# Patient Record
Sex: FEMALE | Race: WHITE | NOT HISPANIC OR LATINO | Employment: OTHER | URBAN - METROPOLITAN AREA
[De-identification: names, ages, dates, MRNs, and addresses within clinical notes are randomized per-mention and may not be internally consistent; named-entity substitution may affect disease eponyms.]

---

## 2017-01-02 ENCOUNTER — APPOINTMENT (OUTPATIENT)
Dept: RADIOLOGY | Facility: MEDICAL CENTER | Age: 67
End: 2017-01-02
Attending: EMERGENCY MEDICINE
Payer: MEDICARE

## 2017-01-02 ENCOUNTER — HOSPITAL ENCOUNTER (EMERGENCY)
Facility: MEDICAL CENTER | Age: 67
End: 2017-01-02
Attending: EMERGENCY MEDICINE
Payer: MEDICARE

## 2017-01-02 VITALS
HEIGHT: 64 IN | HEART RATE: 76 BPM | BODY MASS INDEX: 28.98 KG/M2 | OXYGEN SATURATION: 95 % | WEIGHT: 169.75 LBS | DIASTOLIC BLOOD PRESSURE: 74 MMHG | SYSTOLIC BLOOD PRESSURE: 120 MMHG | TEMPERATURE: 97 F | RESPIRATION RATE: 18 BRPM

## 2017-01-02 DIAGNOSIS — M54.50 ACUTE BILATERAL LOW BACK PAIN WITHOUT SCIATICA: ICD-10-CM

## 2017-01-02 DIAGNOSIS — J06.9 UPPER RESPIRATORY TRACT INFECTION, UNSPECIFIED TYPE: ICD-10-CM

## 2017-01-02 DIAGNOSIS — M62.838 MUSCLE SPASM: ICD-10-CM

## 2017-01-02 LAB
APPEARANCE UR: ABNORMAL
APPEARANCE UR: CLEAR
BACTERIA #/AREA URNS HPF: ABNORMAL /HPF
BILIRUB UR QL STRIP.AUTO: NEGATIVE
COLOR UR: YELLOW
COLOR UR: YELLOW
CULTURE IF INDICATED INDCX: YES UA CULTURE
EPI CELLS #/AREA URNS HPF: ABNORMAL /HPF
GLUCOSE UR STRIP.AUTO-MCNC: NEGATIVE MG/DL
GLUCOSE UR STRIP.AUTO-MCNC: NEGATIVE MG/DL
KETONES UR STRIP.AUTO-MCNC: NEGATIVE MG/DL
KETONES UR STRIP.AUTO-MCNC: NEGATIVE MG/DL
LEUKOCYTE ESTERASE UR QL STRIP.AUTO: NEGATIVE
LEUKOCYTE ESTERASE UR QL STRIP.AUTO: NEGATIVE
MICRO URNS: ABNORMAL
NITRITE UR QL STRIP.AUTO: POSITIVE
NITRITE UR QL STRIP.AUTO: POSITIVE
PH UR STRIP.AUTO: 6 [PH]
PH UR STRIP.AUTO: 6 [PH]
PROT UR QL STRIP: NEGATIVE MG/DL
PROT UR QL STRIP: NEGATIVE MG/DL
RBC # URNS HPF: ABNORMAL /HPF
RBC UR QL AUTO: NEGATIVE
RBC UR QL AUTO: NEGATIVE
SP GR UR STRIP.AUTO: <=1.005
SP GR UR STRIP.AUTO: <=1.005
WBC #/AREA URNS HPF: ABNORMAL /HPF

## 2017-01-02 PROCEDURE — 71010 DX-CHEST-LIMITED (1 VIEW): CPT

## 2017-01-02 PROCEDURE — 87086 URINE CULTURE/COLONY COUNT: CPT

## 2017-01-02 PROCEDURE — 99284 EMERGENCY DEPT VISIT MOD MDM: CPT

## 2017-01-02 PROCEDURE — 87077 CULTURE AEROBIC IDENTIFY: CPT

## 2017-01-02 PROCEDURE — 87186 SC STD MICRODIL/AGAR DIL: CPT

## 2017-01-02 PROCEDURE — 81002 URINALYSIS NONAUTO W/O SCOPE: CPT

## 2017-01-02 PROCEDURE — 81001 URINALYSIS AUTO W/SCOPE: CPT

## 2017-01-02 RX ORDER — CIPROFLOXACIN 500 MG/1
500 TABLET, FILM COATED ORAL 2 TIMES DAILY
Qty: 14 TAB | Refills: 0 | Status: SHIPPED | OUTPATIENT
Start: 2017-01-02 | End: 2017-01-06

## 2017-01-02 RX ORDER — METHOCARBAMOL 750 MG/1
1500 TABLET, FILM COATED ORAL 4 TIMES DAILY PRN
Qty: 20 TAB | Refills: 0 | Status: SHIPPED | OUTPATIENT
Start: 2017-01-02 | End: 2017-02-27

## 2017-01-02 RX ORDER — METHYLPREDNISOLONE 4 MG/1
TABLET ORAL
Qty: 1 KIT | Refills: 0 | Status: SHIPPED | OUTPATIENT
Start: 2017-01-02 | End: 2017-01-18 | Stop reason: SDUPTHER

## 2017-01-02 ASSESSMENT — PAIN SCALES - GENERAL: PAINLEVEL_OUTOF10: 4

## 2017-01-02 NOTE — ED AVS SNAPSHOT
Foound Access Code: Activation code not generated  Current Foound Status: Active    Personalishart  A secure, online tool to manage your health information     [employer]’s Foound® is a secure, online tool that connects you to your personalized health information from the privacy of your home -- day or night - making it very easy for you to manage your healthcare. Once the activation process is completed, you can even access your medical information using the Foound koko, which is available for free in the Apple Koko store or Google Play store.     Foound provides the following levels of access (as shown below):   My Chart Features   [facility] Primary Care Doctor [facility]  Specialists [facility]  Urgent  Care Non-[facility]  Primary Care  Doctor   Email your healthcare team securely and privately 24/7 X X X X   Manage appointments: schedule your next appointment; view details of past/upcoming appointments X      Request prescription refills. X      View recent personal medical records, including lab and immunizations X X X X   View health record, including health history, allergies, medications X X X X   Read reports about your outpatient visits, procedures, consult and ER notes X X X X   See your discharge summary, which is a recap of your hospital and/or ER visit that includes your diagnosis, lab results, and care plan. X X       How to register for Foound:  1. Go to  [email or web address].  2. Click on the Sign Up Now box, which takes you to the New Member Sign Up page. You will need to provide the following information:  a. Enter your Foound Access Code exactly as it appears at the top of this page. (You will not need to use this code after you’ve completed the sign-up process. If you do not sign up before the expiration date, you must request a new code.)   b. Enter your date of birth.   c. Enter your home email address.   d. Click Submit, and follow the next screen’s instructions.  3. Create a Foound ID. This will  be your [employer] login ID and cannot be changed, so think of one that is secure and easy to remember.  4. Create a [employer] password. You can change your password at any time.  5. Enter your Password Reset Question and Answer. This can be used at a later time if you forget your password.   6. Enter your e-mail address. This allows you to receive e-mail notifications when new information is available in [employer].  7. Click Sign Up. You can now view your health information.    For assistance activating your [employer] account, call [phone]

## 2017-01-02 NOTE — ED AVS SNAPSHOT
1/2/2017          Summer Hatch  [address] [place] [address]    Dear Summer:    [facility] wants to ensure your discharge home is safe and you or your loved ones have had all your questions answered regarding your care after you leave the hospital.    You may receive a telephone call within two days of your discharge.  This call is to make certain you understand your discharge instructions as well as ensure we provided you with the best care possible during your stay with us.     The call will only last approximately 3-5 minutes and will be done by a nurse.    Once again, we want to ensure your discharge home is safe and that you have a clear understanding of any next steps in your care.  If you have any questions or concerns, please do not hesitate to contact us, we are here for you.  Thank you for choosing [facility] for your healthcare needs.    Sincerely,    Jarred Gonzalez    [facility]

## 2017-01-02 NOTE — ED NOTES
Presents accompanied by family; pt complains of exacerbation of chronic back pin.  She has a residual cough from a recent URI treated with antibiotics.

## 2017-01-02 NOTE — ED AVS SNAPSHOT
After Visit Summary                                                                                                                Summer Hatch   MRN: [id number]    Department:  [facility], Emergency Dept   Date of Visit:  1/2/2017            [facility], Emergency Dept    [address]    [place] [address]    Phone:  [phone]      You were seen by     Victor Hugo Franco M.D.      Your Diagnosis Was     Acute bilateral low back pain without sciatica     M54.5       Follow-up Information     1. Follow up with Jl Palomino M.D.. Schedule an appointment as soon as possible for a visit in 1 week.    Specialty:  Family Medicine    Why:  As needed    Contact information    [address] [facility] [address]  [place] [address] [phone]        Medication Information     Review all of your home medications and newly ordered medications with your primary doctor and/or pharmacist as soon as possible. Follow medication instructions as directed by your doctor and/or pharmacist.     Please keep your complete medication list with you and share with your physician. Update the information when medications are discontinued, doses are changed, or new medications (including over-the-counter products) are added; and carry medication information at all times in the event of emergency situations.               Medication List      START taking these medications        Instructions    ciprofloxacin 500 MG Tabs   Commonly known as:  CIPRO    Take 1 Tab by mouth 2 times a day.   Dose:  500 mg       methocarbamol 750 MG Tabs   Commonly known as:  ROBAXIN    Take 2 Tabs by mouth 4 times a day as needed (muscle spasm).   Dose:  1500 mg       MethylPREDNISolone 4 MG Tbpk   Commonly known as:  MEDROL DOSEPAK    Sig: Per instructions on pack         ASK your doctor about these medications        Instructions    * albuterol 108 (90 BASE) MCG/ACT Aers inhalation aerosol    Inhale 2 Puffs by  mouth every 6 hours as needed for Shortness of Breath.   Dose:  2 Puff       * albuterol 2.5mg/3ml Nebu solution for nebulization   Commonly known as:  PROVENTIL    Doctor's comments:  DX COPD J44.9   3 mL by Nebulization route every four hours as needed for Shortness of Breath.   Dose:  2.5 mg       allopurinol 100 MG Tabs   Commonly known as:  ZYLOPRIM    TAKE 1 TABLET DAILY       * aripiprazole 5 MG tablet   Commonly known as:  ABILIFY    TAKE 1 TABLET AT BEDTIME       * aripiprazole 5 MG tablet   Commonly known as:  ABILIFY    TAKE 1 TABLET AT BEDTIME       aspirin 325 MG Tabs   Commonly known as:  ASA    Take 325 mg by mouth every day.   Dose:  325 mg       atorvastatin 40 MG Tabs   Commonly known as:  LIPITOR    Take 1 Tab by mouth every day.   Dose:  40 mg       * azithromycin 250 MG Tabs   Commonly known as:  ZITHROMAX    Take 2 tablets on day 1, then take 1 tablet a day for 4 days.       * azithromycin 250 MG Tabs   Commonly known as:  ZITHROMAX    Take 2 tablets on day 1, then take 1 tablet a day for 4 days.       Calcium Carbonate Antacid 1000 MG Chew    Take 1 Tab by mouth 2 Times a Day.   Dose:  1000 mg       cyclobenzaprine 10 MG Tabs   Commonly known as:  FLEXERIL    Take 1 Tab by mouth 3 times a day as needed.   Dose:  10 mg        MG Caps    Take 100 mg by mouth every morning.   Dose:  100 mg       * duloxetine 60 MG Cpep delayed-release capsule   Commonly known as:  CYMBALTA    TAKE 1 CAPSULE EVERY       MORNING       * duloxetine 60 MG Cpep delayed-release capsule   Commonly known as:  CYMBALTA    TAKE 1 CAPSULE EVERY       MORNING       fenofibrate 145 MG Tabs   Commonly known as:  TRICOR    TAKE 1 TABLET DAILY       fluticasone 50 MCG/ACT nasal spray   Commonly known as:  FLONASE    Spray 1-2 Sprays in nose every day.   Dose:  1-2 Spray       fluticasone-salmeterol 250-50 MCG/DOSE Aepb   Commonly known as:  ADVAIR    Inhale 1 Puff by mouth every 12 hours.   Dose:  1 Puff        hydrocodone-acetaminophen 5-325 MG Tabs per tablet   Commonly known as:  NORCO    Take 1-2 Tabs by mouth every four hours as needed.   Dose:  1-2 Tab       latanoprost 0.005 % Soln   Commonly known as:  XALATAN    Place 1 Drop in both eyes every evening.   Dose:  1 Drop       lisinopril 10 MG Tabs   Commonly known as:  PRINIVIL    Take 1 Tab by mouth every day.   Dose:  10 mg       Magnesium Oxide 500 MG Tabs    Take 1 Tab by mouth PRN.   Dose:  1 Tab       niacin 1000 MG CR tablet   Commonly known as:  NIASPAN    TAKE 1 TABLET DAILY       omeprazole 20 MG delayed-release capsule   Commonly known as:  PRILOSEC    Take 1 Cap by mouth every morning. Indications: GERD-Related Laryngitis   Dose:  20 mg       * predniSONE 10 MG Tabs   Commonly known as:  DELTASONE    Take 30mg x 5 days, then take 20mg x 5 days, then take 10mg x 5 days, with food, then discontinue.       * predniSONE 10 MG Tabs   Commonly known as:  DELTASONE    Take 30mg x 3 days, then take 20mg x 3 days, then take 10mg x 3 days, with food, then discontinue.       SPIRIVA HANDIHALER 18 MCG Caps   Generic drug:  tiotropium    INHALE THE CONTENTS OF ONE CAPSULE DAILY       SYNTHROID 100 MCG Tabs   Generic drug:  levothyroxine    TAKE 1 TABLET DAILY       tramadol 50 MG Tabs   Commonly known as:  ULTRAM    TAKE 1 TO 2 TABLETS BY MOUTH 2 TIMES DAILY AS NEEDED FOR SEVERE PAIN       triamterene-hctz 37.5-25 MG Tabs   Commonly known as:  MAXZIDE-25/DYAZIDE    TAKE 1 TABLET EVERY MORNING       * Notice:  This list has 10 medication(s) that are the same as other medications prescribed for you. Read the directions carefully, and ask your doctor or other care provider to review them with you.            Procedures and tests performed during your visit     DX-CHEST-LIMITED (1 VIEW)    POC UA    URINALYSIS,CULTURE IF INDICATED    URINE MICROSCOPIC (W/UA)        Discharge Instructions         Back Pain & Injury    Take Medrol Dosepak and Robaxin for spasm. Followup if  not better 7-10 days.  Return for weakness or fever.  Avoid lifting more than 10 pounds and avoid bending.  Avoid strict bedrest. Continue your inhalers using albuterol 4 times a day. See your physician if respiratory symptoms not improving next week. Return for worsening shortness of breath, dizziness, vomiting, ill appearance.    Your back pain is most likely caused by a strain of the muscles or ligaments that support the spine.  This is a  common injury. Back strains cause pain and trouble moving because of muscle spasms. They may take several weeks to heal, although they are usually much better after 2-3 days.     Your spinal column (backbone) is made up of 24 main vertebral bodies in addition to the sacrum and coccyx. These are held together by tough fibrous tissue called ligaments, and also by the support of your muscles. Nerve roots pass through the openings between the vertebrae. A sudden wrenching move or injury to the back may cause injury to, or pressure upon these nerves. This may result in localized back pain or radiation (movement) of pain into the buttocks and down the leg into the foot. The condition known as sciatica is frequently associated with a ruptured (herniated) disc. Pain is also created by muscle spasm alone.    Treatment for back pain includes:  l Rest - Get bed rest as needed over the next day or two.  Use a firm mattress and lie on your side with your knees slightly bent. If you lie on your back, put a pillow under your knees.  Use bed rest for only the most extreme, acute (sudden) episode. Prolonged bed rest over 48 hours will aggravate your condition.  l Early movement - Back pain improves most rapidly if you remain active. It is much more stressful on the back to sit or  one place than it is to move around.  Do not sit, drive or  one place for more than 30 minutes at a time.  Take short walks on level surfaces as soon as pain will allow.  l Limit bending and lifting -  Don't bend over or lift anything over 20 pounds until you are completely better. Learn to lift by bending your knees and using your leg muscles to help. Keep the load close to your body and avoid twisting, reaching and overhead work.     l Medicines - Medicine to reduce pain and inflammation are helpful and muscle-relaxing drugs may also be prescribed.  l Therapy - Ice used for acute conditions is very effective. Use a large plastic bag filled with ice and wrapped in a towel. This also provides excellent pain relief. This may be continuous or for thirty minutes every two hours during acute phase, then as needed. Heat for thirty minutes prior to activities is helpful.  Back exercises and gentle massage may be of some benefit.You should be examined again if your back pain is not better in one week.     TO PREVENT:  Avoid an underactive life style. Active exercise, as directed by your caregiver, is your greatest weapon against back pain. Hard physical activities such as tennis, racquetball, water skiing etc., without proper physical conditioning may aggravate and/or create problems, especially if you are not in condition for that activity. If you have a back problem it is especially important to avoid sports requiring sudden body movements. Swimming and walking are generally safer activities. Maintain good posture. Avoid obesity.    See your caregiver for continued problems. Your caregiver can help or refer you for appropriate exercises and work hardening. Work hardening means that the back is put through the proper exercises and rehabilitation to treat the present problems and prevent future problems. With conditioning, most back problems can be avoided. Sometimes a more serious issue may be the cause of back pain and you should be seen immediately again if new problems seem to be developing.    seek immediate medical attention if:  Ø Numbness, tingling, weakness, or problem with the use of your arms or  legs.  Ø Severe back pain not relieved with medications.  Ø Change in bowel or bladder control.  Ø Increasing pain in any areas of the body.  Ø Shortness of breath, dizziness or fainting.  Ø Nausea (feeling sick to your stomach), vomiting or sweats.  Ø Fever above 101º    ExitCare® Patient Information ©2007 [employer].              Patient Information     Patient Information    Following emergency treatment: all patient requiring follow-up care must return either to a private physician or a clinic if your condition worsens before you are able to obtain further medical attention, please return to the emergency room.     Billing Information    At [facility], we work to make the billing process streamlined for our patients.  Our Representatives are here to answer any questions you may have regarding your hospital bill.  If you have insurance coverage and have supplied your insurance information to us, we will submit a claim to your insurer on your behalf.  Should you have any questions regarding your bill, we can be reached online or by phone as follows:  Online: You are able pay your bills online or live chat with our representatives about any billing questions you may have. We are here to help Monday - Friday from 8:00am to 7:30pm and 9:00am - 12:00pm on Saturdays.  Please visit [email or web address]  for more information.   Phone:  [phone] or [phone]    Please note that your emergency physician, surgeon, pathologist, radiologist, anesthesiologist, and other specialists are not employed by [facility] and will therefore bill separately for their services.  Please contact them directly for any questions concerning their bills at the numbers below:     Emergency Physician Services:  [phone]  [facility]:  [phone]  [facility]:  [phone]  [facility]:  [phone]    1. Your final bill may vary from the amount  quoted upon discharge if all procedures are not complete at that time, or if your doctor has additional procedures of which we are not aware. You will receive an additional bill if you return to the Emergency Department at [facility] for suture removal regardless of the facility of which the sutures were placed.     2. Please arrange for settlement of this account at the emergency registration.    3. All self-pay accounts are due in full at the time of treatment.  If you are unable to meet this obligation then payment is expected within 4-5 days.     4. If you have had radiology studies (CT, X-ray, Ultrasound, MRI), you have received a preliminary result during your emergency department visit. Please contact the radiology department [phone] to receive a copy of your final result. Please discuss the Final result with your primary physician or with the follow up physician provided.     Crisis Hotline:  [place] Crisis Hotline:  8-446-DSFUFPY or 1-576.472.4105  [place] Crisis Hotline:    1-716.632.6761 or 181-605-9182         ED Discharge Follow Up Questions    1. In order to provide you with very good care, we would like to follow up with a phone call in the next few days.  May we have your permission to contact you?     YES /  NO    2. What is the best phone number to call you? (       )_____-__________    3. What is the best time to call you?      Morning  /  Afternoon  /  Evening                   Patient Signature:  ____________________________________________________________    Date:  ____________________________________________________________      Your appointments     Jan 18, 2017  1:00 PM   Established Patient Pul with JOSE Marrufo   [facility] (--)    [address]  [address]  [place] [address]   [phone]            Mar 30, 2017  8:40 AM   Established Patient with Jl Palomino M.D.   [facility] ([facility])    [address] [address]   [phone]           You will be receiving a confirmation call a few days before your appointment from our automated call confirmation system.

## 2017-01-03 NOTE — DISCHARGE INSTRUCTIONS
Back Pain & Injury    Take Medrol Dosepak and Robaxin for spasm. Followup if not better 7-10 days.  Return for weakness or fever.  Avoid lifting more than 10 pounds and avoid bending.  Avoid strict bedrest. Continue your inhalers using albuterol 4 times a day. See your physician if respiratory symptoms not improving next week. Return for worsening shortness of breath, dizziness, vomiting, ill appearance.    Your back pain is most likely caused by a strain of the muscles or ligaments that support the spine.  This is a  common injury. Back strains cause pain and trouble moving because of muscle spasms. They may take several weeks to heal, although they are usually much better after 2-3 days.     Your spinal column (backbone) is made up of 24 main vertebral bodies in addition to the sacrum and coccyx. These are held together by tough fibrous tissue called ligaments, and also by the support of your muscles. Nerve roots pass through the openings between the vertebrae. A sudden wrenching move or injury to the back may cause injury to, or pressure upon these nerves. This may result in localized back pain or radiation (movement) of pain into the buttocks and down the leg into the foot. The condition known as sciatica is frequently associated with a ruptured (herniated) disc. Pain is also created by muscle spasm alone.    Treatment for back pain includes:  l Rest - Get bed rest as needed over the next day or two.  Use a firm mattress and lie on your side with your knees slightly bent. If you lie on your back, put a pillow under your knees.  Use bed rest for only the most extreme, acute (sudden) episode. Prolonged bed rest over 48 hours will aggravate your condition.  l Early movement - Back pain improves most rapidly if you remain active. It is much more stressful on the back to sit or  one place than it is to move around.  Do not sit, drive or  one place for more than 30 minutes at a time.  Take short  walks on level surfaces as soon as pain will allow.  l Limit bending and lifting - Don't bend over or lift anything over 20 pounds until you are completely better. Learn to lift by bending your knees and using your leg muscles to help. Keep the load close to your body and avoid twisting, reaching and overhead work.     l Medicines - Medicine to reduce pain and inflammation are helpful and muscle-relaxing drugs may also be prescribed.  l Therapy - Ice used for acute conditions is very effective. Use a large plastic bag filled with ice and wrapped in a towel. This also provides excellent pain relief. This may be continuous or for thirty minutes every two hours during acute phase, then as needed. Heat for thirty minutes prior to activities is helpful.  Back exercises and gentle massage may be of some benefit.You should be examined again if your back pain is not better in one week.     TO PREVENT:  Avoid an underactive life style. Active exercise, as directed by your caregiver, is your greatest weapon against back pain. Hard physical activities such as tennis, racquetball, water skiing etc., without proper physical conditioning may aggravate and/or create problems, especially if you are not in condition for that activity. If you have a back problem it is especially important to avoid sports requiring sudden body movements. Swimming and walking are generally safer activities. Maintain good posture. Avoid obesity.    See your caregiver for continued problems. Your caregiver can help or refer you for appropriate exercises and work hardening. Work hardening means that the back is put through the proper exercises and rehabilitation to treat the present problems and prevent future problems. With conditioning, most back problems can be avoided. Sometimes a more serious issue may be the cause of back pain and you should be seen immediately again if new problems seem to be developing.    seek immediate medical attention  if:  Ø Numbness, tingling, weakness, or problem with the use of your arms or legs.  Ø Severe back pain not relieved with medications.  Ø Change in bowel or bladder control.  Ø Increasing pain in any areas of the body.  Ø Shortness of breath, dizziness or fainting.  Ø Nausea (feeling sick to your stomach), vomiting or sweats.  Ø Fever above 101º    ExitCare® Patient Information ©2007 [employer].

## 2017-01-03 NOTE — ED NOTES
Discharge and f/u instructions discussed and understanding verbalized.  Ambulatory out of ED.  RX x 2 given.

## 2017-01-03 NOTE — ED PROVIDER NOTES
ED Provider Note    CHIEF COMPLAINT  Chief Complaint   Patient presents with   • Back Pain   • Cough       HPI  Summer Hatch is a 66 y.o. female who presents for severe back pain since this morning that is positional and locks the patient up preventing her from moving. She is pain-free now in her current posture. Onset today. Denies history of chronic back pain prior surgeries or injury. Denies Radiation of the pain weakness numbness bowel or bladder issue or fever. Patient has also had a respiratory illness she called flu for 7 days with cough productive of yellow sputum. She has a history of asthma and COPD and quit smoking 20 years ago. Her pulmonologist Dr. Shaikh treated her with a Z-Gerard and prednisone in addition to her Spiriva Advair and albuterol. She believes this infection is improving.    REVIEW OF SYSTEMS  Pertinent positives include: Baseline shortness of breath, resolved vomiting and resolved diarrhea.  Pertinent negatives include: Chest pain, fever, influenza vaccine, diabetes, immunocompromise.    PAST MEDICAL HISTORY  Past Medical History   Diagnosis Date   • Hypertension    • COPD    • Renal disorder    • Seizure disorder (HCC)    • Carpal tunnel syndrome    • Hypothyroidism 10/22/2013   • GERD (gastroesophageal reflux disease) 10/22/2013   • History of tobacco abuse 10/22/2013     Quit 1998. Smoked one pack per day for 30 years.   • Asthma    • Bronchitis    • Osteoporosis    • Pneumonia        SOCIAL HISTORY  No tobacco use.    CURRENT MEDICATIONS  •  predniSONE (DELTASONE) 10 MG Tab, Take 30mg x 3 days, then take 20mg x 3 days, then take 10mg x 3 days, with food, then discontinue., Disp: 18 Tab, Rfl: 2  •  azithromycin (ZITHROMAX) 250 MG Tab, Take 2 tablets on day 1, then take 1 tablet a day for 4 days., Disp: 6 Tab, Rfl: 0  •  SYNTHROID 100 MCG Tab, TAKE 1 TABLET DAILY, Disp: 90 Tab, Rfl: 3  •  cyclobenzaprine (FLEXERIL) 10 MG Tab, Take 1 Tab by mouth 3 times a day as needed., Disp: 30 Tab,  Rfl: 0  •  duloxetine (CYMBALTA) 60 MG Cap DR Particles delayed-release capsule, TAKE 1 CAPSULE EVERY       MORNING, Disp: 90 Cap, Rfl: 3  •  aripiprazole (ABILIFY) 5 MG tablet, TAKE 1 TABLET AT BEDTIME, Disp: 90 Tab, Rfl: 3  •  atorvastatin (LIPITOR) 40 MG Tab, Take 1 Tab by mouth every day., Disp: 90 Tab, Rfl: 3  •  fenofibrate (TRICOR) 145 MG Tab, TAKE 1 TABLET DAILY, Disp: 90 Tab, Rfl: 3  •  aripiprazole (ABILIFY) 5 MG tablet, TAKE 1 TABLET AT BEDTIME, Disp: 90 Tab, Rfl: 3  •  albuterol 108 (90 BASE) MCG/ACT Aero Soln inhalation aerosol, Inhale 2 Puffs by mouth every 6 hours as needed for Shortness of Breath., Disp: 3 Inhaler, Rfl: 3  •  predniSONE (DELTASONE) 10 MG Tab, Take 30mg x 5 days, then take 20mg x 5 days, then take 10mg x 5 days, with food, then discontinue., Disp: 30 Tab, Rfl: 0  •  albuterol (PROVENTIL) 2.5mg/3ml Nebu Soln solution for nebulization, 3 mL by Nebulization route every four hours as needed for Shortness of Breath., Disp: 360 mL, Rfl: 5  •  azithromycin (ZITHROMAX) 250 MG Tab, Take 2 tablets on day 1, then take 1 tablet a day for 4 days., Disp: 6 Tab, Rfl: 0  •  tramadol (ULTRAM) 50 MG Tab, TAKE 1 TO 2 TABLETS BY MOUTH 2 TIMES DAILY AS NEEDED FOR SEVERE PAIN, Disp: 90 Tab, Rfl: 5  •  allopurinol (ZYLOPRIM) 100 MG Tab, TAKE 1 TABLET DAILY, Disp: 90 Tab, Rfl: 3  •  SPIRIVA HANDIHALER 18 MCG Cap, INHALE THE CONTENTS OF ONE CAPSULE DAILY, Disp: 90 Cap, Rfl: 3  •  lisinopril (PRINIVIL) 10 MG Tab, Take 1 Tab by mouth every day., Disp: 90 Tab, Rfl: 3  •  omeprazole (PRILOSEC) 20 MG delayed-release capsule, Take 1 Cap by mouth every morning. Indications: GERD-Related Laryngitis, Disp: 90 Cap, Rfl: 3  •  duloxetine (CYMBALTA) 60 MG Cap DR Particles delayed-release capsule, TAKE 1 CAPSULE EVERY       MORNING, Disp: 90 Cap, Rfl: 3  •  triamterene-hctz (MAXZIDE-25/DYAZIDE) 37.5-25 MG Tab, TAKE 1 TABLET EVERY MORNING, Disp: 90 Tab, Rfl: 3  •  niacin (NIASPAN) 1000 MG CR tablet, TAKE 1 TABLET DAILY,  "Disp: 90 Tab, Rfl: 3  •  fluticasone (FLONASE) 50 MCG/ACT nasal spray, Spray 1-2 Sprays in nose every day., Disp: 3 Bottle, Rfl: 3  •  fluticasone-salmeterol (ADVAIR) 250-50 MCG/DOSE AEROSOL POWDER, BREATH ACTIVATED, Inhale 1 Puff by mouth every 12 hours., Disp: 3 Inhaler, Rfl: 3  •  Magnesium Oxide 500 MG Tab, Take 1 Tab by mouth PRN., Disp: , Rfl:   •  calcium carbonate 1000 MG Chew Tab, Take 1 Tab by mouth 2 Times a Day., Disp: 30 Tab, Rfl: 3  •  docusate sodium 100 MG Cap, Take 100 mg by mouth every morning., Disp: 60 Cap, Rfl: 3  •  latanoprost (XALATAN) 0.005 % Solution, Place 1 Drop in both eyes every evening., Disp: 1 Bottle, Rfl: 3  •  hydrocodone-acetaminophen (NORCO) 5-325 MG Tab per tablet, Take 1-2 Tabs by mouth every four hours as needed., Disp: , Rfl:   •  aspirin (ASA) 325 MG TABS, Take 325 mg by mouth every day., Disp: , Rfl:       ALLERGIES  Allergies   Allergen Reactions   • Tape    • Tylenol Swelling     Lip, throat swelling       PHYSICAL EXAM  VITAL SIGNS: /74 mmHg  Pulse 77  Temp(Src) 36.1 °C (97 °F)  Resp 18  Ht 1.626 m (5' 4\")  Wt 77 kg (169 lb 12.1 oz)  BMI 29.12 kg/m2  SpO2 94%  Constitutional :  Well developed, Well nourished, no hypoxia on room air.   HNT: Oropharynx moist without erythema or exudate.   Ears: External ears normal.  Eyes: pupils reactive without eye discharge nor conjunctival hyperemia.  Neck: Normal range of motion, No tenderness, Supple, No stridor.   Lymphatic: No adenopathy.   Cardiovascular: Regular rhythm, No murmurs, No rubs, No gallops.  No cyanosis.   Respiratory: Rales, rhonchi, wheeze  Abdomen:  Soft, nontender  Skin: Warm, dry, no erythema, no rash.   Musculoskeletal: no limb deformities.    RADIOLOGY:  DX-CHEST-LIMITED (1 VIEW)   Final Result         No acute cardiac or pulmonary abnormality is identified.          LABORATORY:  Results for orders placed or performed during the hospital encounter of 01/02/17   URINALYSIS,CULTURE IF INDICATED "   Result Value Ref Range    Micro Urine Req Microscopic     Color Yellow     Character Clear     Specific Gravity <=1.005 <1.035    Ph 6.0 5.0-8.0    Glucose Negative Negative mg/dL    Ketones Negative Negative mg/dL    Protein Negative Negative mg/dL    Bilirubin Negative Negative    Nitrite Positive (A) Negative    Leukocyte Esterase Negative Negative    Occult Blood Negative Negative    Culture Indicated Yes UA Culture   URINE MICROSCOPIC (W/UA)   Result Value Ref Range    WBC 0-2 /hpf    RBC 0-2 /hpf    Bacteria Moderate (A) None /hpf    Epithelial Cells Few Few /hpf   POC UA   Result Value Ref Range    POC Color Yellow     POC Appearance Slightly Cloudy (A)     POC Glucose Negative Negative mg/dL    POC Ketones Negative Negative mg/dL    POC Specific Gravity <=1.005 (A) 1.005-1.030    POC Blood Negative Negative    POC Urine PH 6.0 5.0-8.0    POC Protein Negative Negative mg/dL    POC Nitrites Positive (A) Negative    POC Leukocyte Esterase Negative Negative         COURSE & MEDICAL DECISION MAKING  This patient presents with back pain that is likely muscle spasm given the positional nature and history. There is no evidence of neurologic deficit or epidural abscess. There is no trauma so at this point there is no indication for imaging. She also has a recent respiratory infection and asthma and COPD that appears to be improving after a course of antibiotics. There is no pneumonia or hypoxia.    PLAN:  Discharge Medication List as of 1/2/2017  5:28 PM      START taking these medications    Details   MethylPREDNISolone (MEDROL DOSEPAK) 4 MG Tablet Therapy Pack Sig: Per instructions on pack, Disp-1 Kit, R-0, Print Rx Paper      methocarbamol (ROBAXIN) 750 MG Tab Take 2 Tabs by mouth 4 times a day as needed (muscle spasm)., Disp-20 Tab, R-0, Print Rx Paper             Back pain handout given    Jl Palomino M.D.  [address]  [address]  [facility] [id number] [phone]    Schedule an appointment as soon as  possible for a visit in 1 week  As needed        CONDITION:  Good.    FINAL IMPRESSION:  1. Acute bilateral low back pain without sciatica    2. Muscle spasm    3. Upper respiratory tract infection, unspecified type    4.      History of asthma and COPD      Electronically signed by: Victor Hugo Franco, 1/2/2017

## 2017-01-04 LAB
BACTERIA UR CULT: ABNORMAL
BACTERIA UR CULT: ABNORMAL
SIGNIFICANT IND 70042: ABNORMAL
SITE SITE: ABNORMAL
SOURCE SOURCE: ABNORMAL

## 2017-01-05 NOTE — PROGRESS NOTES
ED Positive Culture Follow-up/Notification Note:    Date: 1/5/17     Patient seen in the ED on 1/2/2017 for back pain and cough.  Per the ED note, the back pain is positional and she was pain free in the ER.  There was also documentation of improvement of her respiratory symptoms on azithromycin prescribed by her pulmonologist.      1. Acute bilateral low back pain without sciatica    2. Muscle spasm    3. Upper respiratory tract infection, unspecified type       Discharge Medication List as of 1/2/2017  5:28 PM      START taking these medications    Details   MethylPREDNISolone (MEDROL DOSEPAK) 4 MG Tablet Therapy Pack Sig: Per instructions on pack, Disp-1 Kit, R-0, Print Rx Paper      methocarbamol (ROBAXIN) 750 MG Tab Take 2 Tabs by mouth 4 times a day as needed (muscle spasm)., Disp-20 Tab, R-0, Print Rx Paper      ciprofloxacin (CIPRO) 500 MG Tab Take 1 Tab by mouth 2 times a day., Disp-14 Tab, R-0, Print Rx Paper             Allergies: Tape and Tylenol     Final cultures:   Results     Procedure Component Value Units Date/Time    URINE CULTURE(NEW) [[id number]]  (Abnormal)  (Susceptibility) Collected:  01/02/17 1546    Order Status:  Completed Specimen Information:  Urine Updated:  01/04/17 5205     Significant Indicator POS (POS)      Source UR      Site --      Urine Culture -- (A)      Urine Culture -- (A)      Result:        Escherichia coli  >100,000 cfu/mL      Narrative:      TEST Urine Culture WAS CANCELLED, 01/02/17 19:34 HIS# [id number]    Culture & Susceptibility     ESCHERICHIA COLI     Antibiotic Sensitivity Microscan Unit Status    Ampicillin Sensitive <=8 mcg/mL Final    Cefepime Sensitive <=8 mcg/mL Final    Cefotaxime Sensitive <=2 mcg/mL Final    Cefotetan Sensitive <=16 mcg/mL Final    Ceftazidime Sensitive <=1 mcg/mL Final    Ceftriaxone Sensitive <=8 mcg/mL Final    Cefuroxime Sensitive <=4 mcg/mL Final    Cephalothin Sensitive <=8 mcg/mL Final    Ciprofloxacin Sensitive <=1 mcg/mL Final     Gentamicin Sensitive <=4 mcg/mL Final    Levofloxacin Sensitive <=2 mcg/mL Final    Nitrofurantoin Sensitive <=32 mcg/mL Final    Pip/Tazobactam Sensitive <=16 mcg/mL Final    Piperacillin Sensitive <=16 mcg/mL Final    Tigecycline Sensitive <=2 mcg/mL Final    Tobramycin Sensitive <=4 mcg/mL Final    Trimeth/Sulfa Sensitive <=2/38 mcg/mL Final                       URINALYSIS,CULTURE IF INDICATED [[id number]]  (Abnormal) Collected:  01/02/17 1546    Order Status:  Completed Specimen Information:  Other Updated:  01/02/17 1705     Micro Urine Req Microscopic      Color Yellow      Character Clear      Specific Gravity <=1.005      Ph 6.0      Glucose Negative mg/dL      Ketones Negative mg/dL      Protein Negative mg/dL      Bilirubin Negative      Nitrite Positive (A)      Leukocyte Esterase Negative      Occult Blood Negative      Culture Indicated Yes UA Culture     URINE CULTURE(NEW) [[id number]]     Order Status:  Canceled           Plan:   - No documented symptoms of UTI and UA with negative leukocyte esterase.  - This is asymptomatic bacteriuria.  No changes to treatment are needed.     Catalina Finley

## 2017-01-06 PROCEDURE — 99284 EMERGENCY DEPT VISIT MOD MDM: CPT

## 2017-01-07 ENCOUNTER — HOSPITAL ENCOUNTER (EMERGENCY)
Facility: MEDICAL CENTER | Age: 67
End: 2017-01-07
Attending: EMERGENCY MEDICINE
Payer: MEDICARE

## 2017-01-07 VITALS
HEIGHT: 64 IN | WEIGHT: 165 LBS | SYSTOLIC BLOOD PRESSURE: 125 MMHG | DIASTOLIC BLOOD PRESSURE: 83 MMHG | OXYGEN SATURATION: 94 % | RESPIRATION RATE: 18 BRPM | TEMPERATURE: 97.4 F | HEART RATE: 90 BPM | BODY MASS INDEX: 28.17 KG/M2

## 2017-01-07 DIAGNOSIS — M62.838 MUSCLE SPASM: ICD-10-CM

## 2017-01-07 DIAGNOSIS — M54.50 ACUTE BILATERAL LOW BACK PAIN WITHOUT SCIATICA: ICD-10-CM

## 2017-01-07 PROCEDURE — A9270 NON-COVERED ITEM OR SERVICE: HCPCS | Performed by: EMERGENCY MEDICINE

## 2017-01-07 PROCEDURE — 700102 HCHG RX REV CODE 250 W/ 637 OVERRIDE(OP): Performed by: EMERGENCY MEDICINE

## 2017-01-07 PROCEDURE — 36415 COLL VENOUS BLD VENIPUNCTURE: CPT

## 2017-01-07 PROCEDURE — 96374 THER/PROPH/DIAG INJ IV PUSH: CPT

## 2017-01-07 PROCEDURE — 700111 HCHG RX REV CODE 636 W/ 250 OVERRIDE (IP): Performed by: EMERGENCY MEDICINE

## 2017-01-07 RX ORDER — OXYCODONE HYDROCHLORIDE 5 MG/1
5 TABLET ORAL EVERY 6 HOURS PRN
Qty: 12 TAB | Refills: 0 | Status: SHIPPED | OUTPATIENT
Start: 2017-01-07 | End: 2017-01-10

## 2017-01-07 RX ORDER — OXYCODONE HYDROCHLORIDE 5 MG/1
5 TABLET ORAL ONCE
Status: COMPLETED | OUTPATIENT
Start: 2017-01-07 | End: 2017-01-07

## 2017-01-07 RX ORDER — DIAZEPAM 5 MG/1
5 TABLET ORAL ONCE
Status: COMPLETED | OUTPATIENT
Start: 2017-01-07 | End: 2017-01-07

## 2017-01-07 RX ORDER — OXYCODONE HYDROCHLORIDE 5 MG/1
10 TABLET ORAL ONCE
Status: COMPLETED | OUTPATIENT
Start: 2017-01-07 | End: 2017-01-07

## 2017-01-07 RX ORDER — DIAZEPAM 5 MG/1
5 TABLET ORAL EVERY 12 HOURS PRN
Qty: 6 TAB | Refills: 0 | Status: SHIPPED | OUTPATIENT
Start: 2017-01-07 | End: 2017-02-27

## 2017-01-07 RX ADMIN — OXYCODONE HYDROCHLORIDE 5 MG: 5 TABLET ORAL at 03:16

## 2017-01-07 RX ADMIN — OXYCODONE HYDROCHLORIDE 10 MG: 5 TABLET ORAL at 01:20

## 2017-01-07 RX ADMIN — HYDROMORPHONE HYDROCHLORIDE 1 MG: 1 INJECTION, SOLUTION INTRAMUSCULAR; INTRAVENOUS; SUBCUTANEOUS at 04:23

## 2017-01-07 RX ADMIN — DIAZEPAM 5 MG: 5 TABLET ORAL at 01:20

## 2017-01-07 ASSESSMENT — ENCOUNTER SYMPTOMS
COUGH: 0
WEAKNESS: 0
ABDOMINAL PAIN: 0
BACK PAIN: 1
FEVER: 0
CONFUSION: 0

## 2017-01-07 NOTE — ED NOTES
"Pt to ED c/o of muscle spasms, particularly in back that started around 1900 this evening.  Pt also c/o of back pain.  Was seen at this facility on 1/1/17 for the same thing, but tonight it is \"much worse, I can't move\".   "

## 2017-01-07 NOTE — ED AVS SNAPSHOT
[employer] Access Code: Activation code not generated  Current [employer] Status: Active    [employer]  A secure, online tool to manage your health information     [employer]’s [employer]® is a secure, online tool that connects you to your personalized health information from the privacy of your home -- day or night - making it very easy for you to manage your healthcare. Once the activation process is completed, you can even access your medical information using the [employer] koko, which is available for free in the Apple Koko store or Google Play store.     [employer] provides the following levels of access (as shown below):   My Chart Features   [facility] Primary Care Doctor [facility]  Specialists [facility]  Urgent  Care Non-[facility]  Primary Care  Doctor   Email your healthcare team securely and privately 24/7 X X X X   Manage appointments: schedule your next appointment; view details of past/upcoming appointments X      Request prescription refills. X      View recent personal medical records, including lab and immunizations X X X X   View health record, including health history, allergies, medications X X X X   Read reports about your outpatient visits, procedures, consult and ER notes X X X X   See your discharge summary, which is a recap of your hospital and/or ER visit that includes your diagnosis, lab results, and care plan. X X       How to register for [employer]:  1. Go to  [email or web address].  2. Click on the Sign Up Now box, which takes you to the New Member Sign Up page. You will need to provide the following information:  a. Enter your [employer] Access Code exactly as it appears at the top of this page. (You will not need to use this code after you’ve completed the sign-up process. If you do not sign up before the expiration date, you must request a new code.)   b. Enter your date of birth.   c. Enter your home email address.   d. Click Submit, and follow the next screen’s instructions.  3. Create a [employer] ID. This will  be your [employer] login ID and cannot be changed, so think of one that is secure and easy to remember.  4. Create a [employer] password. You can change your password at any time.  5. Enter your Password Reset Question and Answer. This can be used at a later time if you forget your password.   6. Enter your e-mail address. This allows you to receive e-mail notifications when new information is available in [employer].  7. Click Sign Up. You can now view your health information.    For assistance activating your [employer] account, call [phone]

## 2017-01-07 NOTE — ED PROVIDER NOTES
ED Provider Note    ED Provider Note      Primary care provider: Jl Palomino M.D.  Means of arrival: POV  History obtained from: Patient  History limited by: none    CHIEF COMPLAINT  Chief Complaint   Patient presents with   • Back Pain   • Muscle Spasms       HPI  Summer Hatch is a 66 y.o. female who presents to the Emergency Department for worsening back pain. She as seen on 1/1/17 for the same thing and was told it was muscle spasm. She had a pain contract for tramadol which does not help. Movement makes it worse. Laying down makes it worse. She took robaxin at 7pm as well without relief and was sent home with steroid without relief. No saddle anesthesia, no urinary incontinence, no localized weakness or numbness or tingling. No recent trauma to her back.    REVIEW OF SYSTEMS  Review of Systems   Constitutional: Negative for fever.   HENT: Negative for congestion.    Respiratory: Negative for cough.    Cardiovascular: Negative for chest pain.   Gastrointestinal: Negative for abdominal pain.   Genitourinary: Negative for difficulty urinating.   Musculoskeletal: Positive for back pain.   Skin: Negative for rash.   Allergic/Immunologic: Negative for immunocompromised state.   Neurological: Negative for weakness.   Psychiatric/Behavioral: Negative for confusion.       PAST MEDICAL HISTORY   has a past medical history of Hypertension; COPD; Renal disorder; Seizure disorder (HCC); Carpal tunnel syndrome; Hypothyroidism (10/22/2013); GERD (gastroesophageal reflux disease) (10/22/2013); History of tobacco abuse (10/22/2013); Asthma; Bronchitis; Osteoporosis; and Pneumonia.    SURGICAL HISTORY   has past surgical history that includes nerve ulnar repair or explore; other orthopedic surgery; hip revision total (10/10/2013); incision and drainage orthopedic (10/10/2013); carpal tunnel release; hip revision total (Right, 11/5/2015); tonsillectomy; and appendectomy.    SOCIAL HISTORY  Social History   Substance Use  "Topics   • Smoking status: Former Smoker -- 1.00 packs/day for 30 years     Types: Cigarettes   • Smokeless tobacco: Never Used   • Alcohol Use: No      History   Drug Use No       FAMILY HISTORY  Family History   Problem Relation Age of Onset   • Psychiatry Mother 47     suicide   • Alcohol/Drug Mother    • Cancer Father 72     Lung   • Alcohol/Drug Brother      Recovering       CURRENT MEDICATIONS  Reviewed.  See Encounter Summary.     ALLERGIES  Allergies   Allergen Reactions   • Tape    • Tylenol Swelling     Lip, throat swelling       PHYSICAL EXAM  VITAL SIGNS: /83 mmHg  Pulse 90  Temp(Src) 36.3 °C (97.4 °F)  Resp 18  Ht 1.626 m (5' 4.02\")  Wt 74.844 kg (165 lb)  BMI 28.31 kg/m2  SpO2 94%  Physical Exam   Constitutional: She is oriented to person, place, and time. No distress.   HENT:   Head: Normocephalic and atraumatic.   Eyes: Pupils are equal, round, and reactive to light.   Neck: Normal range of motion.   Cardiovascular: Normal rate.    Pulmonary/Chest: Effort normal and breath sounds normal.   Abdominal: Soft. There is no tenderness.   Musculoskeletal: She exhibits no edema or tenderness.   Diffuse left sided lower back pain tenderness    Neurological: She is alert and oriented to person, place, and time. No cranial nerve deficit. Coordination normal.   5/5 strength in LE bilaterally and intact sensation   Skin: Skin is warm. She is not diaphoretic.   Psychiatric: She has a normal mood and affect. Her behavior is normal.             COURSE & MEDICAL DECISION MAKING  Pertinent Labs & Imaging studies reviewed. (See chart for details)    Differential: muscle spasm, UTI, cauda equina, herniated disc, pyelo     1:13 AM - Patient seen and examined at bedside.     Decision Making:  This is a 66 y.o. year old female who presents with lower back pain that has been ongoing for some time. She was seen here in emergency department about a week ago and was given steroids and Robaxin for the same thing. " She has allergy both to Tylenol and cannot take NSAIDs because of her CK D therefore she has only been taking tramadol and the Robaxin. She has had a pain specialist and has a pain contract with them. On presentation here she has no focal neurological deficits and actually has very good isolated strength in her lower extremities however because of the spasm that is mostly localized in the lower left side makes it difficult to walk or move about without causing more discomfort. She has no significant red flags such as urinary retention or incontinence or saddle anesthesia to suggest any need for emergent imaging. She has no history of trauma. Without any focal neural findings this is most likely just related to low back pain and muscle spasm.  She was given oxycodone and Valium in the ED. On reevaluation after the initial dosing she was able to move a little bit more such as laid back on the bed and sit up as opposed to before she didn't want even lay down on the bed. However she still is having difficulty ambulating on her own therefore we'll put an IV in her and give her stronger IV pain medicines to see if we can achieve pain control.    She was given a dose of IV Dilaudid here. Upon reevaluation she was feeling significantly better. Her range of motion had significantly improved. She reevaluation was able to sit up without any difficulty and stand up and ambulate. She had again still no focal weakness or numbness or tingling. She is on a pain contract and will see her pain specialist on Tuesday however this being her 2nd visit to the emergency room for back pain I recommended treatment at home with a couple more days of stronger pain medication. Due to her allergies she can only have some oxycodone as well as a few tablets of Valium. She was advised not to take other medications with it such as her Flexeril, Robaxin or tramadol. She is aware of this plan. She is going to speak with her pain specialist on Tuesday.  She also understands any strict return precautions for worsening symptoms. She is discharged home in stable condition.    FINAL IMPRESSION  1. Acute bilateral low back pain without sciatica    2. Muscle spasm       Dispo: home

## 2017-01-07 NOTE — ED AVS SNAPSHOT
1/7/2017          Summer Hatch  [address] [place] [address]    Dear Summer:    [facility] wants to ensure your discharge home is safe and you or your loved ones have had all your questions answered regarding your care after you leave the hospital.    You may receive a telephone call within two days of your discharge.  This call is to make certain you understand your discharge instructions as well as ensure we provided you with the best care possible during your stay with us.     The call will only last approximately 3-5 minutes and will be done by a nurse.    Once again, we want to ensure your discharge home is safe and that you have a clear understanding of any next steps in your care.  If you have any questions or concerns, please do not hesitate to contact us, we are here for you.  Thank you for choosing [facility] for your healthcare needs.    Sincerely,    Jarred Gonzalez    [facility]

## 2017-01-07 NOTE — DISCHARGE INSTRUCTIONS
Back Pain, Adult  Back pain is very common in adults. The cause of back pain is rarely dangerous and the pain often gets better over time. The cause of your back pain may not be known. Some common causes of back pain include:  · Strain of the muscles or ligaments supporting the spine.  · Wear and tear (degeneration) of the spinal disks.  · Arthritis.  · Direct injury to the back.  For many people, back pain may return. Since back pain is rarely dangerous, most people can learn to manage this condition on their own.  HOME CARE INSTRUCTIONS  Watch your back pain for any changes. The following actions may help to lessen any discomfort you are feeling:  · Remain active. It is stressful on your back to sit or  one place for long periods of time. Do not sit, drive, or  one place for more than 30 minutes at a time. Take short walks on even surfaces as soon as you are able. Try to increase the length of time you walk each day.  · Exercise regularly as directed by your health care provider. Exercise helps your back heal faster. It also helps avoid future injury by keeping your muscles strong and flexible.  · Do not stay in bed. Resting more than 1-2 days can delay your recovery.  · Pay attention to your body when you bend and lift. The most comfortable positions are those that put less stress on your recovering back. Always use proper lifting techniques, including:  · Bending your knees.  · Keeping the load close to your body.  · Avoiding twisting.  · Find a comfortable position to sleep. Use a firm mattress and lie on your side with your knees slightly bent. If you lie on your back, put a pillow under your knees.  · Avoid feeling anxious or stressed. Stress increases muscle tension and can worsen back pain. It is important to recognize when you are anxious or stressed and learn ways to manage it, such as with exercise.  · Take medicines only as directed by your health care provider. Over-the-counter  medicines to reduce pain and inflammation are often the most helpful. Your health care provider may prescribe muscle relaxant drugs. These medicines help dull your pain so you can more quickly return to your normal activities and healthy exercise.  · Apply ice to the injured area:  · Put ice in a plastic bag.  · Place a towel between your skin and the bag.  · Leave the ice on for 20 minutes, 2-3 times a day for the first 2-3 days. After that, ice and heat may be alternated to reduce pain and spasms.  · Maintain a healthy weight. Excess weight puts extra stress on your back and makes it difficult to maintain good posture.  SEEK MEDICAL CARE IF:  · You have pain that is not relieved with rest or medicine.  · You have increasing pain going down into the legs or buttocks.  · You have pain that does not improve in one week.  · You have night pain.  · You lose weight.  · You have a fever or chills.  SEEK IMMEDIATE MEDICAL CARE IF:   · You develop new bowel or bladder control problems.  · You have unusual weakness or numbness in your arms or legs.  · You develop nausea or vomiting.  · You develop abdominal pain.  · You feel faint.     This information is not intended to replace advice given to you by your health care provider. Make sure you discuss any questions you have with your health care provider.     Document Released: 12/18/2006 Document Revised: 01/08/2016 Document Reviewed: 04/21/2015  [employer] Interactive Patient Education ©2016 [employer]      Muscle Cramps and Spasms  Muscle cramps and spasms occur when a muscle or muscles tighten and you have no control over this tightening (involuntary muscle contraction). They are a common problem and can develop in any muscle. The most common place is in the calf muscles of the leg. Both muscle cramps and muscle spasms are involuntary muscle contractions, but they also have differences:   · Muscle cramps are sporadic and painful. They may last a few seconds to a quarter  of an hour. Muscle cramps are often more forceful and last longer than muscle spasms.  · Muscle spasms may or may not be painful. They may also last just a few seconds or much longer.  CAUSES   It is uncommon for cramps or spasms to be due to a serious underlying problem. In many cases, the cause of cramps or spasms is unknown. Some common causes are:   · Overexertion.    · Overuse from repetitive motions (doing the same thing over and over).    · Remaining in a certain position for a long period of time.    · Improper preparation, form, or technique while performing a sport or activity.    · Dehydration.    · Injury.    · Side effects of some medicines.    · Abnormally low levels of the salts and ions in your blood (electrolytes), especially potassium and calcium. This could happen if you are taking water pills (diuretics) or you are pregnant.    Some underlying medical problems can make it more likely to develop cramps or spasms. These include, but are not limited to:   · Diabetes.    · Parkinson disease.    · Hormone disorders, such as thyroid problems.    · Alcohol abuse.    · Diseases specific to muscles, joints, and bones.    · Blood vessel disease where not enough blood is getting to the muscles.    HOME CARE INSTRUCTIONS   · Stay well hydrated. Drink enough water and fluids to keep your urine clear or pale yellow.  · It may be helpful to massage, stretch, and relax the affected muscle.  · For tight or tense muscles, use a warm towel, heating pad, or hot shower water directed to the affected area.  · If you are sore or have pain after a cramp or spasm, applying ice to the affected area may relieve discomfort.  ¨ Put ice in a plastic bag.  ¨ Place a towel between your skin and the bag.  ¨ Leave the ice on for 15-20 minutes, 03-04 times a day.  · Medicines used to treat a known cause of cramps or spasms may help reduce their frequency or severity. Only take over-the-counter or prescription medicines as directed  by your caregiver.  SEEK MEDICAL CARE IF:   Your cramps or spasms get more severe, more frequent, or do not improve over time.   MAKE SURE YOU:   · Understand these instructions.  · Will watch your condition.  · Will get help right away if you are not doing well or get worse.     This information is not intended to replace advice given to you by your health care provider. Make sure you discuss any questions you have with your health care provider.     Document Released: 06/09/2003 Document Revised: 04/14/2014 Document Reviewed: 12/04/2013  [employer] Interactive Patient Education ©2016 [employer]

## 2017-01-07 NOTE — ED AVS SNAPSHOT
After Visit Summary                                                                                                                Summer Hatch   MRN: [id number]    Department:  [facility], Emergency Dept   Date of Visit:  1/6/2017            [facility], Emergency Dept    [address] [facility]    [place] [address]    Phone:  [phone]      You were seen by     Catalina Sheffield M.D.      Your Diagnosis Was     Acute bilateral low back pain without sciatica     M54.5       These are the medications you received during your hospitalization from 01/06/2017 2314 to 01/07/2017 0515     Date/Time Order Dose Route Action    01/07/2017 0120 diazepam (VALIUM) tablet 5 mg 5 mg Oral Given    01/07/2017 0120 oxycodone immediate-release (ROXICODONE) tablet 10 mg 10 mg Oral Given    01/07/2017 0316 oxycodone immediate-release (ROXICODONE) tablet 5 mg 5 mg Oral Given    01/07/2017 0423 HYDROmorphone (DILAUDID) injection 1 mg 1 mg Intravenous Given      Follow-up Information     1. Schedule an appointment as soon as possible for a visit with Jl Palomino M.D..    Specialty:  Family Medicine    Contact information    [address] [facility] [address]  [address]  [place] [address] [phone]        Medication Information     Review all of your home medications and newly ordered medications with your primary doctor and/or pharmacist as soon as possible. Follow medication instructions as directed by your doctor and/or pharmacist.     Please keep your complete medication list with you and share with your physician. Update the information when medications are discontinued, doses are changed, or new medications (including over-the-counter products) are added; and carry medication information at all times in the event of emergency situations.               Medication List      START taking these medications        Instructions    diazepam 5 MG Tabs   Commonly known as:  VALIUM    Take 1 Tab by  mouth every 12 hours as needed (muscle spasm).   Dose:  5 mg       oxycodone immediate-release 5 MG Tabs   Commonly known as:  ROXICODONE    Take 1 Tab by mouth every 6 hours as needed for Severe Pain for up to 3 days.   Dose:  5 mg         ASK your doctor about these medications        Instructions    * albuterol 108 (90 BASE) MCG/ACT Aers inhalation aerosol    Inhale 2 Puffs by mouth every 6 hours as needed for Shortness of Breath.   Dose:  2 Puff       * albuterol 2.5mg/3ml Nebu solution for nebulization   Commonly known as:  PROVENTIL    Doctor's comments:  DX COPD J44.9   3 mL by Nebulization route every four hours as needed for Shortness of Breath.   Dose:  2.5 mg       allopurinol 100 MG Tabs   Commonly known as:  ZYLOPRIM    TAKE 1 TABLET DAILY       * aripiprazole 5 MG tablet   Commonly known as:  ABILIFY    TAKE 1 TABLET AT BEDTIME       * aripiprazole 5 MG tablet   Commonly known as:  ABILIFY    TAKE 1 TABLET AT BEDTIME       aspirin 325 MG Tabs   Commonly known as:  ASA    Take 325 mg by mouth every day.   Dose:  325 mg       atorvastatin 40 MG Tabs   Commonly known as:  LIPITOR    Take 1 Tab by mouth every day.   Dose:  40 mg       Calcium Carbonate Antacid 1000 MG Chew    Take 1 Tab by mouth 2 Times a Day.   Dose:  1000 mg       cyclobenzaprine 10 MG Tabs   Commonly known as:  FLEXERIL    Take 1 Tab by mouth 3 times a day as needed.   Dose:  10 mg        MG Caps    Take 100 mg by mouth every morning.   Dose:  100 mg       * duloxetine 60 MG Cpep delayed-release capsule   Commonly known as:  CYMBALTA    TAKE 1 CAPSULE EVERY       MORNING       * duloxetine 60 MG Cpep delayed-release capsule   Commonly known as:  CYMBALTA    TAKE 1 CAPSULE EVERY       MORNING       fenofibrate 145 MG Tabs   Commonly known as:  TRICOR    TAKE 1 TABLET DAILY       fluticasone 50 MCG/ACT nasal spray   Commonly known as:  FLONASE    Spray 1-2 Sprays in nose every day.   Dose:  1-2 Spray       fluticasone-salmeterol  250-50 MCG/DOSE Aepb   Commonly known as:  ADVAIR    Inhale 1 Puff by mouth every 12 hours.   Dose:  1 Puff       hydrocodone-acetaminophen 5-325 MG Tabs per tablet   Commonly known as:  NORCO    Take 1-2 Tabs by mouth every four hours as needed.   Dose:  1-2 Tab       latanoprost 0.005 % Soln   Commonly known as:  XALATAN    Place 1 Drop in both eyes every evening.   Dose:  1 Drop       lisinopril 10 MG Tabs   Commonly known as:  PRINIVIL    Take 1 Tab by mouth every day.   Dose:  10 mg       Magnesium Oxide 500 MG Tabs    Take 1 Tab by mouth PRN.   Dose:  1 Tab       methocarbamol 750 MG Tabs   Commonly known as:  ROBAXIN    Take 2 Tabs by mouth 4 times a day as needed (muscle spasm).   Dose:  1500 mg       MethylPREDNISolone 4 MG Tbpk   Commonly known as:  MEDROL DOSEPAK    Sig: Per instructions on pack       niacin 1000 MG CR tablet   Commonly known as:  NIASPAN    TAKE 1 TABLET DAILY       omeprazole 20 MG delayed-release capsule   Commonly known as:  PRILOSEC    Take 1 Cap by mouth every morning. Indications: GERD-Related Laryngitis   Dose:  20 mg       predniSONE 10 MG Tabs   Commonly known as:  DELTASONE    Take 30mg x 3 days, then take 20mg x 3 days, then take 10mg x 3 days, with food, then discontinue.       SPIRIVA HANDIHALER 18 MCG Caps   Generic drug:  tiotropium    INHALE THE CONTENTS OF ONE CAPSULE DAILY       SYNTHROID 100 MCG Tabs   Generic drug:  levothyroxine    TAKE 1 TABLET DAILY       tramadol 50 MG Tabs   Commonly known as:  ULTRAM    TAKE 1 TO 2 TABLETS BY MOUTH 2 TIMES DAILY AS NEEDED FOR SEVERE PAIN       triamterene-hctz 37.5-25 MG Tabs   Commonly known as:  MAXZIDE-25/DYAZIDE    TAKE 1 TABLET EVERY MORNING       * Notice:  This list has 6 medication(s) that are the same as other medications prescribed for you. Read the directions carefully, and ask your doctor or other care provider to review them with you.              Discharge Instructions       Back Pain, Adult  Back pain is very  common in adults. The cause of back pain is rarely dangerous and the pain often gets better over time. The cause of your back pain may not be known. Some common causes of back pain include:  · Strain of the muscles or ligaments supporting the spine.  · Wear and tear (degeneration) of the spinal disks.  · Arthritis.  · Direct injury to the back.  For many people, back pain may return. Since back pain is rarely dangerous, most people can learn to manage this condition on their own.  HOME CARE INSTRUCTIONS  Watch your back pain for any changes. The following actions may help to lessen any discomfort you are feeling:  · Remain active. It is stressful on your back to sit or  one place for long periods of time. Do not sit, drive, or  one place for more than 30 minutes at a time. Take short walks on even surfaces as soon as you are able. Try to increase the length of time you walk each day.  · Exercise regularly as directed by your health care provider. Exercise helps your back heal faster. It also helps avoid future injury by keeping your muscles strong and flexible.  · Do not stay in bed. Resting more than 1-2 days can delay your recovery.  · Pay attention to your body when you bend and lift. The most comfortable positions are those that put less stress on your recovering back. Always use proper lifting techniques, including:  · Bending your knees.  · Keeping the load close to your body.  · Avoiding twisting.  · Find a comfortable position to sleep. Use a firm mattress and lie on your side with your knees slightly bent. If you lie on your back, put a pillow under your knees.  · Avoid feeling anxious or stressed. Stress increases muscle tension and can worsen back pain. It is important to recognize when you are anxious or stressed and learn ways to manage it, such as with exercise.  · Take medicines only as directed by your health care provider. Over-the-counter medicines to reduce pain and inflammation  are often the most helpful. Your health care provider may prescribe muscle relaxant drugs. These medicines help dull your pain so you can more quickly return to your normal activities and healthy exercise.  · Apply ice to the injured area:  · Put ice in a plastic bag.  · Place a towel between your skin and the bag.  · Leave the ice on for 20 minutes, 2-3 times a day for the first 2-3 days. After that, ice and heat may be alternated to reduce pain and spasms.  · Maintain a healthy weight. Excess weight puts extra stress on your back and makes it difficult to maintain good posture.  SEEK MEDICAL CARE IF:  · You have pain that is not relieved with rest or medicine.  · You have increasing pain going down into the legs or buttocks.  · You have pain that does not improve in one week.  · You have night pain.  · You lose weight.  · You have a fever or chills.  SEEK IMMEDIATE MEDICAL CARE IF:   · You develop new bowel or bladder control problems.  · You have unusual weakness or numbness in your arms or legs.  · You develop nausea or vomiting.  · You develop abdominal pain.  · You feel faint.     This information is not intended to replace advice given to you by your health care provider. Make sure you discuss any questions you have with your health care provider.     Document Released: 12/18/2006 Document Revised: 01/08/2016 Document Reviewed: 04/21/2015  [employer] Interactive Patient Education ©2016 [employer]      Muscle Cramps and Spasms  Muscle cramps and spasms occur when a muscle or muscles tighten and you have no control over this tightening (involuntary muscle contraction). They are a common problem and can develop in any muscle. The most common place is in the calf muscles of the leg. Both muscle cramps and muscle spasms are involuntary muscle contractions, but they also have differences:   · Muscle cramps are sporadic and painful. They may last a few seconds to a quarter of an hour. Muscle cramps are often more  forceful and last longer than muscle spasms.  · Muscle spasms may or may not be painful. They may also last just a few seconds or much longer.  CAUSES   It is uncommon for cramps or spasms to be due to a serious underlying problem. In many cases, the cause of cramps or spasms is unknown. Some common causes are:   · Overexertion.    · Overuse from repetitive motions (doing the same thing over and over).    · Remaining in a certain position for a long period of time.    · Improper preparation, form, or technique while performing a sport or activity.    · Dehydration.    · Injury.    · Side effects of some medicines.    · Abnormally low levels of the salts and ions in your blood (electrolytes), especially potassium and calcium. This could happen if you are taking water pills (diuretics) or you are pregnant.    Some underlying medical problems can make it more likely to develop cramps or spasms. These include, but are not limited to:   · Diabetes.    · Parkinson disease.    · Hormone disorders, such as thyroid problems.    · Alcohol abuse.    · Diseases specific to muscles, joints, and bones.    · Blood vessel disease where not enough blood is getting to the muscles.    HOME CARE INSTRUCTIONS   · Stay well hydrated. Drink enough water and fluids to keep your urine clear or pale yellow.  · It may be helpful to massage, stretch, and relax the affected muscle.  · For tight or tense muscles, use a warm towel, heating pad, or hot shower water directed to the affected area.  · If you are sore or have pain after a cramp or spasm, applying ice to the affected area may relieve discomfort.  ¨ Put ice in a plastic bag.  ¨ Place a towel between your skin and the bag.  ¨ Leave the ice on for 15-20 minutes, 03-04 times a day.  · Medicines used to treat a known cause of cramps or spasms may help reduce their frequency or severity. Only take over-the-counter or prescription medicines as directed by your caregiver.  SEEK MEDICAL CARE  IF:   Your cramps or spasms get more severe, more frequent, or do not improve over time.   MAKE SURE YOU:   · Understand these instructions.  · Will watch your condition.  · Will get help right away if you are not doing well or get worse.     This information is not intended to replace advice given to you by your health care provider. Make sure you discuss any questions you have with your health care provider.     Document Released: 06/09/2003 Document Revised: 04/14/2014 Document Reviewed: 12/04/2013  [employer] Interactive Patient Education ©2016 [employer]              Patient Information     Patient Information    Following emergency treatment: all patient requiring follow-up care must return either to a private physician or a clinic if your condition worsens before you are able to obtain further medical attention, please return to the emergency room.     Billing Information    At [facility], we work to make the billing process streamlined for our patients.  Our Representatives are here to answer any questions you may have regarding your hospital bill.  If you have insurance coverage and have supplied your insurance information to us, we will submit a claim to your insurer on your behalf.  Should you have any questions regarding your bill, we can be reached online or by phone as follows:  Online: You are able pay your bills online or live chat with our representatives about any billing questions you may have. We are here to help Monday - Friday from 8:00am to 7:30pm and 9:00am - 12:00pm on Saturdays.  Please visit [email or web address]  for more information.   Phone:  [phone] or [phone]    Please note that your emergency physician, surgeon, pathologist, radiologist, anesthesiologist, and other specialists are not employed by [facility] and will therefore bill separately for their services.  Please contact them directly for any questions concerning their bills at the  numbers below:     Emergency Physician Services:  [phone]  [facility]:  [phone]  [facility]:  [phone]  [facility]:  [phone]    1. Your final bill may vary from the amount quoted upon discharge if all procedures are not complete at that time, or if your doctor has additional procedures of which we are not aware. You will receive an additional bill if you return to the Emergency Department at [facility] for suture removal regardless of the facility of which the sutures were placed.     2. Please arrange for settlement of this account at the emergency registration.    3. All self-pay accounts are due in full at the time of treatment.  If you are unable to meet this obligation then payment is expected within 4-5 days.     4. If you have had radiology studies (CT, X-ray, Ultrasound, MRI), you have received a preliminary result during your emergency department visit. Please contact the radiology department [phone] to receive a copy of your final result. Please discuss the Final result with your primary physician or with the follow up physician provided.     Crisis Hotline:  [place] Crisis Hotline:  4-808-UWMLDWN or 1-177.422.2083  [place] Crisis Hotline:    1-601.656.8652 or 111-598-5548         ED Discharge Follow Up Questions    1. In order to provide you with very good care, we would like to follow up with a phone call in the next few days.  May we have your permission to contact you?     YES /  NO    2. What is the best phone number to call you? (       )_____-__________    3. What is the best time to call you?      Morning  /  Afternoon  /  Evening                   Patient Signature:  ____________________________________________________________    Date:  ____________________________________________________________      Your appointments     Jan 18, 2017  1:00 PM   Established Patient Pul with JOSE Marrufo   [facility] (--)    [address]  [address]  [facility] [address]   [phone]            Mar 30, 2017  8:40 AM   Established Patient with Jl Palomino M.D.   [facility]    [address]  [facility] [id number] [phone]           You will be receiving a confirmation call a few days before your appointment from our automated call confirmation system.

## 2017-01-07 NOTE — ED NOTES
Pt to ER with c/o lower back pain/spasms starting at 1900 this evening. Pt took 2 tramadol this evening with no relief.

## 2017-01-07 NOTE — ED NOTES
Pt states pain improved but still present, now lying down in bed. Call light in reach, family at bedside.

## 2017-01-07 NOTE — ED NOTES
Discharged in good condition after discharge instructions reviewed with pt in detail, pt verbalizes understanding of all. Ambulated to w/c and then wheeled out in w/c out with steady gait accompanied by family with follow up instructions in hand. Instructed pt to not drive while taking pain medications, pt states understanding.

## 2017-01-16 ENCOUNTER — HOSPITAL ENCOUNTER (OUTPATIENT)
Dept: RADIOLOGY | Facility: MEDICAL CENTER | Age: 67
End: 2017-01-16
Attending: PHYSICIAN ASSISTANT
Payer: MEDICARE

## 2017-01-16 DIAGNOSIS — M47.816 SPONDYLOSIS OF LUMBAR REGION WITHOUT MYELOPATHY OR RADICULOPATHY: ICD-10-CM

## 2017-01-16 PROCEDURE — 72100 X-RAY EXAM L-S SPINE 2/3 VWS: CPT

## 2017-01-17 ENCOUNTER — OFFICE VISIT (OUTPATIENT)
Dept: MEDICAL GROUP | Facility: MEDICAL CENTER | Age: 67
End: 2017-01-17
Payer: MEDICARE

## 2017-01-17 VITALS
OXYGEN SATURATION: 95 % | WEIGHT: 167 LBS | BODY MASS INDEX: 27.82 KG/M2 | DIASTOLIC BLOOD PRESSURE: 84 MMHG | SYSTOLIC BLOOD PRESSURE: 138 MMHG | HEART RATE: 90 BPM | TEMPERATURE: 97.5 F | HEIGHT: 65 IN

## 2017-01-17 DIAGNOSIS — J44.1 CHRONIC OBSTRUCTIVE PULMONARY DISEASE WITH ACUTE EXACERBATION (HCC): ICD-10-CM

## 2017-01-17 DIAGNOSIS — J40 BRONCHITIS: ICD-10-CM

## 2017-01-17 DIAGNOSIS — Z92.241 HISTORY OF RECENT STEROID USE: ICD-10-CM

## 2017-01-17 DIAGNOSIS — S22.080G COMPRESSION FRACTURE OF T12 VERTEBRA WITH DELAYED HEALING: ICD-10-CM

## 2017-01-17 PROCEDURE — 99214 OFFICE O/P EST MOD 30 MIN: CPT | Performed by: FAMILY MEDICINE

## 2017-01-17 RX ORDER — AMOXICILLIN AND CLAVULANATE POTASSIUM 875; 125 MG/1; MG/1
1 TABLET, FILM COATED ORAL 2 TIMES DAILY
Qty: 20 TAB | Refills: 0 | Status: SHIPPED | OUTPATIENT
Start: 2017-01-17 | End: 2017-01-17 | Stop reason: SDUPTHER

## 2017-01-17 RX ORDER — AMOXICILLIN AND CLAVULANATE POTASSIUM 875; 125 MG/1; MG/1
1 TABLET, FILM COATED ORAL 2 TIMES DAILY
Qty: 20 TAB | Refills: 0 | Status: SHIPPED | OUTPATIENT
Start: 2017-01-17 | End: 2017-02-27

## 2017-01-17 NOTE — MR AVS SNAPSHOT
"        Summer Hatch   2017 1:00 PM   Office Visit   MRN: [id number]    Department:  [facility]   Dept Phone:  [phone]    Description:  Female : 1950   Provider:  Darlin Shaikh M.D.           Reason for Visit     Cough     URI runny nose    Back Pain           Allergies as of 2017     Allergen Noted Reactions    Tape 10/09/2013       Tylenol 09/15/2016   Swelling    Lip, throat swelling      You were diagnosed with     Bronchitis   [930168]       Chronic obstructive pulmonary disease with acute exacerbation (CMS-[facility])   [914163]       Compression fracture of T12 vertebra with delayed healing   [0008230]       History of recent steroid use   [6125164]         Vital Signs     Blood Pressure Pulse Temperature Height Weight Body Mass Index    138/84 mmHg 90 36.4 °C (97.5 °F) 1.651 m (5' 5\") 75.751 kg (167 lb) 27.79 kg/m2    Oxygen Saturation Breastfeeding? Smoking Status             95% No Former Smoker         Basic Information     Date Of Birth Sex Race Ethnicity Preferred Language    1950 Female White Non- English      Your appointments     2017  1:00 PM   Established Patient Pul with JOSE Marrufo   [facility] (--)    [address]  [address]  [facility] [id number] [phone]            Mar 30, 2017  8:40 AM   Established Patient with Jl Palomino M.D.   [facility] ([facility])    [address]  [facility] [address] [phone]           You will be receiving a confirmation call a few days before your appointment from our automated call confirmation system.              Problem List              ICD-10-CM Priority Class Noted - Resolved    History of septic arthritis Z87.39   10/9/2013 - Present    HTN (hypertension) I10 Medium  10/10/2013 - Present    Seizure disorder (CMS-HCC) G40.909   10/10/2013 - Present    Hyperlipidemia E78.5 Low  10/10/2013 - Present    CTS (carpal tunnel " syndrome) G56.00   10/10/2013 - Present    Chronic hip pain M25.559, G89.29   10/22/2013 - Present    Hypothyroidism E03.9 Low  10/22/2013 - Present    GERD (gastroesophageal reflux disease) K21.9 Low  10/22/2013 - Present    Screening for breast cancer Z12.39   10/22/2013 - Present    History of tobacco abuse Z87.891   10/22/2013 - Present    Right arm pain M79.601   12/13/2013 - Present    Neuropathy (CMS-HCC) G62.9   1/10/2014 - Present    Chronic gout M1A.9XX0   3/13/2014 - Present    Seasonal allergies J30.2   6/13/2014 - Present    Vitamin D insufficiency E55.9   9/26/2014 - Present    Chronic kidney disease, stage III (moderate) N18.3 Medium  9/26/2014 - Present    Atopic dermatitis L20.9   1/8/2015 - Present    Prediabetes R73.03 Low  5/15/2015 - Present    MDD (major depressive disorder), recurrent episode, moderate (CMS-HCC) F33.1 Low  6/18/2015 - Present    Cyst in hand [id number]   6/26/2015 - Present    AK (actinic keratosis) L57.0   6/26/2015 - Present    Status post right hip replacement Z96.641 High  10/31/2015 - Present    Bilateral hip pain M25.551, M25.552   3/21/2016 - Present    Chest pain R07.9   9/30/2016 - Present    Dizzy R42   9/30/2016 - Present    Non morbid obesity due to excess calories E66.09   9/30/2016 - Present    Atherosclerosis of both carotid arteries I65.23   10/19/2016 - Present    Bronchitis J40   1/17/2017 - Present    Chronic obstructive pulmonary disease with acute exacerbation (CMS-HCC) J44.1   1/17/2017 - Present    Compression fracture of T12 vertebra with delayed healing M48.54XG   1/17/2017 - Present    History of recent steroid use Z92.241   1/17/2017 - Present      Health Maintenance        Date Due Completion Dates    IMM DTaP/Tdap/Td Vaccine (1 - Tdap) 6/6/1970 ---    IMM ZOSTER VACCINE 6/6/2011 ---    BONE DENSITY 6/6/2016 ---    IMM INFLUENZA (1) 9/1/2016 9/28/2015, 9/26/2014, 10/13/2013    MAMMOGRAM 4/13/2017 4/13/2016, 1/2/2014, 12/20/2013, 12/20/2013, 12/20/2013,  12/20/2013    PAP SMEAR 6/26/2018 6/26/2015, 6/26/2015    IMM PNEUMOCOCCAL 65+ (ADULT) LOW/MEDIUM RISK SERIES (2 of 2 - PPSV23) 10/14/2018 9/28/2015, 10/14/2013    COLONOSCOPY 1/1/2020 1/1/2010 (Done)    Override on 1/1/2010: Done            Current Immunizations     13-VALENT PCV PREVNAR 9/28/2015    Influenza TIV (IM) 10/13/2013 11:02 AM    Influenza Vaccine Quad Inj (Pf) 9/26/2014  8:32 AM    Influenza Vaccine Quad Inj (Preserved) 9/28/2015    Pneumococcal polysaccharide vaccine (PPSV-23) 10/14/2013  8:03 AM      Below and/or attached are the medications your provider expects you to take. Review all of your home medications and newly ordered medications with your provider and/or pharmacist. Follow medication instructions as directed by your provider and/or pharmacist. Please keep your medication list with you and share with your provider. Update the information when medications are discontinued, doses are changed, or new medications (including over-the-counter products) are added; and carry medication information at all times in the event of emergency situations     Allergies:  TAPE - (reactions not documented)     TYLENOL - Swelling               Medications  Valid as of: January 17, 2017 -  2:47 PM    Generic Name Brand Name Tablet Size Instructions for use    Albuterol Sulfate (Aero Soln) albuterol 108 (90 BASE) MCG/ACT Inhale 2 Puffs by mouth every 6 hours as needed for Shortness of Breath.        Albuterol Sulfate (Nebu Soln) PROVENTIL 2.5mg/3ml 3 mL by Nebulization route every four hours as needed for Shortness of Breath.        Allopurinol (Tab) ZYLOPRIM 100 MG TAKE 1 TABLET DAILY        Amoxicillin-Pot Clavulanate (Tab) AUGMENTIN 875-125 MG Take 1 Tab by mouth 2 times a day.        ARIPiprazole (Tab) ABILIFY 5 MG TAKE 1 TABLET AT BEDTIME        ARIPiprazole (Tab) ABILIFY 5 MG TAKE 1 TABLET AT BEDTIME        Aspirin (Tab)  MG Take 325 mg by mouth every day.        Atorvastatin Calcium (Tab) LIPITOR  40 MG Take 1 Tab by mouth every day.        Calcium Carbonate Antacid (Chew Tab) Calcium Carbonate Antacid 1000 MG Take 1 Tab by mouth 2 Times a Day.        Cyclobenzaprine HCl (Tab) FLEXERIL 10 MG Take 1 Tab by mouth 3 times a day as needed.        DiazePAM (Tab) VALIUM 5 MG Take 1 Tab by mouth every 12 hours as needed (muscle spasm).        Docusate Sodium (Cap)  MG Take 100 mg by mouth every morning.        DULoxetine HCl (Cap DR Particles) CYMBALTA 60 MG TAKE 1 CAPSULE EVERY       MORNING        DULoxetine HCl (Cap DR Particles) CYMBALTA 60 MG TAKE 1 CAPSULE EVERY       MORNING        Fenofibrate (Tab) TRICOR 145 MG TAKE 1 TABLET DAILY        Fluticasone Propionate (Suspension) FLONASE 50 MCG/ACT Spray 1-2 Sprays in nose every day.        Fluticasone-Salmeterol (AEROSOL POWDER, BREATH ACTIVATED) ADVAIR 250-50 MCG/DOSE Inhale 1 Puff by mouth every 12 hours.        Hydrocodone-Acetaminophen (Tab) NORCO 5-325 MG Take 1-2 Tabs by mouth every four hours as needed.        Latanoprost (Solution) XALATAN 0.005 % Place 1 Drop in both eyes every evening.        Levothyroxine Sodium (Tab) SYNTHROID 100 MCG TAKE 1 TABLET DAILY        Lisinopril (Tab) PRINIVIL 10 MG Take 1 Tab by mouth every day.        Magnesium Oxide (Tab) Magnesium Oxide 500 MG Take 1 Tab by mouth PRN.        Methocarbamol (Tab) ROBAXIN 750 MG Take 2 Tabs by mouth 4 times a day as needed (muscle spasm).        MethylPREDNISolone (Tablet Therapy Pack) MEDROL DOSEPAK 4 MG Sig: Per instructions on pack        Niacin (Antihyperlipidemic) (Tab CR) NIASPAN 1000 MG TAKE 1 TABLET DAILY        Omeprazole (CAPSULE DELAYED RELEASE) PRILOSEC 20 MG Take 1 Cap by mouth every morning. Indications: GERD-Related Laryngitis        PredniSONE (Tab) DELTASONE 10 MG Take 30mg x 3 days, then take 20mg x 3 days, then take 10mg x 3 days, with food, then discontinue.        Tiotropium Bromide Monohydrate (Cap) SPIRIVA HANDIHALER 18 MCG INHALE THE CONTENTS OF ONE CAPSULE  DAILY        TraMADol HCl (Tab) ULTRAM 50 MG TAKE 1 TO 2 TABLETS BY MOUTH 2 TIMES DAILY AS NEEDED FOR SEVERE PAIN        Triamterene-HCTZ (Tab) MAXZIDE-25/DYAZIDE 37.5-25 MG TAKE 1 TABLET EVERY MORNING        .                 Medicines prescribed today were sent to:     [facility] - [place], [place] - [address] AT PORTAL TO REGISTERED [facility]    [address] [facility] [address]    Phone: [phone] Fax: [phone]    Open 24 Hours?: No    [facility]ARMACY #[id number] - [place], [place] - [address] [address]    [address] [place] [address] [place] [address]    Phone: [phone] Fax: [phone]    Open 24 Hours?: No      Medication refill instructions:       If your prescription bottle indicates you have medication refills left, it is not necessary to call your provider’s office. Please contact your pharmacy and they will refill your medication.    If your prescription bottle indicates you do not have any refills left, you may request refills at any time through one of the following ways: The online [employer] system (except Urgent Care), by calling your provider’s office, or by asking your pharmacy to contact your provider’s office with a refill request. Medication refills are processed only during regular business hours and may not be available until the next business day. Your provider may request additional information or to have a follow-up visit with you prior to refilling your medication.   *Please Note: Medication refills are assigned a new Rx number when refilled electronically. Your pharmacy may indicate that no refills were authorized even though a new prescription for the same medication is available at the pharmacy. Please request the medicine by name with the pharmacy before contacting your provider for a refill.        Referral     A referral request has been sent to our patient care coordination department. Please allow 3-5 business days for us to process this request and contact you  either by phone or mail. If you do not hear from us by the 5th business day, please call us at [phone].           DocVerse Access Code: Activation code not generated  Current DocVerse Status: Active

## 2017-01-18 ENCOUNTER — OFFICE VISIT (OUTPATIENT)
Dept: PULMONOLOGY | Facility: HOSPICE | Age: 67
End: 2017-01-18
Payer: MEDICARE

## 2017-01-18 VITALS
SYSTOLIC BLOOD PRESSURE: 132 MMHG | BODY MASS INDEX: 27.82 KG/M2 | HEIGHT: 65 IN | RESPIRATION RATE: 14 BRPM | WEIGHT: 167 LBS | HEART RATE: 82 BPM | TEMPERATURE: 97.9 F | DIASTOLIC BLOOD PRESSURE: 84 MMHG

## 2017-01-18 DIAGNOSIS — J44.1 CHRONIC OBSTRUCTIVE PULMONARY DISEASE WITH ACUTE EXACERBATION (HCC): ICD-10-CM

## 2017-01-18 DIAGNOSIS — K21.9 GASTROESOPHAGEAL REFLUX DISEASE, ESOPHAGITIS PRESENCE NOT SPECIFIED: ICD-10-CM

## 2017-01-18 DIAGNOSIS — J40 BRONCHITIS: ICD-10-CM

## 2017-01-18 DIAGNOSIS — Z87.891 HISTORY OF TOBACCO ABUSE: ICD-10-CM

## 2017-01-18 DIAGNOSIS — J30.2 SEASONAL ALLERGIC RHINITIS, UNSPECIFIED ALLERGIC RHINITIS TRIGGER: ICD-10-CM

## 2017-01-18 DIAGNOSIS — R05.9 COUGH: ICD-10-CM

## 2017-01-18 PROCEDURE — 99214 OFFICE O/P EST MOD 30 MIN: CPT | Performed by: NURSE PRACTITIONER

## 2017-01-18 RX ORDER — METHYLPREDNISOLONE 4 MG/1
TABLET ORAL
Qty: 1 KIT | Refills: 0 | Status: SHIPPED | OUTPATIENT
Start: 2017-01-18 | End: 2017-02-27

## 2017-01-18 RX ORDER — BENZONATATE 100 MG/1
200 CAPSULE ORAL 3 TIMES DAILY PRN
Qty: 30 CAP | Refills: 0 | Status: SHIPPED | OUTPATIENT
Start: 2017-01-18 | End: 2017-02-27

## 2017-01-18 RX ORDER — BACLOFEN 10 MG/1
TABLET ORAL
COMMUNITY
Start: 2017-01-10 | End: 2017-02-27

## 2017-01-18 RX ORDER — AZITHROMYCIN 250 MG/1
TABLET, FILM COATED ORAL
COMMUNITY
Start: 2016-12-28 | End: 2017-02-27

## 2017-01-18 NOTE — MR AVS SNAPSHOT
"        Summer Bonilla Holden   2017 1:00 PM   Office Visit   MRN: [id number]    Department:  [facility]   Dept Phone:  [phone]    Description:  Female : 1950   Provider:  JOSE Marrufo           Reason for Visit     Follow-Up           Allergies as of 2017     Allergen Noted Reactions    Tape 10/09/2013       Tylenol 09/15/2016   Swelling    Lip, throat swelling      You were diagnosed with     Chronic obstructive pulmonary disease with acute exacerbation (CMS-HCC)   [487964]       Bronchitis   [082755]       History of tobacco abuse   [778351]       Gastroesophageal reflux disease, esophagitis presence not specified   [9736046]       Seasonal allergic rhinitis, unspecified allergic rhinitis trigger   [5079921]       Cough   [786.2.ICD-9-CM]         Vital Signs     Blood Pressure Pulse Temperature Respirations Height Weight    132/84 mmHg 82 36.6 °C (97.9 °F) (Temporal) 14 1.651 m (5' 5\") 75.751 kg (167 lb)    Body Mass Index Smoking Status                27.79 kg/m2 Former Smoker          Basic Information     Date Of Birth Sex Race Ethnicity Preferred Language    1950 Female White Non- English      Your appointments     2017  8:20 AM   Established Patient Pul with JOSE Marrufo   [facility] Pulmonary Medicine (--)    [address]  [address]  [facility] [id number] [phone]            Mar 30, 2017  8:40 AM   Established Patient with Jl Palomino M.D.   [facility] ([facility])    [address]  [facility] [id number] [phone]           You will be receiving a confirmation call a few days before your appointment from our automated call confirmation system.              Problem List              ICD-10-CM Priority Class Noted - Resolved    History of septic arthritis Z87.39   10/9/2013 - Present    HTN (hypertension) I10 Medium  10/10/2013 - Present    Seizure disorder (CMS-HCC) G40.909   " 10/10/2013 - Present    Hyperlipidemia E78.5 Low  10/10/2013 - Present    CTS (carpal tunnel syndrome) G56.00   10/10/2013 - Present    Chronic hip pain M25.559, G89.29   10/22/2013 - Present    Hypothyroidism E03.9 Low  10/22/2013 - Present    GERD (gastroesophageal reflux disease) K21.9 Low  10/22/2013 - Present    Screening for breast cancer Z12.39   10/22/2013 - Present    History of tobacco abuse Z87.891   10/22/2013 - Present    Right arm pain M79.601   12/13/2013 - Present    Neuropathy (CMS-HCC) G62.9   1/10/2014 - Present    Chronic gout M1A.9XX0   3/13/2014 - Present    Seasonal allergies J30.2   6/13/2014 - Present    Vitamin D insufficiency E55.9   9/26/2014 - Present    Chronic kidney disease, stage III (moderate) N18.3 Medium  9/26/2014 - Present    Atopic dermatitis L20.9   1/8/2015 - Present    Prediabetes R73.03 Low  5/15/2015 - Present    MDD (major depressive disorder), recurrent episode, moderate (CMS-HCC) F33.1 Low  6/18/2015 - Present    Cyst in hand [id number]   6/26/2015 - Present    AK (actinic keratosis) L57.0   6/26/2015 - Present    Status post right hip replacement Z96.641 High  10/31/2015 - Present    Bilateral hip pain M25.551, M25.552   3/21/2016 - Present    Chest pain R07.9   9/30/2016 - Present    Dizzy R42   9/30/2016 - Present    Non morbid obesity due to excess calories E66.09   9/30/2016 - Present    Atherosclerosis of both carotid arteries I65.23   10/19/2016 - Present    Bronchitis J40   1/17/2017 - Present    Chronic obstructive pulmonary disease with acute exacerbation (CMS-HCC) J44.1   1/17/2017 - Present    Compression fracture of T12 vertebra with delayed healing M48.54XG   1/17/2017 - Present    History of recent steroid use Z92.241   1/17/2017 - Present      Health Maintenance        Date Due Completion Dates    IMM DTaP/Tdap/Td Vaccine (1 - Tdap) 6/6/1970 ---    IMM ZOSTER VACCINE 6/6/2011 ---    BONE DENSITY 6/6/2016 ---    IMM INFLUENZA (1) 9/1/2016 9/28/2015,  9/26/2014, 10/13/2013    MAMMOGRAM 4/13/2017 4/13/2016, 1/2/2014, 12/20/2013, 12/20/2013, 12/20/2013, 12/20/2013    PAP SMEAR 6/26/2018 6/26/2015, 6/26/2015    IMM PNEUMOCOCCAL 65+ (ADULT) LOW/MEDIUM RISK SERIES (2 of 2 - PPSV23) 10/14/2018 9/28/2015, 10/14/2013    COLONOSCOPY 1/1/2020 1/1/2010 (Done)    Override on 1/1/2010: Done            Current Immunizations     13-VALENT PCV PREVNAR 9/28/2015    Influenza TIV (IM) 10/13/2013 11:02 AM    Influenza Vaccine Quad Inj (Pf) 9/26/2014  8:32 AM    Influenza Vaccine Quad Inj (Preserved) 9/28/2015    Pneumococcal polysaccharide vaccine (PPSV-23) 10/14/2013  8:03 AM      Below and/or attached are the medications your provider expects you to take. Review all of your home medications and newly ordered medications with your provider and/or pharmacist. Follow medication instructions as directed by your provider and/or pharmacist. Please keep your medication list with you and share with your provider. Update the information when medications are discontinued, doses are changed, or new medications (including over-the-counter products) are added; and carry medication information at all times in the event of emergency situations     Allergies:  TAPE - (reactions not documented)     TYLENOL - Swelling               Medications  Valid as of: January 18, 2017 -  1:22 PM    Generic Name Brand Name Tablet Size Instructions for use    Albuterol Sulfate (Aero Soln) albuterol 108 (90 BASE) MCG/ACT Inhale 2 Puffs by mouth every 6 hours as needed for Shortness of Breath.        Albuterol Sulfate (Nebu Soln) PROVENTIL 2.5mg/3ml 3 mL by Nebulization route every four hours as needed for Shortness of Breath.        Allopurinol (Tab) ZYLOPRIM 100 MG TAKE 1 TABLET DAILY        Amoxicillin-Pot Clavulanate (Tab) AUGMENTIN 875-125 MG Take 1 Tab by mouth 2 times a day.        ARIPiprazole (Tab) ABILIFY 5 MG TAKE 1 TABLET AT BEDTIME        ARIPiprazole (Tab) ABILIFY 5 MG TAKE 1 TABLET AT BEDTIME          Aspirin (Tab)  MG Take 325 mg by mouth every day.        Atorvastatin Calcium (Tab) LIPITOR 40 MG Take 1 Tab by mouth every day.        Azithromycin (Tab) ZITHROMAX 250 MG         Baclofen (Tab) LIORESAL 10 MG         Benzonatate (Cap) TESSALON 100 MG Take 2 Caps by mouth 3 times a day as needed for Cough. Do not chew. Swallow whole.        Calcium Carbonate Antacid (Chew Tab) Calcium Carbonate Antacid 1000 MG Take 1 Tab by mouth 2 Times a Day.        Cyclobenzaprine HCl (Tab) FLEXERIL 10 MG Take 1 Tab by mouth 3 times a day as needed.        DiazePAM (Tab) VALIUM 5 MG Take 1 Tab by mouth every 12 hours as needed (muscle spasm).        Docusate Sodium (Cap)  MG Take 100 mg by mouth every morning.        DULoxetine HCl (Cap DR Particles) CYMBALTA 60 MG TAKE 1 CAPSULE EVERY       MORNING        DULoxetine HCl (Cap DR Particles) CYMBALTA 60 MG TAKE 1 CAPSULE EVERY       MORNING        Fenofibrate (Tab) TRICOR 145 MG TAKE 1 TABLET DAILY        Fluticasone Propionate (Suspension) FLONASE 50 MCG/ACT Spray 1-2 Sprays in nose every day.        Fluticasone-Salmeterol (AEROSOL POWDER, BREATH ACTIVATED) ADVAIR 250-50 MCG/DOSE Inhale 1 Puff by mouth every 12 hours.        Hydrocodone-Acetaminophen (Tab) NORCO 5-325 MG Take 1-2 Tabs by mouth every four hours as needed.        Latanoprost (Solution) XALATAN 0.005 % Place 1 Drop in both eyes every evening.        Levothyroxine Sodium (Tab) SYNTHROID 100 MCG TAKE 1 TABLET DAILY        Lisinopril (Tab) PRINIVIL 10 MG Take 1 Tab by mouth every day.        Magnesium Oxide (Tab) Magnesium Oxide 500 MG Take 1 Tab by mouth PRN.        Methocarbamol (Tab) ROBAXIN 750 MG Take 2 Tabs by mouth 4 times a day as needed (muscle spasm).        MethylPREDNISolone (Tablet Therapy Pack) MEDROL DOSEPAK 4 MG Sig: Per instructions on pack        Niacin (Antihyperlipidemic) (Tab CR) NIASPAN 1000 MG TAKE 1 TABLET DAILY        Omeprazole (CAPSULE DELAYED RELEASE) PRILOSEC 20 MG Take 1  Cap by mouth every morning. Indications: GERD-Related Laryngitis        PredniSONE (Tab) DELTASONE 10 MG Take 30mg x 3 days, then take 20mg x 3 days, then take 10mg x 3 days, with food, then discontinue.        Tiotropium Bromide Monohydrate (Cap) SPIRIVA HANDIHALER 18 MCG INHALE THE CONTENTS OF ONE CAPSULE DAILY        TraMADol HCl (Tab) ULTRAM 50 MG TAKE 1 TO 2 TABLETS BY MOUTH 2 TIMES DAILY AS NEEDED FOR SEVERE PAIN        Triamterene-HCTZ (Tab) MAXZIDE-25/DYAZIDE 37.5-25 MG TAKE 1 TABLET EVERY MORNING        .                 Medicines prescribed today were sent to:     [facility] - [place], [place] - [address] AT PORTAL TO [facility]    [address] [facility] [address]    Phone: [phone] Fax: [phone]    Open 24 Hours?: No    [facility]/PHARMACY #[id number] - [place] - [address] [address]    [address] [place] [address] [place] [address]    Phone: [phone] Fax: [phone]    Open 24 Hours?: No      Medication refill instructions:       If your prescription bottle indicates you have medication refills left, it is not necessary to call your provider’s office. Please contact your pharmacy and they will refill your medication.    If your prescription bottle indicates you do not have any refills left, you may request refills at any time through one of the following ways: The online [employer] system (except Urgent Care), by calling your provider’s office, or by asking your pharmacy to contact your provider’s office with a refill request. Medication refills are processed only during regular business hours and may not be available until the next business day. Your provider may request additional information or to have a follow-up visit with you prior to refilling your medication.   *Please Note: Medication refills are assigned a new Rx number when refilled electronically. Your pharmacy may indicate that no refills were authorized even though a new prescription for the same medication  is available at the pharmacy. Please request the medicine by name with the pharmacy before contacting your provider for a refill.        Instructions    1. Continue Augment till gone. Start Medrol dosepak now. May use Tessalon perles for cough relief.  2. Continue inhalers and start nebulizer 2-3x's daily.  3. Continue omeprazole daily.  4. Once symptoms resolve, complete spirometry.  5. Consider flu shot once symptoms gone.  6. Follow up in 2 weeks with APRN to check symptoms.  7. Advise bone density test and avoidance of long term prednisone use.          MediaBrix Access Code: Activation code not generated  Current MediaBrix Status: Active

## 2017-01-18 NOTE — PATIENT INSTRUCTIONS
1. Continue Augment till gone. Start Medrol dosepak now. May use Tessalon perles for cough relief.  2. Continue inhalers and start nebulizer 2-3x's daily.  3. Continue omeprazole daily.  4. Once symptoms resolve, complete spirometry.  5. Consider flu shot once symptoms gone.  6. Follow up in 2 weeks with APRN to check symptoms.  7. Advise bone density test and avoidance of long term prednisone use.

## 2017-01-19 NOTE — PROGRESS NOTES
Subjective:     Chief Complaint   Patient presents with   • Cough   • URI     runny nose   • Back Pain        Summer is a regular patient of Dr. Palomino.    Summer Hatch is a 66 y.o. female here today for evaluation and management of:    Bronchitis  Patient has a long history of recurrent bronchitis is. She has COPD and saw the pulmonologist on 12/28/16. At that time she got a Z-Gerard and steroids. She did not improve and then was seen on 1-17 in the emergency room. She was complaining of back pain and persistent cough. She was put on Cipro, a Medrol Dosepak and Robaxin. In the ER she also had a Escherichia coli UTI. She then returned to the emergency room on the seventh for back pain and was treated conservatively. She reports that she is still coughing, has a runny nose and a sore throat from the cough. She denies any fever but she has had chills and some malaise.         Allergies   Allergen Reactions   • Tape    • Tylenol Swelling     Lip, throat swelling       Current medicines (including changes today)  Current Outpatient Prescriptions   Medication Sig Dispense Refill   • amoxicillin-clavulanate (AUGMENTIN) 875-125 MG Tab Take 1 Tab by mouth 2 times a day. 20 Tab 0   • methocarbamol (ROBAXIN) 750 MG Tab Take 2 Tabs by mouth 4 times a day as needed (muscle spasm). 20 Tab 0   • SYNTHROID 100 MCG Tab TAKE 1 TABLET DAILY 90 Tab 3   • atorvastatin (LIPITOR) 40 MG Tab Take 1 Tab by mouth every day. 90 Tab 3   • fenofibrate (TRICOR) 145 MG Tab TAKE 1 TABLET DAILY 90 Tab 3   • aripiprazole (ABILIFY) 5 MG tablet TAKE 1 TABLET AT BEDTIME 90 Tab 3   • albuterol 108 (90 BASE) MCG/ACT Aero Soln inhalation aerosol Inhale 2 Puffs by mouth every 6 hours as needed for Shortness of Breath. 3 Inhaler 3   • albuterol (PROVENTIL) 2.5mg/3ml Nebu Soln solution for nebulization 3 mL by Nebulization route every four hours as needed for Shortness of Breath. 360 mL 5   • tramadol (ULTRAM) 50 MG Tab TAKE 1 TO 2 TABLETS BY MOUTH 2 TIMES  DAILY AS NEEDED FOR SEVERE PAIN 90 Tab 5   • allopurinol (ZYLOPRIM) 100 MG Tab TAKE 1 TABLET DAILY 90 Tab 3   • SPIRIVA HANDIHALER 18 MCG Cap INHALE THE CONTENTS OF ONE CAPSULE DAILY 90 Cap 3   • lisinopril (PRINIVIL) 10 MG Tab Take 1 Tab by mouth every day. 90 Tab 3   • omeprazole (PRILOSEC) 20 MG delayed-release capsule Take 1 Cap by mouth every morning. Indications: GERD-Related Laryngitis 90 Cap 3   • duloxetine (CYMBALTA) 60 MG Cap DR Particles delayed-release capsule TAKE 1 CAPSULE EVERY       MORNING 90 Cap 3   • triamterene-hctz (MAXZIDE-25/DYAZIDE) 37.5-25 MG Tab TAKE 1 TABLET EVERY MORNING 90 Tab 3   • niacin (NIASPAN) 1000 MG CR tablet TAKE 1 TABLET DAILY 90 Tab 3   • fluticasone (FLONASE) 50 MCG/ACT nasal spray Spray 1-2 Sprays in nose every day. 3 Bottle 3   • fluticasone-salmeterol (ADVAIR) 250-50 MCG/DOSE AEROSOL POWDER, BREATH ACTIVATED Inhale 1 Puff by mouth every 12 hours. 3 Inhaler 3   • docusate sodium 100 MG Cap Take 100 mg by mouth every morning. 60 Cap 3   • latanoprost (XALATAN) 0.005 % Solution Place 1 Drop in both eyes every evening. 1 Bottle 3   • aspirin (ASA) 325 MG TABS Take 325 mg by mouth every day.     • azithromycin (ZITHROMAX) 250 MG Tab      • baclofen (LIORESAL) 10 MG Tab      • benzonatate (TESSALON) 100 MG Cap Take 2 Caps by mouth 3 times a day as needed for Cough. Do not chew. Swallow whole. 30 Cap 0   • MethylPREDNISolone (MEDROL DOSEPAK) 4 MG Tablet Therapy Pack Sig: Per instructions on pack 1 Kit 0   • diazepam (VALIUM) 5 MG Tab Take 1 Tab by mouth every 12 hours as needed (muscle spasm). 6 Tab 0   • predniSONE (DELTASONE) 10 MG Tab Take 30mg x 3 days, then take 20mg x 3 days, then take 10mg x 3 days, with food, then discontinue. 18 Tab 2   • cyclobenzaprine (FLEXERIL) 10 MG Tab Take 1 Tab by mouth 3 times a day as needed. 30 Tab 0   • duloxetine (CYMBALTA) 60 MG Cap DR Particles delayed-release capsule TAKE 1 CAPSULE EVERY       MORNING 90 Cap 3   • aripiprazole (ABILIFY)  5 MG tablet TAKE 1 TABLET AT BEDTIME 90 Tab 3   • Magnesium Oxide 500 MG Tab Take 1 Tab by mouth PRN.     • calcium carbonate 1000 MG Chew Tab Take 1 Tab by mouth 2 Times a Day. 30 Tab 3   • hydrocodone-acetaminophen (NORCO) 5-325 MG Tab per tablet Take 1-2 Tabs by mouth every four hours as needed.       No current facility-administered medications for this visit.       She  has a past medical history of Hypertension; COPD; Renal disorder; Seizure disorder (CMS-HCC); Carpal tunnel syndrome; Hypothyroidism (10/22/2013); GERD (gastroesophageal reflux disease) (10/22/2013); History of tobacco abuse (10/22/2013); Asthma; Bronchitis; Osteoporosis; and Pneumonia.    Patient Active Problem List    Diagnosis Date Noted   • Status post right hip replacement 10/31/2015     Priority: High   • Chronic kidney disease, stage III (moderate) 09/26/2014     Priority: Medium   • HTN (hypertension) 10/10/2013     Priority: Medium   • MDD (major depressive disorder), recurrent episode, moderate (CMS-HCC) 06/18/2015     Priority: Low   • Prediabetes 05/15/2015     Priority: Low   • Hypothyroidism 10/22/2013     Priority: Low   • GERD (gastroesophageal reflux disease) 10/22/2013     Priority: Low   • Hyperlipidemia 10/10/2013     Priority: Low   • Bronchitis 01/17/2017   • Chronic obstructive pulmonary disease with acute exacerbation (CMS-HCC) 01/17/2017   • Compression fracture of T12 vertebra with delayed healing 01/17/2017   • History of recent steroid use 01/17/2017   • Atherosclerosis of both carotid arteries 10/19/2016   • Chest pain 09/30/2016   • Dizzy 09/30/2016   • Non morbid obesity due to excess calories 09/30/2016   • Bilateral hip pain 03/21/2016   • Cyst in hand 06/26/2015   • AK (actinic keratosis) 06/26/2015   • Atopic dermatitis 01/08/2015   • Vitamin D insufficiency 09/26/2014   • Seasonal allergies 06/13/2014   • Chronic gout 03/13/2014   • Neuropathy (CMS-HCC) 01/10/2014   • Right arm pain 12/13/2013   • Chronic hip  "pain 10/22/2013   • Screening for breast cancer 10/22/2013   • History of tobacco abuse 10/22/2013   • Seizure disorder (CMS-HCC) 10/10/2013   • CTS (carpal tunnel syndrome) 10/10/2013   • History of septic arthritis 10/09/2013       ROS   No  chills.  No nausea or vomiting.  No chest pain or palpitations. No pain with urination or hematuria.  No black or bloody stools.       Objective:     Blood pressure 138/84, pulse 90, temperature 36.4 °C (97.5 °F), height 1.651 m (5' 5\"), weight 75.751 kg (167 lb), SpO2 95 %, not currently breastfeeding. Body mass index is 27.79 kg/(m^2).   Physical Exam:  Well developed, well nourished.  Alert, oriented in no acute distress.  Eye contact is good, speech goal directed, affect calm  Eyes: conjunctiva non-injected, sclera non-icteric.  Ears: Pinna normal. TM pearly gray.   Nose: Nares are patent. Erythematous, swollen mucosa with yellow discharge  Mouth: Oral mucous membranes pink and moist with no lesions. Posterior pharynx with generalized mild erythema   Neck Supple.  No adenopathy or masses in the neck or supraclavicular regions. No thyromegaly  Lungs: Decreased breath sounds throughout. There are some expiratory wheezes throughout as well as occasional rhonchi in the left lower lobe.  CV: regular rate and rhythm. No murmur  Back: Patient has point tenderness at the T12 level to palpation of the spinous process.  Lumbar spine x-ray from 1/16/17 shows \"Age-indeterminate compression deformity in the T12 vertebral body with approximately 60% loss of height.\".   Neurologic: 5/5 motor strength of the lower extremities with normal sensation.    Assessment and Plan:   The following treatment plan was discussed    1. Bronchitis  Switch to Augmentin 875 twice a day for 10 days. Increase fluids and rest. Patient to follow-up with pulmonology if not improving.  - amoxicillin-clavulanate (AUGMENTIN) 875-125 MG Tab; Take 1 Tab by mouth 2 times a day.  Dispense: 20 Tab; Refill: 0    2. " Chronic obstructive pulmonary disease with acute exacerbation (CMS-HCC)  See #1  - amoxicillin-clavulanate (AUGMENTIN) 875-125 MG Tab; Take 1 Tab by mouth 2 times a day.  Dispense: 20 Tab; Refill: 0    3. Compression fracture of T12 vertebra with delayed healing  On reviewing previous imaging compared to the recent lumbar spine x-ray this T12 compression fracture appears to be new and corresponds to her point tenderness. Given the amount of pain that she is currently exhibiting we will refer her to the spine doctor to see if she is a candidate for kyphoplasty versus bruising.  - REFERRAL TO SPINE SURGERY    4. History of recent steroid use  See #3*  - REFERRAL TO SPINE SURGERY    Any change or worsening of signs or symptoms, patient encouraged to follow-up or report to the emergency room for further evaluation. Patient understands and agrees.    Followup: Return in about 4 weeks (around 2/14/2017).

## 2017-01-19 NOTE — ASSESSMENT & PLAN NOTE
Patient has a long history of recurrent bronchitis is. She has COPD and saw the pulmonologist on 12/28/16. At that time she got a Z-Gerard and steroids. She did not improve and then was seen on 1-17 in the emergency room. She was complaining of back pain and persistent cough. She was put on Cipro, a Medrol Dosepak and Robaxin. In the ER she also had a Escherichia coli UTI. She then returned to the emergency room on the seventh for back pain and was treated conservatively. She reports that she is still coughing, has a runny nose and a sore throat from the cough. She denies any fever but she has had chills and some malaise.

## 2017-01-23 ENCOUNTER — PATIENT MESSAGE (OUTPATIENT)
Dept: MEDICAL GROUP | Facility: MEDICAL CENTER | Age: 67
End: 2017-01-23

## 2017-01-23 DIAGNOSIS — S22.080G COMPRESSION FRACTURE OF T12 VERTEBRA WITH DELAYED HEALING: ICD-10-CM

## 2017-01-23 DIAGNOSIS — Z78.0 POSTMENOPAUSAL: ICD-10-CM

## 2017-01-23 NOTE — TELEPHONE ENCOUNTER
From: Summer Hatch  To: Jl Palomino M.D.  Sent: 1/23/2017 9:09 AM PST  Subject: Non-Urgent Medical Question    In the last six months I've broken my left foot, my right ankle, my left rib, and now a compression fracture in the T12 vertabrae. I've also had four incidents of COPD where they have prescribed steroids to help clear my breathing. I'm concerned that I'll continue to have sress fractures unless treatment of some type is begun. Will you please order a osteoporsis bone scan for me and then I'll make an appointment with you to discuss options for preventing more bone loss?

## 2017-01-24 ENCOUNTER — HOSPITAL ENCOUNTER (OUTPATIENT)
Dept: LAB | Facility: MEDICAL CENTER | Age: 67
End: 2017-01-24
Attending: INTERNAL MEDICINE
Payer: MEDICARE

## 2017-01-24 LAB
ALBUMIN SERPL BCP-MCNC: 4.2 G/DL (ref 3.2–4.9)
ALBUMIN/GLOB SERPL: 1.4 G/DL
ALP SERPL-CCNC: 123 U/L (ref 30–99)
ALT SERPL-CCNC: 16 U/L (ref 2–50)
ANION GAP SERPL CALC-SCNC: 9 MMOL/L (ref 0–11.9)
APPEARANCE UR: CLEAR
AST SERPL-CCNC: 12 U/L (ref 12–45)
BASOPHILS # BLD AUTO: 0.13 K/UL (ref 0–0.12)
BASOPHILS NFR BLD AUTO: 1.2 % (ref 0–1.8)
BILIRUB SERPL-MCNC: 0.5 MG/DL (ref 0.1–1.5)
BILIRUB UR QL STRIP.AUTO: NEGATIVE
BUN SERPL-MCNC: 38 MG/DL (ref 8–22)
CALCIUM SERPL-MCNC: 9.6 MG/DL (ref 8.5–10.5)
CHLORIDE SERPL-SCNC: 97 MMOL/L (ref 96–112)
CO2 SERPL-SCNC: 25 MMOL/L (ref 20–33)
COLOR UR AUTO: YELLOW
CREAT SERPL-MCNC: 1.42 MG/DL (ref 0.5–1.4)
CREAT UR-MCNC: 102.2 MG/DL
EOSINOPHIL # BLD: 0.22 K/UL (ref 0–0.51)
EOSINOPHIL NFR BLD AUTO: 2 % (ref 0–6.9)
ERYTHROCYTE [DISTWIDTH] IN BLOOD BY AUTOMATED COUNT: 44.7 FL (ref 35.9–50)
GLOBULIN SER CALC-MCNC: 3.1 G/DL (ref 1.9–3.5)
GLUCOSE SERPL-MCNC: 90 MG/DL (ref 65–99)
GLUCOSE UR STRIP.AUTO-MCNC: NEGATIVE MG/DL
HCT VFR BLD AUTO: 47.7 % (ref 37–47)
HGB BLD-MCNC: 14.6 G/DL (ref 12–16)
IMM GRANULOCYTES # BLD AUTO: 0.14 K/UL (ref 0–0.11)
IMM GRANULOCYTES NFR BLD AUTO: 1.3 % (ref 0–0.9)
KETONES UR STRIP.AUTO-MCNC: NEGATIVE MG/DL
LEUKOCYTE ESTERASE UR QL STRIP.AUTO: NEGATIVE
LYMPHOCYTES # BLD: 1.59 K/UL (ref 1–4.8)
LYMPHOCYTES NFR BLD AUTO: 14.3 % (ref 22–41)
MCH RBC QN AUTO: 25.2 PG (ref 27–33)
MCHC RBC AUTO-ENTMCNC: 30.6 G/DL (ref 33.6–35)
MCV RBC AUTO: 82.2 FL (ref 81.4–97.8)
MICRO URNS: NORMAL
MONOCYTES # BLD: 1.04 K/UL (ref 0–0.85)
MONOCYTES NFR BLD AUTO: 9.4 % (ref 0–13.4)
NEUTROPHILS # BLD: 8 K/UL (ref 2–7.15)
NEUTROPHILS NFR BLD AUTO: 71.8 % (ref 44–72)
NITRITE UR QL STRIP.AUTO: NEGATIVE
NRBC # BLD AUTO: 0 K/UL
NRBC BLD-RTO: 0 /100 WBC
PH UR: 6.5 [PH]
PLATELET # BLD AUTO: 259 K/UL (ref 164–446)
PMV BLD AUTO: 10.3 FL (ref 9–12.9)
POTASSIUM SERPL-SCNC: 4.6 MMOL/L (ref 3.6–5.5)
PROT 24H UR-MRATE: 5.2 MG/DL (ref 0–15)
PROT SERPL-MCNC: 7.3 G/DL (ref 6–8.2)
PROT UR QL STRIP: NEGATIVE MG/DL
PROT/CREAT UR: 51 MG/G (ref 10–107)
RBC # BLD AUTO: 5.8 M/UL (ref 4.2–5.4)
RBC UR QL AUTO: NEGATIVE
SODIUM SERPL-SCNC: 131 MMOL/L (ref 135–145)
SP GR UR STRIP.AUTO: 1.01
WBC # BLD AUTO: 11.1 K/UL (ref 4.8–10.8)

## 2017-01-24 PROCEDURE — 82570 ASSAY OF URINE CREATININE: CPT

## 2017-01-24 PROCEDURE — 84156 ASSAY OF PROTEIN URINE: CPT

## 2017-01-24 PROCEDURE — 85025 COMPLETE CBC W/AUTO DIFF WBC: CPT

## 2017-01-24 PROCEDURE — 36415 COLL VENOUS BLD VENIPUNCTURE: CPT

## 2017-01-24 PROCEDURE — 80053 COMPREHEN METABOLIC PANEL: CPT

## 2017-01-24 PROCEDURE — 81003 URINALYSIS AUTO W/O SCOPE: CPT

## 2017-02-01 ENCOUNTER — TELEPHONE (OUTPATIENT)
Dept: MEDICAL GROUP | Facility: MEDICAL CENTER | Age: 67
End: 2017-02-01

## 2017-02-01 ENCOUNTER — OFFICE VISIT (OUTPATIENT)
Dept: PULMONOLOGY | Facility: HOSPICE | Age: 67
End: 2017-02-01
Payer: MEDICARE

## 2017-02-01 ENCOUNTER — HOSPITAL ENCOUNTER (OUTPATIENT)
Dept: RADIOLOGY | Facility: MEDICAL CENTER | Age: 67
End: 2017-02-01
Attending: FAMILY MEDICINE
Payer: MEDICARE

## 2017-02-01 VITALS
BODY MASS INDEX: 27.66 KG/M2 | RESPIRATION RATE: 14 BRPM | TEMPERATURE: 97.9 F | DIASTOLIC BLOOD PRESSURE: 82 MMHG | HEIGHT: 65 IN | WEIGHT: 166 LBS | SYSTOLIC BLOOD PRESSURE: 116 MMHG | HEART RATE: 93 BPM

## 2017-02-01 DIAGNOSIS — K21.9 GASTROESOPHAGEAL REFLUX DISEASE, ESOPHAGITIS PRESENCE NOT SPECIFIED: ICD-10-CM

## 2017-02-01 DIAGNOSIS — J44.9 CHRONIC OBSTRUCTIVE PULMONARY DISEASE, UNSPECIFIED COPD TYPE (HCC): ICD-10-CM

## 2017-02-01 DIAGNOSIS — S22.080G COMPRESSION FRACTURE OF T12 VERTEBRA WITH DELAYED HEALING: ICD-10-CM

## 2017-02-01 DIAGNOSIS — Z87.891 HISTORY OF TOBACCO ABUSE: ICD-10-CM

## 2017-02-01 DIAGNOSIS — J30.2 SEASONAL ALLERGIC RHINITIS, UNSPECIFIED ALLERGIC RHINITIS TRIGGER: ICD-10-CM

## 2017-02-01 DIAGNOSIS — Z78.0 POSTMENOPAUSAL: ICD-10-CM

## 2017-02-01 PROBLEM — J44.1 CHRONIC OBSTRUCTIVE PULMONARY DISEASE WITH ACUTE EXACERBATION (HCC): Status: RESOLVED | Noted: 2017-01-17 | Resolved: 2017-02-01

## 2017-02-01 PROCEDURE — 3017F COLORECTAL CA SCREEN DOC REV: CPT | Performed by: NURSE PRACTITIONER

## 2017-02-01 PROCEDURE — 0518F FALL PLAN OF CARE DOCD: CPT | Mod: 8P | Performed by: NURSE PRACTITIONER

## 2017-02-01 PROCEDURE — G8484 FLU IMMUNIZE NO ADMIN: HCPCS | Performed by: NURSE PRACTITIONER

## 2017-02-01 PROCEDURE — 77080 DXA BONE DENSITY AXIAL: CPT

## 2017-02-01 PROCEDURE — 4040F PNEUMOC VAC/ADMIN/RCVD: CPT | Performed by: NURSE PRACTITIONER

## 2017-02-01 PROCEDURE — G8420 CALC BMI NORM PARAMETERS: HCPCS | Performed by: NURSE PRACTITIONER

## 2017-02-01 PROCEDURE — 99214 OFFICE O/P EST MOD 30 MIN: CPT | Performed by: NURSE PRACTITIONER

## 2017-02-01 PROCEDURE — 3014F SCREEN MAMMO DOC REV: CPT | Performed by: NURSE PRACTITIONER

## 2017-02-01 PROCEDURE — G8598 ASA/ANTIPLAT THER USED: HCPCS | Performed by: NURSE PRACTITIONER

## 2017-02-01 PROCEDURE — 1036F TOBACCO NON-USER: CPT | Performed by: NURSE PRACTITIONER

## 2017-02-01 PROCEDURE — 3288F FALL RISK ASSESSMENT DOCD: CPT | Performed by: NURSE PRACTITIONER

## 2017-02-01 PROCEDURE — 1100F PTFALLS ASSESS-DOCD GE2>/YR: CPT | Performed by: NURSE PRACTITIONER

## 2017-02-01 PROCEDURE — G8432 DEP SCR NOT DOC, RNG: HCPCS | Performed by: NURSE PRACTITIONER

## 2017-02-01 NOTE — TELEPHONE ENCOUNTER
----- Message from Jl Palomino M.D. sent at 2/1/2017 10:05 AM PST -----  Please notify patient that her bone density is normal.  Jl Palomino M.D.

## 2017-02-01 NOTE — PROGRESS NOTES
Chief Complaint   Patient presents with   • Follow-Up       HPI:  Summer Hatch is a 66 y.o. year old female here today for follow-up on resolution of acute symptoms last OV 1/18/17. She is normally followed by Cor[facility]. CXR 1/2/17 indicated no acute process. She was given zpak/pred taper with no benefit and then last OV given Augmentin with medrol dosepak and tessalon perles. Symptoms are gone. She remains on her inhalers. She notes stable dyspnea on exertion, no cough/phlegm/wheezing. She denies fever, chills, night sweats, chest pain, ankle swelling or hemoptysis. She did have a possible allergic rxn and [facility] responded 2 days ago. Unsure if this was due to taking stool softeners - her hands were burning, she was short of breath, swollen lips nauseas and followed by diarrhea. This has resolved but her appetite is still recovering. She is pending f/u with PCP. She also has a history of osteoporosis diagnosed in [place] 4 yrs ago. She has not had therapy. She has had bone fractures of leg/ankle and back now. PCP is ordering bone density and orthopedist is ordering MRI to further evaluate her spine.     Summer Hatch is a 66 y.o. year old female history of Asthma/COPD.  Chest xray in November 2015 was clear. PFT's in January 2016 indicated an FEV1 of 1.53 L, 64% predicted with an FEV1/FVC ratio of 59 and a DLCO of 75% predicted. She has a history of tobacco abuse and quit smoking in 1998. She is compliant on Advair 250/50 bid, Spiriva daily and a Ventolin HFA inhaler as needed. She is on a PPI and GERD well controlled. She also uses a Flonase nasal spray. She feels her sinus drainage is stable and she denies purulent sinus drainage.     ROS: As per HPI and otherwise negative if not stated.    Past Medical History   Diagnosis Date   • Hypertension    • COPD    • Renal disorder    • Seizure disorder (CMS-HCC)    • Carpal tunnel syndrome    • Hypothyroidism 10/22/2013   • GERD (gastroesophageal reflux  "disease) 10/22/2013   • History of tobacco abuse 10/22/2013     Quit 1998. Smoked one pack per day for 30 years.   • Asthma    • Bronchitis    • Osteoporosis    • Pneumonia        Past Surgical History   Procedure Laterality Date   • Nerve ulnar repair or explore     • Other orthopedic surgery     • Hip revision total  10/10/2013     Performed by Nils Starr M.D. at SURGERY [facility]   • Incision and drainage orthopedic  10/10/2013     Performed by Nils Starr M.D. at SURGERY [facility]   • Carpal tunnel release     • Hip revision total Right 11/5/2015     Procedure: Evacuation hematoma, revision total hip;  Surgeon: Nils Starr M.D.;  Location: SURGERY [facility];  Service:    • Tonsillectomy     • Appendectomy         Family History   Problem Relation Age of Onset   • Psychiatry Mother 47     suicide   • Alcohol/Drug Mother    • Cancer Father 72     Lung   • Alcohol/Drug Brother      Recovering       Social History     Social History   • Marital Status:      Spouse Name: N/A   • Number of Children: N/A   • Years of Education: N/A     Occupational History   • Not on file.     Social History Main Topics   • Smoking status: Former Smoker -- 1.00 packs/day for 30 years     Types: Cigarettes   • Smokeless tobacco: Never Used   • Alcohol Use: No   • Drug Use: No   • Sexual Activity: Not on file     Other Topics Concern   • Not on file     Social History Narrative       Allergies as of 02/01/2017 - Victor Hugo as Reviewed 02/01/2017   Allergen Reaction Noted   • Tape  10/09/2013   • Tylenol Swelling 09/15/2016        @Vital signs for this encounter:  Filed Vitals:    02/01/17 0808   Height: 1.651 m (5' 5\")   Weight: 75.297 kg (166 lb)   Weight % change since last entry.: 0 %   BP: 116/82   Pulse: 93   BMI (Calculated): 27.62   Resp: 14   Temp: 36.6 °C (97.9 °F)   TempSrc: Temporal   O2 sat % room air: 95 %       Current medications as of today   Current Outpatient Prescriptions "   Medication Sig Dispense Refill   • benzonatate (TESSALON) 100 MG Cap Take 2 Caps by mouth 3 times a day as needed for Cough. Do not chew. Swallow whole. 30 Cap 0   • MethylPREDNISolone (MEDROL DOSEPAK) 4 MG Tablet Therapy Pack Sig: Per instructions on pack 1 Kit 0   • azithromycin (ZITHROMAX) 250 MG Tab      • baclofen (LIORESAL) 10 MG Tab      • amoxicillin-clavulanate (AUGMENTIN) 875-125 MG Tab Take 1 Tab by mouth 2 times a day. 20 Tab 0   • diazepam (VALIUM) 5 MG Tab Take 1 Tab by mouth every 12 hours as needed (muscle spasm). 6 Tab 0   • methocarbamol (ROBAXIN) 750 MG Tab Take 2 Tabs by mouth 4 times a day as needed (muscle spasm). 20 Tab 0   • predniSONE (DELTASONE) 10 MG Tab Take 30mg x 3 days, then take 20mg x 3 days, then take 10mg x 3 days, with food, then discontinue. 18 Tab 2   • SYNTHROID 100 MCG Tab TAKE 1 TABLET DAILY 90 Tab 3   • cyclobenzaprine (FLEXERIL) 10 MG Tab Take 1 Tab by mouth 3 times a day as needed. 30 Tab 0   • duloxetine (CYMBALTA) 60 MG Cap DR Particles delayed-release capsule TAKE 1 CAPSULE EVERY       MORNING 90 Cap 3   • aripiprazole (ABILIFY) 5 MG tablet TAKE 1 TABLET AT BEDTIME 90 Tab 3   • atorvastatin (LIPITOR) 40 MG Tab Take 1 Tab by mouth every day. 90 Tab 3   • fenofibrate (TRICOR) 145 MG Tab TAKE 1 TABLET DAILY 90 Tab 3   • aripiprazole (ABILIFY) 5 MG tablet TAKE 1 TABLET AT BEDTIME 90 Tab 3   • albuterol 108 (90 BASE) MCG/ACT Aero Soln inhalation aerosol Inhale 2 Puffs by mouth every 6 hours as needed for Shortness of Breath. 3 Inhaler 3   • albuterol (PROVENTIL) 2.5mg/3ml Nebu Soln solution for nebulization 3 mL by Nebulization route every four hours as needed for Shortness of Breath. 360 mL 5   • tramadol (ULTRAM) 50 MG Tab TAKE 1 TO 2 TABLETS BY MOUTH 2 TIMES DAILY AS NEEDED FOR SEVERE PAIN 90 Tab 5   • allopurinol (ZYLOPRIM) 100 MG Tab TAKE 1 TABLET DAILY 90 Tab 3   • SPIRIVA HANDIHALER 18 MCG Cap INHALE THE CONTENTS OF ONE CAPSULE DAILY 90 Cap 3   • lisinopril  (PRINIVIL) 10 MG Tab Take 1 Tab by mouth every day. 90 Tab 3   • omeprazole (PRILOSEC) 20 MG delayed-release capsule Take 1 Cap by mouth every morning. Indications: GERD-Related Laryngitis 90 Cap 3   • duloxetine (CYMBALTA) 60 MG Cap DR Particles delayed-release capsule TAKE 1 CAPSULE EVERY       MORNING 90 Cap 3   • triamterene-hctz (MAXZIDE-25/DYAZIDE) 37.5-25 MG Tab TAKE 1 TABLET EVERY MORNING 90 Tab 3   • niacin (NIASPAN) 1000 MG CR tablet TAKE 1 TABLET DAILY 90 Tab 3   • fluticasone (FLONASE) 50 MCG/ACT nasal spray Spray 1-2 Sprays in nose every day. 3 Bottle 3   • fluticasone-salmeterol (ADVAIR) 250-50 MCG/DOSE AEROSOL POWDER, BREATH ACTIVATED Inhale 1 Puff by mouth every 12 hours. 3 Inhaler 3   • Magnesium Oxide 500 MG Tab Take 1 Tab by mouth PRN.     • calcium carbonate 1000 MG Chew Tab Take 1 Tab by mouth 2 Times a Day. 30 Tab 3   • docusate sodium 100 MG Cap Take 100 mg by mouth every morning. 60 Cap 3   • latanoprost (XALATAN) 0.005 % Solution Place 1 Drop in both eyes every evening. 1 Bottle 3   • hydrocodone-acetaminophen (NORCO) 5-325 MG Tab per tablet Take 1-2 Tabs by mouth every four hours as needed.     • aspirin (ASA) 325 MG TABS Take 325 mg by mouth every day.       No current facility-administered medications for this visit.         Physical Exam:   Gen:           Alert and oriented, No apparent distress. Mood and affect appropriate, normal interaction with examiner.  Eyes:          PERRL, EOM intact, sclere white, conjunctive moist. Glasses.  Ears:          Not examined.   Hearing:     Grossly intact.  Nose:          Normal, no lesions or deformities.  Dentition:    Good dentition.  Oropharynx:   Tongue normal, posterior pharynx without erythema or exudate.  Mallampati Classification: 2  Neck:        Supple, trachea midline, no masses.  Respiratory Effort: No intercostal retractions or use of accessory muscles.   Lung Auscultation:      Clear to auscultation bilaterally; no rales, rhonchi or  "wheezing.  CV:            Regular rate and rhythm. No murmurs, rubs or gallops.  Abd:           Not examined.   Lymphadenopathy: No palpable nodes or edema.  Gait and Station: Normal.  Digits and Nails: Not examined.  Cranial Nerves: II-XII grossly intact.  Skin:        No rashes, lesions or ulcers noted.               Ext:           No cyanosis or edema.      Assessment:  1. Chronic obstructive pulmonary disease, unspecified COPD type (CMS-HCC)     2. History of tobacco abuse     3. Gastroesophageal reflux disease, esophagitis presence not specified     4. Seasonal allergic rhinitis, unspecified allergic rhinitis trigger         Immunizations:    Flu:will update with DR. Palomino  Pneumovax 23:2013  Prevnar 13:2015    Plan:  1. Continue inhaler regimen.  2. Patient would like to obtain flu vaccine from Dr. Palomino due to feeling \"off\" from reaction this week.  3. Update htee next OV.  4. Discussed respiratory hygiene.  5. Follow up in 2 months with thee, sooner if needed.          "

## 2017-02-01 NOTE — MR AVS SNAPSHOT
"Summer Irene Holden   2017 8:20 AM   Office Visit   MRN: [id number]    Department:  [facility]   Dept Phone:  [phone]    Description:  Female : 1950   Provider:  JOSE Marrufo           Reason for Visit     Follow-Up           Allergies as of 2017     Allergen Noted Reactions    Tape 10/09/2013       Tylenol 09/15/2016   Swelling    Lip, throat swelling      You were diagnosed with     Chronic obstructive pulmonary disease, unspecified COPD type (CMS-[facility])   [7963582]       History of tobacco abuse   [562238]       Gastroesophageal reflux disease, esophagitis presence not specified   [0842735]       Seasonal allergic rhinitis, unspecified allergic rhinitis trigger   [6559925]         Vital Signs     Blood Pressure Pulse Temperature Respirations Height Weight    116/82 mmHg 93 36.6 °C (97.9 °F) (Temporal) 14 1.651 m (5' 5\") 75.297 kg (166 lb)    Body Mass Index Smoking Status                27.62 kg/m2 Former Smoker          Basic Information     Date Of Birth Sex Race Ethnicity Preferred Language    1950 Female White Non- English      Your appointments     2017 10:15 AM   BONE DENSITY (DEXASCAN) with [employer] 1   [facility]ING [facility]    [facility] [place]  [address]  [address]  [facility] [address]   [phone]           No calcium supplements 24 hours prior to exam, including antacids or multivitamins w/calcium.  Pt may eat and drink normally.  No injection procedures prior to Dexa on the same day.  No barium contrast, no CTs with IV or oral contrast, no Pet/CTs and no Nuc Med injections for 7 days prior to a Dexa (including Barium Swallow, Upper GI, Small Bowel Series).  If scheduling a Dexa on the same day as a CT with IV or oral contrast, any test with barium, Pet/CT or a Nuc Med with injection, always schedule the Dexa before the other study.            Mar 29, 2017  1:30 PM   Pulmonary Function Test with " SPIROMETRY/6MW   [facility] (--)    [address]  [address]  [place] [id number] [phone]            Mar 29, 2017  2:00 PM   Established Patient Pul with SAPNA Chairez.   [facility] (--)    [address]  [address]  [place] [id number] [phone]            Mar 30, 2017  8:40 AM   Established Patient with Jl Palomino M.D.   [facility] ([facility])    [address]  [place] [id number] [phone]           You will be receiving a confirmation call a few days before your appointment from our automated call confirmation system.              Problem List              ICD-10-CM Priority Class Noted - Resolved    History of septic arthritis Z87.39   10/9/2013 - Present    HTN (hypertension) I10 Medium  10/10/2013 - Present    Seizure disorder (CMS-HCC) G40.909   10/10/2013 - Present    Hyperlipidemia E78.5 Low  10/10/2013 - Present    CTS (carpal tunnel syndrome) G56.00   10/10/2013 - Present    Chronic hip pain M25.559, G89.29   10/22/2013 - Present    Hypothyroidism E03.9 Low  10/22/2013 - Present    GERD (gastroesophageal reflux disease) K21.9 Low  10/22/2013 - Present    Screening for breast cancer Z12.39   10/22/2013 - Present    History of tobacco abuse Z87.891   10/22/2013 - Present    Right arm pain M79.601   12/13/2013 - Present    Neuropathy (CMS-HCC) G62.9   1/10/2014 - Present    Chronic gout M1A.9XX0   3/13/2014 - Present    Seasonal allergies J30.2   6/13/2014 - Present    Vitamin D insufficiency E55.9   9/26/2014 - Present    Chronic kidney disease, stage III (moderate) N18.3 Medium  9/26/2014 - Present    Atopic dermatitis L20.9   1/8/2015 - Present    Prediabetes R73.03 Low  5/15/2015 - Present    MDD (major depressive disorder), recurrent episode, moderate (CMS-HCC) F33.1 Low  6/18/2015 - Present    Cyst in hand [id number]   6/26/2015 - Present    AK (actinic keratosis) L57.0   6/26/2015 -  Present    Status post right hip replacement Z96.641 High  10/31/2015 - Present    Bilateral hip pain M25.551, M25.552   3/21/2016 - Present    Chest pain R07.9   9/30/2016 - Present    Dizzy R42   9/30/2016 - Present    Non morbid obesity due to excess calories E66.09   9/30/2016 - Present    Atherosclerosis of both carotid arteries I65.23   10/19/2016 - Present    Bronchitis J40   1/17/2017 - Present    Compression fracture of T12 vertebra with delayed healing M48.54XG   1/17/2017 - Present    History of recent steroid use Z92.241   1/17/2017 - Present    Chronic obstructive pulmonary disease (CMS-[facility]) J44.9   2/1/2017 - Present      Health Maintenance        Date Due Completion Dates    IMM DTaP/Tdap/Td Vaccine (1 - Tdap) 6/6/1970 ---    IMM ZOSTER VACCINE 6/6/2011 ---    BONE DENSITY 6/6/2016 ---    IMM INFLUENZA (1) 9/1/2016 9/28/2015, 9/26/2014, 10/13/2013    MAMMOGRAM 4/13/2017 4/13/2016, 1/2/2014, 12/20/2013, 12/20/2013, 12/20/2013, 12/20/2013    PAP SMEAR 6/26/2018 6/26/2015, 6/26/2015    IMM PNEUMOCOCCAL 65+ (ADULT) LOW/MEDIUM RISK SERIES (2 of 2 - PPSV23) 10/14/2018 9/28/2015, 10/14/2013    COLONOSCOPY 1/1/2020 1/1/2010 (Done)    Override on 1/1/2010: Done            Current Immunizations     13-VALENT PCV PREVNAR 9/28/2015    Influenza TIV (IM) 10/13/2013 11:02 AM    Influenza Vaccine Quad Inj (Pf) 9/26/2014  8:32 AM    Influenza Vaccine Quad Inj (Preserved) 9/28/2015    Pneumococcal polysaccharide vaccine (PPSV-23) 10/14/2013  8:03 AM      Below and/or attached are the medications your provider expects you to take. Review all of your home medications and newly ordered medications with your provider and/or pharmacist. Follow medication instructions as directed by your provider and/or pharmacist. Please keep your medication list with you and share with your provider. Update the information when medications are discontinued, doses are changed, or new medications (including over-the-counter products) are  added; and carry medication information at all times in the event of emergency situations     Allergies:  TAPE - (reactions not documented)     TYLENOL - Swelling               Medications  Valid as of: February 01, 2017 -  8:42 AM    Generic Name Brand Name Tablet Size Instructions for use    Albuterol Sulfate (Aero Soln) albuterol 108 (90 BASE) MCG/ACT Inhale 2 Puffs by mouth every 6 hours as needed for Shortness of Breath.        Albuterol Sulfate (Nebu Soln) PROVENTIL 2.5mg/3ml 3 mL by Nebulization route every four hours as needed for Shortness of Breath.        Allopurinol (Tab) ZYLOPRIM 100 MG TAKE 1 TABLET DAILY        Amoxicillin-Pot Clavulanate (Tab) AUGMENTIN 875-125 MG Take 1 Tab by mouth 2 times a day.        ARIPiprazole (Tab) ABILIFY 5 MG TAKE 1 TABLET AT BEDTIME        ARIPiprazole (Tab) ABILIFY 5 MG TAKE 1 TABLET AT BEDTIME        Aspirin (Tab)  MG Take 325 mg by mouth every day.        Atorvastatin Calcium (Tab) LIPITOR 40 MG Take 1 Tab by mouth every day.        Azithromycin (Tab) ZITHROMAX 250 MG         Baclofen (Tab) LIORESAL 10 MG         Benzonatate (Cap) TESSALON 100 MG Take 2 Caps by mouth 3 times a day as needed for Cough. Do not chew. Swallow whole.        Calcium Carbonate Antacid (Chew Tab) Calcium Carbonate Antacid 1000 MG Take 1 Tab by mouth 2 Times a Day.        Cyclobenzaprine HCl (Tab) FLEXERIL 10 MG Take 1 Tab by mouth 3 times a day as needed.        DiazePAM (Tab) VALIUM 5 MG Take 1 Tab by mouth every 12 hours as needed (muscle spasm).        Docusate Sodium (Cap)  MG Take 100 mg by mouth every morning.        DULoxetine HCl (Cap DR Particles) CYMBALTA 60 MG TAKE 1 CAPSULE EVERY       MORNING        DULoxetine HCl (Cap DR Particles) CYMBALTA 60 MG TAKE 1 CAPSULE EVERY       MORNING        Fenofibrate (Tab) TRICOR 145 MG TAKE 1 TABLET DAILY        Fluticasone Propionate (Suspension) FLONASE 50 MCG/ACT Spray 1-2 Sprays in nose every day.        Fluticasone-Salmeterol  (AEROSOL POWDER, BREATH ACTIVATED) ADVAIR 250-50 MCG/DOSE Inhale 1 Puff by mouth every 12 hours.        Hydrocodone-Acetaminophen (Tab) NORCO 5-325 MG Take 1-2 Tabs by mouth every four hours as needed.        Latanoprost (Solution) XALATAN 0.005 % Place 1 Drop in both eyes every evening.        Levothyroxine Sodium (Tab) SYNTHROID 100 MCG TAKE 1 TABLET DAILY        Lisinopril (Tab) PRINIVIL 10 MG Take 1 Tab by mouth every day.        Magnesium Oxide (Tab) Magnesium Oxide 500 MG Take 1 Tab by mouth PRN.        Methocarbamol (Tab) ROBAXIN 750 MG Take 2 Tabs by mouth 4 times a day as needed (muscle spasm).        MethylPREDNISolone (Tablet Therapy Pack) MEDROL DOSEPAK 4 MG Sig: Per instructions on pack        Niacin (Antihyperlipidemic) (Tab CR) NIASPAN 1000 MG TAKE 1 TABLET DAILY        Omeprazole (CAPSULE DELAYED RELEASE) PRILOSEC 20 MG Take 1 Cap by mouth every morning. Indications: GERD-Related Laryngitis        PredniSONE (Tab) DELTASONE 10 MG Take 30mg x 3 days, then take 20mg x 3 days, then take 10mg x 3 days, with food, then discontinue.        Tiotropium Bromide Monohydrate (Cap) SPIRIVA HANDIHALER 18 MCG INHALE THE CONTENTS OF ONE CAPSULE DAILY        TraMADol HCl (Tab) ULTRAM 50 MG TAKE 1 TO 2 TABLETS BY MOUTH 2 TIMES DAILY AS NEEDED FOR SEVERE PAIN        Triamterene-HCTZ (Tab) MAXZIDE-25/DYAZIDE 37.5-25 MG TAKE 1 TABLET EVERY MORNING        .                 Medicines prescribed today were sent to:     [facility] - [place], [place] - [address] AT PORTAL TO REGISTERED [facility] SITES    [address] [facility] [address]    Phone: [phone] Fax: [phone]    Open 24 Hours?: No    [id number] - [place] - [address]    [address] [place] [address]    Phone: [phone] Fax: [phone]    Open 24 Hours?: No      Medication refill instructions:       If your prescription bottle indicates you have medication refills left, it is not necessary to call  your provider’s office. Please contact your pharmacy and they will refill your medication.    If your prescription bottle indicates you do not have any refills left, you may request refills at any time through one of the following ways: The online [employer] system (except Urgent Care), by calling your provider’s office, or by asking your pharmacy to contact your provider’s office with a refill request. Medication refills are processed only during regular business hours and may not be available until the next business day. Your provider may request additional information or to have a follow-up visit with you prior to refilling your medication.   *Please Note: Medication refills are assigned a new Rx number when refilled electronically. Your pharmacy may indicate that no refills were authorized even though a new prescription for the same medication is available at the pharmacy. Please request the medicine by name with the pharmacy before contacting your provider for a refill.        Instructions    1. Update flu shot with Dr. Palomino.  2. Follow up in 2 months with thee, sooner if needed.          [employer] Access Code: Activation code not generated  Current [employer] Status: Active

## 2017-02-09 ENCOUNTER — APPOINTMENT (OUTPATIENT)
Dept: RADIOLOGY | Facility: MEDICAL CENTER | Age: 67
End: 2017-02-09
Attending: NEUROLOGICAL SURGERY
Payer: MEDICARE

## 2017-02-10 ENCOUNTER — HOSPITAL ENCOUNTER (OUTPATIENT)
Dept: RADIOLOGY | Facility: MEDICAL CENTER | Age: 67
End: 2017-02-10
Attending: NEUROLOGICAL SURGERY
Payer: MEDICARE

## 2017-02-10 DIAGNOSIS — M54.5 LOW BACK PAIN, UNSPECIFIED BACK PAIN LATERALITY, UNSPECIFIED CHRONICITY, WITH SCIATICA PRESENCE UNSPECIFIED: ICD-10-CM

## 2017-02-10 PROCEDURE — 72148 MRI LUMBAR SPINE W/O DYE: CPT

## 2017-02-17 NOTE — TELEPHONE ENCOUNTER
Was the patient seen in the last year in this department? Yes     Does patient have an active prescription for medications requested? No     Received Request Via: Pharmacy     Last seen: 1/17/2017  Last labs: 1/24/2017

## 2017-02-24 ENCOUNTER — HOSPITAL ENCOUNTER (OUTPATIENT)
Dept: RADIOLOGY | Facility: MEDICAL CENTER | Age: 67
End: 2017-02-24
Attending: NEUROLOGICAL SURGERY
Payer: MEDICARE

## 2017-02-24 DIAGNOSIS — S22.080A T12 COMPRESSION FRACTURE, INITIAL ENCOUNTER (HCC): ICD-10-CM

## 2017-02-24 NOTE — CONSULTS
Interventional Radiology Consultation      Re: Summer Hatch     MRN: [id number]   : 1950    Summer Hatch was referred by Kris Eckert MD.  She is a 66 y.o. female seen in clinic for evaluation and possible vertebral augmentation.    History of Present Illness:   presents with atraumatic low back pain that began about eight weeks ago. She woke up with severe low back pain 10/10 that has not significantly improved. Imaging workup showed an acute T12 vertebral compression fracture. Today, 's pain is 4-5/10 with tramadol. It was 6-7/10 when she woke up. Lying down in certain positions and tramadol makes the pain better. Walking, standing, sleeping in certain positions, and carrying groceries makes the pain worse. The pain impacts her ability to perform activities of daily living due to the pain, specifically grocery shopping, putting on her shoes, and sleeping in bed at times. She is a long arm quilter and has been unable to do her usual daily quilting for two months due to the pain she gets while standing in one place. She is in physical therapy, which has helped a little, but her pain is still significantly impacting her daily life. She has an appointment with her primary care provider to discuss her recent history of four stress fractures and this compression fracture in the setting of negative DEXA scan. She states she had a DEXA several years ago in [place] that showed osteoporosis but she was never on therapy. She has chronic obstructive pulmonary disease and has taken steroids intermittently for several years.    She is seen today for review of imaging studies and discussion of possible vertebral augmentation of a compression fracture at T12.     Past Medical History   Diagnosis Date   • Hypertension    • COPD    • Renal disorder    • Seizure disorder (CMS-[facility])    • Carpal tunnel syndrome    • Hypothyroidism 10/22/2013   • GERD (gastroesophageal reflux disease) 10/22/2013   •  History of tobacco abuse 10/22/2013     Quit 1998. Smoked one pack per day for 30 years.   • Asthma    • Bronchitis    • Osteoporosis    • Pneumonia      Past Surgical History   Procedure Laterality Date   • Nerve ulnar repair or explore     • Other orthopedic surgery     • Hip revision total  10/10/2013     Performed by Nils Starr M.D. at SURGERY [facility]   • Incision and drainage orthopedic  10/10/2013     Performed by Nils Starr M.D. at SURGERY [facility]   • Carpal tunnel release     • Hip revision total Right 11/5/2015     Procedure: Evacuation hematoma, revision total hip;  Surgeon: Nils Starr M.D.;  Location: SURGERY [facility];  Service:    • Tonsillectomy     • Appendectomy       Social History     Social History   • Marital Status:      Spouse Name: N/A   • Number of Children: N/A   • Years of Education: N/A     Occupational History   • Not on file.     Social History Main Topics   • Smoking status: Former Smoker -- 1.00 packs/day for 30 years     Types: Cigarettes   • Smokeless tobacco: Never Used   • Alcohol Use: No   • Drug Use: No   • Sexual Activity: Not on file     Other Topics Concern   • Not on file     Social History Narrative     Family History   Problem Relation Age of Onset   • Psychiatry Mother 47     suicide   • Alcohol/Drug Mother    • Cancer Father 72     Lung   • Alcohol/Drug Brother      Recovering       Review of Systems:  Pertinent items are noted in HPI.    Labs:      Ref. Range 1/24/2017 12:08   WBC Latest Ref Range: 4.8-10.8 K/uL 11.1 (H)   RBC Latest Ref Range: 4.20-5.40 M/uL 5.80 (H)   Hemoglobin Latest Ref Range: 12.0-16.0 g/dL 14.6   Hematocrit Latest Ref Range: 37.0-47.0 % 47.7 (H)   MCV Latest Ref Range: 81.4-97.8 fL 82.2   MCH Latest Ref Range: 27.0-33.0 pg 25.2 (L)   MCHC Latest Ref Range: 33.6-35.0 g/dL 30.6 (L)   RDW Latest Ref Range: 35.9-50.0 fL 44.7   Platelet Count Latest Ref Range: 164-446 K/uL 259   MPV Latest Ref Range:  9.0-12.9 fL 10.3   Neutrophils-Polys Latest Ref Range: 44.00-72.00 % 71.80   Neutrophils (Absolute) Latest Ref Range: 2.00-7.15 K/uL 8.00 (H)   Lymphocytes Latest Ref Range: 22.00-41.00 % 14.30 (L)   Lymphs (Absolute) Latest Ref Range: 1.00-4.80 K/uL 1.59   Monocytes Latest Ref Range: 0.00-13.40 % 9.40   Monos (Absolute) Latest Ref Range: 0.00-0.85 K/uL 1.04 (H)   Eosinophils Latest Ref Range: 0.00-6.90 % 2.00   Eos (Absolute) Latest Ref Range: 0.00-0.51 K/uL 0.22   Basophils Latest Ref Range: 0.00-1.80 % 1.20   Baso (Absolute) Latest Ref Range: 0.00-0.12 K/uL 0.13 (H)   Immature Granulocytes Latest Ref Range: 0.00-0.90 % 1.30 (H)   Immature Granulocytes (abs) Latest Ref Range: 0.00-0.11 K/uL 0.14 (H)   Nucleated RBC Latest Units: /100 WBC 0.00   NRBC (Absolute) Latest Units: K/uL 0.00   Sodium Latest Ref Range: 135-145 mmol/L 131 (L)   Potassium Latest Ref Range: 3.6-5.5 mmol/L 4.6   Chloride Latest Ref Range:  mmol/L 97   Co2 Latest Ref Range: 20-33 mmol/L 25   Anion Gap Latest Ref Range: 0.0-11.9  9.0   Glucose Latest Ref Range: 65-99 mg/dL 90   Bun Latest Ref Range: 8-22 mg/dL 38 (H)   Creatinine Latest Ref Range: 0.50-1.40 mg/dL 1.42 (H)   GFR If  Latest Ref Range: >60 mL/min/1.73 m 2 45 (A)   GFR If Non  Latest Ref Range: >60 mL/min/1.73 m 2 37 (A)   Calcium Latest Ref Range: 8.5-10.5 mg/dL 9.6   AST(SGOT) Latest Ref Range: 12-45 U/L 12   ALT(SGPT) Latest Ref Range: 2-50 U/L 16   Alkaline Phosphatase Latest Ref Range: 30-99 U/L 123 (H)   Total Bilirubin Latest Ref Range: 0.1-1.5 mg/dL 0.5   Albumin Latest Ref Range: 3.2-4.9 g/dL 4.2   Total Protein Latest Ref Range: 6.0-8.2 g/dL 7.3   Globulin Latest Ref Range: 1.9-3.5 g/dL 3.1   A-G Ratio Latest Units: g/dL 1.4       Radiology:   Review of imaging obtained at [facility]:  MRI Lumbar Spine February 10, 2017:  1.  Moderate acute wedge type compression fracture the T12 vertebral body with mild to moderate retropulsion narrowing  the vertebral canal by approximately 30%. Vertebral augmentation should be considered.  2.  Minimal to mild multilevel lumbar spondylotic change.    DEXA scan February 1, 2017:  According to the World Health Organization classification, bone mineral density of this patient is normal.  FRAX score not obtained for this patient.    Current Outpatient Prescriptions   Medication Sig Dispense Refill   • fluticasone-salmeterol (ADVAIR) 250-50 MCG/DOSE AEROSOL POWDER, BREATH ACTIVATED Inhale 1 Puff by mouth every 12 hours. 3 Inhaler 3   • azithromycin (ZITHROMAX) 250 MG Tab      • baclofen (LIORESAL) 10 MG Tab      • benzonatate (TESSALON) 100 MG Cap Take 2 Caps by mouth 3 times a day as needed for Cough. Do not chew. Swallow whole. 30 Cap 0   • MethylPREDNISolone (MEDROL DOSEPAK) 4 MG Tablet Therapy Pack Sig: Per instructions on pack 1 Kit 0   • amoxicillin-clavulanate (AUGMENTIN) 875-125 MG Tab Take 1 Tab by mouth 2 times a day. 20 Tab 0   • diazepam (VALIUM) 5 MG Tab Take 1 Tab by mouth every 12 hours as needed (muscle spasm). 6 Tab 0   • methocarbamol (ROBAXIN) 750 MG Tab Take 2 Tabs by mouth 4 times a day as needed (muscle spasm). 20 Tab 0   • predniSONE (DELTASONE) 10 MG Tab Take 30mg x 3 days, then take 20mg x 3 days, then take 10mg x 3 days, with food, then discontinue. 18 Tab 2   • SYNTHROID 100 MCG Tab TAKE 1 TABLET DAILY 90 Tab 3   • cyclobenzaprine (FLEXERIL) 10 MG Tab Take 1 Tab by mouth 3 times a day as needed. 30 Tab 0   • duloxetine (CYMBALTA) 60 MG Cap DR Particles delayed-release capsule TAKE 1 CAPSULE EVERY       MORNING 90 Cap 3   • aripiprazole (ABILIFY) 5 MG tablet TAKE 1 TABLET AT BEDTIME 90 Tab 3   • atorvastatin (LIPITOR) 40 MG Tab Take 1 Tab by mouth every day. 90 Tab 3   • fenofibrate (TRICOR) 145 MG Tab TAKE 1 TABLET DAILY 90 Tab 3   • aripiprazole (ABILIFY) 5 MG tablet TAKE 1 TABLET AT BEDTIME 90 Tab 3   • albuterol 108 (90 BASE) MCG/ACT Aero Soln inhalation aerosol Inhale 2 Puffs by mouth every  6 hours as needed for Shortness of Breath. 3 Inhaler 3   • albuterol (PROVENTIL) 2.5mg/3ml Nebu Soln solution for nebulization 3 mL by Nebulization route every four hours as needed for Shortness of Breath. 360 mL 5   • tramadol (ULTRAM) 50 MG Tab TAKE 1 TO 2 TABLETS BY MOUTH 2 TIMES DAILY AS NEEDED FOR SEVERE PAIN 90 Tab 5   • allopurinol (ZYLOPRIM) 100 MG Tab TAKE 1 TABLET DAILY 90 Tab 3   • SPIRIVA HANDIHALER 18 MCG Cap INHALE THE CONTENTS OF ONE CAPSULE DAILY 90 Cap 3   • lisinopril (PRINIVIL) 10 MG Tab Take 1 Tab by mouth every day. 90 Tab 3   • omeprazole (PRILOSEC) 20 MG delayed-release capsule Take 1 Cap by mouth every morning. Indications: GERD-Related Laryngitis 90 Cap 3   • duloxetine (CYMBALTA) 60 MG Cap DR Particles delayed-release capsule TAKE 1 CAPSULE EVERY       MORNING 90 Cap 3   • triamterene-hctz (MAXZIDE-25/DYAZIDE) 37.5-25 MG Tab TAKE 1 TABLET EVERY MORNING 90 Tab 3   • niacin (NIASPAN) 1000 MG CR tablet TAKE 1 TABLET DAILY 90 Tab 3   • fluticasone (FLONASE) 50 MCG/ACT nasal spray Spray 1-2 Sprays in nose every day. 3 Bottle 3   • Magnesium Oxide 500 MG Tab Take 1 Tab by mouth PRN.     • calcium carbonate 1000 MG Chew Tab Take 1 Tab by mouth 2 Times a Day. 30 Tab 3   • docusate sodium 100 MG Cap Take 100 mg by mouth every morning. 60 Cap 3   • latanoprost (XALATAN) 0.005 % Solution Place 1 Drop in both eyes every evening. 1 Bottle 3   • hydrocodone-acetaminophen (NORCO) 5-325 MG Tab per tablet Take 1-2 Tabs by mouth every four hours as needed.     • aspirin (ASA) 325 MG TABS Take 325 mg by mouth every day.       No current facility-administered medications for this encounter.       Allergies   Allergen Reactions   • Tape    • Tylenol Swelling     Lip, throat swelling     Physical Exam:  General Appearance: healthy, alert, no distress, cooperative  Lungs: mild dyspnea with exertion  Ambulates greater than 100 feet independently with steady gait.  There is focal pain with percussion of the  vertebral body at T12.    Impression:   1. Acute T12 vertebral compression fracture.  2. Acute low back pain.  3. Hypertension.  4. Chronic obstructive pulmonary disease.  5. Hypothyroidism.  6. Gastroesophageal reflux disease.  7. Renal insufficiency.  8. History of tobacco use.    Plan:   Jose E Garcia MD has reviewed 's history and imaging studies, examined the patient, and discussed treatment options.  is a candidate for vertebral augmentation. The acute pain the patient describes is directly related to the fracture. We discussed the method of the procedure at length. We additionally discussed the risks, including bleeding and infection, reaction to the moderate sedation medications, and cement extravasation causing compression of a nerve or the spinal canal or pulmonary embolus and subsequent interventions. There is a small chance the procedure will not alleviate the back pain. The patient may be at risk to develop future compression fractures. We discussed alternatives of the procedure including conservative medical management. The patient verbalizes understanding of the plan and elects to proceed. She has been scheduled for March 1. Because of her history of COPD, we will arrange an anesthesiologist to monitor her breathing during the procedure. Written procedure care instructions were provided. She was instructed to follow up in our clinic as well as with Dr. Eckert after the procedure. She should resume physical therapy about 2 days after the procedure.      KATLYN Vilchis with Jose E Garcia MD  Interventional Radiology  [address] ([address])  [place] [address]  [phone]

## 2017-02-27 DIAGNOSIS — Z01.812 PRE-PROCEDURAL LABORATORY EXAMINATION: ICD-10-CM

## 2017-02-27 DIAGNOSIS — Z01.810 PRE-OPERATIVE CARDIOVASCULAR EXAMINATION: ICD-10-CM

## 2017-02-27 LAB
ANION GAP SERPL CALC-SCNC: 10 MMOL/L (ref 0–11.9)
BUN SERPL-MCNC: 28 MG/DL (ref 8–22)
CALCIUM SERPL-MCNC: 10.3 MG/DL (ref 8.5–10.5)
CHLORIDE SERPL-SCNC: 98 MMOL/L (ref 96–112)
CO2 SERPL-SCNC: 25 MMOL/L (ref 20–33)
CREAT SERPL-MCNC: 1.39 MG/DL (ref 0.5–1.4)
EKG IMPRESSION: NORMAL
ERYTHROCYTE [DISTWIDTH] IN BLOOD BY AUTOMATED COUNT: 48.1 FL (ref 35.9–50)
GFR SERPL CREATININE-BSD FRML MDRD: 38 ML/MIN/1.73 M 2
GLUCOSE SERPL-MCNC: 90 MG/DL (ref 65–99)
HCT VFR BLD AUTO: 41.5 % (ref 37–47)
HGB BLD-MCNC: 13.1 G/DL (ref 12–16)
INR PPP: 0.95 (ref 0.87–1.13)
MCH RBC QN AUTO: 26 PG (ref 27–33)
MCHC RBC AUTO-ENTMCNC: 31.6 G/DL (ref 33.6–35)
MCV RBC AUTO: 82.3 FL (ref 81.4–97.8)
PLATELET # BLD AUTO: 277 K/UL (ref 164–446)
PMV BLD AUTO: 9.7 FL (ref 9–12.9)
POTASSIUM SERPL-SCNC: 4.3 MMOL/L (ref 3.6–5.5)
PROTHROMBIN TIME: 13 SEC (ref 12–14.6)
RBC # BLD AUTO: 5.04 M/UL (ref 4.2–5.4)
SODIUM SERPL-SCNC: 133 MMOL/L (ref 135–145)
WBC # BLD AUTO: 6.8 K/UL (ref 4.8–10.8)

## 2017-02-27 PROCEDURE — 80048 BASIC METABOLIC PNL TOTAL CA: CPT

## 2017-02-27 PROCEDURE — 85027 COMPLETE CBC AUTOMATED: CPT

## 2017-02-27 PROCEDURE — 85610 PROTHROMBIN TIME: CPT

## 2017-02-27 PROCEDURE — 36415 COLL VENOUS BLD VENIPUNCTURE: CPT

## 2017-03-01 ENCOUNTER — APPOINTMENT (OUTPATIENT)
Dept: RADIOLOGY | Facility: MEDICAL CENTER | Age: 67
End: 2017-03-01
Attending: RADIOLOGY
Payer: MEDICARE

## 2017-03-01 ENCOUNTER — HOSPITAL ENCOUNTER (OUTPATIENT)
Facility: MEDICAL CENTER | Age: 67
End: 2017-03-01
Attending: RADIOLOGY | Admitting: RADIOLOGY
Payer: MEDICARE

## 2017-03-01 VITALS
DIASTOLIC BLOOD PRESSURE: 74 MMHG | OXYGEN SATURATION: 89 % | HEIGHT: 64 IN | TEMPERATURE: 97.4 F | HEART RATE: 80 BPM | BODY MASS INDEX: 28 KG/M2 | WEIGHT: 164 LBS | SYSTOLIC BLOOD PRESSURE: 122 MMHG | RESPIRATION RATE: 18 BRPM

## 2017-03-01 DIAGNOSIS — S22.080G COMPRESSION FRACTURE OF T12 VERTEBRA WITH DELAYED HEALING: ICD-10-CM

## 2017-03-01 PROBLEM — M48.54XA COMPRESSION FRACTURE OF THORACIC SPINE, NON-TRAUMATIC (HCC): Status: ACTIVE | Noted: 2017-03-01

## 2017-03-01 PROCEDURE — 22513 PERQ VERTEBRAL AUGMENTATION: CPT

## 2017-03-01 PROCEDURE — 700111 HCHG RX REV CODE 636 W/ 250 OVERRIDE (IP)

## 2017-03-01 PROCEDURE — 4410193 IR-KYPHOPLASTY,1 VERTEBRA,THOR

## 2017-03-01 PROCEDURE — 700101 HCHG RX REV CODE 250

## 2017-03-01 RX ORDER — SODIUM CHLORIDE 9 MG/ML
INJECTION, SOLUTION INTRAVENOUS
Status: DISCONTINUED | OUTPATIENT
Start: 2017-03-01 | End: 2017-03-01 | Stop reason: HOSPADM

## 2017-03-01 RX ORDER — OXYCODONE HYDROCHLORIDE 5 MG/1
2.5 TABLET ORAL
Status: DISCONTINUED | OUTPATIENT
Start: 2017-03-01 | End: 2017-03-01 | Stop reason: HOSPADM

## 2017-03-01 RX ORDER — MIDAZOLAM HYDROCHLORIDE 1 MG/ML
INJECTION INTRAMUSCULAR; INTRAVENOUS
Status: DISCONTINUED
Start: 2017-03-01 | End: 2017-03-01 | Stop reason: HOSPADM

## 2017-03-01 RX ORDER — ONDANSETRON 2 MG/ML
4 INJECTION INTRAMUSCULAR; INTRAVENOUS EVERY 8 HOURS PRN
Status: DISCONTINUED | OUTPATIENT
Start: 2017-03-01 | End: 2017-03-01 | Stop reason: HOSPADM

## 2017-03-01 RX ADMIN — SODIUM CHLORIDE: 9 INJECTION, SOLUTION INTRAVENOUS at 07:30

## 2017-03-01 ASSESSMENT — PAIN SCALES - GENERAL
PAINLEVEL_OUTOF10: 0

## 2017-03-01 NOTE — IP AVS SNAPSHOT
3/1/2017          Summer Hatch  [address] [place] [address]    Dear Summer:    [facility] wants to ensure your discharge home is safe and you or your loved ones have had all your questions answered regarding your care after you leave the hospital.    You may receive a telephone call within two days of your discharge.  This call is to make certain you understand your discharge instructions as well as ensure we provided you with the best care possible during your stay with us.     The call will only last approximately 3-5 minutes and will be done by a nurse.    Once again, we want to ensure your discharge home is safe and that you have a clear understanding of any next steps in your care.  If you have any questions or concerns, please do not hesitate to contact us, we are here for you.  Thank you for choosing [facility] for your healthcare needs.    Sincerely,    Jarred Gonzalez    [facility]

## 2017-03-01 NOTE — OR NURSING
1000   REPORT RECEIVED FROM IR,  S/P KYPHOPLASTY UNDER GENERAL ANESTHESIA.    1024   PATIENT RECEIVED FROM IR,  S/P KYPHOPLASTY.  PATIENT IS DROWSY BUT AROUSABLE.  FAMILY IS AT BEDSIDE.  DENIES ANY PAIN AT THIS TIME.  REPORT RECEIVED FROM ANESTHESIOLOGIST.    1100   PATIENT TAKING PO FLUID WELL.    1200  PATIENT RESTING COMFORTABLY.  BACK DRESSING INTACT.    1230  PATIENT IS UP AND AMBULATE TO BATHROOM WITHOUT DIFFICULTY.    1245  D/C INSTRUCTIONS GIVEN TO PATIENT AND .  VERBALIZED UNDERSTANDING OF ALL.  HL D/C.  PATIENT D/C TO HOME,  VIA W/C AND ESCORTED OUT BY VOLUNTEER.

## 2017-03-01 NOTE — PROGRESS NOTES
IR Procedure Note:    Patient underwent a T12 kyphoplasty  by Dr. Garcia anaesthesia with Dr. Nayak. Pt remained hemodynamically stable during procedure. Report called to Nilton GAYLE. Pt transported by [facility]ana to [facility] with RN and anesthesia.

## 2017-03-01 NOTE — OR SURGEON
Immediate Post- Operative Note        PostOp Diagnosis: VERTEBRAL INSUFFICIENCY FRACTURE AT T12      Procedure(s): THORACIC KYPHOPLASTY AT T12      Estimated Blood Loss: < 10ML        Complications: NONE            3/1/2017             10:01 AM               Jose E Garcia

## 2017-03-01 NOTE — DISCHARGE INSTRUCTIONS
ACTIVITY: Rest and take it easy for the first 24 hours.  A responsible adult is recommended to remain with you during that time.  It is normal to feel sleepy.  We encourage you to not do anything that requires balance, judgment or coordination.    MILD FLU-LIKE SYMPTOMS ARE NORMAL. YOU MAY EXPERIENCE GENERALIZED MUSCLE ACHES, THROAT IRRITATION, HEADACHE AND/OR SOME NAUSEA.    FOR 24 HOURS DO NOT:  Drive, operate machinery or run household appliances.  Drink beer or alcoholic beverages.   Make important decisions or sign legal documents.    SPECIAL INSTRUCTIONS: *KEEP INCISION DRY FOR 24 HRS**    DIET: To avoid nausea, slowly advance diet as tolerated, avoiding spicy or greasy foods for the first day.  Add more substantial food to your diet according to your physician's instructions.  Babies can be fed formula or breast milk as soon as they are hungry.  INCREASE FLUIDS AND FIBER TO AVOID CONSTIPATION.    SURGICAL DRESSING/BATHING: *MAY SHOWER TOMORROW AFTERNOON THEN MAY REMOVE BACK DRESSING**    FOLLOW-UP APPOINTMENT:  A follow-up appointment should be arranged with your doctor in *DR [facility]   [phone]**; call to schedule.    You should CALL YOUR PHYSICIAN if you develop:  Fever greater than 101 degrees F.  Pain not relieved by medication, or persistent nausea or vomiting.  Excessive bleeding (blood soaking through dressing) or unexpected drainage from the wound.  Extreme redness or swelling around the incision site, drainage of pus or foul smelling drainage.  Inability to urinate or empty your bladder within 8 hours.  Problems with breathing or chest pain.    You should call 911 if you develop problems with breathing or chest pain.  If you are unable to contact your doctor or surgical center, you should go to the nearest emergency room or urgent care center.  Physician's telephone #: *[phone]**    If any questions arise, call your doctor.  If your doctor is not available, please feel free to call the Surgical  Center at [phone].  The Center is open Monday through Friday from 7AM to 7PM.  You can also call the HEALTH HOTLINE open 24 hours/day, 7 days/week and speak to a nurse at [phone], or toll free at [phone].    A registered nurse may call you a few days after your surgery to see how you are doing after your procedure.    MEDICATIONS: Resume taking daily medication.  Take prescribed pain medication with food.  If no medication is prescribed, you may take non-aspirin pain medication if needed.  PAIN MEDICATION CAN BE VERY CONSTIPATING.  Take a stool softener or laxative such as senokot, pericolace, or milk of magnesia if needed.      If your physician has prescribed pain medication that includes Acetaminophen (Tylenol), do not take additional Acetaminophen (Tylenol) while taking the prescribed medication.    Depression / Suicide Risk    As you are discharged from this [facility] facility, it is important to learn how to keep safe from harming yourself.    Recognize the warning signs:  · Abrupt changes in personality, positive or negative- including increase in energy   · Giving away possessions  · Change in eating patterns- significant weight changes-  positive or negative  · Change in sleeping patterns- unable to sleep or sleeping all the time   · Unwillingness or inability to communicate  · Depression  · Unusual sadness, discouragement and loneliness  · Talk of wanting to die  · Neglect of personal appearance   · Rebelliousness- reckless behavior  · Withdrawal from people/activities they love  · Confusion- inability to concentrate     If you or a loved one observes any of these behaviors or has concerns about self-harm, here's what you can do:  · Talk about it- your feelings and reasons for harming yourself  · Remove any means that you might use to hurt yourself (examples: pills, rope, extension cords, firearm)  · Get professional help from the community (Mental Health, Substance Abuse,  psychological counseling)  · Do not be alone:Call your Safe Contact- someone whom you trust who will be there for you.  · Call your local CRISIS HOTLINE 738-2904 or 411-191-6022  · Call your local Children's Mobile Crisis Response Team [place] (955) 756-5528 or www.[employer]  · Call the toll free National Suicide Prevention Hotlines   · National Suicide Prevention Lifeline 530-354-CJCC (7276)  · National Hope Line Network 800-SUICIDE (744-1934)

## 2017-03-01 NOTE — IP AVS SNAPSHOT
" Home Care Instructions                                                                                                                Name:Summer Hatch  Medical Record Number:[id number]  CSN: [id number]    YOB: 1950   Age: 66 y.o.  Sex: female  HT:1.626 m (5' 4\") WT: 74.39 kg (164 lb)          Admit Date: 3/1/2017     Discharge Date:   Today's Date: 3/1/2017  Attending Doctor:  Jose E Walker M.D.                  Allergies:  Tape and Tylenol                Discharge Instructions         ACTIVITY: Rest and take it easy for the first 24 hours.  A responsible adult is recommended to remain with you during that time.  It is normal to feel sleepy.  We encourage you to not do anything that requires balance, judgment or coordination.    MILD FLU-LIKE SYMPTOMS ARE NORMAL. YOU MAY EXPERIENCE GENERALIZED MUSCLE ACHES, THROAT IRRITATION, HEADACHE AND/OR SOME NAUSEA.    FOR 24 HOURS DO NOT:  Drive, operate machinery or run household appliances.  Drink beer or alcoholic beverages.   Make important decisions or sign legal documents.    SPECIAL INSTRUCTIONS: *KEEP INCISION DRY FOR 24 HRS**    DIET: To avoid nausea, slowly advance diet as tolerated, avoiding spicy or greasy foods for the first day.  Add more substantial food to your diet according to your physician's instructions.  Babies can be fed formula or breast milk as soon as they are hungry.  INCREASE FLUIDS AND FIBER TO AVOID CONSTIPATION.    SURGICAL DRESSING/BATHING: *MAY SHOWER TOMORROW AFTERNOON THEN MAY REMOVE BACK DRESSING**    FOLLOW-UP APPOINTMENT:  A follow-up appointment should be arranged with your doctor in *DR [facility]ALK[place]   [phone]**; call to schedule.    You should CALL YOUR PHYSICIAN if you develop:  Fever greater than 101 degrees F.  Pain not relieved by medication, or persistent nausea or vomiting.  Excessive bleeding (blood soaking through dressing) or unexpected drainage from the wound.  Extreme redness or swelling around the incision site, " drainage of pus or foul smelling drainage.  Inability to urinate or empty your bladder within 8 hours.  Problems with breathing or chest pain.    You should call 911 if you develop problems with breathing or chest pain.  If you are unable to contact your doctor or surgical center, you should go to the nearest emergency room or urgent care center.  Physician's telephone #: *[phone]**    If any questions arise, call your doctor.  If your doctor is not available, please feel free to call the Surgical Center at [phone].  The Center is open Monday through Friday from 7AM to 7PM.  You can also call the HEALTH HOTLINE open 24 hours/day, 7 days/week and speak to a nurse at [phone], or toll free at [phone].    A registered nurse may call you a few days after your surgery to see how you are doing after your procedure.    MEDICATIONS: Resume taking daily medication.  Take prescribed pain medication with food.  If no medication is prescribed, you may take non-aspirin pain medication if needed.  PAIN MEDICATION CAN BE VERY CONSTIPATING.  Take a stool softener or laxative such as senokot, pericolace, or milk of magnesia if needed.      If your physician has prescribed pain medication that includes Acetaminophen (Tylenol), do not take additional Acetaminophen (Tylenol) while taking the prescribed medication.    Depression / Suicide Risk    As you are discharged from this [facility] facility, it is important to learn how to keep safe from harming yourself.    Recognize the warning signs:  · Abrupt changes in personality, positive or negative- including increase in energy   · Giving away possessions  · Change in eating patterns- significant weight changes-  positive or negative  · Change in sleeping patterns- unable to sleep or sleeping all the time   · Unwillingness or inability to communicate  · Depression  · Unusual sadness, discouragement and loneliness  · Talk of wanting to die  · Neglect of personal  appearance   · Rebelliousness- reckless behavior  · Withdrawal from people/activities they love  · Confusion- inability to concentrate     If you or a loved one observes any of these behaviors or has concerns about self-harm, here's what you can do:  · Talk about it- your feelings and reasons for harming yourself  · Remove any means that you might use to hurt yourself (examples: pills, rope, extension cords, firearm)  · Get professional help from the community (Mental Health, Substance Abuse, psychological counseling)  · Do not be alone:Call your Safe Contact- someone whom you trust who will be there for you.  · Call your local CRISIS HOTLINE 369-3089 or 072-009-1273  · Call your local Children's Mobile Crisis Response Team [place] (740) 498-0458 or www.[employer]  · Call the toll free National Suicide Prevention Hotlines   · National Suicide Prevention Lifeline 743-249-LZKS (0784)  · [facility] 800-SUICIDE (485-9209)       Medication List      CONTINUE taking these medications        Instructions    * albuterol 108 (90 BASE) MCG/ACT Aers inhalation aerosol    Inhale 2 Puffs by mouth every 6 hours as needed for Shortness of Breath.   Dose:  2 Puff       * albuterol 2.5mg/3ml Nebu solution for nebulization   Commonly known as:  PROVENTIL    Doctor's comments:  DX COPD J44.9   3 mL by Nebulization route every four hours as needed for Shortness of Breath.   Dose:  2.5 mg       allopurinol 100 MG Tabs   Commonly known as:  ZYLOPRIM    TAKE 1 TABLET DAILY       aripiprazole 5 MG tablet   Commonly known as:  ABILIFY    TAKE 1 TABLET AT BEDTIME       atorvastatin 40 MG Tabs   Commonly known as:  LIPITOR    Take 1 Tab by mouth every day.   Dose:  40 mg       duloxetine 60 MG Cpep delayed-release capsule   Commonly known as:  CYMBALTA    TAKE 1 CAPSULE EVERY       MORNING       fenofibrate 145 MG Tabs   Commonly known as:  TRICOR    TAKE 1 TABLET DAILY       fluticasone-salmeterol 250-50 MCG/DOSE  Aepb   Commonly known as:  ADVAIR    Inhale 1 Puff by mouth every 12 hours.   Dose:  1 Puff       latanoprost 0.005 % Soln   Commonly known as:  XALATAN    Place 1 Drop in both eyes every evening.   Dose:  1 Drop       lisinopril 10 MG Tabs   Commonly known as:  PRINIVIL    Take 1 Tab by mouth every day.   Dose:  10 mg       niacin 1000 MG CR tablet   Commonly known as:  NIASPAN    TAKE 1 TABLET DAILY       omeprazole 20 MG delayed-release capsule   Commonly known as:  PRILOSEC    Take 1 Cap by mouth every morning. Indications: GERD-Related Laryngitis   Dose:  20 mg       SPIRIVA HANDIHALER 18 MCG Caps   Generic drug:  tiotropium    INHALE THE CONTENTS OF ONE CAPSULE DAILY       SYNTHROID 100 MCG Tabs   Generic drug:  levothyroxine    TAKE 1 TABLET DAILY       tramadol 50 MG Tabs   Commonly known as:  ULTRAM    TAKE 1 TO 2 TABLETS BY MOUTH 2 TIMES DAILY AS NEEDED FOR SEVERE PAIN       * Notice:  This list has 2 medication(s) that are the same as other medications prescribed for you. Read the directions carefully, and ask your doctor or other care provider to review them with you.            Medication Information     Above and/or attached are the medications your physician expects you to take upon discharge. Review all of your home medications and newly ordered medications with your doctor and/or pharmacist. Follow medication instructions as directed by your doctor and/or pharmacist. Please keep your medication list with you and share with your physician. Update the information when medications are discontinued, doses are changed, or new medications (including over-the-counter products) are added; and carry medication information at all times in the event of emergency situations.        Resources     Quit Smoking / Tobacco Use:    I understand the use of any tobacco products increases my chance of suffering from future heart disease or stroke and could cause other illnesses which may shorten my life. Quitting the  use of tobacco products is the single most important thing I can do to improve my health. For further information on smoking / tobacco cessation call a Toll Free Quit Line at 1-892.289.4106 (*National Cancer [place]) or 1-553.691.7583 (American Lung Association) or you can access the web based program at www.lungusa.org.    [place] Tobacco Users Help Line:  (269) 840-8351       Toll Free: 1-660.219.5011  Quit Tobacco Program [facility] (006)260-4258    Crisis Hotline:    [place] Crisis Hotline:  5-924-NUCUYMR or 1-658.419.8343    [place] Crisis Hotline:    1-494.739.5810 or 083-464-6961    Discharge Survey:   Thank you for choosing [facility]. We hope we did everything we could to make your hospital stay a pleasant one. You may be receiving a survey and we would appreciate your time and participation in answering the questions. Your input is very valuable to us in our efforts to improve our service to our patients and their families.            Signatures     My signature on this form indicates that:    1. I acknowledge receipt and understanding of these Home Care Instruction.  2. My questions regarding this information have been answered to my satisfaction.  3. I have formulated a plan with my discharge nurse to obtain my prescribed medications for home.    __________________________________      __________________________________                   Patient Signature                                 Guardian/Responsible Adult Signature      __________________________________                 __________       ________                       Nurse Signature                                               Date                 Time

## 2017-03-06 ENCOUNTER — TELEPHONE (OUTPATIENT)
Dept: RADIOLOGY | Facility: MEDICAL CENTER | Age: 67
End: 2017-03-06

## 2017-03-06 NOTE — TELEPHONE ENCOUNTER
Pt contacted APRN regarding new back pain after T12 kyphoplasty. She got good relief after kyphoplasty on 3/1 and resumed her prior activities. She was moving chairs for her quilting class and developed acute severe mid back pain. This pain is in her back above the previously treated site but is the same in intensity and quality as her recent fracture pain. She is at risk for future compression fractures as she has already demonstrated an insufficiency fracture.   Will update Dr. Garcia and order MRI tspine to r/o new fracture. Pt to call and schedule appt.

## 2017-03-07 ENCOUNTER — TELEPHONE (OUTPATIENT)
Dept: RADIOLOGY | Facility: MEDICAL CENTER | Age: 67
End: 2017-03-07

## 2017-03-07 ENCOUNTER — PATIENT MESSAGE (OUTPATIENT)
Dept: MEDICAL GROUP | Facility: MEDICAL CENTER | Age: 67
End: 2017-03-07

## 2017-03-07 ENCOUNTER — HOSPITAL ENCOUNTER (OUTPATIENT)
Dept: RADIOLOGY | Facility: MEDICAL CENTER | Age: 67
End: 2017-03-07
Attending: NURSE PRACTITIONER
Payer: MEDICARE

## 2017-03-07 DIAGNOSIS — Z87.81 HISTORY OF VERTEBRAL COMPRESSION FRACTURE: ICD-10-CM

## 2017-03-07 DIAGNOSIS — M54.9 MID-BACK PAIN, ACUTE: ICD-10-CM

## 2017-03-07 PROCEDURE — 72146 MRI CHEST SPINE W/O DYE: CPT

## 2017-03-07 NOTE — TELEPHONE ENCOUNTER
MRI reviewed with Dr. Garcia. No evidence of acute fracture. There is an incidental finding suspicious for an arachnoid cyst at the level of T3-4. Ordered additional imaging studies to clarify. Called pt to discuss. Acute pain she describes is still present and likely muscular in etiology- supportive care measures discussed.

## 2017-03-08 ENCOUNTER — PATIENT MESSAGE (OUTPATIENT)
Dept: MEDICAL GROUP | Facility: MEDICAL CENTER | Age: 67
End: 2017-03-08

## 2017-03-08 NOTE — TELEPHONE ENCOUNTER
From: Summer Hatch  To: Jl Palomino M.D.  Sent: 3/7/2017 7:20 PM PST  Subject: Test Result Question    Hi Dr. Palomino. I had an EKG at [facility] on 2/27/17 before my back surgery. 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 I scheduled to see you on March 30th. Can this wait until then?

## 2017-03-08 NOTE — TELEPHONE ENCOUNTER
From: Summer Yin  To: Jl Palomino M.D.  Sent: 3/8/2017 7:33 AM PST  Subject: Test Result Question    I have another dr appt at 2 30. I can come after 4. 00. If not tomorrow after 2 30  ----- Message -----  From: Jl Palomino M.D.  Sent: 3/8/2017 7:27 AM PST  To: Summer Yin  Subject: RE: Test Result Question    Can you come in at 2:30 today? We will want to repeat the EKG and possibly get you set up for some cardiac testing.    Dr. Palomino      ----- Message -----   From: SUMMER YIN   Sent: 3/7/2017 7:20 PM PST   To: Jl Palomino M.D.  Subject: Test Result Question    Hi Dr. Palomino. I had an EKG at [facility] on 2/27/17 before my back surgery. 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 I scheduled to see you on March 30th. Can this wait until then?

## 2017-03-09 ENCOUNTER — OFFICE VISIT (OUTPATIENT)
Dept: MEDICAL GROUP | Facility: MEDICAL CENTER | Age: 67
End: 2017-03-09
Payer: MEDICARE

## 2017-03-09 VITALS
HEART RATE: 86 BPM | BODY MASS INDEX: 28 KG/M2 | SYSTOLIC BLOOD PRESSURE: 120 MMHG | RESPIRATION RATE: 16 BRPM | DIASTOLIC BLOOD PRESSURE: 80 MMHG | TEMPERATURE: 97 F | HEIGHT: 64 IN | OXYGEN SATURATION: 96 % | WEIGHT: 164 LBS

## 2017-03-09 DIAGNOSIS — Z87.891 HISTORY OF TOBACCO ABUSE: ICD-10-CM

## 2017-03-09 DIAGNOSIS — I10 ESSENTIAL HYPERTENSION: ICD-10-CM

## 2017-03-09 DIAGNOSIS — E78.5 HYPERLIPIDEMIA, UNSPECIFIED HYPERLIPIDEMIA TYPE: ICD-10-CM

## 2017-03-09 DIAGNOSIS — S22.080G COMPRESSION FRACTURE OF T12 VERTEBRA WITH DELAYED HEALING: ICD-10-CM

## 2017-03-09 DIAGNOSIS — R94.31 ABNORMAL EKG: ICD-10-CM

## 2017-03-09 DIAGNOSIS — T78.2XXS ANAPHYLACTIC REACTION, SEQUELA: ICD-10-CM

## 2017-03-09 DIAGNOSIS — M48.54XS COMPRESSION FRACTURE OF THORACIC SPINE, NON-TRAUMATIC, SEQUELA: ICD-10-CM

## 2017-03-09 PROBLEM — T78.2XXA ANAPHYLACTIC REACTION: Status: ACTIVE | Noted: 2017-03-09

## 2017-03-09 PROBLEM — J40 BRONCHITIS: Status: RESOLVED | Noted: 2017-01-17 | Resolved: 2017-03-09

## 2017-03-09 PROCEDURE — 3014F SCREEN MAMMO DOC REV: CPT | Performed by: FAMILY MEDICINE

## 2017-03-09 PROCEDURE — 4040F PNEUMOC VAC/ADMIN/RCVD: CPT | Performed by: FAMILY MEDICINE

## 2017-03-09 PROCEDURE — G8484 FLU IMMUNIZE NO ADMIN: HCPCS | Performed by: FAMILY MEDICINE

## 2017-03-09 PROCEDURE — G8432 DEP SCR NOT DOC, RNG: HCPCS | Performed by: FAMILY MEDICINE

## 2017-03-09 PROCEDURE — 1100F PTFALLS ASSESS-DOCD GE2>/YR: CPT | Performed by: FAMILY MEDICINE

## 2017-03-09 PROCEDURE — 93000 ELECTROCARDIOGRAM COMPLETE: CPT | Performed by: FAMILY MEDICINE

## 2017-03-09 PROCEDURE — 1036F TOBACCO NON-USER: CPT | Performed by: FAMILY MEDICINE

## 2017-03-09 PROCEDURE — 99214 OFFICE O/P EST MOD 30 MIN: CPT | Performed by: FAMILY MEDICINE

## 2017-03-09 PROCEDURE — G8420 CALC BMI NORM PARAMETERS: HCPCS | Performed by: FAMILY MEDICINE

## 2017-03-09 PROCEDURE — 3017F COLORECTAL CA SCREEN DOC REV: CPT | Performed by: FAMILY MEDICINE

## 2017-03-09 PROCEDURE — 3288F FALL RISK ASSESSMENT DOCD: CPT | Performed by: FAMILY MEDICINE

## 2017-03-09 PROCEDURE — 0518F FALL PLAN OF CARE DOCD: CPT | Mod: 8P | Performed by: FAMILY MEDICINE

## 2017-03-09 PROCEDURE — G8599 NO ASA/ANTIPLAT THER USE RNG: HCPCS | Performed by: FAMILY MEDICINE

## 2017-03-09 RX ORDER — EPINEPHRINE 0.3 MG/.3ML
0.3 INJECTION SUBCUTANEOUS ONCE
Qty: 0.3 ML | Refills: 1 | Status: SHIPPED | OUTPATIENT
Start: 2017-03-09 | End: 2017-03-09

## 2017-03-09 RX ORDER — ZOLEDRONIC ACID 5 MG/100ML
5 INJECTION, SOLUTION INTRAVENOUS ONCE
Qty: 100 ML | Refills: 2 | Status: SHIPPED
Start: 2017-03-09 | End: 2017-03-09

## 2017-03-09 NOTE — MR AVS SNAPSHOT
"        Summer Hatch   3/9/2017 4:20 PM   Office Visit   MRN: [id number]    Department:  [facility]   Dept Phone:  [phone]    Description:  Female : 1950   Provider:  Jl Palomino M.D.           Reason for Visit     Abnormal EKG           Allergies as of 3/9/2017     Allergen Noted Reactions    Tylenol 09/15/2016   Anaphylaxis    Lip, throat swelling    Tape 10/09/2013         You were diagnosed with     Abnormal EKG   [624928]       Essential hypertension   [7534575]       Hyperlipidemia, unspecified hyperlipidemia type   [3969787]       History of tobacco abuse   [742826]       Compression fracture of T12 vertebra with delayed healing   [7254413]       Compression fracture of thoracic spine, non-traumatic, sequela   [916459]       Anaphylactic reaction, sequela   [064939]         Vital Signs     Blood Pressure Pulse Temperature Respirations Height Weight    120/80 mmHg 86 36.1 °C (97 °F) 16 1.626 m (5' 4.02\") 74.39 kg (164 lb)    Body Mass Index Oxygen Saturation Breastfeeding? Smoking Status          28.14 kg/m2 96% No Former Smoker        Basic Information     Date Of Birth Sex Race Ethnicity Preferred Language    1950 Female White Non- English      Your appointments     Mar 20, 2017  3:00 PM   MR SPINE WITH AND WITHOUT (60) with DOUBLE R MRI 1   IMAGING DOUBLE R. (Double R)    [address]  [facility] [address]   [phone]            Mar 29, 2017  1:30 PM   Pulmonary Function Test with SPIROMETRY/6MW   [facility] (--)    [address]  [place] [id number] [phone]            Mar 29, 2017  2:00 PM   Established Patient Pul with TYRELL Chairez   [facility] (--)    [address]  [address]  [place] [address] [phone]            Mar 30, 2017  8:40 AM   Established Patient with Jl Palomino M.D.   [facility] ([facility])    [address]  [facility] [address]   [phone]           You will be receiving a confirmation call a few days before your appointment from our automated call confirmation system.              Problem List              ICD-10-CM Priority Class Noted - Resolved    History of septic arthritis Z87.39   10/9/2013 - Present    HTN (hypertension) I10 Medium  10/10/2013 - Present    Seizure disorder (CMS-HCC) G40.909   10/10/2013 - Present    Hyperlipidemia E78.5 Low  10/10/2013 - Present    CTS (carpal tunnel syndrome) G56.00   10/10/2013 - Present    Chronic hip pain M25.559, G89.29   10/22/2013 - Present    Hypothyroidism E03.9 Low  10/22/2013 - Present    GERD (gastroesophageal reflux disease) K21.9 Low  10/22/2013 - Present    Screening for breast cancer Z12.39   10/22/2013 - Present    History of tobacco abuse Z87.891   10/22/2013 - Present    Right arm pain M79.601   12/13/2013 - Present    Neuropathy (CMS-HCC) G62.9   1/10/2014 - Present    Chronic gout M1A.9XX0   3/13/2014 - Present    Seasonal allergies J30.2   6/13/2014 - Present    Vitamin D insufficiency E55.9   9/26/2014 - Present    Chronic kidney disease, stage III (moderate) N18.3 Medium  9/26/2014 - Present    Atopic dermatitis L20.9   1/8/2015 - Present    Prediabetes R73.03 Low  5/15/2015 - Present    MDD (major depressive disorder), recurrent episode, moderate (CMS-HCC) F33.1 Low  6/18/2015 - Present    Cyst in hand [id number]   6/26/2015 - Present    AK (actinic keratosis) L57.0   6/26/2015 - Present    Status post right hip replacement Z96.641 High  10/31/2015 - Present    Bilateral hip pain M25.551, M25.552   3/21/2016 - Present    Atherosclerosis of both carotid arteries I65.23   10/19/2016 - Present    Compression fracture of T12 vertebra with delayed healing M48.54XG   1/17/2017 - Present    History of recent steroid use Z92.241   1/17/2017 - Present    Chronic obstructive pulmonary disease (CMS-HCC) J44.9   2/1/2017 - Present    Compression fracture of  thoracic spine, non-traumatic (CMS-HCC) M48.54XA   3/1/2017 - Present    Abnormal EKG R94.31   3/9/2017 - Present    Anaphylactic reaction T78.2XXA   3/9/2017 - Present      Health Maintenance        Date Due Completion Dates    IMM DTaP/Tdap/Td Vaccine (1 - Tdap) 6/6/1970 ---    IMM ZOSTER VACCINE 6/6/2011 ---    IMM INFLUENZA (1) 9/1/2016 9/28/2015, 9/26/2014, 10/13/2013    MAMMOGRAM 4/13/2017 4/13/2016, 1/2/2014, 12/20/2013, 12/20/2013, 12/20/2013, 12/20/2013    PAP SMEAR 6/26/2018 6/26/2015, 6/26/2015    IMM PNEUMOCOCCAL 65+ (ADULT) LOW/MEDIUM RISK SERIES (2 of 2 - PPSV23) 10/14/2018 9/28/2015, 10/14/2013    COLONOSCOPY 1/1/2020 1/1/2010 (Done)    Override on 1/1/2010: Done    BONE DENSITY 2/1/2022 2/1/2017            Current Immunizations     13-VALENT PCV PREVNAR 9/28/2015    Influenza TIV (IM) 10/13/2013 11:02 AM    Influenza Vaccine Quad Inj (Pf) 9/26/2014  8:32 AM    Influenza Vaccine Quad Inj (Preserved) 9/28/2015    Pneumococcal polysaccharide vaccine (PPSV-23) 10/14/2013  8:03 AM      Below and/or attached are the medications your provider expects you to take. Review all of your home medications and newly ordered medications with your provider and/or pharmacist. Follow medication instructions as directed by your provider and/or pharmacist. Please keep your medication list with you and share with your provider. Update the information when medications are discontinued, doses are changed, or new medications (including over-the-counter products) are added; and carry medication information at all times in the event of emergency situations     Allergies:  TYLENOL - Anaphylaxis     TAPE - (reactions not documented)               Medications  Valid as of: March 09, 2017 -  5:14 PM    Generic Name Brand Name Tablet Size Instructions for use    Albuterol Sulfate (Aero Soln) albuterol 108 (90 BASE) MCG/ACT Inhale 2 Puffs by mouth every 6 hours as needed for Shortness of Breath.        Albuterol Sulfate (Nebu Soln)  PROVENTIL 2.5mg/3ml 3 mL by Nebulization route every four hours as needed for Shortness of Breath.        Allopurinol (Tab) ZYLOPRIM 100 MG TAKE 1 TABLET DAILY        ARIPiprazole (Tab) ABILIFY 5 MG TAKE 1 TABLET AT BEDTIME        Atorvastatin Calcium (Tab) LIPITOR 40 MG Take 1 Tab by mouth every day.        DULoxetine HCl (Cap DR Particles) CYMBALTA 60 MG TAKE 1 CAPSULE EVERY       MORNING        EPINEPHrine (Solution Auto-injector) EPIPEN 0.3 MG/0.3ML 0.3 mL by Intramuscular route Once for 1 dose.        Fenofibrate (Tab) TRICOR 145 MG TAKE 1 TABLET DAILY        Fluticasone-Salmeterol (AEROSOL POWDER, BREATH ACTIVATED) ADVAIR 250-50 MCG/DOSE Inhale 1 Puff by mouth every 12 hours.        Latanoprost (Solution) XALATAN 0.005 % Place 1 Drop in both eyes every evening.        Levothyroxine Sodium (Tab) SYNTHROID 100 MCG TAKE 1 TABLET DAILY        Lisinopril (Tab) PRINIVIL 10 MG Take 1 Tab by mouth every day.        Niacin (Antihyperlipidemic) (Tab CR) NIASPAN 1000 MG TAKE 1 TABLET DAILY        Omeprazole (CAPSULE DELAYED RELEASE) PRILOSEC 20 MG Take 1 Cap by mouth every morning. Indications: GERD-Related Laryngitis        Tiotropium Bromide Monohydrate (Cap) SPIRIVA HANDIHALER 18 MCG INHALE THE CONTENTS OF ONE CAPSULE DAILY        TraMADol HCl (Tab) ULTRAM 50 MG TAKE 1 TO 2 TABLETS BY MOUTH 2 TIMES DAILY AS NEEDED FOR SEVERE PAIN        Zoledronic Acid (Solution) RECLAST 5 MG/100ML 100 mL by Intravenous route Once for 1 dose.        .                 Medicines prescribed today were sent to:     [facility] - [place], [place] - [address] AT PORTAL TO REGISTERED [facility]    [address] [facility] [address]    Phone: [phone] Fax: [phone]    Open 24 Hours?: No    [facility] #[id number] - [place] - [address]    [address] [place] [address]    Phone: [phone] Fax: [phone]    Open 24 Hours?: No      Medication refill instructions:       If your prescription  bottle indicates you have medication refills left, it is not necessary to call your provider’s office. Please contact your pharmacy and they will refill your medication.    If your prescription bottle indicates you do not have any refills left, you may request refills at any time through one of the following ways: The online [employer] system (except Urgent Care), by calling your provider’s office, or by asking your pharmacy to contact your provider’s office with a refill request. Medication refills are processed only during regular business hours and may not be available until the next business day. Your provider may request additional information or to have a follow-up visit with you prior to refilling your medication.   *Please Note: Medication refills are assigned a new Rx number when refilled electronically. Your pharmacy may indicate that no refills were authorized even though a new prescription for the same medication is available at the pharmacy. Please request the medicine by name with the pharmacy before contacting your provider for a refill.        Your To Do List     Future Labs/Procedures Complete By Expires    NM-CARDIAC STRESS TEST  As directed 9/9/2017      Referral     A referral request has been sent to our patient care coordination department. Please allow 3-5 business days for us to process this request and contact you either by phone or mail. If you do not hear from us by the 5th business day, please call us at [phone].           [employer] Access Code: Activation code not generated  Current [employer] Status: Active

## 2017-03-10 NOTE — ASSESSMENT & PLAN NOTE
Patient had lip swelling and throat swelling when taking Tylenol and then later when taking a stool softener. She has avoided Tylenol since but it was effective in helping her pain previously.

## 2017-03-10 NOTE — ASSESSMENT & PLAN NOTE
Patient had a compression fracture of T12. She recently had kyphoplasty. Patient was on long-term steroid-induced. She had a bone density which showed normal bone density.  Patient cannot tolerate Fosamax could cause GI upset. She tried it for one year and had to discontinue. She did not get bone pain with Fosamax.

## 2017-03-10 NOTE — PROGRESS NOTES
Subjective:   Summer Hatch is a 66 y.o. female here today for abnormal EKG, compression fracture, anaphylactic reaction.    Abnormal EKG  Patient had abnormal EKG for the first time on record before kyphoplasty surgery last month. 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 She denies any chest pain but does have shortness of breath, although she does have COPD. She has history of hypertension, hyperlipidemia and tobacco use.    Anaphylactic reaction  Patient had lip swelling and throat swelling when taking Tylenol and then later when taking a stool softener. She has avoided Tylenol since but it was effective in helping her pain previously.    Compression fracture of T12 vertebra with delayed healing  Patient had a compression fracture of T12. She recently had kyphoplasty. Patient was on long-term steroid-induced. She had a bone density which showed normal bone density.  Patient cannot tolerate Fosamax could cause GI upset. She tried it for one year and had to discontinue. She did not get bone pain with Fosamax.    HTN (hypertension)  She is tolerating current medication.           Current medicines (including changes today)  Current Outpatient Prescriptions   Medication Sig Dispense Refill   • zoledronic Acid (RECLAST) 5 MG/100ML Solution IVPB premix 100 mL by Intravenous route Once for 1 dose. 100 mL 2   • EPINEPHrine (EPIPEN 2-MATILDE) 0.3 MG/0.3ML Solution Auto-injector solution for injection 0.3 mL by Intramuscular route Once for 1 dose. 0.3 mL 1   • fluticasone-salmeterol (ADVAIR) 250-50 MCG/DOSE AEROSOL POWDER, BREATH ACTIVATED Inhale 1 Puff by mouth every 12 hours. 3 Inhaler 3   • SYNTHROID 100 MCG Tab TAKE 1 TABLET DAILY 90 Tab 3   • atorvastatin (LIPITOR) 40 MG Tab Take 1 Tab by mouth every day. 90 Tab 3   • fenofibrate (TRICOR) 145 MG Tab TAKE 1 TABLET DAILY 90 Tab 3   • aripiprazole (ABILIFY) 5 MG tablet TAKE 1 TABLET AT BEDTIME 90 Tab 3   • albuterol 108 (90 BASE) MCG/ACT Aero Soln inhalation aerosol  "Inhale 2 Puffs by mouth every 6 hours as needed for Shortness of Breath. 3 Inhaler 3   • albuterol (PROVENTIL) 2.5mg/3ml Nebu Soln solution for nebulization 3 mL by Nebulization route every four hours as needed for Shortness of Breath. 360 mL 5   • tramadol (ULTRAM) 50 MG Tab TAKE 1 TO 2 TABLETS BY MOUTH 2 TIMES DAILY AS NEEDED FOR SEVERE PAIN 90 Tab 5   • allopurinol (ZYLOPRIM) 100 MG Tab TAKE 1 TABLET DAILY 90 Tab 3   • SPIRIVA HANDIHALER 18 MCG Cap INHALE THE CONTENTS OF ONE CAPSULE DAILY 90 Cap 3   • lisinopril (PRINIVIL) 10 MG Tab Take 1 Tab by mouth every day. 90 Tab 3   • omeprazole (PRILOSEC) 20 MG delayed-release capsule Take 1 Cap by mouth every morning. Indications: GERD-Related Laryngitis 90 Cap 3   • duloxetine (CYMBALTA) 60 MG Cap DR Particles delayed-release capsule TAKE 1 CAPSULE EVERY       MORNING 90 Cap 3   • niacin (NIASPAN) 1000 MG CR tablet TAKE 1 TABLET DAILY 90 Tab 3   • latanoprost (XALATAN) 0.005 % Solution Place 1 Drop in both eyes every evening. 1 Bottle 3     No current facility-administered medications for this visit.     She  has a past medical history of Hypertension; COPD; Renal disorder; Carpal tunnel syndrome; Hypothyroidism (10/22/2013); GERD (gastroesophageal reflux disease) (10/22/2013); History of tobacco abuse (10/22/2013); Bronchitis; Osteoporosis; Pneumonia; Arthritis; Indigestion; Dental disorder; Emphysema of lung (CMS-HCC); Asthma; Pain; Glaucoma; Breath shortness; and Seizure disorder (CMS-HCC).    ROS   No chest pain,  no abdominal pain       Objective:     Blood pressure 120/80, pulse 86, temperature 36.1 °C (97 °F), resp. rate 16, height 1.626 m (5' 4.02\"), weight 74.39 kg (164 lb), SpO2 96 %, not currently breastfeeding. Body mass index is 28.14 kg/(m^2).   Physical Exam:  Constitutional: Alert, no distress.  Skin: Warm, dry, good turgor, no rashes in visible areas.  Eye: Equal, round and reactive, conjunctiva clear, lids normal.  Respiratory: Unlabored respiratory " effort, lungs clear to auscultation, no wheezes, no ronchi.  Cardiovascular: Normal S1, S2, no murmur, no edema.  Psych: Alert and oriented x3, normal affect and mood.        Assessment and Plan:   The following treatment plan was discussed    1. Abnormal EKG  Nuclear medicine stress test ordered. Call with results.  - NM-CARDIAC STRESS TEST; Future    2. Essential hypertension  Nuclear medicine stress test ordered. Call with results.  - NM-CARDIAC STRESS TEST; Future    3. Hyperlipidemia, unspecified hyperlipidemia type  Nuclear medicine stress test ordered. Call with results.  - NM-CARDIAC STRESS TEST; Future    4. History of tobacco abuse  Nuclear medicine stress test ordered. Call with results.  - NM-CARDIAC STRESS TEST; Future    5. Compression fracture of T12 vertebra with delayed healing  Start patient on Reclast  - zoledronic Acid (RECLAST) 5 MG/100ML Solution IVPB premix; 100 mL by Intravenous route Once for 1 dose.  Dispense: 100 mL; Refill: 2    6. Compression fracture of thoracic spine, non-traumatic, sequela  Start patient on Reclast because she cannot tolerate Fosamax    7. Anaphylactic reaction, sequela  Referral to allergist for evaluation.  - EPINEPHrine (EPIPEN 2-MATILDE) 0.3 MG/0.3ML Solution Auto-injector solution for injection; 0.3 mL by Intramuscular route Once for 1 dose.  Dispense: 0.3 mL; Refill: 1  - REFERRAL TO ALLERGY      Followup: Return in about 6 months (around 9/9/2017) for Annual, Long.

## 2017-03-10 NOTE — ASSESSMENT & PLAN NOTE
Patient had abnormal EKG for the first time on record before kyphoplasty surgery last month. 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 She denies any chest pain but does have shortness of breath, although she does have COPD. She has history of hypertension, hyperlipidemia and tobacco use.

## 2017-03-17 ENCOUNTER — TELEPHONE (OUTPATIENT)
Dept: MEDICAL GROUP | Facility: MEDICAL CENTER | Age: 67
End: 2017-03-17

## 2017-03-17 ENCOUNTER — HOSPITAL ENCOUNTER (OUTPATIENT)
Dept: RADIOLOGY | Facility: MEDICAL CENTER | Age: 67
End: 2017-03-17
Attending: FAMILY MEDICINE
Payer: MEDICARE

## 2017-03-17 DIAGNOSIS — Z87.891 HISTORY OF TOBACCO ABUSE: ICD-10-CM

## 2017-03-17 DIAGNOSIS — E78.5 HYPERLIPIDEMIA, UNSPECIFIED HYPERLIPIDEMIA TYPE: ICD-10-CM

## 2017-03-17 DIAGNOSIS — I10 ESSENTIAL HYPERTENSION: ICD-10-CM

## 2017-03-17 DIAGNOSIS — R94.31 ABNORMAL EKG: ICD-10-CM

## 2017-03-17 PROCEDURE — A9502 TC99M TETROFOSMIN: HCPCS

## 2017-03-17 PROCEDURE — 700111 HCHG RX REV CODE 636 W/ 250 OVERRIDE (IP)

## 2017-03-17 RX ORDER — REGADENOSON 0.08 MG/ML
INJECTION, SOLUTION INTRAVENOUS
Status: COMPLETED
Start: 2017-03-17 | End: 2017-03-17

## 2017-03-17 RX ADMIN — REGADENOSON 0.4 MG: 0.08 INJECTION, SOLUTION INTRAVENOUS at 12:19

## 2017-03-17 NOTE — TELEPHONE ENCOUNTER
----- Message from Jl Palomino M.D. sent at 3/17/2017  2:42 PM PDT -----  Please notify the patient that there are no concerns on nuclear stress test, there are no signs of restrictions in blood flow.  Jl Palomino M.D.

## 2017-03-20 ENCOUNTER — APPOINTMENT (OUTPATIENT)
Dept: RADIOLOGY | Facility: MEDICAL CENTER | Age: 67
End: 2017-03-20
Attending: NURSE PRACTITIONER
Payer: MEDICARE

## 2017-03-20 DIAGNOSIS — G93.0 ARACHNOID CYST: ICD-10-CM

## 2017-03-20 PROCEDURE — A9577 INJ MULTIHANCE: HCPCS | Performed by: NURSE PRACTITIONER

## 2017-03-20 PROCEDURE — 72157 MRI CHEST SPINE W/O & W/DYE: CPT

## 2017-03-20 PROCEDURE — 700117 HCHG RX CONTRAST REV CODE 255: Performed by: NURSE PRACTITIONER

## 2017-03-20 RX ADMIN — GADOBENATE DIMEGLUMINE 10 ML: 529 INJECTION, SOLUTION INTRAVENOUS at 15:31

## 2017-03-24 ENCOUNTER — TELEPHONE (OUTPATIENT)
Dept: RADIOLOGY | Facility: MEDICAL CENTER | Age: 67
End: 2017-03-24

## 2017-03-24 NOTE — TELEPHONE ENCOUNTER
Contrast enhanced Tspine MRI reviewed with Dr. Garcia, diagnostic for arachnoid cyst at T3-T4 area. LM for pt to return call to discuss. It is not the cause of her pain and, if no weakness noted, should not require intervention.

## 2017-03-29 ENCOUNTER — OFFICE VISIT (OUTPATIENT)
Dept: PULMONOLOGY | Facility: HOSPICE | Age: 67
End: 2017-03-29
Payer: MEDICARE

## 2017-03-29 ENCOUNTER — NON-PROVIDER VISIT (OUTPATIENT)
Dept: PULMONOLOGY | Facility: HOSPICE | Age: 67
End: 2017-03-29
Payer: MEDICARE

## 2017-03-29 VITALS
HEART RATE: 78 BPM | SYSTOLIC BLOOD PRESSURE: 124 MMHG | RESPIRATION RATE: 16 BRPM | OXYGEN SATURATION: 94 % | TEMPERATURE: 97.3 F | HEIGHT: 65 IN | DIASTOLIC BLOOD PRESSURE: 78 MMHG | BODY MASS INDEX: 28.16 KG/M2 | WEIGHT: 169 LBS

## 2017-03-29 DIAGNOSIS — J44.9 CHRONIC OBSTRUCTIVE PULMONARY DISEASE, UNSPECIFIED COPD TYPE (HCC): ICD-10-CM

## 2017-03-29 DIAGNOSIS — J30.2 SEASONAL ALLERGIC RHINITIS, UNSPECIFIED ALLERGIC RHINITIS TRIGGER: ICD-10-CM

## 2017-03-29 DIAGNOSIS — Z23 NEED FOR INFLUENZA VACCINATION: ICD-10-CM

## 2017-03-29 DIAGNOSIS — K21.9 GASTROESOPHAGEAL REFLUX DISEASE, ESOPHAGITIS PRESENCE NOT SPECIFIED: ICD-10-CM

## 2017-03-29 PROCEDURE — 94060 EVALUATION OF WHEEZING: CPT | Performed by: INTERNAL MEDICINE

## 2017-03-29 PROCEDURE — 99214 OFFICE O/P EST MOD 30 MIN: CPT | Performed by: NURSE PRACTITIONER

## 2017-03-29 RX ORDER — TRIAMTERENE AND HYDROCHLOROTHIAZIDE 37.5; 25 MG/1; MG/1
TABLET ORAL
COMMUNITY
Start: 2017-03-08 | End: 2017-03-10

## 2017-03-29 RX ORDER — EPINEPHRINE 0.3 MG/.3ML
INJECTION INTRAMUSCULAR
COMMUNITY
Start: 2017-03-10 | End: 2019-07-17

## 2017-03-29 RX ORDER — BACLOFEN 10 MG/1
TABLET ORAL
COMMUNITY
Start: 2017-02-26 | End: 2017-03-01

## 2017-03-29 RX ORDER — METHYLPREDNISOLONE 4 MG/1
TABLET ORAL
Qty: 21 TAB | Refills: 1 | Status: SHIPPED
Start: 2017-03-29 | End: 2017-09-25

## 2017-03-29 RX ORDER — AMOXICILLIN 500 MG/1
CAPSULE ORAL
COMMUNITY
Start: 2017-03-10 | End: 2017-03-11

## 2017-03-29 RX ORDER — FLUTICASONE PROPIONATE 50 MCG
SPRAY, SUSPENSION (ML) NASAL
COMMUNITY
Start: 2017-02-15 | End: 2017-03-01

## 2017-03-29 RX ORDER — AMOXICILLIN AND CLAVULANATE POTASSIUM 875; 125 MG/1; MG/1
1 TABLET, FILM COATED ORAL 2 TIMES DAILY WITH MEALS
Qty: 20 TAB | Refills: 1 | Status: SHIPPED
Start: 2017-03-29 | End: 2017-09-06

## 2017-03-29 RX ORDER — DIAZEPAM 5 MG/1
TABLET ORAL
Refills: 0 | COMMUNITY
Start: 2017-01-07 | End: 2017-02-01

## 2017-03-29 RX ORDER — AMOXICILLIN AND CLAVULANATE POTASSIUM 875; 125 MG/1; MG/1
TABLET, FILM COATED ORAL
COMMUNITY
Start: 2017-01-17 | End: 2017-03-29

## 2017-03-29 RX ORDER — BENZONATATE 100 MG/1
CAPSULE ORAL
COMMUNITY
Start: 2017-01-18 | End: 2017-02-01

## 2017-03-29 RX ORDER — METHYLPREDNISOLONE 4 MG/1
TABLET ORAL
COMMUNITY
Start: 2017-01-18 | End: 2017-02-01

## 2017-03-29 RX ORDER — METHOCARBAMOL 750 MG/1
TABLET, FILM COATED ORAL
COMMUNITY
Start: 2017-01-03 | End: 2017-02-01

## 2017-03-29 ASSESSMENT — PULMONARY FUNCTION TESTS
FEV1/FVC: 62
FVC: 2.52
FEV1_PERCENT_CHANGE: 3
FVC_PREDICTED: 3.14
FVC_PERCENT_PREDICTED: 77
FEV1_PREDICTED: 2.39
FEV1: 1.59
FEV1/FVC: 63.1
FVC: 2.43
FEV1/FVC_PERCENT_CHANGE: 167
FEV1_PERCENT_CHANGE: 5
FEV1_PREDICTED: 62
FEV1: 1.5
FEV1/FVC_PERCENT_PREDICTED: 81
FEV1/FVC_PREDICTED: 76.11

## 2017-03-29 NOTE — MR AVS SNAPSHOT
"        Summer Bonilla Hatch   3/29/2017 2:00 PM   Office Visit   MRN: [id number]    Department:  [facility]   Dept Phone:  [phone]    Description:  Female : 1950   Provider:  TYRELL Chairez           Reason for Visit     COPD           Allergies as of 3/29/2017     Allergen Noted Reactions    Tylenol 09/15/2016   Anaphylaxis    Lip, throat swelling    Tape 10/09/2013         You were diagnosed with     Need for influenza vaccination   [[id number]]       Chronic obstructive pulmonary disease, unspecified COPD type (CMS-HCC)   [4868422]       Seasonal allergic rhinitis, unspecified allergic rhinitis trigger   [3679604]       Gastroesophageal reflux disease, esophagitis presence not specified   [2672457]         Vital Signs     Blood Pressure Pulse Temperature Respirations Height Weight    124/78 mmHg 78 36.3 °C (97.3 °F) 16 1.651 m (5' 5\") 76.658 kg (169 lb)    Body Mass Index Oxygen Saturation Smoking Status             28.12 kg/m2 94% Former Smoker         Basic Information     Date Of Birth Sex Race Ethnicity Preferred Language    1950 Female White Non- English      Your appointments     Sep 25, 2017 10:20 AM   Established Patient Pul with TYRELL Chairez   [facility] Pulmonary Medicine (--)    [address] [facility] [address]  [facility] [id number] [phone]              Problem List              ICD-10-CM Priority Class Noted - Resolved    History of septic arthritis Z87.39   10/9/2013 - Present    HTN (hypertension) I10 Medium  10/10/2013 - Present    Seizure disorder (CMS-HCC) G40.909   10/10/2013 - Present    Hyperlipidemia E78.5 Low  10/10/2013 - Present    CTS (carpal tunnel syndrome) G56.00   10/10/2013 - Present    Chronic hip pain M25.559, G89.29   10/22/2013 - Present    Hypothyroidism E03.9 Low  10/22/2013 - Present    GERD (gastroesophageal reflux disease) K21.9 Low  10/22/2013 - Present    Screening for breast cancer Z12.39   10/22/2013 - Present   " History of tobacco abuse Z87.891   10/22/2013 - Present    Right arm pain M79.601   12/13/2013 - Present    Neuropathy (CMS-HCC) G62.9   1/10/2014 - Present    Chronic gout M1A.9XX0   3/13/2014 - Present    Seasonal allergies J30.2   6/13/2014 - Present    Vitamin D insufficiency E55.9   9/26/2014 - Present    Chronic kidney disease, stage III (moderate) N18.3 Medium  9/26/2014 - Present    Atopic dermatitis L20.9   1/8/2015 - Present    Prediabetes R73.03 Low  5/15/2015 - Present    MDD (major depressive disorder), recurrent episode, moderate (CMS-HCC) F33.1 Low  6/18/2015 - Present    Cyst in hand [id number]   6/26/2015 - Present    AK (actinic keratosis) L57.0   6/26/2015 - Present    Status post right hip replacement Z96.641 High  10/31/2015 - Present    Bilateral hip pain M25.551, M25.552   3/21/2016 - Present    Atherosclerosis of both carotid arteries I65.23   10/19/2016 - Present    Compression fracture of T12 vertebra with delayed healing M48.54XG   1/17/2017 - Present    History of recent steroid use Z92.241   1/17/2017 - Present    Chronic obstructive pulmonary disease (CMS-HCC) J44.9   2/1/2017 - Present    Compression fracture of thoracic spine, non-traumatic (CMS-HCC) M48.54XA   3/1/2017 - Present    Abnormal EKG R94.31   3/9/2017 - Present    Anaphylactic reaction T78.2XXA   3/9/2017 - Present      Health Maintenance        Date Due Completion Dates    IMM DTaP/Tdap/Td Vaccine (1 - Tdap) 6/6/1970 ---    IMM ZOSTER VACCINE 6/6/2011 ---    IMM INFLUENZA (1) 9/1/2016 9/28/2015, 9/26/2014, 10/13/2013    MAMMOGRAM 4/13/2017 4/13/2016, 1/2/2014, 12/20/2013, 12/20/2013, 12/20/2013, 12/20/2013    PAP SMEAR 6/26/2018 6/26/2015, 6/26/2015    IMM PNEUMOCOCCAL 65+ (ADULT) LOW/MEDIUM RISK SERIES (2 of 2 - PPSV23) 10/14/2018 9/28/2015, 10/14/2013    COLONOSCOPY 1/1/2020 1/1/2010 (Done)    Override on 1/1/2010: Done    BONE DENSITY 2/1/2022 2/1/2017            Current Immunizations     13-VALENT PCV PREVNAR 9/28/2015       Influenza TIV (IM) 10/13/2013 11:02 AM    Influenza Vaccine Quad Inj (Pf) 9/26/2014  8:32 AM    Influenza Vaccine Quad Inj (Preserved) 9/28/2015    Pneumococcal polysaccharide vaccine (PPSV-23) 10/14/2013  8:03 AM      Below and/or attached are the medications your provider expects you to take. Review all of your home medications and newly ordered medications with your provider and/or pharmacist. Follow medication instructions as directed by your provider and/or pharmacist. Please keep your medication list with you and share with your provider. Update the information when medications are discontinued, doses are changed, or new medications (including over-the-counter products) are added; and carry medication information at all times in the event of emergency situations     Allergies:  TYLENOL - Anaphylaxis     TAPE - (reactions not documented)               Medications  Valid as of: March 29, 2017 -  2:40 PM    Generic Name Brand Name Tablet Size Instructions for use    Albuterol Sulfate (Aero Soln) albuterol 108 (90 BASE) MCG/ACT Inhale 2 Puffs by mouth every 6 hours as needed for Shortness of Breath.        Albuterol Sulfate (Nebu Soln) PROVENTIL 2.5mg/3ml 3 mL by Nebulization route every four hours as needed for Shortness of Breath.        Allopurinol (Tab) ZYLOPRIM 100 MG TAKE 1 TABLET DAILY        Amoxicillin-Pot Clavulanate (Tab) AUGMENTIN 875-125 MG Take 1 Tab by mouth 2 times a day, with meals.        ARIPiprazole (Tab) ABILIFY 5 MG TAKE 1 TABLET AT BEDTIME        Atorvastatin Calcium (Tab) LIPITOR 40 MG Take 1 Tab by mouth every day.        DULoxetine HCl (Cap DR Particles) CYMBALTA 60 MG TAKE 1 CAPSULE EVERY       MORNING        EPINEPHrine (Solution Auto-injector) EPIPEN 2-MATILDE 0.3 MG/0.3ML         Fenofibrate (Tab) TRICOR 145 MG TAKE 1 TABLET DAILY        Fluticasone-Salmeterol (AEROSOL POWDER, BREATH ACTIVATED) ADVAIR 250-50 MCG/DOSE Inhale 1 Puff by mouth every 12 hours.        Latanoprost (Solution)  XALATAN 0.005 % Place 1 Drop in both eyes every evening.        Levothyroxine Sodium (Tab) SYNTHROID 100 MCG TAKE 1 TABLET DAILY        Lisinopril (Tab) PRINIVIL 10 MG Take 1 Tab by mouth every day.        MethylPREDNISolone (Tablet Therapy Pack) MEDROL DOSEPAK 4 MG Take as directed.        Niacin (Antihyperlipidemic) (Tab CR) NIASPAN 1000 MG TAKE 1 TABLET DAILY        Omeprazole (CAPSULE DELAYED RELEASE) PRILOSEC 20 MG Take 1 Cap by mouth every morning. Indications: GERD-Related Laryngitis        Tiotropium Bromide Monohydrate (Cap) SPIRIVA HANDIHALER 18 MCG INHALE THE CONTENTS OF ONE CAPSULE DAILY        TraMADol HCl (Tab) ULTRAM 50 MG TAKE 1 TO 2 TABLETS BY MOUTH 2 TIMES DAILY AS NEEDED FOR SEVERE PAIN        .                 Medicines prescribed today were sent to:     [facility] - [place], [place] - [address] AT PORTAL TO [facility]    [address] [address]    Phone: [phone] Fax: [phone]    Open 24 Hours?: No    [facility]ACY #[id number] - [place], [place] - [address] [place] [address]    [address] [place] [address] [place] [address]    Phone: [phone] Fax: [phone]    Open 24 Hours?: No      Medication refill instructions:       If your prescription bottle indicates you have medication refills left, it is not necessary to call your provider’s office. Please contact your pharmacy and they will refill your medication.    If your prescription bottle indicates you do not have any refills left, you may request refills at any time through one of the following ways: The online aScentias system (except Urgent Care), by calling your provider’s office, or by asking your pharmacy to contact your provider’s office with a refill request. Medication refills are processed only during regular business hours and may not be available until the next business day. Your provider may request additional information or to have a follow-up visit with you prior to refilling your medication.      *Please Note: Medication refills are assigned a new Rx number when refilled electronically. Your pharmacy may indicate that no refills were authorized even though a new prescription for the same medication is available at the pharmacy. Please request the medicine by name with the pharmacy before contacting your provider for a refill.           InOpenhart Access Code: Activation code not generated  Current Jumpstarter Status: Active

## 2017-03-29 NOTE — PROCEDURES
Good patient effort & cooperation.  The results of this test meet the ATS standards for acceptability and repeatability.  Test was performed on the [employer]/D spirometry system.  Predicted values were N-Lauren.  A bronchodilator of Ventolin HFA - 2 puffs with a spacer was administered to the patient.    This was a pre-bronchodilators and post-bronchodilators spirometry test    SPIROMETRY:  1. FVC was 2.43 L, 77 % of predicted  2. FEV1 was 1.50 L, 62 % of predicted   3. FEV1/FVC ratio was 63 %  4. There was no significant response to bronchodilators   5. Flow volume loop scooped about consistent with airway obstruction        IMPRESSION:  This spirometry test findings are consistent with moderate obstructive airway disease.  Given the patient's history of smoking probably he has COPD.  There was no significant response to bronchodilators, the lack of response to bronchodilators does not preclude their use if clinically indicated.  Clinical correlation is required.

## 2017-03-29 NOTE — MR AVS SNAPSHOT
Summer Hatch   3/29/2017 1:30 PM   Non-Provider Visit   MRN: [id number]    Department:  [facility]   Dept Phone:  [phone]    Description:  Female : 1950   Provider:  Cinthya Hernandez M.D.           Reason for Visit     COPD           Allergies as of 3/29/2017     Allergen Noted Reactions    Tylenol 09/15/2016   Anaphylaxis    Lip, throat swelling    Tape 10/09/2013         You were diagnosed with     Chronic obstructive pulmonary disease, unspecified COPD type (CMS-HCC)   [[id number]]         Vital Signs     Smoking Status                   Former Smoker           Basic Information     Date Of Birth Sex Race Ethnicity Preferred Language    1950 Female White Non- English      Problem List              ICD-10-CM Priority Class Noted - Resolved    History of septic arthritis Z87.39   10/9/2013 - Present    HTN (hypertension) I10 Medium  10/10/2013 - Present    Seizure disorder (CMS-HCC) G40.909   10/10/2013 - Present    Hyperlipidemia E78.5 Low  10/10/2013 - Present    CTS (carpal tunnel syndrome) G56.00   10/10/2013 - Present    Chronic hip pain M25.559, G89.29   10/22/2013 - Present    Hypothyroidism E03.9 Low  10/22/2013 - Present    GERD (gastroesophageal reflux disease) K21.9 Low  10/22/2013 - Present    Screening for breast cancer Z12.39   10/22/2013 - Present    History of tobacco abuse Z87.891   10/22/2013 - Present    Right arm pain M79.601   2013 - Present    Neuropathy (CMS-HCC) G62.9   1/10/2014 - Present    Chronic gout M1A.9XX0   3/13/2014 - Present    Seasonal allergies J30.2   2014 - Present    Vitamin D insufficiency E55.9   2014 - Present    Chronic kidney disease, stage III (moderate) N18.3 Medium  2014 - Present    Atopic dermatitis L20.9   2015 - Present    Prediabetes R73.03 Low  5/15/2015 - Present    MDD (major depressive disorder), recurrent episode, moderate (CMS-HCC) F33.1 Low  2015 - Present    Cyst in hand [id number]    6/26/2015 - Present    AK (actinic keratosis) L57.0   6/26/2015 - Present    Status post right hip replacement Z96.641 High  10/31/2015 - Present    Bilateral hip pain M25.551, M25.552   3/21/2016 - Present    Atherosclerosis of both carotid arteries I65.23   10/19/2016 - Present    Compression fracture of T12 vertebra with delayed healing M48.54XG   1/17/2017 - Present    History of recent steroid use Z92.241   1/17/2017 - Present    Chronic obstructive pulmonary disease (CMS-HCC) J44.9   2/1/2017 - Present    Compression fracture of thoracic spine, non-traumatic (CMS-HCC) M48.54XA   3/1/2017 - Present    Abnormal EKG R94.31   3/9/2017 - Present    Anaphylactic reaction T78.2XXA   3/9/2017 - Present      Health Maintenance        Date Due Completion Dates    IMM DTaP/Tdap/Td Vaccine (1 - Tdap) 6/6/1970 ---    IMM ZOSTER VACCINE 6/6/2011 ---    IMM INFLUENZA (1) 9/1/2016 9/28/2015, 9/26/2014, 10/13/2013    MAMMOGRAM 4/13/2017 4/13/2016, 1/2/2014, 12/20/2013, 12/20/2013, 12/20/2013, 12/20/2013    PAP SMEAR 6/26/2018 6/26/2015, 6/26/2015    IMM PNEUMOCOCCAL 65+ (ADULT) LOW/MEDIUM RISK SERIES (2 of 2 - PPSV23) 10/14/2018 9/28/2015, 10/14/2013    COLONOSCOPY 1/1/2020 1/1/2010 (Done)    Override on 1/1/2010: Done    BONE DENSITY 2/1/2022 2/1/2017            Current Immunizations     13-VALENT PCV PREVNAR 9/28/2015    Influenza TIV (IM) 10/13/2013 11:02 AM    Influenza Vaccine Quad Inj (Pf) 9/26/2014  8:32 AM    Influenza Vaccine Quad Inj (Preserved) 9/28/2015    Pneumococcal polysaccharide vaccine (PPSV-23) 10/14/2013  8:03 AM      Below and/or attached are the medications your provider expects you to take. Review all of your home medications and newly ordered medications with your provider and/or pharmacist. Follow medication instructions as directed by your provider and/or pharmacist. Please keep your medication list with you and share with your provider. Update the information when medications are discontinued, doses  are changed, or new medications (including over-the-counter products) are added; and carry medication information at all times in the event of emergency situations     Allergies:  TYLENOL - Anaphylaxis     TAPE - (reactions not documented)               Medications  Valid as of: March 29, 2017 -  2:09 PM    Generic Name Brand Name Tablet Size Instructions for use    Albuterol Sulfate (Aero Soln) albuterol 108 (90 BASE) MCG/ACT Inhale 2 Puffs by mouth every 6 hours as needed for Shortness of Breath.        Albuterol Sulfate (Nebu Soln) PROVENTIL 2.5mg/3ml 3 mL by Nebulization route every four hours as needed for Shortness of Breath.        Allopurinol (Tab) ZYLOPRIM 100 MG TAKE 1 TABLET DAILY        Amoxicillin-Pot Clavulanate (Tab) AUGMENTIN 875-125 MG         ARIPiprazole (Tab) ABILIFY 5 MG TAKE 1 TABLET AT BEDTIME        Atorvastatin Calcium (Tab) LIPITOR 40 MG Take 1 Tab by mouth every day.        DULoxetine HCl (Cap DR Particles) CYMBALTA 60 MG TAKE 1 CAPSULE EVERY       MORNING        EPINEPHrine (Solution Auto-injector) EPIPEN 2-MATILDE 0.3 MG/0.3ML         Fenofibrate (Tab) TRICOR 145 MG TAKE 1 TABLET DAILY        Fluticasone-Salmeterol (AEROSOL POWDER, BREATH ACTIVATED) ADVAIR 250-50 MCG/DOSE Inhale 1 Puff by mouth every 12 hours.        Latanoprost (Solution) XALATAN 0.005 % Place 1 Drop in both eyes every evening.        Levothyroxine Sodium (Tab) SYNTHROID 100 MCG TAKE 1 TABLET DAILY        Lisinopril (Tab) PRINIVIL 10 MG Take 1 Tab by mouth every day.        Niacin (Antihyperlipidemic) (Tab CR) NIASPAN 1000 MG TAKE 1 TABLET DAILY        Omeprazole (CAPSULE DELAYED RELEASE) PRILOSEC 20 MG Take 1 Cap by mouth every morning. Indications: GERD-Related Laryngitis        Tiotropium Bromide Monohydrate (Cap) SPIRIVA HANDIHALER 18 MCG INHALE THE CONTENTS OF ONE CAPSULE DAILY        TraMADol HCl (Tab) ULTRAM 50 MG TAKE 1 TO 2 TABLETS BY MOUTH 2 TIMES DAILY AS NEEDED FOR SEVERE PAIN        .                 Medicines  prescribed today were sent to:     [facility] - [place], [place] - [address] AT PORTAL TO REGISTERED [facility]    [address] [facility] [address]    Phone: [phone] Fax: [phone]    Open 24 Hours?: No    [facility]ARMACY #[id number] - [place], [place] - [address] [place] [address]    [address] [place] [address] [place] [address]    Phone: [phone] Fax: [phone]    Open 24 Hours?: No      Medication refill instructions:       If your prescription bottle indicates you have medication refills left, it is not necessary to call your provider’s office. Please contact your pharmacy and they will refill your medication.    If your prescription bottle indicates you do not have any refills left, you may request refills at any time through one of the following ways: The online O[employer]asis system (except Urgent Care), by calling your provider’s office, or by asking your pharmacy to contact your provider’s office with a refill request. Medication refills are processed only during regular business hours and may not be available until the next business day. Your provider may request additional information or to have a follow-up visit with you prior to refilling your medication.   *Please Note: Medication refills are assigned a new Rx number when refilled electronically. Your pharmacy may indicate that no refills were authorized even though a new prescription for the same medication is available at the pharmacy. Please request the medicine by name with the pharmacy before contacting your provider for a refill.           Oligasis Access Code: Activation code not generated  Current Oligasis Status: Active

## 2017-03-29 NOTE — PATIENT INSTRUCTIONS
Plan:    1) Continue Advair, Spiriva and Ventolin inhalers.   2) Continue OTC Flonase and Mucinex as needed.  3) Continue PPI.  4) She states even though her Dexa scan was normal her PCP is going to start her on infusions for Osteoporosis as she has had testing in the past indicating Osteopenia and she has had multiple bone fractures.   5) She is up to date on Prevnar 13 and Pneumovax 23 vaccines. Recommend updated Influenza vaccine in the Fall.  6) Encouraged routine walking/exercise.  7) RX for Augmentin and Medrol dosepack provided to have on hand.  8) 6 month follow up, sooner if needed. Flu vaccine at follow up visit.

## 2017-03-29 NOTE — PROGRESS NOTES
Chief Complaint   Patient presents with   • COPD       HPI:  Summer Hatch is a 66 y.o. year old female here today for follow-up on her Asthma/COPD. Chest xray January 2017 was clear. PFT's in January 2016 indicated an FEV1 of 1.53 L, 64% predicted with an FEV1/FVC ratio of 59 and a DLCO of 75% predicted. Spirometry today om the office indicates an FEV1 of 1.50 L, 62% predicted with an FEV1/FVC ratio of 62. She has a history of tobacco abuse and quit smoking in 1998. She is compliant on Advair 250/50 bid, Spiriva daily and a Ventolin HFA inhaler as needed. She is on a PPI. She also uses a Flonase nasal spray. She had an exacerbation of her COPD in January requiring a Zpak and a Prednisone taper. She states she required an additional course of steroids and Augmentin before her symptoms improved. She is feeling well now. She states she felt like she was developing a cold last week. She took an OTC cold medication and restarted her nebulizer. She is feeling better. She notes a seldom cough with a small amount of clear mucous. She notes an intermittent wheeze which resolves with use of her nebulizer. She denies any fevers or chills. She notes dyspnea with exertion which she feels is unchanged. She feels her sinus symptoms are stable. She denies breakthrough reflux symptoms. She had a recent unremarkable Dexa scan.     Past Medical History   Diagnosis Date   • Hypertension    • COPD    • Renal disorder    • Carpal tunnel syndrome    • Hypothyroidism 10/22/2013   • GERD (gastroesophageal reflux disease) 10/22/2013   • History of tobacco abuse 10/22/2013     Quit 1998. Smoked one pack per day for 30 years.   • Bronchitis    • Osteoporosis    • Pneumonia    • Arthritis    • Indigestion    • Dental disorder    • Emphysema of lung (CMS-HCC)    • Asthma      Uses inhaler PRN.   • Pain      Bilateral hips.   • Glaucoma      Bilateral.   • Breath shortness      With exertion.   • Seizure disorder (CMS-HCC)      Last seizure  30 years ago.  No medication.       Past Surgical History   Procedure Laterality Date   • Nerve ulnar repair or explore     • Other orthopedic surgery     • Hip revision total  10/10/2013     Performed by Nils Starr M.D. at SURGERY [facility]   • Incision and drainage orthopedic  10/10/2013     Performed by Nils Starr M.D. at [facility]   • Carpal tunnel release     • Hip revision total Right 11/5/2015     Procedure: Evacuation hematoma, revision total hip;  Surgeon: Nils Starr M.D.;  Location: SURGERY [facility];  Service:    • Tonsillectomy     • Appendectomy         Family History   Problem Relation Age of Onset   • Psychiatry Mother 47     suicide   • Alcohol/Drug Mother    • Cancer Father 72     Lung   • Alcohol/Drug Brother      Recovering       Social History     Social History   • Marital Status:      Spouse Name: N/A   • Number of Children: N/A   • Years of Education: N/A     Occupational History   • Not on file.     Social History Main Topics   • Smoking status: Former Smoker -- 1.00 packs/day for 30 years     Types: Cigarettes     Quit date: 01/01/1998   • Smokeless tobacco: Never Used   • Alcohol Use: No   • Drug Use: No   • Sexual Activity: Not on file     Other Topics Concern   • Not on file     Social History Narrative       ROS:  Constitutional: Denies fevers, chills, sweats, fatigue, weight loss  Eyes: Denies vision loss, pain, drainage, double vision  Ears/Nose/Mouth/Throat: Denies ear ache, difficulty hearing, sore throat, persistent hoarseness, decayed teeth/toothache  Cardiovascular: Denies chest pain, tightness, palpitations, swelling in feet/legs, fainting, difficulty breathing when laying down  Respiratory: See HPI   GI: Denies heartburn, difficulty swallowing, nausea, vomiting, abdominal pain, diarrhea, constipation  : Denies frequent urination, painful urination  Integumentary: Denies rashes, lumps or color changes  MSK: Denies painful  "joints, sore muscles, and back pain.   Neurological: Denies frequent headaches, dizziness, weakness        Current Outpatient Prescriptions on File Prior to Visit   Medication Sig Dispense Refill   • fluticasone-salmeterol (ADVAIR) 250-50 MCG/DOSE AEROSOL POWDER, BREATH ACTIVATED Inhale 1 Puff by mouth every 12 hours. 3 Inhaler 3   • SYNTHROID 100 MCG Tab TAKE 1 TABLET DAILY 90 Tab 3   • atorvastatin (LIPITOR) 40 MG Tab Take 1 Tab by mouth every day. 90 Tab 3   • fenofibrate (TRICOR) 145 MG Tab TAKE 1 TABLET DAILY 90 Tab 3   • aripiprazole (ABILIFY) 5 MG tablet TAKE 1 TABLET AT BEDTIME 90 Tab 3   • albuterol 108 (90 BASE) MCG/ACT Aero Soln inhalation aerosol Inhale 2 Puffs by mouth every 6 hours as needed for Shortness of Breath. 3 Inhaler 3   • albuterol (PROVENTIL) 2.5mg/3ml Nebu Soln solution for nebulization 3 mL by Nebulization route every four hours as needed for Shortness of Breath. 360 mL 5   • tramadol (ULTRAM) 50 MG Tab TAKE 1 TO 2 TABLETS BY MOUTH 2 TIMES DAILY AS NEEDED FOR SEVERE PAIN 90 Tab 5   • allopurinol (ZYLOPRIM) 100 MG Tab TAKE 1 TABLET DAILY 90 Tab 3   • SPIRIVA HANDIHALER 18 MCG Cap INHALE THE CONTENTS OF ONE CAPSULE DAILY 90 Cap 3   • lisinopril (PRINIVIL) 10 MG Tab Take 1 Tab by mouth every day. 90 Tab 3   • omeprazole (PRILOSEC) 20 MG delayed-release capsule Take 1 Cap by mouth every morning. Indications: GERD-Related Laryngitis 90 Cap 3   • duloxetine (CYMBALTA) 60 MG Cap DR Particles delayed-release capsule TAKE 1 CAPSULE EVERY       MORNING 90 Cap 3   • niacin (NIASPAN) 1000 MG CR tablet TAKE 1 TABLET DAILY 90 Tab 3   • latanoprost (XALATAN) 0.005 % Solution Place 1 Drop in both eyes every evening. 1 Bottle 3     No current facility-administered medications on file prior to visit.     Tylenol and Tape    Blood pressure 124/78, pulse 78, temperature 36.3 °C (97.3 °F), resp. rate 16, height 1.651 m (5' 5\"), weight 76.658 kg (169 lb), SpO2 94 %.  PE:   Appearance: Well developed, well " nourished, no acute distress  Eyes: PERRL, EOM intact, sclera white, conjunctiva moist  Ears: no lesions or deformities  Hearing: grossly intact  Nose: no lesions or deformities  Oropharynx: tongue normal, posterior pharynx without erythema or exudate  Neck: supple, trachea midline, no masses   Respiratory effort: no intercostal retractions or use of accessory muscles  Lung auscultation: no rales, rhonchi or wheezes  Heart auscultation: no murmur rub or gallop  Extremities: no cyanosis or edema  Abdomen: soft ,non tender, no masses  Gait and Station: normal  Digits and nails: no clubbing, cyanosis, petechiae or nodes.  Cranial nerves: grossly intact  Skin: no rashes, lesions or ulcers noted  Orientation: Oriented to time, person and place  Mood and affect: mood and affect appropriate, normal interaction with examiner  Judgement: Intact          Assessment:  1. Need for influenza vaccination     2. Chronic obstructive pulmonary disease, unspecified COPD type (CMS-HCC)     3. Seasonal allergic rhinitis, unspecified allergic rhinitis trigger     4. Gastroesophageal reflux disease, esophagitis presence not specified           Plan:    1) Continue Advair, Spiriva and Ventolin inhalers.   2) Continue OTC Flonase and Mucinex as needed.  3) Continue PPI.  4) She states even though her Dexa scan was normal her PCP is going to start her on infusions for Osteoporosis as she has had testing in the past indicating Osteopenia and she has had multiple bone fractures.   5) She is up to date on Prevnar 13 and Pneumovax 23 vaccines. Recommend updated Influenza vaccine in the Fall.  6) Encouraged routine walking/exercise.  7) RX for Augmentin and Medrol dosepack provided to have on hand.  8) 6 month follow up, sooner if needed. Flu vaccine at follow up visit.

## 2017-04-04 ENCOUNTER — TELEPHONE (OUTPATIENT)
Dept: MEDICAL GROUP | Facility: MEDICAL CENTER | Age: 67
End: 2017-04-04

## 2017-04-04 DIAGNOSIS — M48.54XS COMPRESSION FRACTURE OF THORACIC SPINE, NON-TRAUMATIC, SEQUELA: ICD-10-CM

## 2017-04-04 RX ORDER — ZOLEDRONIC ACID 5 MG/100ML
5 INJECTION, SOLUTION INTRAVENOUS
Qty: 100 ML | Refills: 2 | Status: SHIPPED
Start: 2017-04-04 | End: 2018-05-24

## 2017-04-04 NOTE — TELEPHONE ENCOUNTER
.1. Caller Name: Pt                      Call Back Number: .[phone] (home)       2. Message: Pt states she was told she would received infusions of Reclast. But was never contacted. Please place order     3. Patient approves office to leave a detailed voicemail/MyChart message: yes

## 2017-04-18 ENCOUNTER — OFFICE VISIT (OUTPATIENT)
Dept: MEDICAL GROUP | Facility: MEDICAL CENTER | Age: 67
End: 2017-04-18
Payer: MEDICARE

## 2017-04-18 VITALS
DIASTOLIC BLOOD PRESSURE: 74 MMHG | TEMPERATURE: 97.7 F | OXYGEN SATURATION: 94 % | HEIGHT: 65 IN | HEART RATE: 98 BPM | BODY MASS INDEX: 26.99 KG/M2 | SYSTOLIC BLOOD PRESSURE: 122 MMHG | WEIGHT: 162 LBS

## 2017-04-18 DIAGNOSIS — J01.10 ACUTE NON-RECURRENT FRONTAL SINUSITIS: ICD-10-CM

## 2017-04-18 DIAGNOSIS — M81.0 OSTEOPOROSIS: ICD-10-CM

## 2017-04-18 PROCEDURE — 3014F SCREEN MAMMO DOC REV: CPT | Performed by: FAMILY MEDICINE

## 2017-04-18 PROCEDURE — G8419 CALC BMI OUT NRM PARAM NOF/U: HCPCS | Performed by: FAMILY MEDICINE

## 2017-04-18 PROCEDURE — G8432 DEP SCR NOT DOC, RNG: HCPCS | Performed by: FAMILY MEDICINE

## 2017-04-18 PROCEDURE — 1100F PTFALLS ASSESS-DOCD GE2>/YR: CPT | Performed by: FAMILY MEDICINE

## 2017-04-18 PROCEDURE — G8599 NO ASA/ANTIPLAT THER USE RNG: HCPCS | Performed by: FAMILY MEDICINE

## 2017-04-18 PROCEDURE — 4040F PNEUMOC VAC/ADMIN/RCVD: CPT | Performed by: FAMILY MEDICINE

## 2017-04-18 PROCEDURE — 0518F FALL PLAN OF CARE DOCD: CPT | Mod: 8P | Performed by: FAMILY MEDICINE

## 2017-04-18 PROCEDURE — 1036F TOBACCO NON-USER: CPT | Performed by: FAMILY MEDICINE

## 2017-04-18 PROCEDURE — 3288F FALL RISK ASSESSMENT DOCD: CPT | Performed by: FAMILY MEDICINE

## 2017-04-18 PROCEDURE — 99213 OFFICE O/P EST LOW 20 MIN: CPT | Performed by: FAMILY MEDICINE

## 2017-04-18 RX ORDER — DOXYCYCLINE HYCLATE 100 MG
100 TABLET ORAL 2 TIMES DAILY
Qty: 28 TAB | Refills: 0 | Status: SHIPPED | OUTPATIENT
Start: 2017-04-18 | End: 2017-09-06

## 2017-04-18 NOTE — MR AVS SNAPSHOT
"        Summer Bonilla Holden   2017 11:40 AM   Office Visit   MRN: [id number]    Department:  [facility]   Dept Phone:  [phone]    Description:  Female : 1950   Provider:  Darlin Shaikh M.D.           Allergies as of 2017     Allergen Noted Reactions    Tylenol 09/15/2016   Anaphylaxis    Lip, throat swelling    Tape 10/09/2013         You were diagnosed with     Acute non-recurrent frontal sinusitis   [[id number]]       Osteoporosis   [[id number]]         Vital Signs     Blood Pressure Pulse Temperature Height Weight Body Mass Index    122/74 mmHg 98 36.5 °C (97.7 °F) 1.651 m (5' 5\") 73.483 kg (162 lb) 26.96 kg/m2    Oxygen Saturation Breastfeeding? Smoking Status             94% No Former Smoker         Basic Information     Date Of Birth Sex Race Ethnicity Preferred Language    1950 Female White Non- English      Your appointments     Sep 25, 2017 10:20 AM   Established Patient Pul with TYRELL Chairez   [facility] (--)    [address]  [address]  [facility] [id number] [phone]              Problem List              ICD-10-CM Priority Class Noted - Resolved    History of septic arthritis Z87.39   10/9/2013 - Present    HTN (hypertension) I10 Medium  10/10/2013 - Present    Seizure disorder (CMS-HCC) G40.909   10/10/2013 - Present    Hyperlipidemia E78.5 Low  10/10/2013 - Present    CTS (carpal tunnel syndrome) G56.00   10/10/2013 - Present    Chronic hip pain M25.559, G89.29   10/22/2013 - Present    Hypothyroidism E03.9 Low  10/22/2013 - Present    GERD (gastroesophageal reflux disease) K21.9 Low  10/22/2013 - Present    Screening for breast cancer Z12.39   10/22/2013 - Present    History of tobacco abuse Z87.891   10/22/2013 - Present    Right arm pain M79.601   2013 - Present    Neuropathy (CMS-HCC) G62.9   1/10/2014 - Present    Chronic gout M1A.9XX0   3/13/2014 - Present    Seasonal allergies J30.2   2014 - Present   " Vitamin D insufficiency E55.9   9/26/2014 - Present    Chronic kidney disease, stage III (moderate) N18.3 Medium  9/26/2014 - Present    Atopic dermatitis L20.9   1/8/2015 - Present    Prediabetes R73.03 Low  5/15/2015 - Present    MDD (major depressive disorder), recurrent episode, moderate (CMS-HCC) F33.1 Low  6/18/2015 - Present    Cyst in hand [id number]   6/26/2015 - Present    AK (actinic keratosis) L57.0   6/26/2015 - Present    Status post right hip replacement Z96.641 High  10/31/2015 - Present    Bilateral hip pain M25.551, M25.552   3/21/2016 - Present    Atherosclerosis of both carotid arteries I65.23   10/19/2016 - Present    Compression fracture of T12 vertebra with delayed healing M48.54XG   1/17/2017 - Present    History of recent steroid use Z92.241   1/17/2017 - Present    Chronic obstructive pulmonary disease (CMS-HCC) J44.9   2/1/2017 - Present    Compression fracture of thoracic spine, non-traumatic (CMS-HCC) M48.54XA   3/1/2017 - Present    Abnormal EKG R94.31   3/9/2017 - Present    Anaphylactic reaction T78.2XXA   3/9/2017 - Present    Acute non-recurrent frontal sinusitis J01.10   4/18/2017 - Present    Osteoporosis M81.0   4/18/2017 - Present      Health Maintenance        Date Due Completion Dates    IMM DTaP/Tdap/Td Vaccine (1 - Tdap) 6/6/1970 ---    IMM ZOSTER VACCINE 6/6/2011 ---    MAMMOGRAM 4/13/2017 4/13/2016, 1/2/2014    PAP SMEAR 6/26/2018 6/26/2015, 6/26/2015    IMM PNEUMOCOCCAL 65+ (ADULT) LOW/MEDIUM RISK SERIES (2 of 2 - PPSV23) 10/14/2018 9/28/2015, 10/14/2013    COLONOSCOPY 1/1/2020 1/1/2010 (Done)    Override on 1/1/2010: Done    BONE DENSITY 2/1/2022 2/1/2017            Current Immunizations     13-VALENT PCV PREVNAR 9/28/2015    Influenza TIV (IM) 10/13/2013 11:02 AM    Influenza Vaccine Quad Inj (Pf) 9/26/2014  8:32 AM    Influenza Vaccine Quad Inj (Preserved) 9/28/2015    Pneumococcal polysaccharide vaccine (PPSV-23) 10/14/2013  8:03 AM      Below and/or attached are the  medications your provider expects you to take. Review all of your home medications and newly ordered medications with your provider and/or pharmacist. Follow medication instructions as directed by your provider and/or pharmacist. Please keep your medication list with you and share with your provider. Update the information when medications are discontinued, doses are changed, or new medications (including over-the-counter products) are added; and carry medication information at all times in the event of emergency situations     Allergies:  TYLENOL - Anaphylaxis     TAPE - (reactions not documented)               Medications  Valid as of: April 18, 2017 -  1:07 PM    Generic Name Brand Name Tablet Size Instructions for use    Albuterol Sulfate (Aero Soln) albuterol 108 (90 BASE) MCG/ACT Inhale 2 Puffs by mouth every 6 hours as needed for Shortness of Breath.        Albuterol Sulfate (Nebu Soln) PROVENTIL 2.5mg/3ml 3 mL by Nebulization route every four hours as needed for Shortness of Breath.        Allopurinol (Tab) ZYLOPRIM 100 MG TAKE 1 TABLET DAILY        Amoxicillin-Pot Clavulanate (Tab) AUGMENTIN 875-125 MG Take 1 Tab by mouth 2 times a day, with meals.        ARIPiprazole (Tab) ABILIFY 5 MG TAKE 1 TABLET AT BEDTIME        Atorvastatin Calcium (Tab) LIPITOR 40 MG Take 1 Tab by mouth every day.        Doxycycline Hyclate (Tab) VIBRAMYCIN 100 MG Take 1 Tab by mouth 2 times a day.        DULoxetine HCl (Cap DR Particles) CYMBALTA 60 MG TAKE 1 CAPSULE EVERY       MORNING        EPINEPHrine (Solution Auto-injector) EPIPEN 2-MATILDE 0.3 MG/0.3ML         Fenofibrate (Tab) TRICOR 145 MG TAKE 1 TABLET DAILY        Fluticasone-Salmeterol (AEROSOL POWDER, BREATH ACTIVATED) ADVAIR 250-50 MCG/DOSE Inhale 1 Puff by mouth every 12 hours.        Latanoprost (Solution) XALATAN 0.005 % Place 1 Drop in both eyes every evening.        Levothyroxine Sodium (Tab) SYNTHROID 100 MCG TAKE 1 TABLET DAILY        Lisinopril (Tab) PRINIVIL 10  MG Take 1 Tab by mouth every day.        MethylPREDNISolone (Tablet Therapy Pack) MEDROL DOSEPAK 4 MG Take as directed.        Niacin (Antihyperlipidemic) (Tab CR) NIASPAN 1000 MG TAKE 1 TABLET DAILY        Omeprazole (CAPSULE DELAYED RELEASE) PRILOSEC 20 MG Take 1 Cap by mouth every morning. Indications: GERD-Related Laryngitis        Tiotropium Bromide Monohydrate (Cap) SPIRIVA HANDIHALER 18 MCG INHALE THE CONTENTS OF ONE CAPSULE DAILY        TraMADol HCl (Tab) ULTRAM 50 MG TAKE 1 TO 2 TABLETS BY MOUTH 2 TIMES DAILY AS NEEDED FOR SEVERE PAIN        Zoledronic Acid (Solution) RECLAST 5 MG/100ML 100 mL by Intravenous route Once Every 12 Months.        .                 Medicines prescribed today were sent to:     [facility] - [place], [place] - [address] AT [address]AL TO [facility]    [address] [facility] [address]    Phone: [phone] Fax: [phone]    Open 24 Hours?: No    [id number] - [place] - [address]    [address] [place] [address] [place] [address]    Phone: [phone] Fax: [phone]    Open 24 Hours?: No      Medication refill instructions:       If your prescription bottle indicates you have medication refills left, it is not necessary to call your provider’s office. Please contact your pharmacy and they will refill your medication.    If your prescription bottle indicates you do not have any refills left, you may request refills at any time through one of the following ways: The online [employer] system (except Urgent Care), by calling your provider’s office, or by asking your pharmacy to contact your provider’s office with a refill request. Medication refills are processed only during regular business hours and may not be available until the next business day. Your provider may request additional information or to have a follow-up visit with you prior to refilling your medication.   *Please Note: Medication refills are assigned a new Rx number  when refilled electronically. Your pharmacy may indicate that no refills were authorized even though a new prescription for the same medication is available at the pharmacy. Please request the medicine by name with the pharmacy before contacting your provider for a refill.           MyChart Access Code: Activation code not generated  Current [employer] Status: Active

## 2017-04-18 NOTE — ASSESSMENT & PLAN NOTE
Severe sinus congestion for the last 5 days. She started with a cough 3 weeks ago and 10 days ago started Augmentin that her pulmonologist prescribed for bronchitis. She reports that about 2 weeks ago she fell and broke her hip, so has had significant stress to her body. She is now complaining of pain in the frontal sinuses and yellow rhinorrhea. There was no improvement with the Augmentin. She denies any fevers or chills. No shortness of breath.

## 2017-04-18 NOTE — PROGRESS NOTES
Subjective:   No chief complaint on file.      Summer Hatch is a 66 y.o. female here today for evaluation and management of:    Acute non-recurrent frontal sinusitis  Severe sinus congestion for the last 5 days. She started with a cough 3 weeks ago and 10 days ago started Augmentin that her pulmonologist prescribed for bronchitis. She reports that about 2 weeks ago she fell and broke her hip, so has had significant stress to her body. She is now complaining of pain in the frontal sinuses and yellow rhinorrhea. There was no improvement with the Augmentin. She denies any fevers or chills. No shortness of breath.    Osteoporosis  Patient has had 4 different fractures this year. One in each foot the compression fracture in her back and then her hip. Dr. Palomino had ordered re-class II but the patient has not heard from the infusion center and she is wondering what she needs to do next.         Allergies   Allergen Reactions   • Tylenol Anaphylaxis     Lip, throat swelling   • Tape        Current medicines (including changes today)  Current Outpatient Prescriptions   Medication Sig Dispense Refill   • doxycycline (VIBRAMYCIN) 100 MG Tab Take 1 Tab by mouth 2 times a day. 28 Tab 0   • zoledronic Acid (RECLAST) 5 MG/100ML Solution IVPB premix 100 mL by Intravenous route Once Every 12 Months. 100 mL 2   • EPIPEN 2-MATILDE 0.3 MG/0.3ML Solution Auto-injector solution for injection      • amoxicillin-clavulanate (AUGMENTIN) 875-125 MG Tab Take 1 Tab by mouth 2 times a day, with meals. 20 Tab 1   • fluticasone-salmeterol (ADVAIR) 250-50 MCG/DOSE AEROSOL POWDER, BREATH ACTIVATED Inhale 1 Puff by mouth every 12 hours. 3 Inhaler 3   • SYNTHROID 100 MCG Tab TAKE 1 TABLET DAILY 90 Tab 3   • atorvastatin (LIPITOR) 40 MG Tab Take 1 Tab by mouth every day. 90 Tab 3   • fenofibrate (TRICOR) 145 MG Tab TAKE 1 TABLET DAILY 90 Tab 3   • aripiprazole (ABILIFY) 5 MG tablet TAKE 1 TABLET AT BEDTIME 90 Tab 3   • albuterol 108 (90 BASE) MCG/ACT  Aero Soln inhalation aerosol Inhale 2 Puffs by mouth every 6 hours as needed for Shortness of Breath. 3 Inhaler 3   • albuterol (PROVENTIL) 2.5mg/3ml Nebu Soln solution for nebulization 3 mL by Nebulization route every four hours as needed for Shortness of Breath. 360 mL 5   • tramadol (ULTRAM) 50 MG Tab TAKE 1 TO 2 TABLETS BY MOUTH 2 TIMES DAILY AS NEEDED FOR SEVERE PAIN 90 Tab 5   • allopurinol (ZYLOPRIM) 100 MG Tab TAKE 1 TABLET DAILY 90 Tab 3   • SPIRIVA HANDIHALER 18 MCG Cap INHALE THE CONTENTS OF ONE CAPSULE DAILY 90 Cap 3   • lisinopril (PRINIVIL) 10 MG Tab Take 1 Tab by mouth every day. 90 Tab 3   • omeprazole (PRILOSEC) 20 MG delayed-release capsule Take 1 Cap by mouth every morning. Indications: GERD-Related Laryngitis 90 Cap 3   • duloxetine (CYMBALTA) 60 MG Cap DR Particles delayed-release capsule TAKE 1 CAPSULE EVERY       MORNING 90 Cap 3   • niacin (NIASPAN) 1000 MG CR tablet TAKE 1 TABLET DAILY 90 Tab 3   • latanoprost (XALATAN) 0.005 % Solution Place 1 Drop in both eyes every evening. 1 Bottle 3   • MethylPREDNISolone (MEDROL DOSEPAK) 4 MG Tablet Therapy Pack Take as directed. 21 Tab 1     No current facility-administered medications for this visit.       She  has a past medical history of Hypertension; COPD; Renal disorder; Carpal tunnel syndrome; Hypothyroidism (10/22/2013); GERD (gastroesophageal reflux disease) (10/22/2013); History of tobacco abuse (10/22/2013); Bronchitis; Osteoporosis; Pneumonia; Arthritis; Indigestion; Dental disorder; Emphysema of lung (CMS-HCC); Asthma; Pain; Glaucoma; Breath shortness; and Seizure disorder (CMS-HCC).    Patient Active Problem List    Diagnosis Date Noted   • Status post right hip replacement 10/31/2015     Priority: High   • Chronic kidney disease, stage III (moderate) 09/26/2014     Priority: Medium   • HTN (hypertension) 10/10/2013     Priority: Medium   • MDD (major depressive disorder), recurrent episode, moderate (CMS-HCC) 06/18/2015     Priority:  "Low   • Prediabetes 05/15/2015     Priority: Low   • Hypothyroidism 10/22/2013     Priority: Low   • GERD (gastroesophageal reflux disease) 10/22/2013     Priority: Low   • Hyperlipidemia 10/10/2013     Priority: Low   • Acute non-recurrent frontal sinusitis 04/18/2017   • Osteoporosis 04/18/2017   • Abnormal EKG 03/09/2017   • Anaphylactic reaction 03/09/2017   • Compression fracture of thoracic spine, non-traumatic (CMS-HCC) 03/01/2017   • Chronic obstructive pulmonary disease (CMS-HCC) 02/01/2017   • Compression fracture of T12 vertebra with delayed healing 01/17/2017   • History of recent steroid use 01/17/2017   • Atherosclerosis of both carotid arteries 10/19/2016   • Bilateral hip pain 03/21/2016   • Cyst in hand 06/26/2015   • AK (actinic keratosis) 06/26/2015   • Atopic dermatitis 01/08/2015   • Vitamin D insufficiency 09/26/2014   • Seasonal allergies 06/13/2014   • Chronic gout 03/13/2014   • Neuropathy (CMS-HCC) 01/10/2014   • Right arm pain 12/13/2013   • Chronic hip pain 10/22/2013   • Screening for breast cancer 10/22/2013   • History of tobacco abuse 10/22/2013   • Seizure disorder (CMS-HCC) 10/10/2013   • CTS (carpal tunnel syndrome) 10/10/2013   • History of septic arthritis 10/09/2013       ROS   No fever or chills.  No nausea or vomiting.  No chest pain or palpitations.  No pain with urination or hematuria.  No black or bloody stools.       Objective:     Blood pressure 122/74, pulse 98, temperature 36.5 °C (97.7 °F), height 1.651 m (5' 5\"), weight 73.483 kg (162 lb), SpO2 94 %, not currently breastfeeding. Body mass index is 26.96 kg/(m^2).   Physical Exam:  Well developed, well nourished.  Alert, oriented in no acute distress.  Eye contact is good, speech goal directed, affect calm  Eyes: conjunctiva non-injected, sclera non-icteric.  Ears: Pinna normal. TM pearly gray.   Nose: Nares are patent. Erythematous swollen mucosa with yellow discharge   Mouth: Oral mucous membranes pink and moist with " no lesions. Diffuse mild posterior pharynx erythema with yellow postnasal drainage Neck Supple.  No adenopathy or masses in the neck or supraclavicular regions. No thyromegaly  Lungs: clear to auscultation bilaterally with decreased excursion. No wheezes or rhonchi  CV: regular rate and rhythm. No murmur      Assessment and Plan:   The following treatment plan was discussed    1. Acute non-recurrent frontal sinusitis  Doxycycline twice a day for 14 days. Increase fluids and rest. Call if not improving  - doxycycline (VIBRAMYCIN) 100 MG Tab; Take 1 Tab by mouth 2 times a day.  Dispense: 28 Tab; Refill: 0    2. Osteoporosis  We will follow up with infusion center to see when she can be set up for her Reclast    Any change or worsening of signs or symptoms, patient encouraged to follow-up or report to the emergency room for further evaluation. Patient understands and agrees.    Followup: Return if symptoms worsen or fail to improve.

## 2017-04-18 NOTE — ASSESSMENT & PLAN NOTE
Patient has had 4 different fractures this year. One in each foot the compression fracture in her back and then her hip. Dr. Palomino had ordered re-class II but the patient has not heard from the infusion center and she is wondering what she needs to do next.

## 2017-05-01 DIAGNOSIS — K21.9 GASTROESOPHAGEAL REFLUX DISEASE, ESOPHAGITIS PRESENCE NOT SPECIFIED: ICD-10-CM

## 2017-05-01 RX ORDER — OMEPRAZOLE 20 MG/1
20 CAPSULE, DELAYED RELEASE ORAL EVERY MORNING
Qty: 90 CAP | Refills: 3 | Status: SHIPPED | OUTPATIENT
Start: 2017-05-01 | End: 2018-04-16 | Stop reason: SDUPTHER

## 2017-05-01 RX ORDER — TRIAMTERENE AND HYDROCHLOROTHIAZIDE 37.5; 25 MG/1; MG/1
TABLET ORAL
Qty: 90 TAB | Refills: 3 | Status: SHIPPED | OUTPATIENT
Start: 2017-05-01 | End: 2018-08-09 | Stop reason: SDUPTHER

## 2017-05-01 RX ORDER — LISINOPRIL 10 MG/1
TABLET ORAL
Qty: 90 TAB | Refills: 3 | Status: SHIPPED | OUTPATIENT
Start: 2017-05-01 | End: 2018-07-10 | Stop reason: SDUPTHER

## 2017-05-08 RX ORDER — DULOXETIN HYDROCHLORIDE 60 MG/1
CAPSULE, DELAYED RELEASE ORAL
Qty: 90 CAP | Refills: 3 | Status: SHIPPED | OUTPATIENT
Start: 2017-05-08 | End: 2018-05-12 | Stop reason: SDUPTHER

## 2017-05-23 RX ORDER — NIACIN 1000 MG/1
1000 TABLET, EXTENDED RELEASE ORAL DAILY
Qty: 90 TAB | Refills: 3 | Status: SHIPPED | OUTPATIENT
Start: 2017-05-23 | End: 2018-10-10

## 2017-05-23 NOTE — TELEPHONE ENCOUNTER
"Was the patient seen in the last year in this department? Yes     Does patient have an active prescription for medications requested? No     Received Request Via: Pharmacy     Last seen\" 04/18/2017 Slots   "

## 2017-05-24 ENCOUNTER — OUTPATIENT INFUSION SERVICES (OUTPATIENT)
Dept: ONCOLOGY | Facility: MEDICAL CENTER | Age: 67
End: 2017-05-24
Attending: FAMILY MEDICINE
Payer: MEDICARE

## 2017-05-24 VITALS
DIASTOLIC BLOOD PRESSURE: 68 MMHG | HEART RATE: 89 BPM | HEIGHT: 64 IN | SYSTOLIC BLOOD PRESSURE: 112 MMHG | WEIGHT: 167.33 LBS | RESPIRATION RATE: 18 BRPM | TEMPERATURE: 98.1 F | BODY MASS INDEX: 28.57 KG/M2

## 2017-05-24 LAB
CA-I BLD ISE-SCNC: 1.21 MMOL/L (ref 1.1–1.3)
CREAT BLD-MCNC: 1.6 MG/DL (ref 0.5–1.4)

## 2017-05-24 PROCEDURE — 82565 ASSAY OF CREATININE: CPT

## 2017-05-24 PROCEDURE — 36415 COLL VENOUS BLD VENIPUNCTURE: CPT

## 2017-05-24 PROCEDURE — 82330 ASSAY OF CALCIUM: CPT

## 2017-05-24 PROCEDURE — 96365 THER/PROPH/DIAG IV INF INIT: CPT

## 2017-05-24 PROCEDURE — 700111 HCHG RX REV CODE 636 W/ 250 OVERRIDE (IP): Performed by: FAMILY MEDICINE

## 2017-05-24 RX ORDER — ZOLEDRONIC ACID 5 MG/100ML
5 INJECTION, SOLUTION INTRAVENOUS ONCE
Status: COMPLETED | OUTPATIENT
Start: 2017-05-24 | End: 2017-05-24

## 2017-05-24 RX ADMIN — ZOLEDRONIC ACID 5 MG: 0.05 INJECTION, SOLUTION INTRAVENOUS at 14:17

## 2017-05-24 ASSESSMENT — PAIN SCALES - GENERAL: PAINLEVEL: 3=SLIGHT PAIN

## 2017-05-24 NOTE — PROGRESS NOTES
Pharmacy Note:    Ca = 1.21 mmol/L  SCr = 1.6 mg/dL, est CrCl ~41 mL/min  Last Dose none in EPIC  Okay to receive Reclast today.    Carri Morrow, PharmD

## 2017-05-24 NOTE — PROGRESS NOTES
Pt presents ambulatory for Reclast infusion. Pt reports no invasive teeth extractions within the last 2 months. Education provided about medication and handout provided, pt reports not currently taking calcium due to kidney issues, she is followed by nephrology. IV established and lab drawn, reviewed and creatine clearance reviewed and WNL for treatment. Pt educated on hydration with medication, she verbalized understanding. Medication infused per orders without complications or adverse reactions. IV flushed with saline and d/c'd with gauze and arm wrap applied to site. Pt to return in one month, card given to pt. Pt left ambulatory in no distress at 1452

## 2017-08-07 ENCOUNTER — PATIENT MESSAGE (OUTPATIENT)
Dept: MEDICAL GROUP | Facility: MEDICAL CENTER | Age: 67
End: 2017-08-07

## 2017-08-07 DIAGNOSIS — M79.642 BILATERAL HAND PAIN: ICD-10-CM

## 2017-08-07 DIAGNOSIS — M79.641 BILATERAL HAND PAIN: ICD-10-CM

## 2017-08-07 NOTE — TELEPHONE ENCOUNTER
From: Summer Hatch  To: Jl Palomino M.D.  Sent: 8/7/2017 7:31 AM PDT  Subject: Prescription Question    Hi Dr Palomino: Good regla! No more broken bones. The Reclast worked.  Last year you prescribed d3b n cream from [facility] for the pain in my hips. It didn't work thre but it does work on the arthritis in my hands. Since I can't have tynelol or NAIDS can you call in another prescription for me? Thanks

## 2017-08-14 RX ORDER — ALLOPURINOL 100 MG/1
TABLET ORAL
Qty: 90 TAB | Refills: 3 | Status: SHIPPED | OUTPATIENT
Start: 2017-08-14 | End: 2018-10-18 | Stop reason: SDUPTHER

## 2017-09-06 ENCOUNTER — HOSPITAL ENCOUNTER (OUTPATIENT)
Facility: MEDICAL CENTER | Age: 67
End: 2017-09-06
Attending: FAMILY MEDICINE
Payer: MEDICARE

## 2017-09-06 ENCOUNTER — OFFICE VISIT (OUTPATIENT)
Dept: MEDICAL GROUP | Facility: MEDICAL CENTER | Age: 67
End: 2017-09-06
Payer: MEDICARE

## 2017-09-06 VITALS
HEART RATE: 94 BPM | OXYGEN SATURATION: 94 % | HEIGHT: 64 IN | DIASTOLIC BLOOD PRESSURE: 70 MMHG | RESPIRATION RATE: 16 BRPM | SYSTOLIC BLOOD PRESSURE: 128 MMHG | TEMPERATURE: 98.2 F | WEIGHT: 174 LBS | BODY MASS INDEX: 29.71 KG/M2

## 2017-09-06 DIAGNOSIS — R31.9 URINARY TRACT INFECTION WITH HEMATURIA, SITE UNSPECIFIED: ICD-10-CM

## 2017-09-06 DIAGNOSIS — N39.0 URINARY TRACT INFECTION WITH HEMATURIA, SITE UNSPECIFIED: ICD-10-CM

## 2017-09-06 DIAGNOSIS — J44.89 ASTHMA WITH COPD: ICD-10-CM

## 2017-09-06 LAB
APPEARANCE UR: NORMAL
BILIRUB UR STRIP-MCNC: NEGATIVE MG/DL
COLOR UR AUTO: YELLOW
GLUCOSE UR STRIP.AUTO-MCNC: NEGATIVE MG/DL
KETONES UR STRIP.AUTO-MCNC: NEGATIVE MG/DL
LEUKOCYTE ESTERASE UR QL STRIP.AUTO: NORMAL
NITRITE UR QL STRIP.AUTO: NEGATIVE
PH UR STRIP.AUTO: 7.5 [PH] (ref 5–8)
PROT UR QL STRIP: NEGATIVE MG/DL
RBC UR QL AUTO: NORMAL
SP GR UR STRIP.AUTO: 1
UROBILINOGEN UR STRIP-MCNC: NEGATIVE MG/DL

## 2017-09-06 PROCEDURE — 99214 OFFICE O/P EST MOD 30 MIN: CPT | Performed by: FAMILY MEDICINE

## 2017-09-06 PROCEDURE — 81002 URINALYSIS NONAUTO W/O SCOPE: CPT | Performed by: FAMILY MEDICINE

## 2017-09-06 PROCEDURE — 87077 CULTURE AEROBIC IDENTIFY: CPT

## 2017-09-06 PROCEDURE — 87086 URINE CULTURE/COLONY COUNT: CPT

## 2017-09-06 PROCEDURE — 87186 SC STD MICRODIL/AGAR DIL: CPT

## 2017-09-06 RX ORDER — SULFAMETHOXAZOLE AND TRIMETHOPRIM 800; 160 MG/1; MG/1
1 TABLET ORAL 2 TIMES DAILY
Qty: 10 TAB | Refills: 0 | Status: SHIPPED | OUTPATIENT
Start: 2017-09-06 | End: 2017-09-11

## 2017-09-06 RX ORDER — TIOTROPIUM BROMIDE 18 UG/1
18 CAPSULE ORAL; RESPIRATORY (INHALATION) DAILY
Qty: 90 CAP | Refills: 3 | Status: SHIPPED | OUTPATIENT
Start: 2017-09-06 | End: 2018-09-03 | Stop reason: SDUPTHER

## 2017-09-06 NOTE — PROGRESS NOTES
"Chief Complaint   Patient presents with   • UTI         Symptom onset: 4 days ago   Current symptoms: painful, urgent, frequent voids. No blood noted in urine.  Since onset symptoms are: unchanged  Treatments tried: none.  Associated symptoms: negative for fever, flank pain, nausea and vomiting, vaginal discharge, pelvic pain, no diarrhea. + slight constipation.  History is positive for frequent UTI.     OBJECTIVE:  Blood pressure 128/70, pulse 94, temperature 36.8 °C (98.2 °F), resp. rate 16, height 1.626 m (5' 4.02\"), weight 78.9 kg (174 lb), SpO2 94 %.  Gen: alert, NAD.  Abdomen soft, tender in suprapubic region. No CVAT. Normal bowel sounds.      Lab Results   Component Value Date/Time    POCCOLOR YELLOW 09/06/2017 04:33 PM    POCCOLOR Yellow 01/02/2017 04:09 PM    POCAPPEAR CLOUDY 09/06/2017 04:33 PM    POCAPPEAR Slightly Cloudy (A) 01/02/2017 04:09 PM    POCLEUKEST 3+ 09/06/2017 04:33 PM    POCLEUKEST Negative 01/02/2017 04:09 PM    POCNITRITE NEGATIVE 09/06/2017 04:33 PM    POCNITRITE Positive (A) 01/02/2017 04:09 PM    POCUROBILIGE NEGATIVE 09/06/2017 04:33 PM    POCPROTEIN NEGATIVE 09/06/2017 04:33 PM    POCPROTEIN Negative 01/02/2017 04:09 PM    POCURPH 7.5 09/06/2017 04:33 PM    POCURPH 6.0 01/02/2017 04:09 PM    POCBLOOD TRACE 09/06/2017 04:33 PM    POCBLOOD Negative 01/02/2017 04:09 PM    POCSPGRV 1.005 09/06/2017 04:33 PM    POCSPGRV <=1.005 (A) 01/02/2017 04:09 PM    POCKETONES NEGATIVE 09/06/2017 04:33 PM    POCKETONES Negative 01/02/2017 04:09 PM    POCBILIRUBIN NEGATIVE 09/06/2017 04:33 PM    POCGLUCUA NEGATIVE 09/06/2017 04:33 PM    POCGLUCUA Negative 01/02/2017 04:09 PM         ASSESSMENT:    1. Urinary tract infection with hematuria, site unspecified  Send urine for culture. Start antibiotics.  RTC if symptoms not improving within 3-4 days or in case of vomiting, fever, increasing pain.   EDUCATION: drink plenty of fluids, wipe front to back every void and BM.     "

## 2017-09-06 NOTE — TELEPHONE ENCOUNTER
Was the patient seen in the last year in this department? Yes     Does patient have an active prescription for medications requested? No     Received Request Via: Pharmacy     Last seen: 04/16/2017 Slots

## 2017-09-07 DIAGNOSIS — R31.9 URINARY TRACT INFECTION WITH HEMATURIA, SITE UNSPECIFIED: ICD-10-CM

## 2017-09-07 DIAGNOSIS — N39.0 URINARY TRACT INFECTION WITH HEMATURIA, SITE UNSPECIFIED: ICD-10-CM

## 2017-09-10 LAB
BACTERIA UR CULT: ABNORMAL
SIGNIFICANT IND 70042: ABNORMAL
SITE SITE: ABNORMAL
SOURCE SOURCE: ABNORMAL

## 2017-09-11 ENCOUNTER — TELEPHONE (OUTPATIENT)
Dept: MEDICAL GROUP | Facility: MEDICAL CENTER | Age: 67
End: 2017-09-11

## 2017-09-11 NOTE — TELEPHONE ENCOUNTER
----- Message from Jl Palomino M.D. sent at 9/10/2017 11:57 AM PDT -----  Please notify patient that her urine culture does show a urinary tract infection. The antibiotic we prescribed will treat the infection.  Jl Palomino M.D.

## 2017-09-19 ENCOUNTER — HOSPITAL ENCOUNTER (OUTPATIENT)
Dept: LAB | Facility: MEDICAL CENTER | Age: 67
End: 2017-09-19
Attending: INTERNAL MEDICINE
Payer: MEDICARE

## 2017-09-19 LAB
25(OH)D3 SERPL-MCNC: 25 NG/ML (ref 30–100)
ALBUMIN SERPL BCP-MCNC: 4.4 G/DL (ref 3.2–4.9)
ALBUMIN/GLOB SERPL: 1.6 G/DL
ALP SERPL-CCNC: 59 U/L (ref 30–99)
ALT SERPL-CCNC: 22 U/L (ref 2–50)
ANION GAP SERPL CALC-SCNC: 6 MMOL/L (ref 0–11.9)
APPEARANCE UR: CLEAR
AST SERPL-CCNC: 25 U/L (ref 12–45)
BASOPHILS # BLD AUTO: 1.4 % (ref 0–1.8)
BASOPHILS # BLD: 0.1 K/UL (ref 0–0.12)
BILIRUB SERPL-MCNC: 0.3 MG/DL (ref 0.1–1.5)
BILIRUB UR QL STRIP.AUTO: NEGATIVE
BUN SERPL-MCNC: 25 MG/DL (ref 8–22)
CALCIUM SERPL-MCNC: 9.5 MG/DL (ref 8.5–10.5)
CHLORIDE SERPL-SCNC: 98 MMOL/L (ref 96–112)
CO2 SERPL-SCNC: 26 MMOL/L (ref 20–33)
COLOR UR: YELLOW
CREAT SERPL-MCNC: 1.52 MG/DL (ref 0.5–1.4)
CREAT UR-MCNC: 52.6 MG/DL
EOSINOPHIL # BLD AUTO: 0.13 K/UL (ref 0–0.51)
EOSINOPHIL NFR BLD: 1.9 % (ref 0–6.9)
ERYTHROCYTE [DISTWIDTH] IN BLOOD BY AUTOMATED COUNT: 42.5 FL (ref 35.9–50)
GFR SERPL CREATININE-BSD FRML MDRD: 34 ML/MIN/1.73 M 2
GLOBULIN SER CALC-MCNC: 2.7 G/DL (ref 1.9–3.5)
GLUCOSE SERPL-MCNC: 93 MG/DL (ref 65–99)
GLUCOSE UR STRIP.AUTO-MCNC: NEGATIVE MG/DL
HCT VFR BLD AUTO: 39.7 % (ref 37–47)
HGB BLD-MCNC: 12.6 G/DL (ref 12–16)
IMM GRANULOCYTES # BLD AUTO: 0.05 K/UL (ref 0–0.11)
IMM GRANULOCYTES NFR BLD AUTO: 0.7 % (ref 0–0.9)
KETONES UR STRIP.AUTO-MCNC: NEGATIVE MG/DL
LEUKOCYTE ESTERASE UR QL STRIP.AUTO: NEGATIVE
LYMPHOCYTES # BLD AUTO: 1.55 K/UL (ref 1–4.8)
LYMPHOCYTES NFR BLD: 22.1 % (ref 22–41)
MCH RBC QN AUTO: 25.9 PG (ref 27–33)
MCHC RBC AUTO-ENTMCNC: 31.7 G/DL (ref 33.6–35)
MCV RBC AUTO: 81.7 FL (ref 81.4–97.8)
MICRO URNS: NORMAL
MONOCYTES # BLD AUTO: 0.79 K/UL (ref 0–0.85)
MONOCYTES NFR BLD AUTO: 11.3 % (ref 0–13.4)
NEUTROPHILS # BLD AUTO: 4.38 K/UL (ref 2–7.15)
NEUTROPHILS NFR BLD: 62.6 % (ref 44–72)
NITRITE UR QL STRIP.AUTO: NEGATIVE
NRBC # BLD AUTO: 0 K/UL
NRBC BLD AUTO-RTO: 0 /100 WBC
PH UR STRIP.AUTO: 6.5 [PH]
PLATELET # BLD AUTO: 234 K/UL (ref 164–446)
PMV BLD AUTO: 10.1 FL (ref 9–12.9)
POTASSIUM SERPL-SCNC: 4.4 MMOL/L (ref 3.6–5.5)
PROT SERPL-MCNC: 7.1 G/DL (ref 6–8.2)
PROT UR QL STRIP: NEGATIVE MG/DL
PROT UR-MCNC: <4 MG/DL (ref 0–15)
PROT/CREAT UR: NORMAL MG/G (ref 10–107)
PTH-INTACT SERPL-MCNC: 36.9 PG/ML (ref 14–72)
RBC # BLD AUTO: 4.86 M/UL (ref 4.2–5.4)
RBC UR QL AUTO: NEGATIVE
SODIUM SERPL-SCNC: 130 MMOL/L (ref 135–145)
SP GR UR STRIP.AUTO: 1.01
URATE SERPL-MCNC: 4.8 MG/DL (ref 1.9–8.2)
UROBILINOGEN UR STRIP.AUTO-MCNC: 0.2 MG/DL
WBC # BLD AUTO: 7 K/UL (ref 4.8–10.8)

## 2017-09-19 PROCEDURE — 82570 ASSAY OF URINE CREATININE: CPT

## 2017-09-19 PROCEDURE — 84156 ASSAY OF PROTEIN URINE: CPT

## 2017-09-19 PROCEDURE — 36415 COLL VENOUS BLD VENIPUNCTURE: CPT

## 2017-09-19 PROCEDURE — 82306 VITAMIN D 25 HYDROXY: CPT

## 2017-09-19 PROCEDURE — 80053 COMPREHEN METABOLIC PANEL: CPT

## 2017-09-19 PROCEDURE — 85025 COMPLETE CBC W/AUTO DIFF WBC: CPT

## 2017-09-19 PROCEDURE — 81003 URINALYSIS AUTO W/O SCOPE: CPT

## 2017-09-19 PROCEDURE — 83970 ASSAY OF PARATHORMONE: CPT

## 2017-09-19 PROCEDURE — 84550 ASSAY OF BLOOD/URIC ACID: CPT

## 2017-09-25 ENCOUNTER — OFFICE VISIT (OUTPATIENT)
Dept: PULMONOLOGY | Facility: HOSPICE | Age: 67
End: 2017-09-25
Payer: MEDICARE

## 2017-09-25 VITALS
SYSTOLIC BLOOD PRESSURE: 126 MMHG | WEIGHT: 174.8 LBS | HEIGHT: 64 IN | OXYGEN SATURATION: 95 % | RESPIRATION RATE: 16 BRPM | BODY MASS INDEX: 29.84 KG/M2 | DIASTOLIC BLOOD PRESSURE: 54 MMHG | HEART RATE: 75 BPM

## 2017-09-25 DIAGNOSIS — B37.0 THRUSH: ICD-10-CM

## 2017-09-25 DIAGNOSIS — J30.2 SEASONAL ALLERGIC RHINITIS, UNSPECIFIED ALLERGIC RHINITIS TRIGGER: ICD-10-CM

## 2017-09-25 DIAGNOSIS — Z23 NEED FOR INFLUENZA VACCINATION: ICD-10-CM

## 2017-09-25 DIAGNOSIS — J44.9 CHRONIC OBSTRUCTIVE PULMONARY DISEASE, UNSPECIFIED COPD TYPE (HCC): ICD-10-CM

## 2017-09-25 DIAGNOSIS — K21.9 GASTROESOPHAGEAL REFLUX DISEASE, ESOPHAGITIS PRESENCE NOT SPECIFIED: ICD-10-CM

## 2017-09-25 PROCEDURE — 99214 OFFICE O/P EST MOD 30 MIN: CPT | Mod: 25 | Performed by: NURSE PRACTITIONER

## 2017-09-25 PROCEDURE — 90662 IIV NO PRSV INCREASED AG IM: CPT | Performed by: NURSE PRACTITIONER

## 2017-09-25 PROCEDURE — G0008 ADMIN INFLUENZA VIRUS VAC: HCPCS | Performed by: NURSE PRACTITIONER

## 2017-09-25 RX ORDER — METHYLPREDNISOLONE 4 MG/1
TABLET ORAL
Qty: 21 TAB | Refills: 1 | Status: SHIPPED | OUTPATIENT
Start: 2017-09-25 | End: 2017-12-15

## 2017-09-25 RX ORDER — BUDESONIDE AND FORMOTEROL FUMARATE DIHYDRATE 160; 4.5 UG/1; UG/1
2 AEROSOL RESPIRATORY (INHALATION) 2 TIMES DAILY
Qty: 3 INHALER | Refills: 3 | Status: SHIPPED | OUTPATIENT
Start: 2017-09-25 | End: 2018-08-06 | Stop reason: SDUPTHER

## 2017-09-25 NOTE — PATIENT INSTRUCTIONS
Plan:    1) Mild Asthma exacerbation. Medrol dose pack now.  2) Nystatin S/S sent to local pharmacy. She states she is good about rinsing and gargling with Listering after use of her Advair. However, she states she has had recurrent thrush. Change Advair dry powder diskus to Symbicort /4.5 mcg 2 puffs INH bid with spacer, rinse mouth after use. RX sent to mail away pharmacy. She will continue Spiriva and Ventolin inhalers.  3) She is interested in swimming for exercise. I do recommend routine exercise. However, she is encouraged to use her Ventolin inhaler 2 puffs 15-30 minutes prior to exercise.  4) She is up to date on Prevnar 13 and Pneumovax 23 vaccines. Updated Influenza vaccine today in the office.   5) Continue Flonase and Mucinex as needed.  6) Continue PPI.  7) Follow up in 6 months with updated PFT's, sooner if acute symptoms persist or worsen.

## 2017-09-25 NOTE — PROGRESS NOTES
Chief Complaint   Patient presents with   • COPD       HPI:  Summer Hatch is a 67 y.o. year old female here today for follow-up on her Asthma/COPD. Chest xray January 2017 was clear. PFT's in January 2016 indicated an FEV1 of 1.53 L, 64% predicted with an FEV1/FVC ratio of 59 and a DLCO of 75% predicted. Spirometry at her last office visit in March 2017 indicated an FEV1 of 1.50 L, 62% predicted with an FEV1/FVC ratio of 62. She has a history of tobacco abuse and quit smoking in 1998. She is compliant on Advair 250/50 bid, Spiriva daily and a Ventolin HFA inhaler as needed. She is on a PPI. She also uses a Flonase nasal spray. She notes her dyspnea has been worse over the past few months. She has required more frequent rest periods. She has been using her Ventolin a little more often. She denies increased cough or mucous. However, she has had a slight increased wheeze worse in the evening. She denies any fevers or chills. She feels her sinus drainage is stable. She feels her reflux is stable on the PPI as well. She denies chest pain. She has had issues with recurrent thrush despite rinsing and using Listerine after her Advair use.         Past Medical History:   Diagnosis Date   • Hypothyroidism 10/22/2013   • GERD (gastroesophageal reflux disease) 10/22/2013   • History of tobacco abuse 10/22/2013    Quit 1998. Smoked one pack per day for 30 years.   • Arthritis    • Asthma     Uses inhaler PRN.   • Breath shortness     With exertion.   • Bronchitis    • Carpal tunnel syndrome    • COPD    • Dental disorder    • Emphysema of lung (CMS-HCC)    • Glaucoma     Bilateral.   • Hypertension    • Indigestion    • Osteoporosis    • Pain     Bilateral hips.   • Pneumonia    • Renal disorder    • Seizure disorder (CMS-HCC)     Last seizure 30 years ago.  No medication.       Past Surgical History:   Procedure Laterality Date   • HIP REVISION TOTAL Right 11/5/2015    Procedure: Evacuation hematoma, revision total hip;   Surgeon: Nils Starr M.D.;  Location: SURGERY [facility];  Service:    • HIP REVISION TOTAL  10/10/2013    Performed by Nils Starr M.D. at SURGERY [facility]   • INCISION AND DRAINAGE ORTHOPEDIC  10/10/2013    Performed by Nils Starr M.D. at [facility]   • APPENDECTOMY     • CARPAL TUNNEL RELEASE     • NERVE ULNAR REPAIR OR EXPLORE     • OTHER ORTHOPEDIC SURGERY     • TONSILLECTOMY         Family History   Problem Relation Age of Onset   • Psychiatry Mother 47     suicide   • Alcohol/Drug Mother    • Cancer Father 72     Lung   • Alcohol/Drug Brother      Recovering       Social History     Social History   • Marital status:      Spouse name: N/A   • Number of children: N/A   • Years of education: N/A     Occupational History   • Not on file.     Social History Main Topics   • Smoking status: Former Smoker     Packs/day: 1.00     Years: 30.00     Types: Cigarettes     Quit date: 1/1/1998   • Smokeless tobacco: Never Used   • Alcohol use No   • Drug use: No   • Sexual activity: Not on file     Other Topics Concern   • Not on file     Social History Narrative   • No narrative on file           ROS:  Constitutional: Denies fevers, chills, sweats, fatigue, weight loss  Eyes: Denies glasses, vision loss, pain, drainage, double vision  Ears/Nose/Mouth/Throat: Denies ear ache, difficulty hearing, sore throat, persistent hoarseness, decayed teeth/toothache. Positive rhinitis   Cardiovascular: Denies chest pain, tightness, palpitations, swelling in feet/legs, fainting, difficulty breathing when laying down  Respiratory: See HPI   GI: Denies heartburn, difficulty swallowing, nausea, vomiting, abdominal pain, diarrhea, constipation  : Denies frequent urination, painful urination  Integumentary: Denies rashes, lumps or color changes  MSK: Denies painful joints, sore muscles, and back pain.   Neurological: Denies frequent headaches, dizziness, weakness        Current Outpatient  Prescriptions   Medication Sig Dispense Refill   • tiotropium (SPIRIVA HANDIHALER) 18 MCG Cap Inhale 1 Cap by mouth every day. 90 Cap 3   • allopurinol (ZYLOPRIM) 100 MG Tab TAKE 1 TABLET DAILY 90 Tab 3   • niacin (NIASPAN) 1000 MG CR tablet Take 1 Tab by mouth every day. 90 Tab 3   • duloxetine (CYMBALTA) 60 MG Cap DR Particles delayed-release capsule TAKE 1 CAPSULE EVERY       MORNING 90 Cap 3   • lisinopril (PRINIVIL) 10 MG Tab TAKE 1 TABLET DAILY 90 Tab 3   • triamterene-hctz (MAXZIDE-25/DYAZIDE) 37.5-25 MG Tab TAKE 1 TABLET EVERY MORNING 90 Tab 3   • omeprazole (PRILOSEC) 20 MG delayed-release capsule Take 1 Cap by mouth every morning. Indications: GERD-Related Laryngitis 90 Cap 3   • fluticasone-salmeterol (ADVAIR) 250-50 MCG/DOSE AEROSOL POWDER, BREATH ACTIVATED Inhale 1 Puff by mouth every 12 hours. 3 Inhaler 3   • SYNTHROID 100 MCG Tab TAKE 1 TABLET DAILY 90 Tab 3   • atorvastatin (LIPITOR) 40 MG Tab Take 1 Tab by mouth every day. 90 Tab 3   • fenofibrate (TRICOR) 145 MG Tab TAKE 1 TABLET DAILY 90 Tab 3   • aripiprazole (ABILIFY) 5 MG tablet TAKE 1 TABLET AT BEDTIME 90 Tab 3   • albuterol 108 (90 BASE) MCG/ACT Aero Soln inhalation aerosol Inhale 2 Puffs by mouth every 6 hours as needed for Shortness of Breath. 3 Inhaler 3   • albuterol (PROVENTIL) 2.5mg/3ml Nebu Soln solution for nebulization 3 mL by Nebulization route every four hours as needed for Shortness of Breath. 360 mL 5   • tramadol (ULTRAM) 50 MG Tab TAKE 1 TO 2 TABLETS BY MOUTH 2 TIMES DAILY AS NEEDED FOR SEVERE PAIN 90 Tab 5   • latanoprost (XALATAN) 0.005 % Solution Place 1 Drop in both eyes every evening. 1 Bottle 3   • zoledronic Acid (RECLAST) 5 MG/100ML Solution IVPB premix 100 mL by Intravenous route Once Every 12 Months. 100 mL 2   • EPIPEN 2-MATILDE 0.3 MG/0.3ML Solution Auto-injector solution for injection      • MethylPREDNISolone (MEDROL DOSEPAK) 4 MG Tablet Therapy Pack Take as directed. 21 Tab 1     No current facility-administered  "medications for this visit.        Allergies   Allergen Reactions   • Tylenol Anaphylaxis     Lip, throat swelling   • Tape        Blood pressure 126/54, pulse 75, resp. rate 16, height 1.626 m (5' 4\"), weight 79.3 kg (174 lb 12.8 oz), SpO2 95 %.    PE:   Appearance: Well developed, well nourished, no acute distress  Eyes: PERRL, EOM intact, sclera white, conjunctiva moist  Ears: no lesions or deformities  Hearing: grossly intact  Nose: no lesions or deformities  Oropharynx: thrush noted to roof of mouth  Neck: supple, trachea midline, no masses   Respiratory effort: no intercostal retractions or use of accessory muscles  Lung auscultation: no rales, rhonchi or wheezes, slightly prolonged expiratory phase  Heart auscultation: no murmur rub or gallop  Extremities: no cyanosis or edema  Abdomen: soft ,non tender, no masses  Gait and Station: normal  Digits and nails: no clubbing, cyanosis, petechiae or nodes.  Cranial nerves: grossly intact  Skin: no rashes, lesions or ulcers noted  Orientation: Oriented to time, person and place  Mood and affect: mood and affect appropriate, normal interaction with examiner  Judgement: Intact          Assessment:  1. Chronic obstructive pulmonary disease, unspecified COPD type (CMS-HCC)  AMB PULMONARY FUNCTION TEST/LAB    budesonide-formoterol (SYMBICORT) 160-4.5 MCG/ACT Aerosol    MethylPREDNISolone (MEDROL DOSEPAK) 4 MG Tablet Therapy Pack   2. Seasonal allergic rhinitis, unspecified allergic rhinitis trigger     3. Gastroesophageal reflux disease, esophagitis presence not specified     4. Need for influenza vaccination  INFLUENZA VACCINE, HIGH DOSE (65+ ONLY)   5. Thrush  nystatin (MYCOSTATIN) 470502 UNIT/ML Suspension         Plan:    1) Mild Asthma exacerbation. Medrol dose pack now.  2) Nystatin S/S sent to local pharmacy. She states she is good about rinsing and gargling with Listering after use of her Advair. However, she states she has had recurrent thrush. Change Advair dry " powder diskus to Symbicort /4.5 mcg 2 puffs INH bid with spacer, rinse mouth after use. RX sent to mail away pharmacy. She will continue Spiriva and Ventolin inhalers.  3) She is interested in swimming for exercise. I do recommend routine exercise. However, she is encouraged to use her Ventolin inhaler 2 puffs 15-30 minutes prior to exercise.  4) She is up to date on Prevnar 13 and Pneumovax 23 vaccines. Updated Influenza vaccine today in the office.   5) Continue Flonase and Mucinex as needed.  6) Continue PPI.  7) Follow up in 6 months with updated PFT's, sooner if acute symptoms persist or worsen.

## 2017-11-20 RX ORDER — ARIPIPRAZOLE 5 MG/1
TABLET ORAL
Qty: 90 TAB | Refills: 3 | Status: SHIPPED | OUTPATIENT
Start: 2017-11-20 | End: 2018-11-27 | Stop reason: SDUPTHER

## 2017-11-20 NOTE — TELEPHONE ENCOUNTER
Was the patient seen in the last year in this department? Yes     Does patient have an active prescription for medications requested? No     Received Request Via: Pharmacy     Last seen: 09/06/2017

## 2017-12-15 ENCOUNTER — PATIENT MESSAGE (OUTPATIENT)
Dept: PULMONOLOGY | Facility: HOSPICE | Age: 67
End: 2017-12-15

## 2017-12-15 DIAGNOSIS — J40 BRONCHITIS: ICD-10-CM

## 2017-12-15 RX ORDER — FENOFIBRATE 145 MG/1
TABLET, COATED ORAL
Qty: 90 TAB | Refills: 3 | Status: SHIPPED | OUTPATIENT
Start: 2017-12-15 | End: 2018-11-01

## 2017-12-15 RX ORDER — METHYLPREDNISOLONE 4 MG/1
TABLET ORAL
Qty: 21 TAB | Refills: 0 | Status: SHIPPED | OUTPATIENT
Start: 2017-12-15 | End: 2018-06-12

## 2017-12-15 RX ORDER — AZITHROMYCIN 250 MG/1
TABLET, FILM COATED ORAL
Qty: 6 TAB | Refills: 0 | Status: SHIPPED | OUTPATIENT
Start: 2017-12-15 | End: 2018-05-31

## 2017-12-15 NOTE — TELEPHONE ENCOUNTER
From: Summer Hatch  To: TYRELL Chairez  Sent: 12/15/2017 9:09 AM PST  Subject: Non-Urgent Medical Question    Hi Corin: I'm taking care of a friend that has a bad cold and now coming down with symptoms myself. My chest is tight and I'm coughing. Since the weekend is approaching could you give me an antibotic to hogan off the brochisis? Please call me ar  or email me at [email or web address] Thanks

## 2017-12-16 NOTE — TELEPHONE ENCOUNTER
The rx's were sent to the mail order pharmacy in error so I called them (Medrol Dosepak and Zpack) into the local pharmacy on file, spoke w/ Duarte, then cancelled the request with the mail order pharmacy and spoke w/ Tiffanie Coombs.  Per the pt's request.

## 2018-02-05 RX ORDER — LEVOTHYROXINE SODIUM 100 MCG
TABLET ORAL
Qty: 90 TAB | Refills: 3 | Status: SHIPPED | OUTPATIENT
Start: 2018-02-05 | End: 2019-02-25

## 2018-02-15 ENCOUNTER — OFFICE VISIT (OUTPATIENT)
Dept: MEDICAL GROUP | Facility: MEDICAL CENTER | Age: 68
End: 2018-02-15
Payer: MEDICARE

## 2018-02-15 VITALS
WEIGHT: 171 LBS | SYSTOLIC BLOOD PRESSURE: 122 MMHG | HEIGHT: 64 IN | OXYGEN SATURATION: 98 % | DIASTOLIC BLOOD PRESSURE: 72 MMHG | TEMPERATURE: 97.6 F | RESPIRATION RATE: 16 BRPM | HEART RATE: 86 BPM | BODY MASS INDEX: 29.19 KG/M2

## 2018-02-15 DIAGNOSIS — Z12.31 ENCOUNTER FOR SCREENING MAMMOGRAM FOR BREAST CANCER: ICD-10-CM

## 2018-02-15 DIAGNOSIS — Z23 NEED FOR VACCINATION: ICD-10-CM

## 2018-02-15 DIAGNOSIS — E03.9 ACQUIRED HYPOTHYROIDISM: ICD-10-CM

## 2018-02-15 DIAGNOSIS — N18.30 CHRONIC KIDNEY DISEASE, STAGE III (MODERATE) (HCC): ICD-10-CM

## 2018-02-15 DIAGNOSIS — K21.9 GASTROESOPHAGEAL REFLUX DISEASE, ESOPHAGITIS PRESENCE NOT SPECIFIED: ICD-10-CM

## 2018-02-15 DIAGNOSIS — Z11.59 NEED FOR HEPATITIS C SCREENING TEST: ICD-10-CM

## 2018-02-15 DIAGNOSIS — K52.9 CHRONIC DIARRHEA: ICD-10-CM

## 2018-02-15 PROCEDURE — 90715 TDAP VACCINE 7 YRS/> IM: CPT | Performed by: FAMILY MEDICINE

## 2018-02-15 PROCEDURE — 99214 OFFICE O/P EST MOD 30 MIN: CPT | Mod: 25 | Performed by: FAMILY MEDICINE

## 2018-02-15 PROCEDURE — 90471 IMMUNIZATION ADMIN: CPT | Performed by: FAMILY MEDICINE

## 2018-02-15 ASSESSMENT — PATIENT HEALTH QUESTIONNAIRE - PHQ9: CLINICAL INTERPRETATION OF PHQ2 SCORE: 0

## 2018-02-15 NOTE — PATIENT INSTRUCTIONS
Bowel Movement Culture   A bowel movement culture checks your poop (bowel movement) for illnesses. The doctor will give you all the supplies you need. For each sample you collect, you may get:   · A small container. You may be given different colored containers. Follow the instructions for each container you are given.  · Gloves that can be thrown away.  · A plastic bag.  Ask the doctor if you have questions.  BEFORE COLLECTING THE SAMPLE  · Cover the toilet bowl with plastic wrap or a plastic bag. Tape the wrap or bag to the bowl of the toilet (not the seat). Do not stretch the plastic tight across the bowl. Leave room for the poop to fall. You can also use a plastic carton to catch your poop. Wash and dry any cartons. Keep these in the bathroom.  · Try not to mix pee (urine) with your poop. Pee before pooping.  · Do not mix toilet paper or water with your sample.  · Women on their period should wait 3 days after their period has ended before collecting a sample.  COLLECTING THE SAMPLE  · Wash your hands.  · Put on gloves.  · Do not pour out the fluid that is in the tube. This fluid will preserve your sample.  · Use the small shovel built into the top of the tube to put small scoops of your poop into the tube. Choose the parts of your poop which are bloody, slimy, or watery. Fill the tube up to the red line on the tube label. If your poop is hard, choose samples from each end and the middle.  · Stir the poop in the tube with the small shovel. Close the top on the tube tightly. Shake the tube until the poop is well mixed.  · On the label, write:  · The date and time you collected the sample.  · Your initials.  · Put the tube in the plastic bag that was given to you.  · If your doctor wants you to collect more than 1 sample, collect them at different times you poop.  Follow these instructions each time you collect a sample.  AFTER COLLECTING THE SAMPLE  · Store your sample(s) using the directions on each test  container you were given. Some samples should be kept at room temperature. Others need to be refrigerated right away.  · You may need to return the sample within 24 hours. Check the directions or call the clinic if you are not sure.  · Flush the rest of your poop down the toilet (but not the plastic wrap). Throw away the gloves. Throw away the carton, if you used one.  · Wash your hands.  Document Released: 01/20/2012 Document Revised: 03/11/2013 Document Reviewed: 01/20/2012  ExitCare® Patient Information ©2014 [employer].  Diarrhea  Diarrhea is frequent loose and watery bowel movements. It can cause you to feel weak and dehydrated. Dehydration can cause you to become tired and thirsty, have a dry mouth, and have decreased urination that often is dark yellow. Diarrhea is a sign of another problem, most often an infection that will not last long. In most cases, diarrhea typically lasts 2-3 days. However, it can last longer if it is a sign of something more serious. It is important to treat your diarrhea as directed by your caregiver to lessen or prevent future episodes of diarrhea.  CAUSES   Some common causes include:  · Gastrointestinal infections caused by viruses, bacteria, or parasites.  · Food poisoning or food allergies.  · Certain medicines, such as antibiotics, chemotherapy, and laxatives.  · Artificial sweeteners and fructose.  · Digestive disorders.  HOME CARE INSTRUCTIONS  · Ensure adequate fluid intake (hydration): Have 1 cup (8 oz) of fluid for each diarrhea episode. Avoid fluids that contain simple sugars or sports drinks, fruit juices, whole milk products, and sodas. Your urine should be clear or pale yellow if you are drinking enough fluids. Hydrate with an oral rehydration solution that you can purchase at pharmacies, retail stores, and online. You can prepare an oral rehydration solution at home by mixing the following ingredients together:  ¨  - tsp table salt.  ¨ ¾ tsp baking soda.  ¨  tsp  salt substitute containing potassium chloride.  ¨ 1  tablespoons sugar.  ¨ 1 L (34 oz) of water.  · Certain foods and beverages may increase the speed at which food moves through the gastrointestinal (GI) tract. These foods and beverages should be avoided and include:  ¨ Caffeinated and alcoholic beverages.  ¨ High-fiber foods, such as raw fruits and vegetables, nuts, seeds, and whole grain breads and cereals.  ¨ Foods and beverages sweetened with sugar alcohols, such as xylitol, sorbitol, and mannitol.  · Some foods may be well tolerated and may help thicken stool including:  ¨ Starchy foods, such as rice, toast, pasta, low-sugar cereal, oatmeal, grits, baked potatoes, crackers, and bagels.  ¨ Bananas.  ¨ Applesauce.  · Add probiotic-rich foods to help increase healthy bacteria in the GI tract, such as yogurt and fermented milk products.  · Wash your hands well after each diarrhea episode.  · Only take over-the-counter or prescription medicines as directed by your caregiver.  · Take a warm bath to relieve any burning or pain from frequent diarrhea episodes.  SEEK IMMEDIATE MEDICAL CARE IF:   · You are unable to keep fluids down.  · You have persistent vomiting.  · You have blood in your stool, or your stools are black and tarry.  · You do not urinate in 6-8 hours, or there is only a small amount of very dark urine.  · You have abdominal pain that increases or localizes.  · You have weakness, dizziness, confusion, or light-headedness.  · You have a severe headache.  · Your diarrhea gets worse or does not get better.  · You have a fever or persistent symptoms for more than 2-3 days.  · You have a fever and your symptoms suddenly get worse.  MAKE SURE YOU:   · Understand these instructions.  · Will watch your condition.  · Will get help right away if you are not doing well or get worse.     This information is not intended to replace advice given to you by your health care provider. Make sure you discuss any questions  you have with your health care provider.     Document Released: 12/08/2003 Document Revised: 01/08/2016 Document Reviewed: 08/25/2013  Elsevier Interactive Patient Education ©2016 [employer]

## 2018-02-15 NOTE — ASSESSMENT & PLAN NOTE
Patient complains of 3-4 loose stools daily for the last 3-4 months.  Usually in the morning after her coffee and then after breakfast.  Usually is constipated - takes tramadol.  No nausea or vomiting.  No black or bloody stools.  No fever or chills.

## 2018-02-20 ENCOUNTER — HOSPITAL ENCOUNTER (OUTPATIENT)
Dept: LAB | Facility: MEDICAL CENTER | Age: 68
End: 2018-02-20
Attending: FAMILY MEDICINE
Payer: MEDICARE

## 2018-02-20 DIAGNOSIS — Z11.59 NEED FOR HEPATITIS C SCREENING TEST: ICD-10-CM

## 2018-02-20 DIAGNOSIS — K52.9 CHRONIC DIARRHEA: ICD-10-CM

## 2018-02-20 DIAGNOSIS — E03.9 ACQUIRED HYPOTHYROIDISM: ICD-10-CM

## 2018-02-20 DIAGNOSIS — N18.30 CHRONIC KIDNEY DISEASE, STAGE III (MODERATE) (HCC): ICD-10-CM

## 2018-02-20 LAB
ALBUMIN SERPL BCP-MCNC: 4.4 G/DL (ref 3.2–4.9)
ALBUMIN/GLOB SERPL: 1.4 G/DL
ALP SERPL-CCNC: 45 U/L (ref 30–99)
ALT SERPL-CCNC: 24 U/L (ref 2–50)
ANION GAP SERPL CALC-SCNC: 10 MMOL/L (ref 0–11.9)
AST SERPL-CCNC: 25 U/L (ref 12–45)
BASOPHILS # BLD AUTO: 1.7 % (ref 0–1.8)
BASOPHILS # BLD: 0.09 K/UL (ref 0–0.12)
BILIRUB SERPL-MCNC: 0.5 MG/DL (ref 0.1–1.5)
BUN SERPL-MCNC: 27 MG/DL (ref 8–22)
CALCIUM SERPL-MCNC: 9.3 MG/DL (ref 8.5–10.5)
CHLORIDE SERPL-SCNC: 96 MMOL/L (ref 96–112)
CO2 SERPL-SCNC: 24 MMOL/L (ref 20–33)
CREAT SERPL-MCNC: 1.53 MG/DL (ref 0.5–1.4)
EOSINOPHIL # BLD AUTO: 0.28 K/UL (ref 0–0.51)
EOSINOPHIL NFR BLD: 5.4 % (ref 0–6.9)
ERYTHROCYTE [DISTWIDTH] IN BLOOD BY AUTOMATED COUNT: 44.1 FL (ref 35.9–50)
GLOBULIN SER CALC-MCNC: 3.1 G/DL (ref 1.9–3.5)
GLUCOSE SERPL-MCNC: 77 MG/DL (ref 65–99)
HCT VFR BLD AUTO: 42.7 % (ref 37–47)
HCV AB SER QL: NEGATIVE
HGB BLD-MCNC: 13.3 G/DL (ref 12–16)
IMM GRANULOCYTES # BLD AUTO: 0.03 K/UL (ref 0–0.11)
IMM GRANULOCYTES NFR BLD AUTO: 0.6 % (ref 0–0.9)
LYMPHOCYTES # BLD AUTO: 1.72 K/UL (ref 1–4.8)
LYMPHOCYTES NFR BLD: 33 % (ref 22–41)
MCH RBC QN AUTO: 25.6 PG (ref 27–33)
MCHC RBC AUTO-ENTMCNC: 31.1 G/DL (ref 33.6–35)
MCV RBC AUTO: 82.3 FL (ref 81.4–97.8)
MONOCYTES # BLD AUTO: 0.76 K/UL (ref 0–0.85)
MONOCYTES NFR BLD AUTO: 14.6 % (ref 0–13.4)
NEUTROPHILS # BLD AUTO: 2.33 K/UL (ref 2–7.15)
NEUTROPHILS NFR BLD: 44.7 % (ref 44–72)
NRBC # BLD AUTO: 0 K/UL
NRBC BLD-RTO: 0 /100 WBC
PLATELET # BLD AUTO: 268 K/UL (ref 164–446)
PMV BLD AUTO: 10.4 FL (ref 9–12.9)
POTASSIUM SERPL-SCNC: 4.4 MMOL/L (ref 3.6–5.5)
PROT SERPL-MCNC: 7.5 G/DL (ref 6–8.2)
RBC # BLD AUTO: 5.19 M/UL (ref 4.2–5.4)
SODIUM SERPL-SCNC: 130 MMOL/L (ref 135–145)
T4 FREE SERPL-MCNC: 1.17 NG/DL (ref 0.53–1.43)
TSH SERPL DL<=0.005 MIU/L-ACNC: 1.46 UIU/ML (ref 0.38–5.33)
WBC # BLD AUTO: 5.2 K/UL (ref 4.8–10.8)

## 2018-02-20 PROCEDURE — 85025 COMPLETE CBC W/AUTO DIFF WBC: CPT

## 2018-02-20 PROCEDURE — 86803 HEPATITIS C AB TEST: CPT

## 2018-02-20 PROCEDURE — 84443 ASSAY THYROID STIM HORMONE: CPT

## 2018-02-20 PROCEDURE — 80053 COMPREHEN METABOLIC PANEL: CPT

## 2018-02-20 PROCEDURE — 84439 ASSAY OF FREE THYROXINE: CPT

## 2018-02-20 PROCEDURE — 36415 COLL VENOUS BLD VENIPUNCTURE: CPT

## 2018-02-21 NOTE — ASSESSMENT & PLAN NOTE
Stable. Currently taking Synthroid 100 mcg daily as prescribed.  Denies palpitations, skin changes, temperature intolerance, or changes in bowel habits

## 2018-02-21 NOTE — PROGRESS NOTES
Subjective:     Chief Complaint   Patient presents with   • Diarrhea     4 months after eating mostly with breakfast and lunch        Summer Hatch is a 67 y.o. female here today for evaluation and management of:    Chronic diarrhea  Patient complains of 3-4 loose stools daily for the last 3-4 months.  Usually in the morning after her coffee and then after breakfast.  Usually is constipated - takes tramadol.  No nausea or vomiting.  No black or bloody stools.  No fever or chills.    Chronic kidney disease, stage III (moderate)  CKD: chronic condition which is stable. Currently avoids high dose NSAIDs. Denies nausea/vomiting, headache, loss of energy/unusual somnolence, mouth sores, skin discoloration or itching, muscle cramps. .       Acquired hypothyroidism  Stable. Currently taking Synthroid 100 mcg daily as prescribed.  Denies palpitations, skin changes, temperature intolerance, or changes in bowel habits        GERD (gastroesophageal reflux disease)  Stable.Takes med daily as directed. Denies side effects.  Denies early satiety, unintentional weight loss, choking, persistent burning pain in chest or upper abdomen.         Allergies   Allergen Reactions   • Tylenol Anaphylaxis     Lip, throat swelling   • Tape        Current medicines (including changes today)  Current Outpatient Prescriptions   Medication Sig Dispense Refill   • SYNTHROID 100 MCG Tab TAKE 1 TABLET DAILY 90 Tab 3   • fenofibrate (TRICOR) 145 MG Tab TAKE 1 TABLET DAILY 90 Tab 3   • aripiprazole (ABILIFY) 5 MG tablet TAKE 1 TABLET AT BEDTIME 90 Tab 3   • atorvastatin (LIPITOR) 40 MG Tab TAKE 1 TABLET DAILY 90 Tab 3   • nystatin (MYCOSTATIN) 357543 UNIT/ML Suspension Swish and swallow 1 teaspoonful (= 5mL) 4 times daily. 250 mL 1   • tiotropium (SPIRIVA HANDIHALER) 18 MCG Cap Inhale 1 Cap by mouth every day. 90 Cap 3   • allopurinol (ZYLOPRIM) 100 MG Tab TAKE 1 TABLET DAILY 90 Tab 3   • duloxetine (CYMBALTA) 60 MG Cap DR Particles  delayed-release capsule TAKE 1 CAPSULE EVERY       MORNING 90 Cap 3   • lisinopril (PRINIVIL) 10 MG Tab TAKE 1 TABLET DAILY 90 Tab 3   • triamterene-hctz (MAXZIDE-25/DYAZIDE) 37.5-25 MG Tab TAKE 1 TABLET EVERY MORNING 90 Tab 3   • omeprazole (PRILOSEC) 20 MG delayed-release capsule Take 1 Cap by mouth every morning. Indications: GERD-Related Laryngitis 90 Cap 3   • tramadol (ULTRAM) 50 MG Tab TAKE 1 TO 2 TABLETS BY MOUTH 2 TIMES DAILY AS NEEDED FOR SEVERE PAIN 90 Tab 5   • MethylPREDNISolone (MEDROL DOSEPAK) 4 MG Tablet Therapy Pack Take as directed. 21 Tab 0   • azithromycin (ZITHROMAX) 250 MG Tab Take 2 tablets on day 1, then take 1 tablet a day for 4 days. 6 Tab 0   • budesonide-formoterol (SYMBICORT) 160-4.5 MCG/ACT Aerosol Inhale 2 Puffs by mouth 2 Times a Day. Use spacer. Rinse mouth after each use. 3 Inhaler 3   • niacin (NIASPAN) 1000 MG CR tablet Take 1 Tab by mouth every day. 90 Tab 3   • zoledronic Acid (RECLAST) 5 MG/100ML Solution IVPB premix 100 mL by Intravenous route Once Every 12 Months. 100 mL 2   • EPIPEN 2-MATILDE 0.3 MG/0.3ML Solution Auto-injector solution for injection      • albuterol 108 (90 BASE) MCG/ACT Aero Soln inhalation aerosol Inhale 2 Puffs by mouth every 6 hours as needed for Shortness of Breath. 3 Inhaler 3   • albuterol (PROVENTIL) 2.5mg/3ml Nebu Soln solution for nebulization 3 mL by Nebulization route every four hours as needed for Shortness of Breath. 360 mL 5   • latanoprost (XALATAN) 0.005 % Solution Place 1 Drop in both eyes every evening. 1 Bottle 3     No current facility-administered medications for this visit.        She  has a past medical history of Arthritis; Asthma; Breath shortness; Bronchitis; Carpal tunnel syndrome; COPD; Dental disorder; Emphysema of lung (CMS-HCC); GERD (gastroesophageal reflux disease) (10/22/2013); Glaucoma; History of tobacco abuse (10/22/2013); Hypertension; Hypothyroidism (10/22/2013); Indigestion; Osteoporosis; Pain; Pneumonia; Renal disorder;  and Seizure disorder (CMS-HCC).    Patient Active Problem List    Diagnosis Date Noted   • Status post right hip replacement 10/31/2015     Priority: High   • Chronic kidney disease, stage III (moderate) 09/26/2014     Priority: Medium   • HTN (hypertension) 10/10/2013     Priority: Medium   • MDD (major depressive disorder), recurrent episode, moderate (CMS-HCC) 06/18/2015     Priority: Low   • Prediabetes 05/15/2015     Priority: Low   • Acquired hypothyroidism 10/22/2013     Priority: Low   • GERD (gastroesophageal reflux disease) 10/22/2013     Priority: Low   • Hyperlipidemia 10/10/2013     Priority: Low   • Chronic diarrhea 02/15/2018   • Bilateral hand pain 08/07/2017   • Acute non-recurrent frontal sinusitis 04/18/2017   • Osteoporosis 04/18/2017   • Abnormal EKG 03/09/2017   • Anaphylactic reaction 03/09/2017   • Compression fracture of thoracic spine, non-traumatic (CMS-HCC) 03/01/2017   • Chronic obstructive pulmonary disease (CMS-HCC) 02/01/2017   • Compression fracture of T12 vertebra with delayed healing 01/17/2017   • History of recent steroid use 01/17/2017   • Atherosclerosis of both carotid arteries 10/19/2016   • Bilateral hip pain 03/21/2016   • Cyst in hand 06/26/2015   • AK (actinic keratosis) 06/26/2015   • Atopic dermatitis 01/08/2015   • Vitamin D insufficiency 09/26/2014   • Seasonal allergies 06/13/2014   • Chronic gout 03/13/2014   • Neuropathy (CMS-HCC) 01/10/2014   • Right arm pain 12/13/2013   • Chronic hip pain 10/22/2013   • Screening for breast cancer 10/22/2013   • History of tobacco abuse 10/22/2013   • Seizure disorder (CMS-HCC) 10/10/2013   • CTS (carpal tunnel syndrome) 10/10/2013   • History of septic arthritis 10/09/2013       ROS   No fever or chills.  No nausea or vomiting.  No chest pain or palpitations.  No cough or SOB.  No pain with urination or hematuria.  No black or bloody stools.       Objective:     Blood pressure 122/72, pulse 86, temperature 36.4 °C (97.6 °F),  "resp. rate 16, height 1.626 m (5' 4\"), weight 77.6 kg (171 lb), SpO2 98 %. Body mass index is 29.35 kg/m².   Physical Exam:  Well developed, well nourished.  Alert, oriented in no acute distress.  Eye contact is good, speech goal directed, affect calm  Eyes: conjunctiva non-injected, sclera non-icteric.  Mouth: Oral mucous membranes pink and moist with no lesions.  Neck Supple.  No adenopathy or masses in the neck or supraclavicular regions. No thyromegaly  Lungs: clear to auscultation bilaterally with good excursion. No wheezes or rhonchi  CV: regular rate and rhythm. No murmur  Abdomen: soft, diffuse tenderness, no masses or organomegaly.  No rebound or guarding            Assessment and Plan:   The following treatment plan was discussed    1. Chronic diarrhea  Lab and stool cultures to assess. Await results. Consider GI referral  - CBC WITH DIFFERENTIAL; Future  - COMP METABOLIC PANEL; Future  - TSH; Future  - FREE THYROXINE; Future  - CDIFF BY PCR; Future  - CRYPTO/GIARDIA RAPID ASSAY; Future  - CULTURE STOOL; Future  - H.PYLORI STOOL ANTIGEN; Future    2. Chronic kidney disease, stage III (moderate)  Avoid NSAIDs. Await labs  - CBC WITH DIFFERENTIAL; Future  - COMP METABOLIC PANEL; Future    3. Acquired hypothyroidism  Check thyroid level and adjust Synthroid as needed  - TSH; Future  - FREE THYROXINE; Future    4. Encounter for screening mammogram for breast cancer    - MA-SCREEN MAMMO W/CAD-BILAT; Future    5. Need for vaccination    - TDAP VACCINE =>6YO IM    6. Gastroesophageal reflux disease, esophagitis presence not specified  Check for H. pylori. Consider referral to GI  - H.PYLORI STOOL ANTIGEN; Future    7. Need for hepatitis C screening test    - HEP C VIRUS ANTIBODY; Future    Any change or worsening of signs or symptoms, patient encouraged to follow-up or report to the emergency room for further evaluation. Patient understands and agrees.    Followup: Return for AWV.           "

## 2018-02-22 ENCOUNTER — HOSPITAL ENCOUNTER (OUTPATIENT)
Facility: MEDICAL CENTER | Age: 68
End: 2018-02-22
Attending: FAMILY MEDICINE
Payer: MEDICARE

## 2018-02-22 DIAGNOSIS — K52.9 CHRONIC DIARRHEA: ICD-10-CM

## 2018-02-22 PROCEDURE — 87899 AGENT NOS ASSAY W/OPTIC: CPT

## 2018-02-22 PROCEDURE — 87493 C DIFF AMPLIFIED PROBE: CPT

## 2018-02-22 PROCEDURE — 87046 STOOL CULTR AEROBIC BACT EA: CPT

## 2018-02-22 PROCEDURE — 87045 FECES CULTURE AEROBIC BACT: CPT

## 2018-02-22 PROCEDURE — 87338 HPYLORI STOOL AG IA: CPT

## 2018-02-23 DIAGNOSIS — K21.9 GASTROESOPHAGEAL REFLUX DISEASE, ESOPHAGITIS PRESENCE NOT SPECIFIED: ICD-10-CM

## 2018-02-23 DIAGNOSIS — K52.9 CHRONIC DIARRHEA: ICD-10-CM

## 2018-02-23 LAB
C DIFF DNA SPEC QL NAA+PROBE: NEGATIVE
C DIFF TOX GENS STL QL NAA+PROBE: NEGATIVE
E COLI SXT1+2 STL IA: NORMAL
H PYLORI AG STL QL IA: NOT DETECTED
SIGNIFICANT IND 70042: NORMAL
SITE SITE: NORMAL
SOURCE SOURCE: NORMAL

## 2018-02-25 LAB
BACTERIA STL CULT: NORMAL
E COLI SXT1+2 STL IA: NORMAL
SIGNIFICANT IND 70042: NORMAL
SITE SITE: NORMAL
SOURCE SOURCE: NORMAL

## 2018-02-27 ENCOUNTER — PATIENT MESSAGE (OUTPATIENT)
Dept: PULMONOLOGY | Facility: HOSPICE | Age: 68
End: 2018-02-27

## 2018-02-27 DIAGNOSIS — J45.901 EXACERBATION OF ASTHMA, UNSPECIFIED ASTHMA SEVERITY, UNSPECIFIED WHETHER PERSISTENT: ICD-10-CM

## 2018-02-27 RX ORDER — PREDNISONE 10 MG/1
TABLET ORAL
Qty: 18 TAB | Refills: 1 | Status: SHIPPED | OUTPATIENT
Start: 2018-02-27 | End: 2018-10-10

## 2018-02-27 NOTE — TELEPHONE ENCOUNTER
From: Summer Hatch  To: TYRELL Chairez  Sent: 2/27/2018 5:48 AM PST  Subject: Prescription Question    Yesterday a prescription for predisone was supposed to be called into [facility]. They haven't received it. Did it get sent to the mailorder [facility] instead? If so please cancel and send to the local [facility] Thanks

## 2018-03-02 ENCOUNTER — PATIENT MESSAGE (OUTPATIENT)
Dept: MEDICAL GROUP | Facility: MEDICAL CENTER | Age: 68
End: 2018-03-02

## 2018-03-02 DIAGNOSIS — K52.9 CHRONIC DIARRHEA: ICD-10-CM

## 2018-03-02 NOTE — TELEPHONE ENCOUNTER
"From: Summer Hatch  To: Darlin Shaikh M.D.  Sent: 3/2/2018 4:24 AM PST  Subject: Non-Urgent Medical Question    Hi Dr. Shaikh\" So glad my tests were negative. Please send in a referral to a gastronolist for me. Thanks  "

## 2018-03-29 ENCOUNTER — PATIENT MESSAGE (OUTPATIENT)
Dept: PULMONOLOGY | Facility: HOSPICE | Age: 68
End: 2018-03-29

## 2018-03-29 NOTE — TELEPHONE ENCOUNTER
From: Summer Hatch  To: Ashly Shaikh M.D.  Sent: 3/29/2018 8:36 AM PDT  Subject: Non-Urgent Medical Question    Hi : Hope you're feeling well.  I'm really struggling with my breathing when I do any kind of exertion. Walking from the restaurant to the car yesterday I had to stop 3 times to try to catch my breath. I've been taking my oxegen reading when I'm struggling for breath and its going into the low 80's. My daughter (who is a RN & nagging daughter) says I need to see a cardiologist. I don't think I need more prededesone. I have test scheduled with you on May 17th? Is there any way we can move the appointment earlier? Thanks

## 2018-03-30 ENCOUNTER — NON-PROVIDER VISIT (OUTPATIENT)
Dept: PULMONOLOGY | Facility: HOSPICE | Age: 68
End: 2018-03-30
Payer: MEDICARE

## 2018-03-30 ENCOUNTER — OFFICE VISIT (OUTPATIENT)
Dept: PULMONOLOGY | Facility: HOSPICE | Age: 68
End: 2018-03-30
Payer: MEDICARE

## 2018-03-30 VITALS
TEMPERATURE: 98.2 F | WEIGHT: 172 LBS | HEART RATE: 70 BPM | HEIGHT: 64 IN | SYSTOLIC BLOOD PRESSURE: 138 MMHG | BODY MASS INDEX: 29.37 KG/M2 | DIASTOLIC BLOOD PRESSURE: 82 MMHG | RESPIRATION RATE: 16 BRPM | OXYGEN SATURATION: 97 %

## 2018-03-30 VITALS — WEIGHT: 172 LBS | BODY MASS INDEX: 29.52 KG/M2

## 2018-03-30 DIAGNOSIS — J44.9 CHRONIC OBSTRUCTIVE PULMONARY DISEASE, UNSPECIFIED COPD TYPE (HCC): ICD-10-CM

## 2018-03-30 DIAGNOSIS — J45.30 MILD PERSISTENT ASTHMA WITHOUT COMPLICATION: ICD-10-CM

## 2018-03-30 DIAGNOSIS — S22.080G COMPRESSION FRACTURE OF T12 VERTEBRA WITH DELAYED HEALING: ICD-10-CM

## 2018-03-30 DIAGNOSIS — R06.09 DYSPNEA ON EXERTION: ICD-10-CM

## 2018-03-30 PROCEDURE — 94729 DIFFUSING CAPACITY: CPT | Performed by: INTERNAL MEDICINE

## 2018-03-30 PROCEDURE — 94726 PLETHYSMOGRAPHY LUNG VOLUMES: CPT | Performed by: INTERNAL MEDICINE

## 2018-03-30 PROCEDURE — 94060 EVALUATION OF WHEEZING: CPT | Performed by: INTERNAL MEDICINE

## 2018-03-30 PROCEDURE — 99214 OFFICE O/P EST MOD 30 MIN: CPT | Mod: 25 | Performed by: INTERNAL MEDICINE

## 2018-03-30 ASSESSMENT — PULMONARY FUNCTION TESTS
FVC_PERCENT_PREDICTED: 77
FEV1/FVC: 53
FEV1/FVC_PERCENT_PREDICTED: 68
FEV1: 1.4
FEV1/FVC_PERCENT_PREDICTED: 77
FEV1/FVC_PERCENT_CHANGE: 10
FEV1/FVC: 53
FVC_PERCENT_PREDICTED: 70
FEV1/FVC: 58.09
FEV1_PERCENT_CHANGE: 9
FEV1_PERCENT_CHANGE: 21
FEV1/FVC_PREDICTED: 77
FEV1_LLN: 1.97
FEV1/FVC_PERCENT_PREDICTED: 76
FEV1/FVC: 58
FVC: 2.2
FVC_PREDICTED: 3.1
FEV1: 1.16
FEV1/FVC_PERCENT_LLN: 64
FVC_LLN: 2.59
FEV1_PERCENT_PREDICTED: 59
FEV1/FVC_PERCENT_PREDICTED: 69
FEV1/FVC_PERCENT_PREDICTED: 75
FEV1_PERCENT_PREDICTED: 48
FEV1/FVC_PERCENT_CHANGE: 233
FEV1_PREDICTED: 2.36
FVC: 2.41

## 2018-03-30 NOTE — PROCEDURES
Technician: KERRI Garcia    Technician Comment:  Good patient effort & cooperation.  The results of this test meet the ATS/ERS standards for acceptability & reproducibility.  Test was performed on the [employer] Plethysmograph-Elite DX system.  Predicted values were [facility]-3 for spirometry, [facility] for DLCO, ITS for Lung Volumes.  The DLCO was uncorrected for Hgb.  A bronchodilator of Ventolin HFA -2puffs via spacer administered.  DLCO performed during dilation period.    Interpretation:  Function testing completed on March 20, 2018 shows reduction of mid flows at 21% predicted, definite bronchodilator response. FEV1 is low at 1.16 L, 48% predicted. Expiratory reserve volume is low consistent with elevated body mass index, at 27%. Mild-to-moderate hyperinflation is noted with residual volume 148% predicted. DLCO is low at 77% predicted. This pattern is reviewed and compared to one year prior, flows are reduced compared to previous.

## 2018-03-30 NOTE — PATIENT INSTRUCTIONS
This lady comes in for evaluation of ongoing shortness of breath and dyspnea on exertion. She had a severe bout of infection in January, 2 courses of steroids following that which did help. However maintenance prednisone or steroid therapy is not an option and she also has osteoporosis and significant stress fractures, 7 in her feet to date.    She continues on Symbicort, Spiriva, Singulair and then albuterol and nebulizer. Lung function testing completed today shows a marked bronchodilator response and I showed her that.    She is also limited by deconditioning and elevated body mass index, although 29.5, she carries a significant portion of her extra weight in her upper abdomen. I explained to her that this compresses her lungs, ERV is only 27%. With hyperinflation and significant reduction in the mid flows at 21% predicted, this explains her dyspnea on exertion.    Encouraging is the fact that I was able to ambulate her fairly briskly and she maintained her saturations between 94 and 90%. I've encouraged her to do some gentle exercise, portion control for the weight, maintaining inhalers, and avoid steroids if possible given the stress fractures. We will see her back in 6 months, sooner if needed

## 2018-03-30 NOTE — PROGRESS NOTES
Summer Hatch is a 67 y.o. female here for dyspnea on exertion with significant asthma. Patient was referred by her primary care doctor.    History of Present Illness:      This lady comes in for evaluation of ongoing shortness of breath and dyspnea on exertion. She had a severe bout of infection in January, 2 courses of steroids following that which did help. However maintenance prednisone or steroid therapy is not an option and she also has osteoporosis and significant stress fractures, 7 in her feet to date.    She continues on Symbicort, Spiriva, Singulair and then albuterol and nebulizer. Lung function testing completed today shows a marked bronchodilator response and I showed her that.    She is also limited by deconditioning and elevated body mass index, although 29.5, she carries a significant portion of her extra weight in her upper abdomen. I explained to her that this compresses her lungs, ERV is only 27%. With hyperinflation and significant reduction in the mid flows at 21% predicted, this explains her dyspnea on exertion.    Encouraging is the fact that I was able to ambulate her fairly briskly and she maintained her saturations between 94 and 90%. I've encouraged her to do some gentle exercise, portion control for the weight, maintaining inhalers, and avoid steroids if possible given the stress fractures. We will see her back in 6 months, sooner if needed    Constitutional ROS: No unexpected change in weight, No unexplained fevers  Eyes: No change in vision or blurring or double vision  Mouth/Throat ROS: No sore throat, No recent change in voice or hoarseness  Pulmonary ROS: See present history for pertinent positives  Cardiovascular ROS: No chest pain to suggest acute coronary syndrome  Gastrointestinal ROS: No abdominal pain to suggest peptic disease  Musculoskeletal/Extremities ROS: no acute artritis or unusual swelling  Hematologic/Lymphatic ROS: No easy bleeding or unusual lymph node  swelling  Neurologic ROS: No new or unusual weakness  Psychiatric ROS: No hallucinations  Allergic/Immunologic: No  urticaria or allergic rash      Current Outpatient Prescriptions   Medication Sig Dispense Refill   • SYNTHROID 100 MCG Tab TAKE 1 TABLET DAILY 90 Tab 3   • fenofibrate (TRICOR) 145 MG Tab TAKE 1 TABLET DAILY 90 Tab 3   • aripiprazole (ABILIFY) 5 MG tablet TAKE 1 TABLET AT BEDTIME 90 Tab 3   • atorvastatin (LIPITOR) 40 MG Tab TAKE 1 TABLET DAILY 90 Tab 3   • budesonide-formoterol (SYMBICORT) 160-4.5 MCG/ACT Aerosol Inhale 2 Puffs by mouth 2 Times a Day. Use spacer. Rinse mouth after each use. 3 Inhaler 3   • tiotropium (SPIRIVA HANDIHALER) 18 MCG Cap Inhale 1 Cap by mouth every day. 90 Cap 3   • allopurinol (ZYLOPRIM) 100 MG Tab TAKE 1 TABLET DAILY 90 Tab 3   • duloxetine (CYMBALTA) 60 MG Cap DR Particles delayed-release capsule TAKE 1 CAPSULE EVERY       MORNING 90 Cap 3   • lisinopril (PRINIVIL) 10 MG Tab TAKE 1 TABLET DAILY 90 Tab 3   • triamterene-hctz (MAXZIDE-25/DYAZIDE) 37.5-25 MG Tab TAKE 1 TABLET EVERY MORNING 90 Tab 3   • omeprazole (PRILOSEC) 20 MG delayed-release capsule Take 1 Cap by mouth every morning. Indications: GERD-Related Laryngitis 90 Cap 3   • zoledronic Acid (RECLAST) 5 MG/100ML Solution IVPB premix 100 mL by Intravenous route Once Every 12 Months. 100 mL 2   • EPIPEN 2-MATILDE 0.3 MG/0.3ML Solution Auto-injector solution for injection      • albuterol 108 (90 BASE) MCG/ACT Aero Soln inhalation aerosol Inhale 2 Puffs by mouth every 6 hours as needed for Shortness of Breath. 3 Inhaler 3   • albuterol (PROVENTIL) 2.5mg/3ml Nebu Soln solution for nebulization 3 mL by Nebulization route every four hours as needed for Shortness of Breath. 360 mL 5   • tramadol (ULTRAM) 50 MG Tab TAKE 1 TO 2 TABLETS BY MOUTH 2 TIMES DAILY AS NEEDED FOR SEVERE PAIN 90 Tab 5   • latanoprost (XALATAN) 0.005 % Solution Place 1 Drop in both eyes every evening. 1 Bottle 3   • predniSONE (DELTASONE) 10 MG Tab  Take 30mg x 3 days, then take 20mg x 3 days, then take 10mg x 3 days, with food, then discontinue. 18 Tab 1   • MethylPREDNISolone (MEDROL DOSEPAK) 4 MG Tablet Therapy Pack Take as directed. 21 Tab 0   • azithromycin (ZITHROMAX) 250 MG Tab Take 2 tablets on day 1, then take 1 tablet a day for 4 days. 6 Tab 0   • nystatin (MYCOSTATIN) 787579 UNIT/ML Suspension Swish and swallow 1 teaspoonful (= 5mL) 4 times daily. 250 mL 1   • niacin (NIASPAN) 1000 MG CR tablet Take 1 Tab by mouth every day. 90 Tab 3     No current facility-administered medications for this visit.        Social History   Substance Use Topics   • Smoking status: Former Smoker     Packs/day: 1.00     Years: 30.00     Types: Cigarettes     Quit date: 1/1/1998   • Smokeless tobacco: Never Used   • Alcohol use No        Past Medical History:   Diagnosis Date   • Arthritis    • Asthma     Uses inhaler PRN.   • Breath shortness     With exertion.   • Bronchitis    • Carpal tunnel syndrome    • COPD    • Dental disorder    • Emphysema of lung (CMS-HCC)    • GERD (gastroesophageal reflux disease) 10/22/2013   • Glaucoma     Bilateral.   • History of tobacco abuse 10/22/2013    Quit 1998. Smoked one pack per day for 30 years.   • Hypertension    • Hypothyroidism 10/22/2013   • Indigestion    • Osteoporosis    • Pain     Bilateral hips.   • Pneumonia    • Renal disorder    • Seizure disorder (CMS-HCC)     Last seizure 30 years ago.  No medication.       Past Surgical History:   Procedure Laterality Date   • HIP REVISION TOTAL Right 11/5/2015    Procedure: Evacuation hematoma, revision total hip;  Surgeon: Nils Starr M.D.;  Location: [facility];  Service:    • HIP REVISION TOTAL  10/10/2013    Performed by Nils Starr M.D. at [facility]   • INCISION AND DRAINAGE ORTHOPEDIC  10/10/2013    Performed by Nils Starr M.D. at [facility]   • APPENDECTOMY     • CARPAL TUNNEL RELEASE     • NERVE ULNAR REPAIR OR  "EXPLORE     • OTHER ORTHOPEDIC SURGERY     • TONSILLECTOMY         Allergies: Tylenol and Tape    Family History   Problem Relation Age of Onset   • Psychiatry Mother 47     suicide   • Alcohol/Drug Mother    • Cancer Father 72     Lung   • Alcohol/Drug Brother      Recovering       Physical Examination    Vitals:    03/30/18 1021   Height: 1.626 m (5' 4\")   Weight: 78 kg (172 lb)   Weight % change since last entry.: 0 %   BP: 138/82   Pulse: 70   BMI (Calculated): 29.52   Resp: 16   Temp: 36.8 °C (98.2 °F)       General Appearance: alert, no distress  Skin: Skin color, texture, turgor normal. No rashes or lesions.  Eyes: negative  Oropharynx: Lips, mucosa, and tongue normal. Teeth and gums normal. Oropharynx moist and without lesion  Lungs: positive findings: Expiratory wheezes with forced exhalation, no rales or accessory muscle use  Heart: negative. RRR without murmur, gallop, or rubs.  No ectopy.  Abdomen: Abdomen soft, non-tender. . No masses,  No organomegaly  Extremities:  No deformities, edema, or skin discoloration  Joints: No acute arthritis  Peripheral Pulses:perfused  Neurologic: intact grossly  Abdominal protuberance without tenderness    I (soft palate, uvula, fauces, tonsillar pillars visible)    Imaging: None presently    PFTS: None presently      Assessment and Plan  1. Chronic obstructive pulmonary disease, unspecified COPD type (CMS-HCC)  - AMB MULTIPLE OXIMETRY; Future    2. Compression fracture of T12 vertebra with delayed healing  Avoid steroids if possible but sometimes needs pulse dosing for severe bronchospasm    3. Mild persistent asthma without complication  Ongoing maintenance therapy    4. Dyspnea on exertion; I walked the patient briskly and saturations were 94-90%, heart rate was within range, allows for some gentle exercises at home  Multifactorial as described  - AMB MULTIPLE OXIMETRY; Future  This lady comes in for evaluation of ongoing shortness of breath and dyspnea on exertion. " She had a severe bout of infection in January, 2 courses of steroids following that which did help. However maintenance prednisone or steroid therapy is not an option and she also has osteoporosis and significant stress fractures, 7 in her feet to date.    She continues on Symbicort, Spiriva, Singulair and then albuterol and nebulizer. Lung function testing completed today shows a marked bronchodilator response and I showed her that.    She is also limited by deconditioning and elevated body mass index, although 29.5, she carries a significant portion of her extra weight in her upper abdomen. I explained to her that this compresses her lungs, ERV is only 27%. With hyperinflation and significant reduction in the mid flows at 21% predicted, this explains her dyspnea on exertion.    Encouraging is the fact that I was able to ambulate her fairly briskly and she maintained her saturations between 94 and 90%. I've encouraged her to do some gentle exercise, portion control for the weight, maintaining inhalers, and avoid steroids if possible given the stress fractures. We will see her back in 6 months, sooner if needed    Followup Return in about 6 months (around 9/30/2018) for With MD or APRN.

## 2018-04-16 DIAGNOSIS — K21.9 GASTROESOPHAGEAL REFLUX DISEASE, ESOPHAGITIS PRESENCE NOT SPECIFIED: ICD-10-CM

## 2018-04-16 RX ORDER — OMEPRAZOLE 20 MG/1
CAPSULE, DELAYED RELEASE ORAL
Qty: 90 CAP | Refills: 0 | Status: SHIPPED | OUTPATIENT
Start: 2018-04-16 | End: 2018-07-18

## 2018-05-14 RX ORDER — DULOXETIN HYDROCHLORIDE 60 MG/1
CAPSULE, DELAYED RELEASE ORAL
Qty: 90 CAP | Refills: 3 | Status: SHIPPED | OUTPATIENT
Start: 2018-05-14 | End: 2019-04-10 | Stop reason: SDUPTHER

## 2018-05-24 ENCOUNTER — TELEPHONE (OUTPATIENT)
Dept: MEDICAL GROUP | Facility: MEDICAL CENTER | Age: 68
End: 2018-05-24

## 2018-05-24 DIAGNOSIS — M81.0 AGE-RELATED OSTEOPOROSIS WITHOUT CURRENT PATHOLOGICAL FRACTURE: ICD-10-CM

## 2018-05-24 RX ORDER — ZOLEDRONIC ACID 5 MG/100ML
5 INJECTION, SOLUTION INTRAVENOUS
Qty: 100 ML | Refills: 1 | Status: SHIPPED
Start: 2018-05-24 | End: 2018-06-12

## 2018-05-24 NOTE — TELEPHONE ENCOUNTER
1. Caller Name: Infusion Center                                         Call Back Number: [phone]        Patient approves a detailed voicemail message: N\A    2. SPECIFIC Action To Be Taken: Reclast Order renewal request for Infusion [facility]    3. Diagnosis/Clinical Reason for Request: Chronic kidney disease, stage III (moderate)    4. Specialty & Provider [facility]    5. Is appointment scheduled for requested order/referral: no    Patient informed they will receive a return phone call from the office ONLY if there are any questions before processing their request. Advised to call back if they haven't received a call from the referral department in 5 days.

## 2018-05-25 ENCOUNTER — OUTPATIENT INFUSION SERVICES (OUTPATIENT)
Dept: ONCOLOGY | Facility: MEDICAL CENTER | Age: 68
End: 2018-05-25
Attending: FAMILY MEDICINE
Payer: MEDICARE

## 2018-05-25 VITALS
DIASTOLIC BLOOD PRESSURE: 52 MMHG | WEIGHT: 167.99 LBS | OXYGEN SATURATION: 92 % | SYSTOLIC BLOOD PRESSURE: 119 MMHG | HEART RATE: 78 BPM | HEIGHT: 64 IN | TEMPERATURE: 98 F | RESPIRATION RATE: 18 BRPM | BODY MASS INDEX: 28.68 KG/M2

## 2018-05-25 LAB
CA-I BLD ISE-SCNC: 1.07 MMOL/L (ref 1.1–1.3)
CREAT BLD-MCNC: 1.8 MG/DL (ref 0.5–1.4)

## 2018-05-25 PROCEDURE — 82565 ASSAY OF CREATININE: CPT

## 2018-05-25 PROCEDURE — 36415 COLL VENOUS BLD VENIPUNCTURE: CPT

## 2018-05-25 PROCEDURE — 82330 ASSAY OF CALCIUM: CPT

## 2018-05-25 ASSESSMENT — PAIN SCALES - GENERAL: PAINLEVEL_OUTOF10: 0

## 2018-05-25 NOTE — PROGRESS NOTES
Pt is here for her Reclast. iCa/Cr drawn - pt is not able to take calcium supplementation r/t her compromised kidney function and CrCl is borderline for treatment today per Zahra Cotto. MD Jl Palomino paged - Reclast no to be given. Pt is to continue on Prolia. Order received. Pt will wait for the authorization. An appointment scheduled. Pt verbalizes understanding.

## 2018-05-31 ENCOUNTER — OUTPATIENT INFUSION SERVICES (OUTPATIENT)
Dept: ONCOLOGY | Facility: MEDICAL CENTER | Age: 68
End: 2018-05-31
Attending: FAMILY MEDICINE
Payer: MEDICARE

## 2018-05-31 VITALS
SYSTOLIC BLOOD PRESSURE: 124 MMHG | RESPIRATION RATE: 18 BRPM | HEIGHT: 64 IN | TEMPERATURE: 97.5 F | HEART RATE: 76 BPM | OXYGEN SATURATION: 100 % | WEIGHT: 167.99 LBS | BODY MASS INDEX: 28.68 KG/M2 | DIASTOLIC BLOOD PRESSURE: 83 MMHG

## 2018-05-31 PROCEDURE — 700111 HCHG RX REV CODE 636 W/ 250 OVERRIDE (IP): Mod: JG | Performed by: FAMILY MEDICINE

## 2018-05-31 PROCEDURE — 96401 CHEMO ANTI-NEOPL SQ/IM: CPT

## 2018-05-31 RX ADMIN — DENOSUMAB 60 MG: 60 INJECTION SUBCUTANEOUS at 17:04

## 2018-05-31 ASSESSMENT — PAIN SCALES - GENERAL: PAINLEVEL_OUTOF10: 0

## 2018-05-31 NOTE — LETTER
Infusion Services   [address] [place] [address]  [place] [address]  Phone: [phone]  Fax: [phone]              Dear Dr. Palomino,    Your patient, Summer Hatch (: 1950), was scheduled at [facility].  Summer's encounter diagnosis is:  No diagnosis found.  She arrived for her appointment, and  the scheduled treatment was given. These medications were administered to the patient: We administered denosumab..  Summer tolerated treatment.. In addition, the following labs were drawn  No results found for this or any previous visit (from the past 24 hour(s)).         Her next appointment is did not reschedule; because she will call for the next appointment.    For more information, you may review the nurse's progress notes in chart review under the notes section.       Sincerely,  [facility]

## 2018-05-31 NOTE — PROGRESS NOTES
Pharmacy Prolia Note  Labs 5/25/18  Ionized calcium = 1.07, patient does not take due to kidney fx  Cr = 1.8 est CrCl ~ 37 ml/min  Last Reclast dose = 5/24/18, changing to Prolia for renal fx.  Lea Cotto, PharmD

## 2018-06-01 NOTE — PROGRESS NOTES
Pt to infusion services ambulatory per self.  Pt here for scheduled Prolia injection.  Plan of care reviewed.  Pt verbalizes understanding.  Pt seen in infusion services 05/25/18, and had labs done.  Pt was to receive Reclast at that time, however MD orders to change to Prolia for renal function.  Medication and possible side effect reviewed; medication handout provided.  Pt tells me she had a molar removed >4 weeks ago and denies any s/sx of infection.  Pt tells me she dose not take calcium due to kidney function, however does take Vitamin D daily.  Discussed with pharmacy; okay for Prolia today.  Prolia administered to L back of arm via subcutaneous injection.  Pt tolerated well.  Band aid applied to injection site.  Pt observed for 15 minutes post injection for first dose.  No adverse effects observed or expressed.  Pt released to self care in no apparent distress after completion of treatment, ambulatory.  Pt to f/u with MD.

## 2018-06-08 ENCOUNTER — HOSPITAL ENCOUNTER (OUTPATIENT)
Dept: LAB | Facility: MEDICAL CENTER | Age: 68
End: 2018-06-08
Attending: INTERNAL MEDICINE
Payer: MEDICARE

## 2018-06-08 LAB
25(OH)D3 SERPL-MCNC: 22 NG/ML (ref 30–100)
ALBUMIN SERPL BCP-MCNC: 4.3 G/DL (ref 3.2–4.9)
ALBUMIN/GLOB SERPL: 1.5 G/DL
ALP SERPL-CCNC: 45 U/L (ref 30–99)
ALT SERPL-CCNC: 27 U/L (ref 2–50)
ANION GAP SERPL CALC-SCNC: 8 MMOL/L (ref 0–11.9)
APPEARANCE UR: CLEAR
AST SERPL-CCNC: 28 U/L (ref 12–45)
BACTERIA #/AREA URNS HPF: ABNORMAL /HPF
BASOPHILS # BLD AUTO: 1.4 % (ref 0–1.8)
BASOPHILS # BLD: 0.1 K/UL (ref 0–0.12)
BILIRUB SERPL-MCNC: 0.4 MG/DL (ref 0.1–1.5)
BILIRUB UR QL STRIP.AUTO: NEGATIVE
BUN SERPL-MCNC: 23 MG/DL (ref 8–22)
CALCIUM SERPL-MCNC: 9.8 MG/DL (ref 8.5–10.5)
CHLORIDE SERPL-SCNC: 102 MMOL/L (ref 96–112)
CO2 SERPL-SCNC: 23 MMOL/L (ref 20–33)
COLOR UR: YELLOW
CREAT SERPL-MCNC: 1.5 MG/DL (ref 0.5–1.4)
CREAT UR-MCNC: 89.3 MG/DL
EOSINOPHIL # BLD AUTO: 0.11 K/UL (ref 0–0.51)
EOSINOPHIL NFR BLD: 1.5 % (ref 0–6.9)
EPI CELLS #/AREA URNS HPF: ABNORMAL /HPF
ERYTHROCYTE [DISTWIDTH] IN BLOOD BY AUTOMATED COUNT: 43.1 FL (ref 35.9–50)
GLOBULIN SER CALC-MCNC: 2.9 G/DL (ref 1.9–3.5)
GLUCOSE SERPL-MCNC: 100 MG/DL (ref 65–99)
GLUCOSE UR STRIP.AUTO-MCNC: NEGATIVE MG/DL
HCT VFR BLD AUTO: 42 % (ref 37–47)
HGB BLD-MCNC: 13.1 G/DL (ref 12–16)
HYALINE CASTS #/AREA URNS LPF: ABNORMAL /LPF
IMM GRANULOCYTES # BLD AUTO: 0.02 K/UL (ref 0–0.11)
IMM GRANULOCYTES NFR BLD AUTO: 0.3 % (ref 0–0.9)
KETONES UR STRIP.AUTO-MCNC: NEGATIVE MG/DL
LEUKOCYTE ESTERASE UR QL STRIP.AUTO: ABNORMAL
LYMPHOCYTES # BLD AUTO: 2.08 K/UL (ref 1–4.8)
LYMPHOCYTES NFR BLD: 28.5 % (ref 22–41)
MAGNESIUM SERPL-MCNC: 1.7 MG/DL (ref 1.5–2.5)
MCH RBC QN AUTO: 25.4 PG (ref 27–33)
MCHC RBC AUTO-ENTMCNC: 31.2 G/DL (ref 33.6–35)
MCV RBC AUTO: 81.4 FL (ref 81.4–97.8)
MICRO URNS: ABNORMAL
MONOCYTES # BLD AUTO: 0.77 K/UL (ref 0–0.85)
MONOCYTES NFR BLD AUTO: 10.6 % (ref 0–13.4)
NEUTROPHILS # BLD AUTO: 4.21 K/UL (ref 2–7.15)
NEUTROPHILS NFR BLD: 57.7 % (ref 44–72)
NITRITE UR QL STRIP.AUTO: POSITIVE
NRBC # BLD AUTO: 0 K/UL
NRBC BLD-RTO: 0 /100 WBC
PH UR STRIP.AUTO: 6.5 [PH]
PHOSPHATE SERPL-MCNC: 3.8 MG/DL (ref 2.5–4.5)
PLATELET # BLD AUTO: 256 K/UL (ref 164–446)
PMV BLD AUTO: 10.3 FL (ref 9–12.9)
POTASSIUM SERPL-SCNC: 4.4 MMOL/L (ref 3.6–5.5)
PROT SERPL-MCNC: 7.2 G/DL (ref 6–8.2)
PROT UR QL STRIP: NEGATIVE MG/DL
PROT UR-MCNC: 6.7 MG/DL (ref 0–15)
PROT/CREAT UR: 75 MG/G (ref 10–107)
RBC # BLD AUTO: 5.16 M/UL (ref 4.2–5.4)
RBC # URNS HPF: ABNORMAL /HPF
RBC UR QL AUTO: NEGATIVE
SODIUM SERPL-SCNC: 133 MMOL/L (ref 135–145)
SP GR UR STRIP.AUTO: 1.01
UROBILINOGEN UR STRIP.AUTO-MCNC: 0.2 MG/DL
WBC # BLD AUTO: 7.3 K/UL (ref 4.8–10.8)
WBC #/AREA URNS HPF: ABNORMAL /HPF

## 2018-06-08 PROCEDURE — 83735 ASSAY OF MAGNESIUM: CPT

## 2018-06-08 PROCEDURE — 80053 COMPREHEN METABOLIC PANEL: CPT

## 2018-06-08 PROCEDURE — 84100 ASSAY OF PHOSPHORUS: CPT

## 2018-06-08 PROCEDURE — 82306 VITAMIN D 25 HYDROXY: CPT

## 2018-06-08 PROCEDURE — 82570 ASSAY OF URINE CREATININE: CPT

## 2018-06-08 PROCEDURE — 84156 ASSAY OF PROTEIN URINE: CPT

## 2018-06-08 PROCEDURE — 36415 COLL VENOUS BLD VENIPUNCTURE: CPT

## 2018-06-08 PROCEDURE — 85025 COMPLETE CBC W/AUTO DIFF WBC: CPT

## 2018-06-08 PROCEDURE — 81001 URINALYSIS AUTO W/SCOPE: CPT

## 2018-06-12 ENCOUNTER — OFFICE VISIT (OUTPATIENT)
Dept: MEDICAL GROUP | Facility: MEDICAL CENTER | Age: 68
End: 2018-06-12
Payer: MEDICARE

## 2018-06-12 VITALS
SYSTOLIC BLOOD PRESSURE: 114 MMHG | WEIGHT: 168 LBS | HEART RATE: 87 BPM | DIASTOLIC BLOOD PRESSURE: 72 MMHG | BODY MASS INDEX: 28.68 KG/M2 | OXYGEN SATURATION: 92 % | RESPIRATION RATE: 16 BRPM | TEMPERATURE: 97.2 F | HEIGHT: 64 IN

## 2018-06-12 DIAGNOSIS — J44.9 CHRONIC OBSTRUCTIVE PULMONARY DISEASE, UNSPECIFIED COPD TYPE (HCC): ICD-10-CM

## 2018-06-12 DIAGNOSIS — K21.9 GASTROESOPHAGEAL REFLUX DISEASE, ESOPHAGITIS PRESENCE NOT SPECIFIED: ICD-10-CM

## 2018-06-12 DIAGNOSIS — E55.9 VITAMIN D INSUFFICIENCY: ICD-10-CM

## 2018-06-12 DIAGNOSIS — M81.0 AGE-RELATED OSTEOPOROSIS WITHOUT CURRENT PATHOLOGICAL FRACTURE: ICD-10-CM

## 2018-06-12 DIAGNOSIS — M25.551 BILATERAL HIP PAIN: ICD-10-CM

## 2018-06-12 DIAGNOSIS — J45.30 MILD PERSISTENT ASTHMA WITHOUT COMPLICATION: ICD-10-CM

## 2018-06-12 DIAGNOSIS — N18.30 CHRONIC KIDNEY DISEASE, STAGE III (MODERATE) (HCC): ICD-10-CM

## 2018-06-12 DIAGNOSIS — I65.23 ATHEROSCLEROSIS OF BOTH CAROTID ARTERIES: ICD-10-CM

## 2018-06-12 DIAGNOSIS — Z87.39 HISTORY OF SEPTIC ARTHRITIS: ICD-10-CM

## 2018-06-12 DIAGNOSIS — R73.03 PREDIABETES: ICD-10-CM

## 2018-06-12 DIAGNOSIS — R06.09 DYSPNEA ON EXERTION: ICD-10-CM

## 2018-06-12 DIAGNOSIS — E79.0 ELEVATED URIC ACID IN BLOOD: ICD-10-CM

## 2018-06-12 DIAGNOSIS — Z96.641 STATUS POST RIGHT HIP REPLACEMENT: ICD-10-CM

## 2018-06-12 DIAGNOSIS — L20.84 INTRINSIC ATOPIC DERMATITIS: ICD-10-CM

## 2018-06-12 DIAGNOSIS — Z87.891 HISTORY OF TOBACCO ABUSE: ICD-10-CM

## 2018-06-12 DIAGNOSIS — M25.552 BILATERAL HIP PAIN: ICD-10-CM

## 2018-06-12 DIAGNOSIS — G56.03 BILATERAL CARPAL TUNNEL SYNDROME: ICD-10-CM

## 2018-06-12 DIAGNOSIS — N30.00 ACUTE CYSTITIS WITHOUT HEMATURIA: ICD-10-CM

## 2018-06-12 DIAGNOSIS — E03.9 ACQUIRED HYPOTHYROIDISM: ICD-10-CM

## 2018-06-12 DIAGNOSIS — J30.1 SEASONAL ALLERGIC RHINITIS DUE TO POLLEN: ICD-10-CM

## 2018-06-12 DIAGNOSIS — G40.909 SEIZURE DISORDER (HCC): ICD-10-CM

## 2018-06-12 DIAGNOSIS — E78.2 MIXED HYPERLIPIDEMIA: ICD-10-CM

## 2018-06-12 DIAGNOSIS — I10 ESSENTIAL HYPERTENSION: ICD-10-CM

## 2018-06-12 DIAGNOSIS — Z00.00 MEDICARE ANNUAL WELLNESS VISIT, INITIAL: ICD-10-CM

## 2018-06-12 DIAGNOSIS — F33.1 MDD (MAJOR DEPRESSIVE DISORDER), RECURRENT EPISODE, MODERATE (HCC): ICD-10-CM

## 2018-06-12 DIAGNOSIS — Z12.39 SCREENING FOR BREAST CANCER: ICD-10-CM

## 2018-06-12 DIAGNOSIS — M48.54XS NON-TRAUMATIC COMPRESSION FRACTURE OF TWELFTH THORACIC VERTEBRA, SEQUELA: ICD-10-CM

## 2018-06-12 DIAGNOSIS — G62.9 NEUROPATHY: ICD-10-CM

## 2018-06-12 PROBLEM — R94.31 ABNORMAL EKG: Status: RESOLVED | Noted: 2017-03-09 | Resolved: 2018-06-12

## 2018-06-12 PROBLEM — Z92.241 HISTORY OF RECENT STEROID USE: Status: RESOLVED | Noted: 2017-01-17 | Resolved: 2018-06-12

## 2018-06-12 PROBLEM — T78.2XXA ANAPHYLACTIC REACTION: Status: RESOLVED | Noted: 2017-03-09 | Resolved: 2018-06-12

## 2018-06-12 PROBLEM — K52.9 CHRONIC DIARRHEA: Status: RESOLVED | Noted: 2018-02-15 | Resolved: 2018-06-12

## 2018-06-12 PROBLEM — M79.641 BILATERAL HAND PAIN: Status: RESOLVED | Noted: 2017-08-07 | Resolved: 2018-06-12

## 2018-06-12 PROBLEM — J01.10 ACUTE NON-RECURRENT FRONTAL SINUSITIS: Status: RESOLVED | Noted: 2017-04-18 | Resolved: 2018-06-12

## 2018-06-12 PROBLEM — M79.642 BILATERAL HAND PAIN: Status: RESOLVED | Noted: 2017-08-07 | Resolved: 2018-06-12

## 2018-06-12 PROBLEM — S22.080G COMPRESSION FRACTURE OF T12 VERTEBRA WITH DELAYED HEALING: Status: RESOLVED | Noted: 2017-01-17 | Resolved: 2018-06-12

## 2018-06-12 PROCEDURE — G0438 PPPS, INITIAL VISIT: HCPCS | Performed by: FAMILY MEDICINE

## 2018-06-12 RX ORDER — FLUTICASONE PROPIONATE 50 MCG
2 SPRAY, SUSPENSION (ML) NASAL DAILY
Qty: 3 G | Refills: 3 | Status: SHIPPED | OUTPATIENT
Start: 2018-06-12 | End: 2019-06-17 | Stop reason: SDUPTHER

## 2018-06-12 RX ORDER — SULFAMETHOXAZOLE AND TRIMETHOPRIM 800; 160 MG/1; MG/1
1 TABLET ORAL 2 TIMES DAILY
Qty: 10 TAB | Refills: 0 | Status: SHIPPED | OUTPATIENT
Start: 2018-06-12 | End: 2018-06-17

## 2018-06-12 ASSESSMENT — ENCOUNTER SYMPTOMS: GENERAL WELL-BEING: GOOD

## 2018-06-12 ASSESSMENT — PATIENT HEALTH QUESTIONNAIRE - PHQ9: CLINICAL INTERPRETATION OF PHQ2 SCORE: 0

## 2018-06-12 ASSESSMENT — ACTIVITIES OF DAILY LIVING (ADL): BATHING_REQUIRES_ASSISTANCE: 0

## 2018-06-12 NOTE — PROGRESS NOTES
HPI:  Summer Hatch is a 68 y.o. here for Medicare Annual Wellness Visit     Patient Active Problem List    Diagnosis Date Noted   • Status post right hip replacement 10/31/2015     Priority: High   • Chronic kidney disease, stage III (moderate) 09/26/2014     Priority: Medium   • HTN (hypertension) 10/10/2013     Priority: Medium   • MDD (major depressive disorder), recurrent episode, moderate (HCC) 06/18/2015     Priority: Low   • Prediabetes 05/15/2015     Priority: Low   • Acquired hypothyroidism 10/22/2013     Priority: Low   • GERD (gastroesophageal reflux disease) 10/22/2013     Priority: Low   • Hyperlipidemia 10/10/2013     Priority: Low   • Mild persistent asthma without complication 03/30/2018   • Dyspnea on exertion 03/30/2018   • Osteoporosis 04/18/2017   • Compression fracture of thoracic spine, non-traumatic ([facility]) 03/01/2017   • Chronic obstructive pulmonary disease ([facility]) 02/01/2017   • Atherosclerosis of both carotid arteries 10/19/2016   • Bilateral hip pain 03/21/2016   • Atopic dermatitis 01/08/2015   • Vitamin D insufficiency 09/26/2014   • Seasonal allergies 06/13/2014   • Elevated uric acid in blood 03/13/2014   • Neuropathy ([facility]) 01/10/2014   • Screening for breast cancer 10/22/2013   • History of tobacco abuse 10/22/2013   • Seizure disorder ([facility]) 10/10/2013   • CTS (carpal tunnel syndrome) 10/10/2013   • History of septic arthritis 10/09/2013       Current Outpatient Prescriptions   Medication Sig Dispense Refill   • sulfamethoxazole-trimethoprim (BACTRIM DS) 800-160 MG tablet Take 1 Tab by mouth 2 times a day for 5 days. 10 Tab 0   • fluticasone (FLONASE) 50 MCG/ACT nasal spray Spray 2 Sprays in nose every day. 3 g 3   • denosumab (PROLIA) 60 MG/ML Solution Inject 1 mL as instructed every 6 months.     • DULoxetine (CYMBALTA) 60 MG Cap DR Particles delayed-release capsule TAKE 1 CAPSULE EVERY       MORNING 90 Cap 3   • omeprazole (PRILOSEC) 20 MG delayed-release capsule FOR  DIRECTIONS ON HOW TO   TAKE THIS MEDICINE, READ   THE ENCLOSED MEDICATION    INFORMATION FORM 90 Cap 0   • predniSONE (DELTASONE) 10 MG Tab Take 30mg x 3 days, then take 20mg x 3 days, then take 10mg x 3 days, with food, then discontinue. 18 Tab 1   • SYNTHROID 100 MCG Tab TAKE 1 TABLET DAILY 90 Tab 3   • fenofibrate (TRICOR) 145 MG Tab TAKE 1 TABLET DAILY 90 Tab 3   • aripiprazole (ABILIFY) 5 MG tablet TAKE 1 TABLET AT BEDTIME 90 Tab 3   • atorvastatin (LIPITOR) 40 MG Tab TAKE 1 TABLET DAILY 90 Tab 3   • budesonide-formoterol (SYMBICORT) 160-4.5 MCG/ACT Aerosol Inhale 2 Puffs by mouth 2 Times a Day. Use spacer. Rinse mouth after each use. 3 Inhaler 3   • nystatin (MYCOSTATIN) 069399 UNIT/ML Suspension Swish and swallow 1 teaspoonful (= 5mL) 4 times daily. 250 mL 1   • tiotropium (SPIRIVA HANDIHALER) 18 MCG Cap Inhale 1 Cap by mouth every day. 90 Cap 3   • allopurinol (ZYLOPRIM) 100 MG Tab TAKE 1 TABLET DAILY 90 Tab 3   • niacin (NIASPAN) 1000 MG CR tablet Take 1 Tab by mouth every day. 90 Tab 3   • lisinopril (PRINIVIL) 10 MG Tab TAKE 1 TABLET DAILY 90 Tab 3   • triamterene-hctz (MAXZIDE-25/DYAZIDE) 37.5-25 MG Tab TAKE 1 TABLET EVERY MORNING 90 Tab 3   • EPIPEN 2-MATILDE 0.3 MG/0.3ML Solution Auto-injector solution for injection      • albuterol 108 (90 BASE) MCG/ACT Aero Soln inhalation aerosol Inhale 2 Puffs by mouth every 6 hours as needed for Shortness of Breath. 3 Inhaler 3   • albuterol (PROVENTIL) 2.5mg/3ml Nebu Soln solution for nebulization 3 mL by Nebulization route every four hours as needed for Shortness of Breath. 360 mL 5   • tramadol (ULTRAM) 50 MG Tab TAKE 1 TO 2 TABLETS BY MOUTH 2 TIMES DAILY AS NEEDED FOR SEVERE PAIN 90 Tab 5   • latanoprost (XALATAN) 0.005 % Solution Place 1 Drop in both eyes every evening. 1 Bottle 3     No current facility-administered medications for this visit.             Current supplements as per medication list.       Allergies: Tylenol and Tape    Current social  contact/activities: will be moving in with boyfriend for the last 10 years.     She  reports that she quit smoking about 20 years ago. Her smoking use included Cigarettes. She has a 30.00 pack-year smoking history. She has never used smokeless tobacco. She reports that she does not drink alcohol or use drugs.  Counseling given: Not Answered      DPA/Advanced Directive:  Patient has Advanced Directive, but it is not on file. Instructed to bring in a copy to scan into their chart.    ROS:    Gait: Uses no assistive device  Ostomy: No  Other tubes: No  Amputations: No  Chronic oxygen use: No  Last eye exam: 1 year ago.  Wears hearing aids: No   : Reports urinary leakage during the last 6 months that has not interfered at all with their daily activities or sleep.      Depression Screening    Little interest or pleasure in doing things?  0 - not at all  Feeling down, depressed , or hopeless? 0 - not at all  Patient Health Questionnaire Score: 0     If depressive symptoms identified deferred to follow up visit unless specifically addressed in assessment and plan.    Interpretation of PHQ-9 Total Score   Score Severity   1-4 No Depression   5-9 Mild Depression   10-14 Moderate Depression   15-19 Moderately Severe Depression   20-27 Severe Depression    Screening for Cognitive Impairment    Three Minute Recall (leader, season, table) 3/3 Patient remembered all and did correct order  Lorenzo clock face with all 12 numbers and set the hands to show 10 past 11.  Yes    Cognitive concerns identified deferred for follow up unless specifically addressed in assessment and plan.    Fall Risk Assessment    Has the patient had two or more falls in the last year or any fall with injury in the last year?  No    Safety Assessment    Throw rugs on floor.  Yes  Handrails on all stairs.  Yes  Good lighting in all hallways.  Yes  Difficulty hearing.  No  Patient counseled about all safety risks that were identified.    Functional Assessment  ADLs    Are there any barriers preventing you from cooking for yourself or meeting nutritional needs?  No.    Are there any barriers preventing you from driving safely or obtaining transportation?  No.    Are there any barriers preventing you from using a telephone or calling for help?  No.    Are there any barriers preventing you from shopping?  No.    Are there any barriers preventing you from taking care of your own finances?  No.    Are there any barriers preventing you from managing your medications?  No.    Are there any barriers preventing you from showering, bathing or dressing yourself?  No.    Are you currently engaging in any exercise or physical activity?  Yes.  Pt states she swims and quilting  What is your perception of your health?  Good.      Health Maintenance Summary                Annual Wellness Visit Overdue 1950     MAMMOGRAM Overdue 4/13/2017      Done 4/13/2016 MA-SCREEN MAMMO W/CAD-BILAT     Patient has more history with this topic...    PAP SMEAR Next Due 6/26/2018      Done 6/26/2015 PATHOLOGY GYN SPECIMEN     Patient has more history with this topic...    IMM PNEUMOCOCCAL 65+ (ADULT) LOW/MEDIUM RISK SERIES Next Due 10/14/2018      Done 9/28/2015 Imm Admin: Pneumococcal Conjugate Vaccine (Prevnar/PCV-13)     Patient has more history with this topic...    PFT SCREENING-FEV1 AND FEV/FVC RATIO / SPIROMETRY SHOULD BE PERFORMED ANNUALLY Next Due 3/30/2019      Done 3/30/2018 AMB PULMONARY FUNCTION TEST/LAB     Patient has more history with this topic...    COLONOSCOPY Next Due 1/1/2020      Done 1/1/2010     BONE DENSITY Next Due 2/1/2022      Done 2/1/2017 DS-BONE DENSITY STUDY (DEXA)    IMM DTaP/Tdap/Td Vaccine Next Due 2/15/2028      Done 2/15/2018 Imm Admin: Tdap Vaccine          Patient Care Team:  Jl Palomino M.D. as PCP - General (Family Medicine)  Mya West M.D. as Consulting Physician (Nephrology)        Social History   Substance Use Topics   • Smoking status: Former  "Smoker     Packs/day: 1.00     Years: 30.00     Types: Cigarettes     Quit date: 1/1/1998   • Smokeless tobacco: Never Used   • Alcohol use No     Family History   Problem Relation Age of Onset   • Psychiatry Mother 47     suicide   • Alcohol/Drug Mother    • Cancer Father 72     Lung   • Alcohol/Drug Brother      Recovering     She  has a past medical history of Arthritis; Asthma; Breath shortness; Bronchitis; Carpal tunnel syndrome; COPD; Dental disorder; Emphysema of lung (HCC); GERD (gastroesophageal reflux disease) (10/22/2013); Glaucoma; History of tobacco abuse (10/22/2013); Hypertension; Hypothyroidism (10/22/2013); Indigestion; Osteoporosis; Pain; Pneumonia; Renal disorder; and Seizure disorder (HCC).   Past Surgical History:   Procedure Laterality Date   • HIP REVISION TOTAL Right 11/5/2015    Procedure: Evacuation hematoma, revision total hip;  Surgeon: Nils Starr M.D.;  Location: SURGERY [facility];  Service:    • HIP REVISION TOTAL  10/10/2013    Performed by Nils Starr M.D. at SURGERY [facility]   • INCISION AND DRAINAGE ORTHOPEDIC  10/10/2013    Performed by Nils Starr M.D. at [facility]   • APPENDECTOMY     • CARPAL TUNNEL RELEASE     • NERVE ULNAR REPAIR OR EXPLORE     • OTHER ORTHOPEDIC SURGERY     • TONSILLECTOMY         Exam:   Blood pressure 114/72, pulse 87, temperature 36.2 °C (97.2 °F), resp. rate 16, height 1.626 m (5' 4\"), weight 76.2 kg (168 lb), SpO2 92 %, not currently breastfeeding. Body mass index is 28.84 kg/m².    Constitutional: Alert, no distress.  Skin: Warm, dry, good turgor, no rashes in visible areas.  Eye: Equal, round and reactive, conjunctiva clear, lids normal.  Respiratory: Unlabored respiratory effort, lungs clear to auscultation, no wheezes, no ronchi.  Cardiovascular: Normal S1, S2, no murmur, no edema.  Psych: Alert and oriented x3, normal affect and mood.      Assessment and Plan. The following treatment and monitoring plan " is recommended:    1. Medicare annual wellness visit, initial  Advised healthy lifestyle.    2. MDD (major depressive disorder), recurrent episode, moderate (HCC)  Controlled.  Continue Cymbalta and Abilify.    3. Neuropathy (HCC)  Controlled.  Continue Cymbalta and Abilify.    4. Seizure disorder (HCC)  No recurrence since 2003.  Not requiring medication at this point.    5. Chronic obstructive pulmonary disease, unspecified COPD type (HCC)  Controlled.  Continue following with pulmonology.  Continue Spiriva and Symbicort.    6. Acute cystitis without hematuria  Presents on recent UA.  Bactrim ordered.  - sulfamethoxazole-trimethoprim (BACTRIM DS) 800-160 MG tablet; Take 1 Tab by mouth 2 times a day for 5 days.  Dispense: 10 Tab; Refill: 0    7. History of septic arthritis  No recurrence.  Continue to monitor.    8. Essential hypertension  Controlled.  Continue current medication.    9. Mixed hyperlipidemia  Controlled.  Continue current medication.    10. Bilateral carpal tunnel syndrome  Patient will have recurrent symptoms despite having surgery many years ago.  She will use hand compression.    11. Mild persistent asthma without complication  Stable.  Continue following with pulmonology.    12. Dyspnea on exertion  Stable.  Continue following with pulmonology.    13. Non-traumatic compression fracture of twelfth thoracic vertebra, sequela  Patient is being followed by pain management and takes 1-2 tramadol per day.    14. Age-related osteoporosis without current pathological fracture  Patient is being followed by pain management and takes 1-2 tramadol per day.    15. Bilateral hip pain  Patient is being followed by pain management and takes 1-2 tramadol per day.    16. Acquired hypothyroidism  Controlled.  Continue levothyroxine    17. Gastroesophageal reflux disease, esophagitis presence not specified  Controlled.  Continue omeprazole.    18. Screening for breast cancer  Patient plans to do a mammogram soon.   Order has been given.    19. History of tobacco abuse  No recurrence.    20. Chronic kidney disease, stage III (moderate)  Stable.  Continue following with nephrologist.    21. Seasonal allergic rhinitis due to pollen  Controlled.  Continue Flonase.    22. Elevated uric acid in blood  Controlled.  Continue allopurinol.    23. Vitamin D insufficiency  Advised vitamin D supplementation.    24. Intrinsic atopic dermatitis  Worse in the winter.  Continue topical creams as needed.    25. Prediabetes  Advised diet and exercise.    26. Atherosclerosis of both carotid arteries  Minimal plaque.  Continue to monitor.    27. Status post right hip replacement  Patient is being followed by pain management and takes 1-2 tramadol per day.        Services suggested: No services needed at this time  Health Care Screening: Age-appropriate preventive services recommended by USPTF and ACIP covered by Medicare were discussed today. Services ordered if indicated and agreed upon by the patient.  Referrals offered: Community-based lifestyle interventions to reduce health risks and promote self-management and wellness, fall prevention, nutrition, physical activity, tobacco-use cessation, weight loss, and mental health services as per orders if indicated.    Discussion today about general wellness and lifestyle habits:    · Prevent falls and reduce trip hazards; Cautioned about securing or removing rugs.  · Have a working fire alarm and carbon monoxide detector;   · Engage in regular physical activity and social activities     Follow-up: Return in about 1 year (around 6/12/2019) for Annual.

## 2018-07-10 RX ORDER — LISINOPRIL 10 MG/1
TABLET ORAL
Qty: 90 TAB | Refills: 3 | Status: SHIPPED | OUTPATIENT
Start: 2018-07-10 | End: 2019-07-16

## 2018-07-18 DIAGNOSIS — K21.9 GASTROESOPHAGEAL REFLUX DISEASE, ESOPHAGITIS PRESENCE NOT SPECIFIED: ICD-10-CM

## 2018-07-18 RX ORDER — OMEPRAZOLE 20 MG/1
CAPSULE, DELAYED RELEASE ORAL
Qty: 90 CAP | Refills: 3 | Status: SHIPPED | OUTPATIENT
Start: 2018-07-18 | End: 2018-10-02 | Stop reason: SDUPTHER

## 2018-07-31 DIAGNOSIS — Z12.31 VISIT FOR SCREENING MAMMOGRAM: ICD-10-CM

## 2018-08-06 DIAGNOSIS — J44.9 CHRONIC OBSTRUCTIVE PULMONARY DISEASE, UNSPECIFIED COPD TYPE (HCC): ICD-10-CM

## 2018-08-06 RX ORDER — BUDESONIDE AND FORMOTEROL FUMARATE DIHYDRATE 160; 4.5 UG/1; UG/1
2 AEROSOL RESPIRATORY (INHALATION) 2 TIMES DAILY
Qty: 3 INHALER | Refills: 3 | Status: ON HOLD | OUTPATIENT
Start: 2018-08-06 | End: 2018-10-29

## 2018-08-06 RX ORDER — ALBUTEROL SULFATE 90 UG/1
2 AEROSOL, METERED RESPIRATORY (INHALATION) EVERY 6 HOURS PRN
Qty: 3 INHALER | Refills: 3 | Status: SHIPPED | OUTPATIENT
Start: 2018-08-06 | End: 2019-08-15 | Stop reason: SDUPTHER

## 2018-08-06 NOTE — TELEPHONE ENCOUNTER
----- Message from Summer Hatch sent at 8/4/2018  7:48 AM PDT -----  Regarding: Prescription Question  Contact: [phone]  Mitul Coombs:  Will you please call in a refill to [employer] mail order fir Proair inhaler and symbicort.  The smoke is getting to me and I've been using my inhaler frequently. Thanks  [phone] Summer Hatch

## 2018-08-06 NOTE — TELEPHONE ENCOUNTER
Have we ever prescribed this med? Yes.  If yes, what date? 9/25/17    Last OV: 3/30/18    Next OV: 10/10/18    DX: Chronic obstructive pulmonary disease, unspecified COPD type (HCC) (J44.9)    Medications: Proair and Symbicort

## 2018-08-09 ENCOUNTER — PATIENT MESSAGE (OUTPATIENT)
Dept: HEALTH INFORMATION MANAGEMENT | Facility: OTHER | Age: 68
End: 2018-08-09

## 2018-08-09 RX ORDER — TRIAMTERENE AND HYDROCHLOROTHIAZIDE 37.5; 25 MG/1; MG/1
TABLET ORAL
Qty: 90 TAB | Refills: 3 | Status: SHIPPED | OUTPATIENT
Start: 2018-08-09 | End: 2019-07-16 | Stop reason: SDUPTHER

## 2018-09-03 DIAGNOSIS — J44.89 ASTHMA WITH COPD: ICD-10-CM

## 2018-09-04 RX ORDER — TIOTROPIUM BROMIDE 18 UG/1
CAPSULE ORAL; RESPIRATORY (INHALATION)
Qty: 90 CAP | Refills: 3 | Status: SHIPPED | OUTPATIENT
Start: 2018-09-04 | End: 2019-09-09 | Stop reason: SDUPTHER

## 2018-10-01 ENCOUNTER — HOSPITAL ENCOUNTER (OUTPATIENT)
Dept: RADIOLOGY | Facility: MEDICAL CENTER | Age: 68
End: 2018-10-01
Attending: PHYSICIAN ASSISTANT
Payer: MEDICARE

## 2018-10-01 DIAGNOSIS — R07.81 RIB PAIN: ICD-10-CM

## 2018-10-01 PROCEDURE — 71101 X-RAY EXAM UNILAT RIBS/CHEST: CPT | Mod: LT

## 2018-10-02 DIAGNOSIS — E78.00 PURE HYPERCHOLESTEROLEMIA: ICD-10-CM

## 2018-10-02 DIAGNOSIS — K21.9 GASTROESOPHAGEAL REFLUX DISEASE, ESOPHAGITIS PRESENCE NOT SPECIFIED: ICD-10-CM

## 2018-10-02 RX ORDER — OMEPRAZOLE 20 MG/1
CAPSULE, DELAYED RELEASE ORAL
Qty: 90 CAP | Refills: 3 | Status: SHIPPED | OUTPATIENT
Start: 2018-10-02 | End: 2019-10-22 | Stop reason: SDUPTHER

## 2018-10-02 RX ORDER — ATORVASTATIN CALCIUM 40 MG/1
TABLET, FILM COATED ORAL
Qty: 90 TAB | Refills: 3 | Status: SHIPPED | OUTPATIENT
Start: 2018-10-02 | End: 2020-01-09

## 2018-10-04 ENCOUNTER — OFFICE VISIT (OUTPATIENT)
Dept: MEDICAL GROUP | Facility: MEDICAL CENTER | Age: 68
End: 2018-10-04
Payer: MEDICARE

## 2018-10-04 VITALS
HEART RATE: 93 BPM | HEIGHT: 64 IN | OXYGEN SATURATION: 95 % | BODY MASS INDEX: 27.66 KG/M2 | WEIGHT: 162 LBS | TEMPERATURE: 97.8 F | DIASTOLIC BLOOD PRESSURE: 72 MMHG | SYSTOLIC BLOOD PRESSURE: 142 MMHG

## 2018-10-04 DIAGNOSIS — Z23 NEED FOR VACCINATION: ICD-10-CM

## 2018-10-04 DIAGNOSIS — J44.9 CHRONIC OBSTRUCTIVE PULMONARY DISEASE, UNSPECIFIED COPD TYPE (HCC): ICD-10-CM

## 2018-10-04 DIAGNOSIS — N18.30 CHRONIC KIDNEY DISEASE, STAGE III (MODERATE) (HCC): ICD-10-CM

## 2018-10-04 DIAGNOSIS — I10 ESSENTIAL HYPERTENSION: ICD-10-CM

## 2018-10-04 DIAGNOSIS — R46.89 CONCERN WITH APPEARANCE: ICD-10-CM

## 2018-10-04 PROCEDURE — 90662 IIV NO PRSV INCREASED AG IM: CPT | Performed by: FAMILY MEDICINE

## 2018-10-04 PROCEDURE — 99214 OFFICE O/P EST MOD 30 MIN: CPT | Mod: 25 | Performed by: FAMILY MEDICINE

## 2018-10-04 PROCEDURE — G0008 ADMIN INFLUENZA VIRUS VAC: HCPCS | Performed by: FAMILY MEDICINE

## 2018-10-04 NOTE — LETTER
October 4, 2018        Summer Hatch  [address] [place] [address]        Dear Dr. Campos:    Summer Hatch, date of birth 1950, may proceed with abdominoplasty.  She is overall low risk for cardiac complications.    If you have any questions or concerns, please don't hesitate to call.        Sincerely,        Jl Palomino M.D.    Electronically Signed

## 2018-10-04 NOTE — PROGRESS NOTES
REASON FOR VISIT: Pre-Op Consultation  Consultation Requested by: Dr. Campos  Procedure date and type: Abdominoplasty    History of condition for which surgery is planned: excessive skin from weight loss    Current chronic conditions: has History of septic arthritis; HTN (hypertension); Seizure disorder ([facility]); Hyperlipidemia; CTS (carpal tunnel syndrome); Acquired hypothyroidism; GERD (gastroesophageal reflux disease); Screening for breast cancer; History of tobacco abuse; Neuropathy ([facility]); Elevated uric acid in blood; Seasonal allergies; Vitamin D insufficiency; Chronic kidney disease, stage III (moderate) ([facility]); Atopic dermatitis; Prediabetes; MDD (major depressive disorder), recurrent episode, moderate ([facility]); Status post right hip replacement; Bilateral hip pain; Atherosclerosis of both carotid arteries; Chronic obstructive pulmonary disease ([facility]); Compression fracture of thoracic spine, non-traumatic ([facility]); Osteoporosis; Mild persistent asthma without complication; and Dyspnea on exertion on her problem list.     Past medical history:  has a past medical history of Arthritis; Asthma; Breath shortness; Bronchitis; Carpal tunnel syndrome; COPD; Dental disorder; Emphysema of lung ([facility]); GERD (gastroesophageal reflux disease) (10/22/2013); Glaucoma; History of tobacco abuse (10/22/2013); Hypertension; Hypothyroidism (10/22/2013); Indigestion; Osteoporosis; Pain; Pneumonia; Renal disorder; and Seizure disorder ([facility]).Negative for: CAD, SBE, CVA, TIA, DVT, PE, bleeding requiring transfusion, intubation.    Surgical and anesthetic history:  has a past surgical history that includes nerve ulnar repair or explore; other orthopedic surgery; hip revision total (10/10/2013); incision and drainage orthopedic (10/10/2013); carpal tunnel release; hip revision total (Right, 11/5/2015); tonsillectomy; and appendectomy. Prior surgery without complication, bleeding, reaction to anesthetic, prolonged recovery    Habits:   Social  History   Substance Use Topics   • Smoking status: Former Smoker     Packs/day: 1.00     Years: 30.00     Types: Cigarettes     Quit date: 1/1/1998   • Smokeless tobacco: Never Used   • Alcohol use No     Allergies: Tylenol and Tape No known allergy to Anesthetic, or Latex.     Current medicines:   Current Outpatient Prescriptions   Medication Sig Dispense Refill   • atorvastatin (LIPITOR) 40 MG Tab TAKE 1 TABLET DAILY 90 Tab 3   • omeprazole (PRILOSEC) 20 MG delayed-release capsule FOR DIRECTIONS ON HOW TO   TAKE THIS MEDICINE, READ   THE ENCLOSED MEDICATION    INFORMATION FORM 90 Cap 3   • SPIRIVA HANDIHALER 18 MCG Cap INHALE THE CONTENTS OF ONE CAPSULE DAILY 90 Cap 3   • triamterene-hctz (MAXZIDE-25/DYAZIDE) 37.5-25 MG Tab TAKE 1 TABLET EVERY MORNING 90 Tab 3   • budesonide-formoterol (SYMBICORT) 160-4.5 MCG/ACT Aerosol Inhale 2 Puffs by mouth 2 Times a Day. Use spacer. Rinse mouth after each use. 3 Inhaler 3   • albuterol 108 (90 Base) MCG/ACT Aero Soln inhalation aerosol Inhale 2 Puffs by mouth every 6 hours as needed for Shortness of Breath. 3 Inhaler 3   • lisinopril (PRINIVIL) 10 MG Tab TAKE 1 TABLET DAILY 90 Tab 3   • fluticasone (FLONASE) 50 MCG/ACT nasal spray Spray 2 Sprays in nose every day. 3 g 3   • denosumab (PROLIA) 60 MG/ML Solution Inject 1 mL as instructed every 6 months.     • DULoxetine (CYMBALTA) 60 MG Cap DR Particles delayed-release capsule TAKE 1 CAPSULE EVERY       MORNING 90 Cap 3   • predniSONE (DELTASONE) 10 MG Tab Take 30mg x 3 days, then take 20mg x 3 days, then take 10mg x 3 days, with food, then discontinue. 18 Tab 1   • SYNTHROID 100 MCG Tab TAKE 1 TABLET DAILY 90 Tab 3   • fenofibrate (TRICOR) 145 MG Tab TAKE 1 TABLET DAILY 90 Tab 3   • aripiprazole (ABILIFY) 5 MG tablet TAKE 1 TABLET AT BEDTIME 90 Tab 3   • nystatin (MYCOSTATIN) 352161 UNIT/ML Suspension Swish and swallow 1 teaspoonful (= 5mL) 4 times daily. 250 mL 1   • allopurinol (ZYLOPRIM) 100 MG Tab TAKE 1 TABLET DAILY 90  "Tab 3   • niacin (NIASPAN) 1000 MG CR tablet Take 1 Tab by mouth every day. 90 Tab 3   • EPIPEN 2-MATILDE 0.3 MG/0.3ML Solution Auto-injector solution for injection      • albuterol (PROVENTIL) 2.5mg/3ml Nebu Soln solution for nebulization 3 mL by Nebulization route every four hours as needed for Shortness of Breath. 360 mL 5   • tramadol (ULTRAM) 50 MG Tab TAKE 1 TO 2 TABLETS BY MOUTH 2 TIMES DAILY AS NEEDED FOR SEVERE PAIN 90 Tab 5   • latanoprost (XALATAN) 0.005 % Solution Place 1 Drop in both eyes every evening. 1 Bottle 3     No current facility-administered medications for this visit.      Anticoagulant: none.  Herbals: none.       ROS: negative for: CP, SOB, PIMENTEL, Orthopnea, wheezing, leg edema, polydipsia, polyuria, fevers, chills, sweats, cough, cold, congestion, abd pain, reflux, black or bloody stools, weight loss/gain.    Functional Status: ambulatory, does not use assistive device, lives with boyfriend    PHYSICAL EXAMINATION:  VITAL SIGNS: Blood pressure 142/72, pulse 93, temperature 36.6 °C (97.8 °F), temperature source Temporal, height 1.626 m (5' 4\"), weight 73.5 kg (162 lb), SpO2 95 %, not currently breastfeeding. Body mass index is 27.81 kg/m².  HEENT: EOMI, PERRL. Oropharynx pink, moist. Mallampati: I (soft palate, uvula, fauces, tonsillar pillars visible). Neck supple, no cervical lymphadenopathy.   LUNGS: CTAB good excursion.   HEART: RRR no murmur.  ABDOMEN: soft, nondistended, nontender normal BS. No HSM.  LOWER EXTREMITIES: warm and well perfused with no edema.    EKG Interpretation-HR is 67 unchanged from previous tracings  Normal nuclear medicine stress test in 2017    Labs: See attached.    IMPRESSION:  1. Diagnoses of Concern with appearance, Chronic obstructive pulmonary disease, unspecified COPD type (HCC), Chronic kidney disease, stage III (moderate) (HCC), Essential hypertension, and Need for vaccination were pertinent to this visit.  2. Planned surgery: abdominal plasty   3. High risk " medical conditions: negative for Cardiac, Bleeding, Poor healing, Thrombosis, Debility. COPD is controlled.    PLAN:  1. Chronic medical conditions: Stable and controlled. Continue current medicines.   2. Avoid drugs that potentiate bleeding as advised by surgeon  3. Discontinue all herbal supplements 2 weeks prior to surgery.  4. Need for SBE prophylaxis: Yes  5. This patient is considered Low risk for cardiopulmonary complications for this planned surgery.    Jesse Index for assessing perioperative cardiovascular risk [Circulation 1999;100:1047]:   (one point each for * high-risk surgery [ intrathoracic, suprainguinal vascular, intraperitoneal ],* Hx ischemic heart dz, * Hx CHF, *Hx TIA or CVA, *IDDM, *Serum Cr >2.0)   Interpretation of risk: (Complication = MI, Pulm edema, v-fib or cardiac arrest, complete heart block)  Very Low: 0 points = 0.4 % complication. Low: 1 point = 0.9 %, Moderate: 2 points = 6.6 %, High: 3+ points = 11%.

## 2018-10-10 ENCOUNTER — OFFICE VISIT (OUTPATIENT)
Dept: PULMONOLOGY | Facility: HOSPICE | Age: 68
End: 2018-10-10
Payer: MEDICARE

## 2018-10-10 VITALS
HEART RATE: 87 BPM | BODY MASS INDEX: 28 KG/M2 | RESPIRATION RATE: 16 BRPM | DIASTOLIC BLOOD PRESSURE: 88 MMHG | SYSTOLIC BLOOD PRESSURE: 160 MMHG | OXYGEN SATURATION: 96 % | WEIGHT: 164 LBS | HEIGHT: 64 IN | TEMPERATURE: 97.5 F

## 2018-10-10 DIAGNOSIS — J30.2 SEASONAL ALLERGIES: ICD-10-CM

## 2018-10-10 DIAGNOSIS — J44.89 CHRONIC OBSTRUCTIVE ASTHMA: ICD-10-CM

## 2018-10-10 DIAGNOSIS — K21.9 GASTROESOPHAGEAL REFLUX DISEASE, ESOPHAGITIS PRESENCE NOT SPECIFIED: ICD-10-CM

## 2018-10-10 DIAGNOSIS — R09.02 HYPOXEMIA: ICD-10-CM

## 2018-10-10 PROCEDURE — 99214 OFFICE O/P EST MOD 30 MIN: CPT | Performed by: NURSE PRACTITIONER

## 2018-10-10 RX ORDER — METHYLPREDNISOLONE 4 MG/1
TABLET ORAL
Qty: 21 TAB | Refills: 2 | Status: ON HOLD | OUTPATIENT
Start: 2018-10-10 | End: 2018-10-29

## 2018-10-10 RX ORDER — AZITHROMYCIN 250 MG/1
TABLET, FILM COATED ORAL
Qty: 6 TAB | Refills: 2 | Status: ON HOLD | OUTPATIENT
Start: 2018-10-10 | End: 2018-10-29

## 2018-10-10 NOTE — PATIENT INSTRUCTIONS
Plan:    1) Continue Advair, Spiriva and Ventolin inhalers.   2) Continue routine exercise. Encourage use of Albuterol prior to exercise.  3) Continue PPI.  4) Continue Flonase.   5) Discussed allergy avoidance.  6) Multi oximetry indicates adequate 02 saturations.   7) PFT's are her last office visit had declined some. However she had experienced a flare of her Asthma/COPD prior to testing. Arrange for repeat Jerome at follow up visit.  8) Discussed importance of prompt treatment of acute infectious symptoms. RX for Zpak and Medrol dosepack on hand.   9) Patient is up to date on her Prevnar 13, Pneumovax 23 and Influenza vaccinations.   10) Follow up in 3 months with repeat Spirometry, sooner OV if needed.

## 2018-10-10 NOTE — PROGRESS NOTES
Chief Complaint   Patient presents with   • COPD     Multi Ox        HPI:  Summer Hatch is a 68 y.o. year old female here today for follow-up on her Asthma/COPD. Chest xray January 2017 was clear. PFT's in January 2016 indicated an FEV1 of 1.53 L, 64% predicted with an FEV1/FVC ratio of 59 and a DLCO of 75% predicted. Spirometry in March 2017 indicated an FEV1 of 1.50 L, 62% predicted with an FEV1/FVC ratio of 62.   PFT's in March 2018 indicated an FEV1 of 1.16 L, 48% predicted with an FEV1/FVC ratio of 53 and  DLCO of 77% predicted. She states she had a recent flare of her Asthma prior to her last visit.   Multi oximetry today in the office indicates adequate 02 saturations with a low 02 of 90%.     She is feeling better overall. She does experience dyspnea with exertion which has improved some. She is swimming routinely. She feels this has helped. She notes an occasional cough with exertion. She notes a small amount of light yellow mucous which she feels is related to post nasal drip. She feels her allergy symptoms have been stable. She denies wheezing. She denies any fevers or chills. She denies breakthrough reflux symptoms.     She has a history of tobacco abuse and quit smoking in 1998. She is compliant on Advair 250/50 bid, Spiriva daily and a Ventolin HFA inhaler as needed. She is on a PPI. She also uses a Flonase nasal spray.         Past Medical History:   Diagnosis Date   • Arthritis    • Asthma     Uses inhaler PRN.   • Breath shortness     With exertion.   • Bronchitis    • Carpal tunnel syndrome    • COPD    • Dental disorder    • Emphysema of lung (HCC)    • GERD (gastroesophageal reflux disease) 10/22/2013   • Glaucoma     Bilateral.   • History of tobacco abuse 10/22/2013    Quit 1998. Smoked one pack per day for 30 years.   • Hypertension    • Hypothyroidism 10/22/2013   • Indigestion    • Osteoporosis    • Pain     Bilateral hips.   • Pneumonia    • Renal disorder    • Seizure disorder (HCC)      Last seizure 30 years ago.  No medication.       Past Surgical History:   Procedure Laterality Date   • HIP REVISION TOTAL Right 11/5/2015    Procedure: Evacuation hematoma, revision total hip;  Surgeon: Nils Starr M.D.;  Location: SURGERY [facility];  Service:    • HIP REVISION TOTAL  10/10/2013    Performed by Nils Starr M.D. at SURGERY [facility]   • INCISION AND DRAINAGE ORTHOPEDIC  10/10/2013    Performed by Nils Starr M.D. at [facility]   • APPENDECTOMY     • CARPAL TUNNEL RELEASE     • NERVE ULNAR REPAIR OR EXPLORE     • OTHER ORTHOPEDIC SURGERY     • TONSILLECTOMY         Family History   Problem Relation Age of Onset   • Psychiatry Mother 47        suicide   • Alcohol/Drug Mother    • Cancer Father 72        Lung   • Alcohol/Drug Brother         Recovering       Social History     Social History   • Marital status:      Spouse name: N/A   • Number of children: N/A   • Years of education: N/A     Occupational History   • Not on file.     Social History Main Topics   • Smoking status: Former Smoker     Packs/day: 1.00     Years: 30.00     Types: Cigarettes     Quit date: 1/1/1998   • Smokeless tobacco: Never Used   • Alcohol use No   • Drug use: No   • Sexual activity: Not on file     Other Topics Concern   • Not on file     Social History Narrative   • No narrative on file         ROS:  Constitutional: Denies fevers, chills, sweats, fatigue, weight loss  Eyes: Denies glasses, vision loss, pain, drainage, double vision  Ears/Nose/Mouth/Throat: Denies ear ache, difficulty hearing, sore throat, persistent hoarseness, decayed teeth/toothache  Cardiovascular: Denies chest pain, tightness, palpitations, swelling in feet/legs, fainting, difficulty breathing when laying down  Respiratory: See HPI   GI: Denies heartburn, difficulty swallowing, nausea, vomiting, abdominal pain, diarrhea, constipation  : Denies frequent urination, painful urination  Integumentary:  Denies rashes, lumps or color changes  MSK: Denies painful joints, sore muscles, and back pain.   Neurological: Denies frequent headaches, dizziness, weakness        Current Outpatient Prescriptions   Medication Sig Dispense Refill   • atorvastatin (LIPITOR) 40 MG Tab TAKE 1 TABLET DAILY 90 Tab 3   • omeprazole (PRILOSEC) 20 MG delayed-release capsule FOR DIRECTIONS ON HOW TO   TAKE THIS MEDICINE, READ   THE ENCLOSED MEDICATION    INFORMATION FORM 90 Cap 3   • SPIRIVA HANDIHALER 18 MCG Cap INHALE THE CONTENTS OF ONE CAPSULE DAILY 90 Cap 3   • triamterene-hctz (MAXZIDE-25/DYAZIDE) 37.5-25 MG Tab TAKE 1 TABLET EVERY MORNING 90 Tab 3   • budesonide-formoterol (SYMBICORT) 160-4.5 MCG/ACT Aerosol Inhale 2 Puffs by mouth 2 Times a Day. Use spacer. Rinse mouth after each use. 3 Inhaler 3   • lisinopril (PRINIVIL) 10 MG Tab TAKE 1 TABLET DAILY 90 Tab 3   • fluticasone (FLONASE) 50 MCG/ACT nasal spray Spray 2 Sprays in nose every day. 3 g 3   • denosumab (PROLIA) 60 MG/ML Solution Inject 1 mL as instructed every 6 months.     • DULoxetine (CYMBALTA) 60 MG Cap DR Particles delayed-release capsule TAKE 1 CAPSULE EVERY       MORNING 90 Cap 3   • SYNTHROID 100 MCG Tab TAKE 1 TABLET DAILY 90 Tab 3   • fenofibrate (TRICOR) 145 MG Tab TAKE 1 TABLET DAILY 90 Tab 3   • aripiprazole (ABILIFY) 5 MG tablet TAKE 1 TABLET AT BEDTIME 90 Tab 3   • nystatin (MYCOSTATIN) 507216 UNIT/ML Suspension Swish and swallow 1 teaspoonful (= 5mL) 4 times daily. 250 mL 1   • allopurinol (ZYLOPRIM) 100 MG Tab TAKE 1 TABLET DAILY 90 Tab 3   • tramadol (ULTRAM) 50 MG Tab TAKE 1 TO 2 TABLETS BY MOUTH 2 TIMES DAILY AS NEEDED FOR SEVERE PAIN 90 Tab 5   • latanoprost (XALATAN) 0.005 % Solution Place 1 Drop in both eyes every evening. 1 Bottle 3   • albuterol 108 (90 Base) MCG/ACT Aero Soln inhalation aerosol Inhale 2 Puffs by mouth every 6 hours as needed for Shortness of Breath. 3 Inhaler 3   • predniSONE (DELTASONE) 10 MG Tab Take 30mg x 3 days, then take  "20mg x 3 days, then take 10mg x 3 days, with food, then discontinue. 18 Tab 1   • niacin (NIASPAN) 1000 MG CR tablet Take 1 Tab by mouth every day. 90 Tab 3   • EPIPEN 2-MATILDE 0.3 MG/0.3ML Solution Auto-injector solution for injection      • albuterol (PROVENTIL) 2.5mg/3ml Nebu Soln solution for nebulization 3 mL by Nebulization route every four hours as needed for Shortness of Breath. 360 mL 5     No current facility-administered medications for this visit.        Allergies   Allergen Reactions   • Tylenol Anaphylaxis     Lip, throat swelling   • Tape Unspecified     Causes blisters       Blood pressure 160/88, pulse 87, temperature 36.4 °C (97.5 °F), resp. rate 16, height 1.626 m (5' 4\"), weight 74.4 kg (164 lb), SpO2 96 %, not currently breastfeeding.    PE:   Appearance: Well developed, well nourished, no acute distress  Eyes: PERRL, EOM intact, sclera white, conjunctiva moist  Ears: no lesions or deformities  Hearing: grossly intact  Nose: no lesions or deformities  Oropharynx: tongue normal, posterior pharynx without erythema or exudate  Neck: supple, trachea midline, no masses   Respiratory effort: no intercostal retractions or use of accessory muscles  Lung auscultation: faint scattered expiratory wheeze   Heart auscultation: no murmur rub or gallop  Extremities: no cyanosis or edema  Abdomen: soft ,non tender, no masses  Gait and Station: normal  Digits and nails: no clubbing, cyanosis, petechiae or nodes.  Cranial nerves: grossly intact  Skin: no rashes, lesions or ulcers noted  Orientation: Oriented to time, person and place  Mood and affect: mood and affect appropriate, normal interaction with examiner  Judgement: Intact          Assessment:    1. Chronic obstructive asthma (HCC)  azithromycin (ZITHROMAX Z-MATILDE) 250 MG Tab    MethylPREDNISolone (MEDROL DOSEPAK) 4 MG Tablet Therapy Pack    Spirometry   2. Hypoxemia     3. Seasonal allergies     4. Gastroesophageal reflux disease, esophagitis presence not " specified           Plan:    1) Continue Advair, Spiriva and Ventolin inhalers.   2) Continue routine exercise. Encourage use of Albuterol prior to exercise.  3) Continue PPI.  4) Continue Flonase.   5) Discussed allergy avoidance.  6) Multi oximetry indicates adequate 02 saturations.   7) PFT's are her last office visit had declined some. However she had experienced a flare of her Asthma/COPD prior to testing. Arrange for repeat Martin at follow up visit.  8) Discussed importance of prompt treatment of acute infectious symptoms. RX for Zpak and Medrol dosepack on hand.   9) Patient is up to date on her Prevnar 13, Pneumovax 23 and Influenza vaccinations.   10) Follow up in 3 months with repeat Spirometry, sooner OV if needed.

## 2018-10-18 RX ORDER — ALLOPURINOL 100 MG/1
TABLET ORAL
Qty: 90 TAB | Refills: 3 | Status: SHIPPED | OUTPATIENT
Start: 2018-10-18 | End: 2019-10-19 | Stop reason: SDUPTHER

## 2018-10-19 ENCOUNTER — HOSPITAL ENCOUNTER (OUTPATIENT)
Dept: LAB | Facility: MEDICAL CENTER | Age: 68
End: 2018-10-19
Attending: ANESTHESIOLOGY
Payer: MEDICARE

## 2018-10-19 LAB
ANION GAP SERPL CALC-SCNC: 9 MMOL/L (ref 0–11.9)
BUN SERPL-MCNC: 25 MG/DL (ref 8–22)
CALCIUM SERPL-MCNC: 10.1 MG/DL (ref 8.5–10.5)
CHLORIDE SERPL-SCNC: 103 MMOL/L (ref 96–112)
CO2 SERPL-SCNC: 26 MMOL/L (ref 20–33)
CREAT SERPL-MCNC: 1.42 MG/DL (ref 0.5–1.4)
GLUCOSE SERPL-MCNC: 112 MG/DL (ref 65–99)
HCT VFR BLD AUTO: 44 % (ref 37–47)
POTASSIUM SERPL-SCNC: 4.7 MMOL/L (ref 3.6–5.5)
SODIUM SERPL-SCNC: 138 MMOL/L (ref 135–145)

## 2018-10-19 PROCEDURE — 36415 COLL VENOUS BLD VENIPUNCTURE: CPT

## 2018-10-19 PROCEDURE — 80048 BASIC METABOLIC PNL TOTAL CA: CPT

## 2018-10-19 PROCEDURE — 85014 HEMATOCRIT: CPT

## 2018-10-29 ENCOUNTER — HOSPITAL ENCOUNTER (OUTPATIENT)
Facility: MEDICAL CENTER | Age: 68
End: 2018-10-29
Attending: PLASTIC SURGERY | Admitting: PLASTIC SURGERY
Payer: COMMERCIAL

## 2018-10-29 VITALS
TEMPERATURE: 97.2 F | RESPIRATION RATE: 16 BRPM | DIASTOLIC BLOOD PRESSURE: 73 MMHG | WEIGHT: 158.51 LBS | BODY MASS INDEX: 27.06 KG/M2 | HEART RATE: 77 BPM | HEIGHT: 64 IN | SYSTOLIC BLOOD PRESSURE: 165 MMHG | OXYGEN SATURATION: 95 %

## 2018-10-29 DIAGNOSIS — G89.18 ACUTE POST-OPERATIVE PAIN: ICD-10-CM

## 2018-10-29 LAB — EKG IMPRESSION: NORMAL

## 2018-10-29 PROCEDURE — 160009 HCHG ANES TIME/MIN: Performed by: PLASTIC SURGERY

## 2018-10-29 PROCEDURE — 160028 HCHG SURGERY MINUTES - 1ST 30 MINS LEVEL 3: Performed by: PLASTIC SURGERY

## 2018-10-29 PROCEDURE — A6223 GAUZE >16<=48 NO W/SAL W/O B: HCPCS | Performed by: PLASTIC SURGERY

## 2018-10-29 PROCEDURE — A9270 NON-COVERED ITEM OR SERVICE: HCPCS | Performed by: ANESTHESIOLOGY

## 2018-10-29 PROCEDURE — 160039 HCHG SURGERY MINUTES - EA ADDL 1 MIN LEVEL 3: Performed by: PLASTIC SURGERY

## 2018-10-29 PROCEDURE — 93005 ELECTROCARDIOGRAM TRACING: CPT | Performed by: PLASTIC SURGERY

## 2018-10-29 PROCEDURE — 500423 HCHG DRESSING, ABD COMBINE: Performed by: PLASTIC SURGERY

## 2018-10-29 PROCEDURE — 500371 HCHG DRAIN, BLAKE 10MM: Performed by: PLASTIC SURGERY

## 2018-10-29 PROCEDURE — 700111 HCHG RX REV CODE 636 W/ 250 OVERRIDE (IP): Performed by: ANESTHESIOLOGY

## 2018-10-29 PROCEDURE — 700111 HCHG RX REV CODE 636 W/ 250 OVERRIDE (IP)

## 2018-10-29 PROCEDURE — 500389 HCHG DRAIN, RESERVOIR SUCT JP 100CC: Performed by: PLASTIC SURGERY

## 2018-10-29 PROCEDURE — 700102 HCHG RX REV CODE 250 W/ 637 OVERRIDE(OP)

## 2018-10-29 PROCEDURE — 501838 HCHG SUTURE GENERAL: Performed by: PLASTIC SURGERY

## 2018-10-29 PROCEDURE — 93010 ELECTROCARDIOGRAM REPORT: CPT | Performed by: INTERNAL MEDICINE

## 2018-10-29 PROCEDURE — 160047 HCHG PACU  - EA ADDL 30 MINS PHASE II: Performed by: PLASTIC SURGERY

## 2018-10-29 PROCEDURE — 160025 RECOVERY II MINUTES (STATS): Performed by: PLASTIC SURGERY

## 2018-10-29 PROCEDURE — A9270 NON-COVERED ITEM OR SERVICE: HCPCS

## 2018-10-29 PROCEDURE — 160046 HCHG PACU - 1ST 60 MINS PHASE II: Performed by: PLASTIC SURGERY

## 2018-10-29 PROCEDURE — 500064 HCHG BINDER, 4-PANEL MED/LG: Performed by: PLASTIC SURGERY

## 2018-10-29 PROCEDURE — 700102 HCHG RX REV CODE 250 W/ 637 OVERRIDE(OP): Performed by: ANESTHESIOLOGY

## 2018-10-29 PROCEDURE — 160002 HCHG RECOVERY MINUTES (STAT): Performed by: PLASTIC SURGERY

## 2018-10-29 PROCEDURE — 700101 HCHG RX REV CODE 250

## 2018-10-29 PROCEDURE — 160035 HCHG PACU - 1ST 60 MINS PHASE I: Performed by: PLASTIC SURGERY

## 2018-10-29 PROCEDURE — 160048 HCHG OR STATISTICAL LEVEL 1-5: Performed by: PLASTIC SURGERY

## 2018-10-29 RX ORDER — OXYCODONE HYDROCHLORIDE 5 MG/1
5-10 TABLET ORAL EVERY 6 HOURS PRN
Qty: 30 TAB | Refills: 0 | Status: SHIPPED | OUTPATIENT
Start: 2018-10-29 | End: 2018-11-05

## 2018-10-29 RX ORDER — OXYCODONE HCL 5 MG/5 ML
5 SOLUTION, ORAL ORAL
Status: COMPLETED | OUTPATIENT
Start: 2018-10-29 | End: 2018-10-29

## 2018-10-29 RX ORDER — MEPERIDINE HYDROCHLORIDE 25 MG/ML
6.25 INJECTION INTRAMUSCULAR; INTRAVENOUS; SUBCUTANEOUS
Status: DISCONTINUED | OUTPATIENT
Start: 2018-10-29 | End: 2018-10-29 | Stop reason: HOSPADM

## 2018-10-29 RX ORDER — HYDROMORPHONE HYDROCHLORIDE 2 MG/ML
0.4 INJECTION, SOLUTION INTRAMUSCULAR; INTRAVENOUS; SUBCUTANEOUS
Status: DISCONTINUED | OUTPATIENT
Start: 2018-10-29 | End: 2018-10-29 | Stop reason: HOSPADM

## 2018-10-29 RX ORDER — HYDROMORPHONE HYDROCHLORIDE 2 MG/ML
0.2 INJECTION, SOLUTION INTRAMUSCULAR; INTRAVENOUS; SUBCUTANEOUS
Status: DISCONTINUED | OUTPATIENT
Start: 2018-10-29 | End: 2018-10-29 | Stop reason: HOSPADM

## 2018-10-29 RX ORDER — CELECOXIB 200 MG/1
CAPSULE ORAL
Status: DISCONTINUED
Start: 2018-10-29 | End: 2018-10-29 | Stop reason: HOSPADM

## 2018-10-29 RX ORDER — OXYCODONE HYDROCHLORIDE 5 MG/1
10 TABLET ORAL
Status: DISCONTINUED | OUTPATIENT
Start: 2018-10-29 | End: 2018-10-29 | Stop reason: HOSPADM

## 2018-10-29 RX ORDER — OXYCODONE HYDROCHLORIDE 5 MG/1
5 TABLET ORAL
Status: DISCONTINUED | OUTPATIENT
Start: 2018-10-29 | End: 2018-10-29 | Stop reason: HOSPADM

## 2018-10-29 RX ORDER — DIPHENHYDRAMINE HYDROCHLORIDE 50 MG/ML
12.5 INJECTION INTRAMUSCULAR; INTRAVENOUS
Status: DISCONTINUED | OUTPATIENT
Start: 2018-10-29 | End: 2018-10-29 | Stop reason: HOSPADM

## 2018-10-29 RX ORDER — MEPERIDINE HYDROCHLORIDE 50 MG/ML
INJECTION INTRAMUSCULAR; INTRAVENOUS; SUBCUTANEOUS
Status: COMPLETED
Start: 2018-10-29 | End: 2018-10-29

## 2018-10-29 RX ORDER — SODIUM CHLORIDE, SODIUM LACTATE, POTASSIUM CHLORIDE, CALCIUM CHLORIDE 600; 310; 30; 20 MG/100ML; MG/100ML; MG/100ML; MG/100ML
INJECTION, SOLUTION INTRAVENOUS CONTINUOUS
Status: DISCONTINUED | OUTPATIENT
Start: 2018-10-29 | End: 2018-10-29 | Stop reason: HOSPADM

## 2018-10-29 RX ORDER — ONDANSETRON 2 MG/ML
4 INJECTION INTRAMUSCULAR; INTRAVENOUS
Status: DISCONTINUED | OUTPATIENT
Start: 2018-10-29 | End: 2018-10-29 | Stop reason: HOSPADM

## 2018-10-29 RX ORDER — OXYCODONE HCL 5 MG/5 ML
10 SOLUTION, ORAL ORAL
Status: COMPLETED | OUTPATIENT
Start: 2018-10-29 | End: 2018-10-29

## 2018-10-29 RX ORDER — HALOPERIDOL 5 MG/ML
1 INJECTION INTRAMUSCULAR
Status: DISCONTINUED | OUTPATIENT
Start: 2018-10-29 | End: 2018-10-29 | Stop reason: HOSPADM

## 2018-10-29 RX ORDER — SODIUM CHLORIDE, SODIUM LACTATE, POTASSIUM CHLORIDE, CALCIUM CHLORIDE 600; 310; 30; 20 MG/100ML; MG/100ML; MG/100ML; MG/100ML
INJECTION, SOLUTION INTRAVENOUS ONCE
Status: COMPLETED | OUTPATIENT
Start: 2018-10-29 | End: 2018-10-29

## 2018-10-29 RX ADMIN — OXYCODONE HYDROCHLORIDE 10 MG: 5 SOLUTION ORAL at 09:56

## 2018-10-29 RX ADMIN — SODIUM CHLORIDE, SODIUM LACTATE, POTASSIUM CHLORIDE, CALCIUM CHLORIDE 1000 ML: 600; 310; 30; 20 INJECTION, SOLUTION INTRAVENOUS at 06:34

## 2018-10-29 RX ADMIN — FENTANYL CITRATE 25 MCG: 50 INJECTION, SOLUTION INTRAMUSCULAR; INTRAVENOUS at 09:56

## 2018-10-29 RX ADMIN — MEPERIDINE HYDROCHLORIDE 6.5 MG: 25 INJECTION INTRAMUSCULAR; INTRAVENOUS; SUBCUTANEOUS at 09:46

## 2018-10-29 RX ADMIN — FENTANYL CITRATE 25 MCG: 50 INJECTION, SOLUTION INTRAMUSCULAR; INTRAVENOUS at 09:54

## 2018-10-29 ASSESSMENT — PAIN SCALES - GENERAL
PAINLEVEL_OUTOF10: 6
PAINLEVEL_OUTOF10: 0
PAINLEVEL_OUTOF10: 5
PAINLEVEL_OUTOF10: 7
PAINLEVEL_OUTOF10: 6
PAINLEVEL_OUTOF10: 5
PAINLEVEL_OUTOF10: 7
PAINLEVEL_OUTOF10: 5
PAINLEVEL_OUTOF10: 6
PAINLEVEL_OUTOF10: 6
PAINLEVEL_OUTOF10: 7

## 2018-10-29 NOTE — DISCHARGE INSTRUCTIONS
ACTIVITY: Rest and take it easy for the first 24 hours.  A responsible adult is recommended to remain with you during that time.  It is normal to feel sleepy.  We encourage you to not do anything that requires balance, judgment or coordination.    MILD FLU-LIKE SYMPTOMS ARE NORMAL. YOU MAY EXPERIENCE GENERALIZED MUSCLE ACHES, THROAT IRRITATION, HEADACHE AND/OR SOME NAUSEA.    FOR 24 HOURS DO NOT:  Drive, operate machinery or run household appliances.  Drink beer or alcoholic beverages.   Make important decisions or sign legal documents.    SPECIAL INSTRUCTIONS: Keep track of all drain output on sheet provided, take sheet with you to all follow up appointments.    Wear binder at all times     Call with any questions     No lifting greater than 10 pounds    DIET: To avoid nausea, slowly advance diet as tolerated, avoiding spicy or greasy foods for the first day.  Add more substantial food to your diet according to your physician's instructions.  Babies can be fed formula or breast milk as soon as they are hungry.  INCREASE FLUIDS AND FIBER TO AVOID CONSTIPATION.    SURGICAL DRESSING/BATHING: Keep dressing dry and clean for 48 hours, then OK to remove.    FOLLOW-UP APPOINTMENT:  A follow-up appointment should be arranged with your doctor; call to schedule.    You should CALL YOUR PHYSICIAN if you develop:  Fever greater than 101 degrees F.  Pain not relieved by medication, or persistent nausea or vomiting.  Excessive bleeding (blood soaking through dressing) or unexpected drainage from the wound.  Extreme redness or swelling around the incision site, drainage of pus or foul smelling drainage.  Inability to urinate or empty your bladder within 8 hours.  Problems with breathing or chest pain.    You should call 911 if you develop problems with breathing or chest pain.  If you are unable to contact your doctor or surgical center, you should go to the nearest emergency room or urgent care center.    Physician's telephone  #: [phone]    If any questions arise, call your doctor.  If your doctor is not available, please feel free to call the Surgical Center at [phone].  The Center is open Monday through Friday from 7AM to 7PM.  You can also call the HEALTH HOTLINE open 24 hours/day, 7 days/week and speak to a nurse at [phone], or toll free at [phone].    A registered nurse may call you a few days after your surgery to see how you are doing after your procedure.    MEDICATIONS: Resume taking daily medication.  Take prescribed pain medication with food.  If no medication is prescribed, you may take non-aspirin pain medication if needed.  PAIN MEDICATION CAN BE VERY CONSTIPATING.  Take a stool softener or laxative such as senokot, pericolace, or milk of magnesia if needed.    Prescription given for oxycodone.  Last pain medication given at 9:50am.    If your physician has prescribed pain medication that includes Acetaminophen (Tylenol), do not take additional Acetaminophen (Tylenol) while taking the prescribed medication.    Depression / Suicide Risk    As you are discharged from this [facility] facility, it is important to learn how to keep safe from harming yourself.    Recognize the warning signs:  · Abrupt changes in personality, positive or negative- including increase in energy   · Giving away possessions  · Change in eating patterns- significant weight changes-  positive or negative  · Change in sleeping patterns- unable to sleep or sleeping all the time   · Unwillingness or inability to communicate  · Depression  · Unusual sadness, discouragement and loneliness  · Talk of wanting to die  · Neglect of personal appearance   · Rebelliousness- reckless behavior  · Withdrawal from people/activities they love  · Confusion- inability to concentrate     If you or a loved one observes any of these behaviors or has concerns about self-harm, here's what you can do:  · Talk about it- your feelings and reasons for  harming yourself  · Remove any means that you might use to hurt yourself (examples: pills, rope, extension cords, firearm)  · Get professional help from the community (Mental Health, Substance Abuse, psychological counseling)  · Do not be alone:Call your Safe Contact- someone whom you trust who will be there for you.  · Call your local CRISIS HOTLINE 253-2395 or 496-629-8501  · Call your local Children's Mobile Crisis Response Team [place] (510) 321-4401 or www.[employer]  · Call the toll free National Suicide Prevention Hotlines   · National Suicide Prevention Lifeline 457-525-PGTX (5750)  · National Hope Line Network 800-SUICIDE (883-8052)

## 2018-10-29 NOTE — OP REPORT
DATE OF SERVICE:  10/29/2018    PREOPERATIVE DIAGNOSIS:  Laxity of abdominal wall skin and diastasis of the   rectus abdominis muscle.    POSTOPERATIVE DIAGNOSIS:  Laxity of abdominal wall skin and diastasis of the   rectus abdominis muscle.    PROCEDURE PERFORMED:  Abdominoplasty.    SURGEON:  Santiaog Campos MD    ASSISTANT:  Romeo Yancey MD    ANESTHESIOLOGIST:  Samuel Hussein MD    OPERATIVE INDICATIONS:  A 68-year-old female.  She had cosmetic concerns   related to her abdomen.  She wished improvement.  She had some underlying lung   disease, for which she does not require oxygen at home.  I had her evaluated   by her primary care physician, who felt she was a suitable candidate for an   abdominoplasty to be done as an outpatient setting.  Because of her lung   disease, we did decide to do her at the hospital rather than the surgery   center in case she had any difficulty coming off of her oxygen.  The patient   was marked in the preoperative holding area.  The patient consented to proceed   with surgery.    OPERATIVE DESCRIPTION:  After induction of general endotracheal anesthesia,   the patient's abdomen was prepped and draped sterilely.  The patient's lower   abdominal incision was made.  Skin and subcutaneous tissue were elevated off   the abdominal wall musculature using electrocautery.  When large perforating   blood vessels were encountered, they were clipped before being divided.  A   generous pad of fat was left around the umbilicus.  An incidental umbilical   hernia was discovered.  This will be mentioned later.  Skin and fat were   elevated off up to the costal margin and to the xiphoid process.  The patient   was relaxed.  The patient had her hernia dissected through the abdominal wall.    The fascia of the abdominal wall was incised in midline to allow the hernia   to be fully reduced.  The hernia was repaired using a 0 Ethibond suture.  This   was placed in a figure-of-eight manner.  Next,  the fascia was plicated in the   midline using both interrupted and running 0 Ethibond sutures.  The plication   was not done very tightly because of her existing pulmonary disease.  The   patient was placed in a reflex position.  Skin and fat were pulled inferiorly   and excess skin was symmetrically excised.  Wounds were closed in layers over   two 10 flat Orestes drains that were brought out through separate stab   incisions.  Umbilicus was brought out through a separate incision as well and   also secured in layers.  At the conclusion of suturing, the patient's skin   flap and umbilicus were pink and were without signs for arterial or venous   compromise.  Sterile dressings and a compressive wrap were applied.       ____________________________________     MD EMERALD BULLOCK / KIMBERLY    DD:  10/29/2018 09:33:47  DT:  10/29/2018 10:43:18    D#:  [id number]  Job#:  [id number]

## 2018-10-29 NOTE — OR NURSING
0935- Pt arrives from OR and report received. ABD roby and 2 MIKHAIL's noted to abd.  Pt shivering, heater placed on pt.    0946- Pt given demerol for continued shivering, additional warm blankets provided.  Pt tolerating sip sof water.    0954- Pt given 25mcg fent for pain 7/10.    0956- Pt given another 25mcg for pain 7/10, pt also given 10mg oxy.    1015- Pt's SO took script for oxycodone to in house [facility] to fill.  Pt's friend at bedside.    1022- Pt placed on room air.    1100- Draining teaching provided with pt and SO.  15ml emptied from each drain.    1120- Pt up to bathroom, voided without difficulty.  Pt changed into her clothes.  Sitting in recliner.    1155- Pt and SO provided with d/c paperwork and instructions, script for oxycodone already filled.  All questions answered.    1200- IV removed, pt taken out via w/c.

## 2018-10-29 NOTE — H&P
Surgery Plastic History & Physical Note    Date  10/29/2018    Primary Care Physician  Jl Palomino M.D.    CC  Excess skin of abdominal wall, laxity of abdominal wall muscles  HPI  This is a 68 y.o. female who presented with muscular and cutaneous laxity of the abdominal wall.  She has been cleared for elective surgery by her PCP.    Past Medical History:   Diagnosis Date   • Arthritis    • Asthma     Uses inhaler PRN.   • Breath shortness     With exertion.   • Bronchitis    • Carpal tunnel syndrome    • COPD    • Dental disorder    • Emphysema of lung (HCC)    • GERD (gastroesophageal reflux disease) 10/22/2013   • Glaucoma     Bilateral.   • History of tobacco abuse 10/22/2013    Quit 1998. Smoked one pack per day for 30 years.   • Hypertension    • Hypothyroidism 10/22/2013   • Indigestion    • Osteoporosis    • Pain     Bilateral hips.   • Pneumonia    • Renal disorder    • Seizure disorder (HCC)     Last seizure 30 years ago.  No medication.       Past Surgical History:   Procedure Laterality Date   • HIP REVISION TOTAL Right 11/5/2015    Procedure: Evacuation hematoma, revision total hip;  Surgeon: Nils Starr M.D.;  Location: SURGERY [facility];  Service:    • HIP REVISION TOTAL  10/10/2013    Performed by Nils Starr M.D. at [facility]   • INCISION AND DRAINAGE ORTHOPEDIC  10/10/2013    Performed by Nils Starr M.D. at [facility]   • APPENDECTOMY     • CARPAL TUNNEL RELEASE     • NERVE ULNAR REPAIR OR EXPLORE     • OTHER ORTHOPEDIC SURGERY     • TONSILLECTOMY         No current facility-administered medications for this encounter.        Social History     Social History   • Marital status:      Spouse name: N/A   • Number of children: N/A   • Years of education: N/A     Occupational History   • Not on file.     Social History Main Topics   • Smoking status: Former Smoker     Packs/day: 1.00     Years: 30.00     Types: Cigarettes     Quit date:  1/1/1998   • Smokeless tobacco: Never Used   • Alcohol use No   • Drug use: No   • Sexual activity: Not on file     Other Topics Concern   • Not on file     Social History Narrative   • No narrative on file       Family History   Problem Relation Age of Onset   • Psychiatry Mother 47        suicide   • Alcohol/Drug Mother    • Cancer Father 72        Lung   • Alcohol/Drug Brother         Recovering       Allergies  Tylenol; Tape; and Latex    Review of Systems  Non contributory    Physical Exam    Vital Signs  Blood Pressure : (!) 165/73   Temperature: 36.4 °C (97.6 °F)   Pulse: 66   Respiration: 16   Pulse Oximetry: 95 %     Heart RR  Lungs Clear  Abd with loose skin and diastasis of the rectus muscles    Labs:                    Radiology:  No orders to display         Assessment/Plan:  Cosmetic deformity of abdominal wall  Procedure(s):  ABDOMINOPLASTY

## 2018-10-29 NOTE — OR SURGEON
Immediate Post OP Note    PreOp Diagnosis: cosmetic deformity of abdominal wall    PostOp Diagnosis: same    Procedure(s):  ABDOMINOPLASTY - Wound Class: Clean    Surgeon(s):  JUAREZ Dugan Jr., M.D.    Anesthesiologist/Type of Anesthesia:  Anesthesiologist: Samuel Hussein M.D./General    Surgical Staff:  Circulator: Lea Almazan R.N.  Scrub Person: Geneva Yanez; Darcy Guerrero    Specimens removed if any:  * No specimens in log *    Estimated Blood Loss: 5 ml    Findings: loose skin, diastasis of rectus abdominus    Complications: none        10/29/2018 9:33 AM Santiago Campos M.D.

## 2018-11-01 RX ORDER — FENOFIBRATE 145 MG/1
TABLET, COATED ORAL
Qty: 90 TAB | Refills: 3 | Status: SHIPPED | OUTPATIENT
Start: 2018-11-01 | End: 2020-01-16 | Stop reason: SDUPTHER

## 2018-11-27 RX ORDER — ARIPIPRAZOLE 5 MG/1
TABLET ORAL
Qty: 90 TAB | Refills: 3 | Status: SHIPPED | OUTPATIENT
Start: 2018-11-27 | End: 2019-12-23

## 2018-12-03 ENCOUNTER — PATIENT MESSAGE (OUTPATIENT)
Dept: PULMONOLOGY | Facility: HOSPICE | Age: 68
End: 2018-12-03

## 2018-12-03 DIAGNOSIS — J44.1 COPD EXACERBATION (HCC): ICD-10-CM

## 2018-12-03 RX ORDER — METHYLPREDNISOLONE 4 MG/1
TABLET ORAL
Qty: 21 TAB | Refills: 1 | Status: SHIPPED | OUTPATIENT
Start: 2018-12-03 | End: 2019-07-22 | Stop reason: SDUPTHER

## 2018-12-03 RX ORDER — AZITHROMYCIN 250 MG/1
TABLET, FILM COATED ORAL
Qty: 6 TAB | Refills: 1 | Status: SHIPPED | OUTPATIENT
Start: 2018-12-03 | End: 2019-01-30

## 2018-12-03 NOTE — PATIENT COMMUNICATION
RX for Zpak and Medrol dose pack provided to her local pharmacy. Recommend OV if symptoms persist. ER/Urgent Care for worsening symptoms.

## 2018-12-03 NOTE — TELEPHONE ENCOUNTER
From: Summer Hatch  To: TYRELL Chairez  Sent: 12/3/2018 10:07 AM PST  Subject: Prescription Question    Mitul Coombs. I have a cold and my breathing is awful upon any type of exertion including bending over. Could you send a prescription to [facility] [address] for a steriod and antibotic to hogan off an infection? Thanks if you have questions please call me at .

## 2018-12-31 ENCOUNTER — PATIENT MESSAGE (OUTPATIENT)
Dept: MEDICAL GROUP | Facility: MEDICAL CENTER | Age: 68
End: 2018-12-31

## 2018-12-31 DIAGNOSIS — M81.0 AGE-RELATED OSTEOPOROSIS WITHOUT CURRENT PATHOLOGICAL FRACTURE: ICD-10-CM

## 2018-12-31 NOTE — TELEPHONE ENCOUNTER
From: Summer Hatch  To: Jl Palomino M.D.  Sent: 12/31/2018 9:46 AM PST  Subject: Prescription Question    Happy New Year Dr. Palomino. I need to get my 6 month prolia shot that was due in December. Could you sendd the prescription to the infusion center at [facility]? Thansk

## 2019-01-04 ENCOUNTER — PATIENT MESSAGE (OUTPATIENT)
Dept: PULMONOLOGY | Facility: HOSPICE | Age: 69
End: 2019-01-04

## 2019-01-04 NOTE — PATIENT COMMUNICATION
To: Summer Hatch      From: Macie Nunez, Med Ass't      Created: 1/4/2019 10:32 AM        Good Morning Summer    How long have you had the cough for. Is it productive, and if so what color is the sputum that you are spitting out? Also are you currently having any other possible related symptoms?

## 2019-01-06 ENCOUNTER — OFFICE VISIT (OUTPATIENT)
Dept: URGENT CARE | Facility: PHYSICIAN GROUP | Age: 69
End: 2019-01-06
Payer: MEDICARE

## 2019-01-06 VITALS
WEIGHT: 159 LBS | HEIGHT: 64 IN | HEART RATE: 76 BPM | SYSTOLIC BLOOD PRESSURE: 116 MMHG | OXYGEN SATURATION: 95 % | TEMPERATURE: 97.9 F | BODY MASS INDEX: 27.14 KG/M2 | DIASTOLIC BLOOD PRESSURE: 78 MMHG | RESPIRATION RATE: 16 BRPM

## 2019-01-06 DIAGNOSIS — R05.9 COUGH: ICD-10-CM

## 2019-01-06 DIAGNOSIS — J44.1 COPD WITH ACUTE EXACERBATION (HCC): ICD-10-CM

## 2019-01-06 PROCEDURE — 99214 OFFICE O/P EST MOD 30 MIN: CPT | Performed by: FAMILY MEDICINE

## 2019-01-06 RX ORDER — DOXYCYCLINE HYCLATE 100 MG
100 TABLET ORAL 2 TIMES DAILY
Qty: 20 TAB | Refills: 0 | Status: SHIPPED | OUTPATIENT
Start: 2019-01-06 | End: 2019-01-16

## 2019-01-06 RX ORDER — BENZONATATE 200 MG/1
200 CAPSULE ORAL 3 TIMES DAILY PRN
Qty: 30 CAP | Refills: 0 | Status: SHIPPED | OUTPATIENT
Start: 2019-01-06 | End: 2019-01-30

## 2019-01-06 RX ORDER — PREDNISONE 20 MG/1
40 TABLET ORAL DAILY
Qty: 10 TAB | Refills: 0 | Status: SHIPPED | OUTPATIENT
Start: 2019-01-06 | End: 2019-01-11

## 2019-01-06 ASSESSMENT — ENCOUNTER SYMPTOMS
SENSORY CHANGE: 0
EYE REDNESS: 0
FOCAL WEAKNESS: 0
PALPITATIONS: 0
EYE DISCHARGE: 0
SORE THROAT: 1
WEIGHT LOSS: 0

## 2019-01-06 NOTE — PROGRESS NOTES
"Subjective:      Summer Hatch is a 68 y.o. female who presents with Cough (cough, sinus pressure, sob x 4 days)            4 days productive cough without blood in sputum. No fever. Initial chills resolved. Associated SOB and wheeze in patient with PMH COPD. +PMH pneumonia treated as outpatient. Initially had sinus pressure that is improvement. No OTC medications. Using albuterol 3-4x daily. No other aggravating or alleviating factors.          Review of Systems   Constitutional: Negative for malaise/fatigue and weight loss.   HENT: Positive for sore throat. Negative for ear discharge and ear pain.    Eyes: Negative for discharge and redness.   Cardiovascular: Negative for chest pain, palpitations and leg swelling.   Skin: Negative for itching and rash.   Neurological: Negative for sensory change and focal weakness.     .  Medications, Allergies, and current problem list reviewed today in Epic       Objective:     /78 (BP Location: Left arm, Patient Position: Sitting, BP Cuff Size: Adult)   Pulse 76   Temp 36.6 °C (97.9 °F) (Temporal)   Resp 16   Ht 1.626 m (5' 4\")   Wt 72.1 kg (159 lb)   SpO2 95%   BMI 27.29 kg/m²      Physical Exam   Constitutional: She is oriented to person, place, and time. She appears well-developed and well-nourished. No distress.   HENT:   Head: Normocephalic and atraumatic.   Right Ear: External ear normal.   Left Ear: External ear normal.   Nasal congestion  Pharynx red without exudate     Eyes: Conjunctivae are normal.   Neck: Neck supple.   Cardiovascular: Normal rate, regular rhythm and normal heart sounds.    Pulmonary/Chest: Effort normal. She has wheezes.   Musculoskeletal: She exhibits no edema or tenderness.   Lymphadenopathy:     She has no cervical adenopathy.   Neurological: She is alert and oriented to person, place, and time.   Skin: Skin is warm and dry.               Assessment/Plan:   Pulse ox adequate    1. COPD with acute exacerbation (HCC)  predniSONE " (DELTASONE) 20 MG Tab    doxycycline (VIBRAMYCIN) 100 MG Tab   2. Cough  benzonatate (TESSALON) 200 MG capsule     Differential diagnosis, natural history, supportive care, and indications for immediate follow-up discussed at length.

## 2019-01-30 ENCOUNTER — NON-PROVIDER VISIT (OUTPATIENT)
Dept: PULMONOLOGY | Facility: HOSPICE | Age: 69
End: 2019-01-30
Payer: MEDICARE

## 2019-01-30 ENCOUNTER — OFFICE VISIT (OUTPATIENT)
Dept: PULMONOLOGY | Facility: HOSPICE | Age: 69
End: 2019-01-30
Payer: MEDICARE

## 2019-01-30 VITALS
HEART RATE: 76 BPM | DIASTOLIC BLOOD PRESSURE: 70 MMHG | SYSTOLIC BLOOD PRESSURE: 130 MMHG | WEIGHT: 159 LBS | HEIGHT: 64 IN | OXYGEN SATURATION: 95 % | TEMPERATURE: 97 F | RESPIRATION RATE: 16 BRPM | BODY MASS INDEX: 27.14 KG/M2

## 2019-01-30 DIAGNOSIS — R09.02 HYPOXEMIA: ICD-10-CM

## 2019-01-30 DIAGNOSIS — K21.9 GASTROESOPHAGEAL REFLUX DISEASE, ESOPHAGITIS PRESENCE NOT SPECIFIED: ICD-10-CM

## 2019-01-30 DIAGNOSIS — J30.2 SEASONAL ALLERGIES: ICD-10-CM

## 2019-01-30 DIAGNOSIS — J44.89 CHRONIC OBSTRUCTIVE ASTHMA: ICD-10-CM

## 2019-01-30 PROCEDURE — 99214 OFFICE O/P EST MOD 30 MIN: CPT | Performed by: NURSE PRACTITIONER

## 2019-01-30 PROCEDURE — 94060 EVALUATION OF WHEEZING: CPT | Performed by: INTERNAL MEDICINE

## 2019-01-30 RX ORDER — PREDNISONE 10 MG/1
TABLET ORAL
Qty: 18 TAB | Refills: 1 | Status: SHIPPED
Start: 2019-01-30 | End: 2019-07-17

## 2019-01-30 RX ORDER — DOXYCYCLINE HYCLATE 100 MG/1
100 CAPSULE ORAL 2 TIMES DAILY
Qty: 20 CAP | Refills: 1 | Status: SHIPPED
Start: 2019-01-30 | End: 2019-09-06

## 2019-01-30 ASSESSMENT — PULMONARY FUNCTION TESTS
FEV1/FVC_PERCENT_PREDICTED: 78
FVC_PREDICTED: 3.08
FVC: 2.48
FEV1/FVC_PREDICTED: 75.97
FVC: 2.4
FEV1_PREDICTED: 2.34
FEV1/FVC: 59
FEV1/FVC_PERCENT_CHANGE: 167
FEV1_PERCENT_CHANGE: 3
FEV1: 1.42
FEV1_PREDICTED: 60
FEV1/FVC: 60.89
FVC_PERCENT_PREDICTED: 77
FEV1: 1.51
FEV1_PERCENT_CHANGE: 5

## 2019-01-30 NOTE — PATIENT INSTRUCTIONS
Plan:    1) She is feeling much better. Spirometry has improved. Continue Advair, Spiriva and Ventolin inhalers.   2) Continue routine exercise. Encourage use of Albuterol prior to exercise.  3) Continue PPI.  4) Continue Flonase.   5) Discussed allergy avoidance.  6) Multi oximetry in the past indicated adequate 02 saturations.   7) Discussed importance of prompt treatment of acute infectious symptoms. RX for Doxycycline and Prednisone taper provided to have o hand. However she is encouraged to make a follow up visit if she is experiencing recurrent exacerbations.   9) Patient is up to date on her Prevnar 13, Pneumovax 23 and Influenza vaccinations.   10) Follow up in 6 months, sooner OV if needed.

## 2019-01-30 NOTE — PROCEDURES
Good patient effort ^ cooperation. The results of this test meet the ATS standards for acceptability and repeatability. Test was performed on the [employer]/D spirometry system. Predicted values were N-Lauren. A bronchodilator of Ventolin HFA - 2 puffs with a spacer was administered to the patient.    Interpretation  Only spirometry performed, FEV1/FVC reduced at 59  Severe small airway disease at 28% predicted  FEV1 1.42-60% predicted with minimal nonsignificant response to bronchodilator at 5%  FVC 2.40-77%  Conclusion:  Moderate central airway obstruction with no response to bronchodilation suggestive of moderate chronic bronchitis  Severe small airway disease superimposed  Mild degree of restrictive lung disease suggested by FVC reduction, need lung volumes to confirm  Correlate clinically

## 2019-01-30 NOTE — PROGRESS NOTES
Chief Complaint   Patient presents with   • COPD     Last Seen 10/10/18   • Results     Martin       HPI:  Summer Hatch is a 68 y.o. year old female here today for follow-up on her Asthma/COPD. Chest xray January 2017 was clear. PFT's in January 2016 indicated an FEV1 of 1.53 L, 64% predicted with an FEV1/FVC ratio of 59 and a DLCO of 75% predicted. Spirometry in March 2017 indicated an FEV1 of 1.50 L, 62% predicted with an FEV1/FVC ratio of 62.  PFT's in March 2018 indicated an FEV1 of 1.16 L, 48% predicted with an FEV1/FVC ratio of 53 and  DLCO of 77% predicted. She states she had a recent flare of her Asthma prior her March office visit. Spirometry was updated today in the office and indicate an FEV1 of 1.42 L, 60% predicted with an FEV1/FVC ratio of 59.     Multi oximetry at her last office visit indicated adequate 02 saturations with a low 02 of 90%.     She noted increased symptoms in early December and emailed a request for antibiotics. She was called in a script for a Zpak and Medrol dose pack. She felt this helped. However January 6th she was seen at the Urgent Care for increased cough and sinus congestion. She was treated with Doxycyline and Prednisone. She was given Tessalon perles as well. She is feeling much better. She states she was recently in [place] and felt better at lower elevation. She denies current cough or mucous production. She notes dyspnea with exertion which has improved overall. She notes an occasional wheeze. She denies any fevers or chills.     She has a history of tobacco abuse and quit smoking in 1998. She is compliant on Advair 250/50 bid, Spiriva daily and a Ventolin HFA inhaler as needed. She is on a PPI. She also uses a Flonase nasal spray.       Past Medical History:   Diagnosis Date   • Arthritis    • Asthma     Uses inhaler PRN.   • Breath shortness     With exertion.   • Bronchitis    • Carpal tunnel syndrome    • COPD    • Dental disorder    • Emphysema of lung (HCC)    •  GERD (gastroesophageal reflux disease) 10/22/2013   • Glaucoma     Bilateral.   • History of tobacco abuse 10/22/2013    Quit 1998. Smoked one pack per day for 30 years.   • Hypertension    • Hypothyroidism 10/22/2013   • Indigestion    • Osteoporosis    • Pain     Bilateral hips.   • Pneumonia    • Renal disorder    • Seizure disorder (HCC)     Last seizure 30 years ago.  No medication.       Past Surgical History:   Procedure Laterality Date   • ABDOMINOPLASTY  10/29/2018    Procedure: ABDOMINOPLASTY;  Surgeon: Santiago Campos M.D.;  Location: SURGERY SAME DAY [facility];  Service: Plastics   • HIP REVISION TOTAL Right 11/5/2015    Procedure: Evacuation hematoma, revision total hip;  Surgeon: Nils Starr M.D.;  Location: SURGERY [facility];  Service:    • HIP REVISION TOTAL  10/10/2013    Performed by Nils Starr M.D. at SURGERY [facility]   • INCISION AND DRAINAGE ORTHOPEDIC  10/10/2013    Performed by Nils Starr M.D. at [facility]   • APPENDECTOMY     • CARPAL TUNNEL RELEASE     • NERVE ULNAR REPAIR OR EXPLORE     • OTHER ORTHOPEDIC SURGERY     • TONSILLECTOMY         Family History   Problem Relation Age of Onset   • Psychiatry Mother 47        suicide   • Alcohol/Drug Mother    • Cancer Father 72        Lung   • Alcohol/Drug Brother         Recovering       Social History     Social History   • Marital status:      Spouse name: N/A   • Number of children: N/A   • Years of education: N/A     Occupational History   • Not on file.     Social History Main Topics   • Smoking status: Former Smoker     Packs/day: 1.00     Years: 30.00     Types: Cigarettes     Quit date: 1/1/1998   • Smokeless tobacco: Never Used   • Alcohol use No   • Drug use: No   • Sexual activity: Not on file     Other Topics Concern   • Not on file     Social History Narrative   • No narrative on file       ROS:  Constitutional: Denies fevers, chills, sweats, fatigue, weight loss  Eyes: Denies  glasses, vision loss, pain, drainage, double vision  Ears/Nose/Mouth/Throat: Denies ear ache, difficulty hearing, sore throat, persistent hoarseness, decayed teeth/toothache  Cardiovascular: Denies chest pain, tightness, palpitations, swelling in feet/legs, fainting, difficulty breathing when laying down  Respiratory: See HPI   GI: Denies heartburn, difficulty swallowing, nausea, vomiting, abdominal pain, diarrhea, constipation  : Denies frequent urination, painful urination  Integumentary: Denies rashes, lumps or color changes  MSK: Denies painful joints, sore muscles, and back pain.   Neurological: Denies frequent headaches, dizziness, weakness        Current Outpatient Prescriptions   Medication Sig Dispense Refill   • denosumab (PROLIA) 60 MG/ML Solution Inject 1 mL as instructed every 6 months. 1.8 mL 1   • MethylPREDNISolone (MEDROL DOSEPAK) 4 MG Tablet Therapy Pack Take as directed. 21 Tab 1   • aripiprazole (ABILIFY) 5 MG tablet TAKE 1 TABLET AT BEDTIME 90 Tab 3   • fenofibrate (TRICOR) 145 MG Tab TAKE 1 TABLET DAILY. 90 Tab 3   • allopurinol (ZYLOPRIM) 100 MG Tab TAKE 1 TABLET DAILY 90 Tab 3   • atorvastatin (LIPITOR) 40 MG Tab TAKE 1 TABLET DAILY 90 Tab 3   • omeprazole (PRILOSEC) 20 MG delayed-release capsule FOR DIRECTIONS ON HOW TO   TAKE THIS MEDICINE, READ   THE ENCLOSED MEDICATION    INFORMATION FORM 90 Cap 3   • SPIRIVA HANDIHALER 18 MCG Cap INHALE THE CONTENTS OF ONE CAPSULE DAILY 90 Cap 3   • triamterene-hctz (MAXZIDE-25/DYAZIDE) 37.5-25 MG Tab TAKE 1 TABLET EVERY MORNING 90 Tab 3   • albuterol 108 (90 Base) MCG/ACT Aero Soln inhalation aerosol Inhale 2 Puffs by mouth every 6 hours as needed for Shortness of Breath. 3 Inhaler 3   • lisinopril (PRINIVIL) 10 MG Tab TAKE 1 TABLET DAILY 90 Tab 3   • fluticasone (FLONASE) 50 MCG/ACT nasal spray Spray 2 Sprays in nose every day. 3 g 3   • DULoxetine (CYMBALTA) 60 MG Cap DR Particles delayed-release capsule TAKE 1 CAPSULE EVERY       MORNING 90 Cap 3  "  • SYNTHROID 100 MCG Tab TAKE 1 TABLET DAILY 90 Tab 3   • EPIPEN 2-MATILDE 0.3 MG/0.3ML Solution Auto-injector solution for injection      • albuterol (PROVENTIL) 2.5mg/3ml Nebu Soln solution for nebulization 3 mL by Nebulization route every four hours as needed for Shortness of Breath. 360 mL 5   • tramadol (ULTRAM) 50 MG Tab TAKE 1 TO 2 TABLETS BY MOUTH 2 TIMES DAILY AS NEEDED FOR SEVERE PAIN 90 Tab 5   • latanoprost (XALATAN) 0.005 % Solution Place 1 Drop in both eyes every evening. 1 Bottle 3   • benzonatate (TESSALON) 200 MG capsule Take 1 Cap by mouth 3 times a day as needed for Cough. (Patient not taking: Reported on 1/30/2019) 30 Cap 0   • azithromycin (ZITHROMAX Z-MATILDE) 250 MG Tab Take 2 tablets on day 1. Then take 1 tablet a day for 4 days. (Patient not taking: Reported on 1/6/2019) 6 Tab 1     No current facility-administered medications for this visit.        Allergies   Allergen Reactions   • Tylenol Anaphylaxis     Lip, throat swelling   • Tape Unspecified     Causes blisters   • Latex Hives       Blood pressure 130/70, pulse 76, temperature 36.1 °C (97 °F), temperature source Temporal, resp. rate 16, height 1.626 m (5' 4\"), weight 72.1 kg (159 lb), SpO2 95 %, not currently breastfeeding.    PE:   Appearance: Well developed, well nourished, no acute distress  Eyes: PERRL, EOM intact, sclera white, conjunctiva moist  Ears: no lesions or deformities  Hearing: grossly intact  Nose: no lesions or deformities  Oropharynx: tongue normal, posterior pharynx without erythema or exudate  Neck: supple, trachea midline, no masses   Respiratory effort: no intercostal retractions or use of accessory muscles  Lung auscultation: no rales, rhonchi or wheezes  Heart auscultation: no murmur rub or gallop  Extremities: no cyanosis or edema  Abdomen: soft ,non tender, no masses  Gait and Station: normal  Digits and nails: no clubbing, cyanosis, petechiae or nodes.  Cranial nerves: grossly intact  Skin: no rashes, lesions or " ulcers noted  Orientation: Oriented to time, person and place  Mood and affect: mood and affect appropriate, normal interaction with examiner  Judgement: Intact          Assessment:    1. Chronic obstructive asthma (HCC)  doxycycline (VIBRAMYCIN) 100 MG Cap    predniSONE (DELTASONE) 10 MG Tab   2. Hypoxemia     3. Seasonal allergies     4. Gastroesophageal reflux disease, esophagitis presence not specified           Plan:    1) She is feeling much better. Spirometry has improved. Continue Advair, Spiriva and Ventolin inhalers.   2) Continue routine exercise. Encourage use of Albuterol prior to exercise.  3) Continue PPI.  4) Continue Flonase.   5) Discussed allergy avoidance.  6) Multi oximetry in the past indicated adequate 02 saturations.   7) Discussed importance of prompt treatment of acute infectious symptoms. RX for Doxycycline and Prednisone taper provided to have o hand. However she is encouraged to make a follow up visit if she is experiencing recurrent exacerbations.   9) Patient is up to date on her Prevnar 13, Pneumovax 23 and Influenza vaccinations.   10) Follow up in 6 months, sooner OV if needed.

## 2019-02-01 ENCOUNTER — OUTPATIENT INFUSION SERVICES (OUTPATIENT)
Dept: ONCOLOGY | Facility: MEDICAL CENTER | Age: 69
End: 2019-02-01
Attending: FAMILY MEDICINE
Payer: MEDICARE

## 2019-02-01 VITALS
WEIGHT: 158.29 LBS | BODY MASS INDEX: 28.05 KG/M2 | TEMPERATURE: 97.9 F | DIASTOLIC BLOOD PRESSURE: 60 MMHG | HEART RATE: 89 BPM | HEIGHT: 63 IN | RESPIRATION RATE: 18 BRPM | OXYGEN SATURATION: 98 % | SYSTOLIC BLOOD PRESSURE: 125 MMHG

## 2019-02-01 DIAGNOSIS — M81.0 AGE-RELATED OSTEOPOROSIS WITHOUT CURRENT PATHOLOGICAL FRACTURE: ICD-10-CM

## 2019-02-01 LAB
CA-I BLD ISE-SCNC: 1.17 MMOL/L (ref 1.1–1.3)
CREAT BLD-MCNC: 1.4 MG/DL (ref 0.5–1.4)

## 2019-02-01 PROCEDURE — 700111 HCHG RX REV CODE 636 W/ 250 OVERRIDE (IP): Mod: JG | Performed by: FAMILY MEDICINE

## 2019-02-01 PROCEDURE — 96401 CHEMO ANTI-NEOPL SQ/IM: CPT

## 2019-02-01 PROCEDURE — 82330 ASSAY OF CALCIUM: CPT

## 2019-02-01 PROCEDURE — 82565 ASSAY OF CREATININE: CPT

## 2019-02-01 RX ADMIN — DENOSUMAB 60 MG: 60 INJECTION SUBCUTANEOUS at 09:25

## 2019-02-01 NOTE — PROGRESS NOTES
Pharmacy Note:    Scr = 1.4 mg/dL    with est crcl ~ 43.8 ml/min  Ionized Calcium =   1.17 mmol/L      Ok to proceed with prolia injection    Rocael Jordan, PharmD

## 2019-02-01 NOTE — PROGRESS NOTES
Pt ambulatory to [facility] for [id number] Prolia injection.  Pt has no s/s of infection, pt denies any recent dental surgeries or plans there of, pt has no complaints at this time.  ISTAT labs drawn from [facility] w/ 25G butterfly, gauze and coban applied to draw site.  Pt cr and ca w/n parameters for tx.  Prolia injected into pt's left back arm, band-aid placed.  Pt left on foot in NAD.  Pt's next appt for in 6 months made.

## 2019-02-25 RX ORDER — LEVOTHYROXINE SODIUM 100 MCG
TABLET ORAL
Qty: 90 TAB | Refills: 3 | Status: SHIPPED | OUTPATIENT
Start: 2019-02-25 | End: 2020-02-03

## 2019-02-26 ENCOUNTER — HOSPITAL ENCOUNTER (OUTPATIENT)
Dept: LAB | Facility: MEDICAL CENTER | Age: 69
End: 2019-02-26
Attending: INTERNAL MEDICINE
Payer: MEDICARE

## 2019-02-26 LAB
25(OH)D3 SERPL-MCNC: 21 NG/ML (ref 30–100)
ALBUMIN SERPL BCP-MCNC: 4.4 G/DL (ref 3.2–4.9)
ALBUMIN/GLOB SERPL: 1.9 G/DL
ALP SERPL-CCNC: 42 U/L (ref 30–99)
ALT SERPL-CCNC: 20 U/L (ref 2–50)
ANION GAP SERPL CALC-SCNC: 7 MMOL/L (ref 0–11.9)
AST SERPL-CCNC: 25 U/L (ref 12–45)
BASOPHILS # BLD AUTO: 1.3 % (ref 0–1.8)
BASOPHILS # BLD: 0.08 K/UL (ref 0–0.12)
BILIRUB SERPL-MCNC: 0.4 MG/DL (ref 0.1–1.5)
BUN SERPL-MCNC: 27 MG/DL (ref 8–22)
CALCIUM SERPL-MCNC: 9.9 MG/DL (ref 8.5–10.5)
CHLORIDE SERPL-SCNC: 103 MMOL/L (ref 96–112)
CO2 SERPL-SCNC: 25 MMOL/L (ref 20–33)
CREAT SERPL-MCNC: 1.52 MG/DL (ref 0.5–1.4)
CREAT UR-MCNC: 44.8 MG/DL
EOSINOPHIL # BLD AUTO: 0.12 K/UL (ref 0–0.51)
EOSINOPHIL NFR BLD: 1.9 % (ref 0–6.9)
ERYTHROCYTE [DISTWIDTH] IN BLOOD BY AUTOMATED COUNT: 46.8 FL (ref 35.9–50)
GLOBULIN SER CALC-MCNC: 2.3 G/DL (ref 1.9–3.5)
GLUCOSE SERPL-MCNC: 97 MG/DL (ref 65–99)
HCT VFR BLD AUTO: 38.3 % (ref 37–47)
HGB BLD-MCNC: 12 G/DL (ref 12–16)
IMM GRANULOCYTES # BLD AUTO: 0.02 K/UL (ref 0–0.11)
IMM GRANULOCYTES NFR BLD AUTO: 0.3 % (ref 0–0.9)
LYMPHOCYTES # BLD AUTO: 1.68 K/UL (ref 1–4.8)
LYMPHOCYTES NFR BLD: 27.1 % (ref 22–41)
MCH RBC QN AUTO: 25.6 PG (ref 27–33)
MCHC RBC AUTO-ENTMCNC: 31.3 G/DL (ref 33.6–35)
MCV RBC AUTO: 81.8 FL (ref 81.4–97.8)
MONOCYTES # BLD AUTO: 0.68 K/UL (ref 0–0.85)
MONOCYTES NFR BLD AUTO: 11 % (ref 0–13.4)
NEUTROPHILS # BLD AUTO: 3.61 K/UL (ref 2–7.15)
NEUTROPHILS NFR BLD: 58.4 % (ref 44–72)
NRBC # BLD AUTO: 0 K/UL
NRBC BLD-RTO: 0 /100 WBC
PHOSPHATE SERPL-MCNC: 3.6 MG/DL (ref 2.5–4.5)
PLATELET # BLD AUTO: 282 K/UL (ref 164–446)
PMV BLD AUTO: 10.9 FL (ref 9–12.9)
POTASSIUM SERPL-SCNC: 4.5 MMOL/L (ref 3.6–5.5)
PROT SERPL-MCNC: 6.7 G/DL (ref 6–8.2)
PROT UR-MCNC: <4 MG/DL (ref 0–15)
RBC # BLD AUTO: 4.68 M/UL (ref 4.2–5.4)
SODIUM SERPL-SCNC: 135 MMOL/L (ref 135–145)
WBC # BLD AUTO: 6.2 K/UL (ref 4.8–10.8)

## 2019-02-26 PROCEDURE — 82570 ASSAY OF URINE CREATININE: CPT

## 2019-02-26 PROCEDURE — 36415 COLL VENOUS BLD VENIPUNCTURE: CPT

## 2019-02-26 PROCEDURE — 84100 ASSAY OF PHOSPHORUS: CPT

## 2019-02-26 PROCEDURE — 82306 VITAMIN D 25 HYDROXY: CPT

## 2019-02-26 PROCEDURE — 84156 ASSAY OF PROTEIN URINE: CPT

## 2019-02-26 PROCEDURE — 80053 COMPREHEN METABOLIC PANEL: CPT

## 2019-02-26 PROCEDURE — 85025 COMPLETE CBC W/AUTO DIFF WBC: CPT

## 2019-04-10 DIAGNOSIS — K21.9 GASTROESOPHAGEAL REFLUX DISEASE, ESOPHAGITIS PRESENCE NOT SPECIFIED: ICD-10-CM

## 2019-04-12 RX ORDER — OMEPRAZOLE 20 MG/1
CAPSULE, DELAYED RELEASE ORAL
Qty: 90 CAP | Refills: 3 | Status: SHIPPED | OUTPATIENT
Start: 2019-04-12 | End: 2019-09-17

## 2019-04-12 RX ORDER — DULOXETIN HYDROCHLORIDE 60 MG/1
CAPSULE, DELAYED RELEASE ORAL
Qty: 90 CAP | Refills: 3 | Status: SHIPPED | OUTPATIENT
Start: 2019-04-12 | End: 2020-03-30

## 2019-04-24 ENCOUNTER — OFFICE VISIT (OUTPATIENT)
Dept: PULMONOLOGY | Facility: HOSPICE | Age: 69
End: 2019-04-24
Payer: MEDICARE

## 2019-04-24 VITALS
WEIGHT: 152 LBS | OXYGEN SATURATION: 92 % | BODY MASS INDEX: 26.93 KG/M2 | SYSTOLIC BLOOD PRESSURE: 124 MMHG | DIASTOLIC BLOOD PRESSURE: 60 MMHG | HEIGHT: 63 IN | RESPIRATION RATE: 16 BRPM | HEART RATE: 79 BPM

## 2019-04-24 DIAGNOSIS — Z88.9 HISTORY OF SEASONAL ALLERGIES: ICD-10-CM

## 2019-04-24 DIAGNOSIS — J45.901 CHRONIC OBSTRUCTIVE ASTHMA WITH ACUTE EXACERBATION (HCC): ICD-10-CM

## 2019-04-24 DIAGNOSIS — J44.1 CHRONIC OBSTRUCTIVE ASTHMA WITH ACUTE EXACERBATION (HCC): ICD-10-CM

## 2019-04-24 PROCEDURE — 99213 OFFICE O/P EST LOW 20 MIN: CPT | Performed by: PHYSICIAN ASSISTANT

## 2019-04-24 RX ORDER — PREDNISONE 10 MG/1
TABLET ORAL
Qty: 18 TAB | Refills: 0 | Status: SHIPPED | OUTPATIENT
Start: 2019-04-24 | End: 2019-07-17

## 2019-04-24 RX ORDER — AZITHROMYCIN 250 MG/1
TABLET, FILM COATED ORAL
Qty: 6 TAB | Refills: 0 | Status: SHIPPED | OUTPATIENT
Start: 2019-04-24 | End: 2019-07-22 | Stop reason: SDUPTHER

## 2019-04-24 NOTE — PATIENT INSTRUCTIONS
1-reviewed inhaler use and spacer provided  2-do not take more often than every 4 hours  3-if worsening of symptoms seek urgent care  4-antibiotic and steroid provided  5-has follow up in July with Corin

## 2019-04-25 NOTE — PROGRESS NOTES
CC shortness of breath    HPI:  Summer Hatch is a 68 y.o. year old female here today for sick visit.  She reports having a cold about 45 days ago and using her emergency pack of antibiotic and prednisone.  Her chest still feels tight and she is having difficulty walking any distance.  Reports using her inhaler up to 15 times a day.  She reports concern that she will be able to tolerate upcoming trip to [place].  Patient last seen in clinic on 1/30/2019 by MALOU Humphrey.  Patient is a former smoker with 30-pack-year history who quit in 1998.  Continued abstinence was advised.    Reviewed in clinic vitals including blood pressure 124/60, heart rate is 79, O2 sat of 92% on room air (was 96% at last visit) and BMI of 26.93 kg/m².    Last chest x-ray 10/1/2018 after a ground-level fall demonstrating mildly displaced acute fracture of the left eighth rib without pneumothorax.  Last pulmonary function test completed 3/30/2018, demonstrated FVC of 2.20 L or 70% predicted, FEV1 of 1.16 L or 48% predicted, FEV1/FVC ratio of 0.68, 21% improvement noted in FEV1 post bronchodilator, residual volume 148% predicted, % predicted and DLCO 77% predicted. Per pulmonology interpretation reduction of mid flows noted, definite bronchodilator response, expiratory reserve volume is low consistent with elevated body mass, mild to moderate hyperinflation is noted, flows are reduced compared to previous test.    Reviewed current home regimen including Spiriva, Flonase when allergies are flared up and albuterol nebulized or MDI.  She does report sleeping with head of bed elevated.  She does report mild shortness of breath at rest and moderate with activity.    She does have a history of osteoporosis and compression fracture.  Has follow-up appointment with Corin in July.    ROS:   Constitutional: Denies fevers, chills, night sweats, increased fatigue or unintentional weight loss  Skin: Denies rashes, hair or nail  changes, lumps or sores   Eyes: Wears glasses, denies new or sudden vision changes  Ears, Nose, Throat: history of seasonal allergies, no sinus infections, persistent hoarseness or sore throat, no earaches, experiences nasal congestion and runny nose when allergies are issue  GI: Denies heartburn/reflux, denies difficulty swallowing  Respiratory: No cough, increased wheezing, mild shortness of breath at rest, moderate shortness of breath with activity, history of pneumonia and bronchitis, no TB or occupational exposure  CV: Denies chest pain, tightness, palpitations, heart murmur or edema, does report sleeping with head of bed elevated      Past Medical History:   Diagnosis Date   • Arthritis    • Asthma     Uses inhaler PRN.   • Breath shortness     With exertion.   • Bronchitis    • Carpal tunnel syndrome    • COPD    • Dental disorder    • Emphysema of lung (HCC)    • GERD (gastroesophageal reflux disease) 10/22/2013   • Glaucoma     Bilateral.   • History of tobacco abuse 10/22/2013    Quit 1998. Smoked one pack per day for 30 years.   • Hypertension    • Hypothyroidism 10/22/2013   • Indigestion    • Osteoporosis    • Pain     Bilateral hips.   • Pneumonia    • Renal disorder    • Seizure disorder (HCC)     Last seizure 30 years ago.  No medication.       Past Surgical History:   Procedure Laterality Date   • ABDOMINOPLASTY  10/29/2018    Procedure: ABDOMINOPLASTY;  Surgeon: Santiago Campos M.D.;  Location: SURGERY SAME DAY [facility];  Service: Plastics   • HIP REVISION TOTAL Right 11/5/2015    Procedure: Evacuation hematoma, revision total hip;  Surgeon: Nils Starr M.D.;  Location: SURGERY [facility];  Service:    • HIP REVISION TOTAL  10/10/2013    Performed by Nils Starr M.D. at SURGERY [facility]   • INCISION AND DRAINAGE ORTHOPEDIC  10/10/2013    Performed by Nils Starr M.D. at [facility]   • APPENDECTOMY     • CARPAL TUNNEL RELEASE     • NERVE ULNAR REPAIR OR  "EXPLORE     • OTHER ORTHOPEDIC SURGERY     • TONSILLECTOMY         Family History   Problem Relation Age of Onset   • Psychiatry Mother 47        suicide   • Alcohol/Drug Mother    • Cancer Father 72        Lung   • Alcohol/Drug Brother         Recovering       Social History     Social History   • Marital status:      Spouse name: N/A   • Number of children: N/A   • Years of education: N/A     Occupational History   • Not on file.     Social History Main Topics   • Smoking status: Former Smoker     Packs/day: 1.00     Years: 30.00     Types: Cigarettes     Quit date: 1/1/1998   • Smokeless tobacco: Never Used      Comment: continued abstinance   • Alcohol use No   • Drug use: No   • Sexual activity: Not on file     Other Topics Concern   • Not on file     Social History Narrative   • No narrative on file       Allergies as of 04/24/2019 - Reviewed 04/24/2019   Allergen Reaction Noted   • Tylenol Anaphylaxis 09/15/2016   • Tape Unspecified 10/09/2013   • Latex Hives 10/29/2018        @Vital signs for this encounter:  Vitals:    04/24/19 1527 04/24/19 1528   Height: 1.6 m (5' 3\")    Weight: 68.9 kg (152 lb)    Weight % change since last entry.: 0 %    BP: 124/60    Pulse: 79    BMI (Calculated): 26.93    Resp: 16    O2 sat % room air:  92 %       Current medications as of today   Current Outpatient Prescriptions   Medication Sig Dispense Refill   • predniSONE (DELTASONE) 10 MG Tab Take 30mg x 3 days, then take 20mg x 3 days, then take 10mg x 3 days, with food, then discontinue. 18 Tab 0   • azithromycin (ZITHROMAX) 250 MG Tab Take 2 tablets on day 1, then take 1 tablet a day for 4 days. 6 Tab 0   • DULoxetine (CYMBALTA) 60 MG Cap DR Particles delayed-release capsule TAKE 1 CAPSULE EVERY       MORNING 90 Cap 3   • omeprazole (PRILOSEC) 20 MG delayed-release capsule FOR DIRECTIONS ON HOW TO   TAKE THIS MEDICINE, READ   THE ENCLOSED MEDICATION    INFORMATION FORM 90 Cap 3   • SYNTHROID 100 MCG Tab TAKE 1 TABLET " DAILY 90 Tab 3   • doxycycline (VIBRAMYCIN) 100 MG Cap Take 1 Cap by mouth 2 times a day. Take until gone. 20 Cap 1   • denosumab (PROLIA) 60 MG/ML Solution Inject 1 mL as instructed every 6 months. 1.8 mL 1   • MethylPREDNISolone (MEDROL DOSEPAK) 4 MG Tablet Therapy Pack Take as directed. 21 Tab 1   • aripiprazole (ABILIFY) 5 MG tablet TAKE 1 TABLET AT BEDTIME 90 Tab 3   • fenofibrate (TRICOR) 145 MG Tab TAKE 1 TABLET DAILY. 90 Tab 3   • allopurinol (ZYLOPRIM) 100 MG Tab TAKE 1 TABLET DAILY 90 Tab 3   • atorvastatin (LIPITOR) 40 MG Tab TAKE 1 TABLET DAILY 90 Tab 3   • omeprazole (PRILOSEC) 20 MG delayed-release capsule FOR DIRECTIONS ON HOW TO   TAKE THIS MEDICINE, READ   THE ENCLOSED MEDICATION    INFORMATION FORM 90 Cap 3   • SPIRIVA HANDIHALER 18 MCG Cap INHALE THE CONTENTS OF ONE CAPSULE DAILY 90 Cap 3   • triamterene-hctz (MAXZIDE-25/DYAZIDE) 37.5-25 MG Tab TAKE 1 TABLET EVERY MORNING 90 Tab 3   • albuterol 108 (90 Base) MCG/ACT Aero Soln inhalation aerosol Inhale 2 Puffs by mouth every 6 hours as needed for Shortness of Breath. 3 Inhaler 3   • lisinopril (PRINIVIL) 10 MG Tab TAKE 1 TABLET DAILY 90 Tab 3   • fluticasone (FLONASE) 50 MCG/ACT nasal spray Spray 2 Sprays in nose every day. 3 g 3   • albuterol (PROVENTIL) 2.5mg/3ml Nebu Soln solution for nebulization 3 mL by Nebulization route every four hours as needed for Shortness of Breath. 360 mL 5   • tramadol (ULTRAM) 50 MG Tab TAKE 1 TO 2 TABLETS BY MOUTH 2 TIMES DAILY AS NEEDED FOR SEVERE PAIN 90 Tab 5   • latanoprost (XALATAN) 0.005 % Solution Place 1 Drop in both eyes every evening. 1 Bottle 3   • predniSONE (DELTASONE) 10 MG Tab Take 30mg x 3 days, then take 20mg x 3 days, then take 10mg x 3 days, with food, then discontinue. (Patient not taking: Reported on 4/24/2019) 18 Tab 1   • EPIPEN 2-MATILDE 0.3 MG/0.3ML Solution Auto-injector solution for injection        No current facility-administered medications for this visit.          Physical Exam:   Gen:            Alert and oriented, No apparent distress. Mood and affect appropriate, normal interaction with provider.  Eyes:          sclere white, conjunctive moist.  Hearing:     Grossly intact.  Dentition:    Good dentition.  Oropharynx:   Tongue normal, posterior pharynx without erythema or exudate.  Neck:        Supple, trachea midline, no masses.  Respiratory Effort: No intercostal retractions or use of accessory muscles.   Lung Auscultation:      Diminished throughout with a few scattered crackles   CV:            Regular rate and rhythm.  1+ pretibial edema, no murmurs, rubs or gallops appreciated  Digits, Nails, Ext: No clubbing, cyanosis, petechiae, or nodes.   Skin:        No rashes, lesions or ulcers noted.                     Assessment:  1. Chronic obstructive asthma with acute exacerbation (HCC)   Zithromax and prednisone taper   2. History of seasonal allergies   Flonase if symptomatic       Immunizations:    Flu: 10/4/2018  Pneumovax 23: 10/14/2013  Prevnar 13: 9/28/2015    Plan:  1-reviewed inhaler use and spacer provided  2-do not take more often than every 4 hours  3-if worsening of symptoms seek urgent care  4-antibiotic and steroid provided  5-has follow up in July with Corin    This dictation was created using voice recognition software. The accuracy of the dictation is limited to the abilities of the software. I expect there may be some errors of grammar and possibly content.

## 2019-06-17 RX ORDER — FLUTICASONE PROPIONATE 50 MCG
SPRAY, SUSPENSION (ML) NASAL
Qty: 48 G | Refills: 3 | Status: SHIPPED | OUTPATIENT
Start: 2019-06-17 | End: 2020-05-20 | Stop reason: SDUPTHER

## 2019-07-16 RX ORDER — TRIAMTERENE AND HYDROCHLOROTHIAZIDE 37.5; 25 MG/1; MG/1
TABLET ORAL
Qty: 90 TAB | Refills: 3 | Status: SHIPPED | OUTPATIENT
Start: 2019-07-16 | End: 2020-06-28

## 2019-07-16 RX ORDER — LISINOPRIL 10 MG/1
TABLET ORAL
Qty: 90 TAB | Refills: 3 | Status: SHIPPED | OUTPATIENT
Start: 2019-07-16 | End: 2020-05-14

## 2019-07-17 ENCOUNTER — OFFICE VISIT (OUTPATIENT)
Dept: MEDICAL GROUP | Facility: MEDICAL CENTER | Age: 69
End: 2019-07-17
Payer: MEDICARE

## 2019-07-17 ENCOUNTER — TELEPHONE (OUTPATIENT)
Dept: MEDICAL GROUP | Facility: MEDICAL CENTER | Age: 69
End: 2019-07-17

## 2019-07-17 ENCOUNTER — HOSPITAL ENCOUNTER (OUTPATIENT)
Dept: LAB | Facility: MEDICAL CENTER | Age: 69
End: 2019-07-17
Payer: MEDICARE

## 2019-07-17 VITALS
TEMPERATURE: 98.4 F | HEART RATE: 58 BPM | WEIGHT: 161 LBS | SYSTOLIC BLOOD PRESSURE: 134 MMHG | BODY MASS INDEX: 27.49 KG/M2 | HEIGHT: 64 IN | DIASTOLIC BLOOD PRESSURE: 72 MMHG | OXYGEN SATURATION: 97 %

## 2019-07-17 DIAGNOSIS — N18.30 CHRONIC KIDNEY DISEASE, STAGE III (MODERATE) (HCC): ICD-10-CM

## 2019-07-17 DIAGNOSIS — R73.01 ELEVATED FASTING BLOOD SUGAR: ICD-10-CM

## 2019-07-17 DIAGNOSIS — M48.54XS NON-TRAUMATIC COMPRESSION FRACTURE OF TWELFTH THORACIC VERTEBRA, SEQUELA: ICD-10-CM

## 2019-07-17 DIAGNOSIS — Z00.00 MEDICARE ANNUAL WELLNESS VISIT, SUBSEQUENT: ICD-10-CM

## 2019-07-17 DIAGNOSIS — Z87.892 HISTORY OF ANAPHYLAXIS: ICD-10-CM

## 2019-07-17 DIAGNOSIS — F33.1 MDD (MAJOR DEPRESSIVE DISORDER), RECURRENT EPISODE, MODERATE (HCC): ICD-10-CM

## 2019-07-17 DIAGNOSIS — M81.0 AGE-RELATED OSTEOPOROSIS WITHOUT CURRENT PATHOLOGICAL FRACTURE: ICD-10-CM

## 2019-07-17 DIAGNOSIS — E78.2 MIXED HYPERLIPIDEMIA: ICD-10-CM

## 2019-07-17 DIAGNOSIS — J44.9 CHRONIC OBSTRUCTIVE PULMONARY DISEASE, UNSPECIFIED COPD TYPE (HCC): ICD-10-CM

## 2019-07-17 DIAGNOSIS — G62.9 NEUROPATHY: ICD-10-CM

## 2019-07-17 DIAGNOSIS — G40.909 SEIZURE DISORDER (HCC): ICD-10-CM

## 2019-07-17 DIAGNOSIS — Z23 NEED FOR VACCINATION: ICD-10-CM

## 2019-07-17 LAB
BASOPHILS # BLD AUTO: 1.2 % (ref 0–1.8)
BASOPHILS # BLD: 0.08 K/UL (ref 0–0.12)
CRP SERPL HS-MCNC: 0.1 MG/DL (ref 0–0.75)
EOSINOPHIL # BLD AUTO: 0.12 K/UL (ref 0–0.51)
EOSINOPHIL NFR BLD: 1.8 % (ref 0–6.9)
ERYTHROCYTE [DISTWIDTH] IN BLOOD BY AUTOMATED COUNT: 45.6 FL (ref 35.9–50)
ERYTHROCYTE [SEDIMENTATION RATE] IN BLOOD BY WESTERGREN METHOD: 21 MM/HOUR (ref 0–30)
HCT VFR BLD AUTO: 41.4 % (ref 37–47)
HGB BLD-MCNC: 12.9 G/DL (ref 12–16)
IMM GRANULOCYTES # BLD AUTO: 0.02 K/UL (ref 0–0.11)
IMM GRANULOCYTES NFR BLD AUTO: 0.3 % (ref 0–0.9)
LYMPHOCYTES # BLD AUTO: 1.81 K/UL (ref 1–4.8)
LYMPHOCYTES NFR BLD: 27.8 % (ref 22–41)
MCH RBC QN AUTO: 25.9 PG (ref 27–33)
MCHC RBC AUTO-ENTMCNC: 31.2 G/DL (ref 33.6–35)
MCV RBC AUTO: 83 FL (ref 81.4–97.8)
MONOCYTES # BLD AUTO: 0.67 K/UL (ref 0–0.85)
MONOCYTES NFR BLD AUTO: 10.3 % (ref 0–13.4)
NEUTROPHILS # BLD AUTO: 3.81 K/UL (ref 2–7.15)
NEUTROPHILS NFR BLD: 58.6 % (ref 44–72)
NRBC # BLD AUTO: 0 K/UL
NRBC BLD-RTO: 0 /100 WBC
PLATELET # BLD AUTO: 256 K/UL (ref 164–446)
PMV BLD AUTO: 10.9 FL (ref 9–12.9)
RBC # BLD AUTO: 4.99 M/UL (ref 4.2–5.4)
WBC # BLD AUTO: 6.5 K/UL (ref 4.8–10.8)

## 2019-07-17 PROCEDURE — 85652 RBC SED RATE AUTOMATED: CPT

## 2019-07-17 PROCEDURE — 90732 PPSV23 VACC 2 YRS+ SUBQ/IM: CPT | Performed by: FAMILY MEDICINE

## 2019-07-17 PROCEDURE — G0009 ADMIN PNEUMOCOCCAL VACCINE: HCPCS | Performed by: FAMILY MEDICINE

## 2019-07-17 PROCEDURE — G0439 PPPS, SUBSEQ VISIT: HCPCS | Performed by: FAMILY MEDICINE

## 2019-07-17 PROCEDURE — 36415 COLL VENOUS BLD VENIPUNCTURE: CPT

## 2019-07-17 PROCEDURE — 85025 COMPLETE CBC W/AUTO DIFF WBC: CPT

## 2019-07-17 PROCEDURE — 86140 C-REACTIVE PROTEIN: CPT

## 2019-07-17 RX ORDER — EPINEPHRINE 0.3 MG/.3ML
0.3 INJECTION SUBCUTANEOUS ONCE
Qty: 0.3 ML | Refills: 0 | Status: SHIPPED | OUTPATIENT
Start: 2019-07-17 | End: 2019-07-17 | Stop reason: SDUPTHER

## 2019-07-17 RX ORDER — EPINEPHRINE 0.3 MG/.3ML
0.3 INJECTION SUBCUTANEOUS ONCE
Qty: 2 EACH | Refills: 0 | Status: SHIPPED | OUTPATIENT
Start: 2019-07-17 | End: 2019-07-17

## 2019-07-17 ASSESSMENT — PATIENT HEALTH QUESTIONNAIRE - PHQ9: CLINICAL INTERPRETATION OF PHQ2 SCORE: 0

## 2019-07-17 ASSESSMENT — ENCOUNTER SYMPTOMS: GENERAL WELL-BEING: GOOD

## 2019-07-17 ASSESSMENT — ACTIVITIES OF DAILY LIVING (ADL): BATHING_REQUIRES_ASSISTANCE: 0

## 2019-07-17 NOTE — TELEPHONE ENCOUNTER
Rx for EpiPen needs day supply and quantity.  Medication only available in packs of 2-0.3mL pens. Please advise on number of packs to be given.

## 2019-07-17 NOTE — PROGRESS NOTES
Chief Complaint   Patient presents with   • Annual Exam     medication questions, arthritis   • Medication Refill     prolia injection         HPI:  Summer Hatch is a 69 y.o. here for Medicare Annual Wellness Visit     Patient Active Problem List    Diagnosis Date Noted   • Status post right hip replacement 10/31/2015     Priority: High   • Chronic kidney disease, stage III (moderate) ([facility]) 09/26/2014     Priority: Medium   • HTN (hypertension) 10/10/2013     Priority: Medium   • MDD (major depressive disorder), recurrent episode, moderate ([facility]) 06/18/2015     Priority: Low   • Prediabetes 05/15/2015     Priority: Low   • Acquired hypothyroidism 10/22/2013     Priority: Low   • GERD (gastroesophageal reflux disease) 10/22/2013     Priority: Low   • Hyperlipidemia 10/10/2013     Priority: Low   • History of anaphylaxis 07/17/2019   • Mild persistent asthma without complication 03/30/2018   • Dyspnea on exertion 03/30/2018   • Osteoporosis 04/18/2017   • Compression fracture of thoracic spine, non-traumatic ([facility]) 03/01/2017   • Chronic obstructive pulmonary disease ([facility]) 02/01/2017   • Atherosclerosis of both carotid arteries 10/19/2016   • Bilateral hip pain 03/21/2016   • Atopic dermatitis 01/08/2015   • Vitamin D insufficiency 09/26/2014   • Seasonal allergies 06/13/2014   • Elevated uric acid in blood 03/13/2014   • Neuropathy ([facility]) 01/10/2014   • Screening for breast cancer 10/22/2013   • History of tobacco abuse 10/22/2013   • Seizure disorder ([facility]) 10/10/2013   • CTS (carpal tunnel syndrome) 10/10/2013   • History of septic arthritis 10/09/2013       Current Outpatient Prescriptions   Medication Sig Dispense Refill   • denosumab (PROLIA) 60 MG/ML Solution Inject 1 mL as instructed every 6 months. 1.8 mL 1   • EPINEPHrine (EPIPEN 2-MATILDE) 0.3 MG/0.3ML Solution Auto-injector solution for injection 0.3 mL by Intramuscular route Once for 1 dose. 0.3 mL 0   • lisinopril (PRINIVIL) 10 MG Tab TAKE 1 TABLET DAILY 90  Tab 3   • triamterene-hctz (MAXZIDE-25/DYAZIDE) 37.5-25 MG Tab TAKE 1 TABLET EVERY MORNING 90 Tab 3   • fluticasone (FLONASE) 50 MCG/ACT nasal spray USE 2 SPRAYS NASALLY DAILY 48 g 3   • azithromycin (ZITHROMAX) 250 MG Tab Take 2 tablets on day 1, then take 1 tablet a day for 4 days. 6 Tab 0   • DULoxetine (CYMBALTA) 60 MG Cap DR Particles delayed-release capsule TAKE 1 CAPSULE EVERY       MORNING 90 Cap 3   • omeprazole (PRILOSEC) 20 MG delayed-release capsule FOR DIRECTIONS ON HOW TO   TAKE THIS MEDICINE, READ   THE ENCLOSED MEDICATION    INFORMATION FORM 90 Cap 3   • SYNTHROID 100 MCG Tab TAKE 1 TABLET DAILY 90 Tab 3   • doxycycline (VIBRAMYCIN) 100 MG Cap Take 1 Cap by mouth 2 times a day. Take until gone. 20 Cap 1   • MethylPREDNISolone (MEDROL DOSEPAK) 4 MG Tablet Therapy Pack Take as directed. 21 Tab 1   • aripiprazole (ABILIFY) 5 MG tablet TAKE 1 TABLET AT BEDTIME 90 Tab 3   • fenofibrate (TRICOR) 145 MG Tab TAKE 1 TABLET DAILY. 90 Tab 3   • allopurinol (ZYLOPRIM) 100 MG Tab TAKE 1 TABLET DAILY 90 Tab 3   • atorvastatin (LIPITOR) 40 MG Tab TAKE 1 TABLET DAILY 90 Tab 3   • omeprazole (PRILOSEC) 20 MG delayed-release capsule FOR DIRECTIONS ON HOW TO   TAKE THIS MEDICINE, READ   THE ENCLOSED MEDICATION    INFORMATION FORM 90 Cap 3   • SPIRIVA HANDIHALER 18 MCG Cap INHALE THE CONTENTS OF ONE CAPSULE DAILY 90 Cap 3   • albuterol 108 (90 Base) MCG/ACT Aero Soln inhalation aerosol Inhale 2 Puffs by mouth every 6 hours as needed for Shortness of Breath. 3 Inhaler 3   • albuterol (PROVENTIL) 2.5mg/3ml Nebu Soln solution for nebulization 3 mL by Nebulization route every four hours as needed for Shortness of Breath. 360 mL 5   • tramadol (ULTRAM) 50 MG Tab TAKE 1 TO 2 TABLETS BY MOUTH 2 TIMES DAILY AS NEEDED FOR SEVERE PAIN 90 Tab 5   • latanoprost (XALATAN) 0.005 % Solution Place 1 Drop in both eyes every evening. 1 Bottle 3     No current facility-administered medications for this visit.             Current  supplements as per medication list.       Allergies: Tylenol; Tape; and Latex    Current social contact/activities: lives with boyfriend. 1 daughter in [place] and 2 grandchildren. 1 best friend in [place].     She  reports that she quit smoking about 21 years ago. Her smoking use included Cigarettes. She has a 30.00 pack-year smoking history. She has never used smokeless tobacco. She reports that she does not drink alcohol or use drugs.  Counseling given: Not Answered      DPA/Advanced Directive:  Patient has Advanced Directive on file.     ROS:    Gait: Uses no assistive device  Ostomy: No  Other tubes: No  Amputations: No  Chronic oxygen use: No  Last eye exam: 6 months ago.  Wears hearing aids: No   : Denies any urinary leakage during the last 6 months      Depression Screening    Little interest or pleasure in doing things?  0 - not at all  Feeling down, depressed , or hopeless? 0 - not at all  Patient Health Questionnaire Score: 0     If depressive symptoms identified deferred to follow up visit unless specifically addressed in assessment and plan.    Interpretation of PHQ-9 Total Score   Score Severity   1-4 No Depression   5-9 Mild Depression   10-14 Moderate Depression   15-19 Moderately Severe Depression   20-27 Severe Depression    Screening for Cognitive Impairment    Three Minute Recall (village, kitchen, baby) 3/3    Lorenzo clock face with all 12 numbers and set the hands to show 10 past 10.  Yes    Cognitive concerns identified deferred for follow up unless specifically addressed in assessment and plan.    Fall Risk Assessment    Has the patient had two or more falls in the last year or any fall with injury in the last year?  No    Safety Assessment    Throw rugs on floor.  Yes  Handrails on all stairs.  Yes  Good lighting in all hallways.  Yes  Difficulty hearing.  No  Patient counseled about all safety risks that were identified.    Functional Assessment ADLs    Are there any barriers preventing you from  cooking for yourself or meeting nutritional needs?  No.    Are there any barriers preventing you from driving safely or obtaining transportation?  No.    Are there any barriers preventing you from using a telephone or calling for help?  No.    Are there any barriers preventing you from shopping?  No.    Are there any barriers preventing you from taking care of your own finances?  No.    Are there any barriers preventing you from managing your medications?  No.    Are there any barriers preventing you from showering, bathing or dressing yourself?  No.    Are you currently engaging in any exercise or physical activity?  No.     What is your perception of your health?  Good.      Health Maintenance Summary                IMM ZOSTER VACCINES Overdue 6/6/2001     PAP SMEAR Overdue 6/26/2018      Done 6/26/2015 PATHOLOGY GYN SPECIMEN     Patient has more history with this topic...    IMM PNEUMOCOCCAL 65+ (ADULT) LOW/MEDIUM RISK SERIES Overdue 10/14/2018      Done 9/28/2015 Imm Admin: Pneumococcal Conjugate Vaccine (Prevnar/PCV-13)     Patient has more history with this topic...    Annual Wellness Visit Overdue 6/13/2019      Done 6/12/2018 Visit Dx: Medicare annual wellness visit, initial    MAMMOGRAM Next Due 7/31/2019      Done 7/31/2018 MA-MAMMO SCREENING BILAT W/TOMOSYNTHESIS W/CAD     Patient has more history with this topic...    IMM INFLUENZA Next Due 9/1/2019      Done 10/4/2018 Imm Admin: Influenza Vaccine Adult HD     Patient has more history with this topic...    COLONOSCOPY Next Due 1/1/2020      Done 1/1/2010     PFT SCREENING-FEV1 AND FEV/FVC RATIO / SPIROMETRY SHOULD BE PERFORMED ANNUALLY Next Due 1/30/2020      Done 1/30/2019 AMB SPIROMETRY     Patient has more history with this topic...    BONE DENSITY Next Due 2/1/2022      Done 2/1/2017 DS-BONE DENSITY STUDY (DEXA)    IMM DTaP/Tdap/Td Vaccine Next Due 2/15/2028      Done 2/15/2018 Imm Admin: Tdap Vaccine          Patient Care Team:  Jl Palomino M.D.  "as PCP - General (Family Medicine)  Mya West M.D. as Consulting Physician (Nephrology)        Social History   Substance Use Topics   • Smoking status: Former Smoker     Packs/day: 1.00     Years: 30.00     Types: Cigarettes     Quit date: 1/1/1998   • Smokeless tobacco: Never Used      Comment: continued abstinance   • Alcohol use No     Family History   Problem Relation Age of Onset   • Psychiatry Mother 47        suicide   • Alcohol/Drug Mother    • Cancer Father 72        Lung   • Alcohol/Drug Brother         Recovering     She  has a past medical history of Arthritis; Asthma; Breath shortness; Bronchitis; Carpal tunnel syndrome; COPD; Dental disorder; Emphysema of lung (HCC); GERD (gastroesophageal reflux disease) (10/22/2013); Glaucoma; History of tobacco abuse (10/22/2013); Hypertension; Hypothyroidism (10/22/2013); Indigestion; Osteoporosis; Pain; Pneumonia; Renal disorder; and Seizure disorder ([facility]).   Past Surgical History:   Procedure Laterality Date   • ABDOMINOPLASTY  10/29/2018    Procedure: ABDOMINOPLASTY;  Surgeon: Santiago Campos M.D.;  Location: SURGERY SAME DAY [facility];  Service: Plastics   • HIP REVISION TOTAL Right 11/5/2015    Procedure: Evacuation hematoma, revision total hip;  Surgeon: Nils Starr M.D.;  Location: SURGERY [facility];  Service:    • HIP REVISION TOTAL  10/10/2013    Performed by Nils Starr M.D. at [facility]   • INCISION AND DRAINAGE ORTHOPEDIC  10/10/2013    Performed by Nils Starr M.D. at [facility]   • APPENDECTOMY     • CARPAL TUNNEL RELEASE     • NERVE ULNAR REPAIR OR EXPLORE     • OTHER ORTHOPEDIC SURGERY     • TONSILLECTOMY         Exam:   /72 (BP Location: Right arm, Patient Position: Sitting)   Pulse (!) 58   Temp 36.9 °C (98.4 °F)   Ht 1.626 m (5' 4\")   Wt 73 kg (161 lb)   SpO2 97%  Body mass index is 27.64 kg/m².    Constitutional: Alert, no distress.  Skin: Warm, dry, good turgor, no rashes " in visible areas.  Eye: Equal, round and reactive, conjunctiva clear, lids normal.  Respiratory: Unlabored respiratory effort, lungs clear to auscultation, no wheezes, no ronchi.  Cardiovascular: Normal S1, S2, positive mild systolic murmur best heard at left fifth intercostal space, no edema.  Psych: Alert and oriented x3, normal affect and mood.      Assessment and Plan.   1. Medicare annual wellness visit, subsequent  - Subsequent Annual Wellness Visit - Includes PPPS ()    2. Chronic kidney disease, stage III (moderate) ([facility])  Stable. Continue following up with nephrology.  - Subsequent Annual Wellness Visit - Includes PPPS ()    3. Chronic obstructive pulmonary disease, unspecified COPD type ([facility])  Stable.  Continue following with pulmonology.  - Subsequent Annual Wellness Visit - Includes PPPS ()    4. Non-traumatic compression fracture of twelfth thoracic vertebra, sequela  Stable.  Continue following with orthopedic and pain specialist.  - Subsequent Annual Wellness Visit - Includes PPPS ()    5. MDD (major depressive disorder), recurrent episode, moderate ([facility])  Controlled.  Continue Cymbalta and Seroquel.  - Subsequent Annual Wellness Visit - Includes PPPS ()    6. Seizure disorder ([facility])  No seizures since 2003 off medications.  Continue to monitor.  - Subsequent Annual Wellness Visit - Includes PPPS ()    7. Neuropathy ([facility])  Patient was treated by a neurologist in [place] and was giving Cymbalta and Abilify for her neuropathy, which she states has helped significantly.  However she is getting recurrent neuropathy and she is wanting to go see the neurologist in [place] again to get recommendations of what medication she can use now.  I recommended gabapentin, but she will let me know what the neurologist says in [place] and I will prescribe medication for her.    8. Age-related osteoporosis without current pathological fracture  Stable.  Refill Prolia.  - denosumab (PROLIA)  60 MG/ML Solution; Inject 1 mL as instructed every 6 months.  Dispense: 1.8 mL; Refill: 1  - Subsequent Annual Wellness Visit - Includes PPPS ()    9. Mixed hyperlipidemia  Check labs next time her nephrologist wants to draw labs and call with results.  - Subsequent Annual Wellness Visit - Includes PPPS ()  - Lipid Profile; Future    10. Elevated fasting blood sugar  Check labs next time her nephrologist wants to draw labs and call with results.  - Subsequent Annual Wellness Visit - Includes PPPS ()  - HEMOGLOBIN A1C; Future    11. History of anaphylaxis  - EPINEPHrine (EPIPEN 2-MATILDE) 0.3 MG/0.3ML Solution Auto-injector solution for injection; 0.3 mL by Intramuscular route Once for 1 dose.  Dispense: 0.3 mL; Refill: 0  - Subsequent Annual Wellness Visit - Includes PPPS ()    12. Need for vaccination  - Subsequent Annual Wellness Visit - Includes PPPS ()  - Pneumococal Polysaccharide Vaccine 23-Valent =>3YO SQ/IM          Services suggested: No services needed at this time  Health Care Screening: Age-appropriate preventive services recommended by USPTF and ACIP covered by Medicare were discussed today. Services ordered if indicated and agreed upon by the patient.  Referrals offered: Community-based lifestyle interventions to reduce health risks and promote self-management and wellness, fall prevention, nutrition, physical activity, tobacco-use cessation, weight loss, and mental health services as per orders if indicated.    Discussion today about general wellness and lifestyle habits:    · Prevent falls and reduce trip hazards; Cautioned about securing or removing rugs.  · Have a working fire alarm and carbon monoxide detector;   · Engage in regular physical activity and social activities     Follow-up: Return in about 1 year (around 7/17/2020) for Annual.

## 2019-07-22 ENCOUNTER — OFFICE VISIT (OUTPATIENT)
Dept: PULMONOLOGY | Facility: HOSPICE | Age: 69
End: 2019-07-22
Payer: MEDICARE

## 2019-07-22 VITALS
OXYGEN SATURATION: 94 % | DIASTOLIC BLOOD PRESSURE: 82 MMHG | SYSTOLIC BLOOD PRESSURE: 132 MMHG | HEIGHT: 64 IN | RESPIRATION RATE: 16 BRPM | BODY MASS INDEX: 27.14 KG/M2 | HEART RATE: 86 BPM | WEIGHT: 159 LBS

## 2019-07-22 DIAGNOSIS — R06.09 DYSPNEA ON EXERTION: ICD-10-CM

## 2019-07-22 DIAGNOSIS — J45.40 MODERATE PERSISTENT ASTHMA WITHOUT COMPLICATION: ICD-10-CM

## 2019-07-22 DIAGNOSIS — R05.8 ALLERGIC COUGH: ICD-10-CM

## 2019-07-22 DIAGNOSIS — J44.9 CHRONIC OBSTRUCTIVE PULMONARY DISEASE, UNSPECIFIED COPD TYPE (HCC): ICD-10-CM

## 2019-07-22 DIAGNOSIS — J44.1 COPD EXACERBATION (HCC): ICD-10-CM

## 2019-07-22 PROBLEM — J45.30 MILD PERSISTENT ASTHMA WITHOUT COMPLICATION: Status: RESOLVED | Noted: 2018-03-30 | Resolved: 2019-07-22

## 2019-07-22 PROCEDURE — 99214 OFFICE O/P EST MOD 30 MIN: CPT | Performed by: NURSE PRACTITIONER

## 2019-07-22 RX ORDER — AZITHROMYCIN 250 MG/1
TABLET, FILM COATED ORAL
Qty: 6 TAB | Refills: 0 | Status: SHIPPED | OUTPATIENT
Start: 2019-07-22 | End: 2019-09-06

## 2019-07-22 RX ORDER — METHYLPREDNISOLONE 4 MG/1
TABLET ORAL
Qty: 21 TAB | Refills: 0 | Status: SHIPPED | OUTPATIENT
Start: 2019-07-22 | End: 2019-09-06

## 2019-07-22 NOTE — PROGRESS NOTES
CC:  Here for f/u pulmonary issues as listed below    HPI:   Summer presents today for follow up for asthma/COPD.  Past medical history of hypertension, septic arthritis, seizures, neuropathy, vitamin D deficiency, chronic kidney disease stage III, prediabetic, depression, osteoporosis.    [place] from 1/2019 indicate a Fev1 of 1.42L or 60% predicted with a mild bronchodilator response, Fev1/FVC ratio of 59, last DLCO 77% computed in 2018.  Former smoker, 30-pack-year history, quit in 1998.    She is compliant using Symbicort 160-4.5, 1 puff twice a day with mouth rinse.  She did not realize it was 2 puffs twice a day. Spiriva daily and a Ventolin HFA 2-3 times a day.  She last required her nebulizer when she was sick in April 2019 requiring emergency antibiotics and steroids with improvement.  She feels her dyspnea is stable.  She gets short of breath with walking in her home.  She does not exercise due to this.  She has a chronic cough producing clear mucus, headaches when she wakes up.  Denies chest tightness, palpitations. She denies difficulty swallowing or choking, but coughs while eating food.  Completed Multi-ox on room air at rest she was 95% and desaturated to a low of 92%.  Her heart rate when starting was 82 and up to 105 bpm.  Her last echocardiogram was in 2015 noting diastolic dysfunction type II, RSVP estimating at 30 mmHg.    Patient Active Problem List    Diagnosis Date Noted   • Status post right hip replacement 10/31/2015     Priority: High   • Chronic kidney disease, stage III (moderate) ([facility]) 09/26/2014     Priority: Medium   • HTN (hypertension) 10/10/2013     Priority: Medium   • MDD (major depressive disorder), recurrent episode, moderate ([facility]) 06/18/2015     Priority: Low   • Prediabetes 05/15/2015     Priority: Low   • Acquired hypothyroidism 10/22/2013     Priority: Low   • GERD (gastroesophageal reflux disease) 10/22/2013     Priority: Low   • Hyperlipidemia 10/10/2013     Priority: Low   •  History of anaphylaxis 07/17/2019   • Mild persistent asthma without complication 03/30/2018   • Dyspnea on exertion 03/30/2018   • Osteoporosis 04/18/2017   • Compression fracture of thoracic spine, non-traumatic ([facility]) 03/01/2017   • Chronic obstructive pulmonary disease ([facility]) 02/01/2017   • Atherosclerosis of both carotid arteries 10/19/2016   • Bilateral hip pain 03/21/2016   • Atopic dermatitis 01/08/2015   • Vitamin D insufficiency 09/26/2014   • Seasonal allergies 06/13/2014   • Elevated uric acid in blood 03/13/2014   • Neuropathy ([facility]) 01/10/2014   • Screening for breast cancer 10/22/2013   • History of tobacco abuse 10/22/2013   • Seizure disorder ([facility]) 10/10/2013   • CTS (carpal tunnel syndrome) 10/10/2013   • History of septic arthritis 10/09/2013       Past Medical History:   Diagnosis Date   • Arthritis    • Asthma     Uses inhaler PRN.   • Breath shortness     With exertion.   • Bronchitis    • Carpal tunnel syndrome    • COPD    • Dental disorder    • Emphysema of lung ([facility])    • GERD (gastroesophageal reflux disease) 10/22/2013   • Glaucoma     Bilateral.   • History of tobacco abuse 10/22/2013    Quit 1998. Smoked one pack per day for 30 years.   • Hypertension    • Hypothyroidism 10/22/2013   • Indigestion    • Osteoporosis    • Pain     Bilateral hips.   • Pneumonia    • Renal disorder    • Seizure disorder ([facility])     Last seizure 30 years ago.  No medication.       Past Surgical History:   Procedure Laterality Date   • ABDOMINOPLASTY  10/29/2018    Procedure: ABDOMINOPLASTY;  Surgeon: Santiago Campos M.D.;  Location: SURGERY SAME DAY [facility];  Service: Plastics   • HIP REVISION TOTAL Right 11/5/2015    Procedure: Evacuation hematoma, revision total hip;  Surgeon: Nils Starr M.D.;  Location: SURGERY [facility];  Service:    • HIP REVISION TOTAL  10/10/2013    Performed by Nils Starr M.D. at SURGERY [facility]   • INCISION AND DRAINAGE ORTHOPEDIC  10/10/2013    Performed  by Nils Starr M.D. at SURGERY [facility]   • APPENDECTOMY     • CARPAL TUNNEL RELEASE     • NERVE ULNAR REPAIR OR EXPLORE     • OTHER ORTHOPEDIC SURGERY     • TONSILLECTOMY         Family History   Problem Relation Age of Onset   • Psychiatry Mother 47        suicide   • Alcohol/Drug Mother    • Cancer Father 72        Lung   • Alcohol/Drug Brother         Recovering       Social History   Substance Use Topics   • Smoking status: Former Smoker     Packs/day: 1.00     Years: 30.00     Types: Cigarettes     Quit date: 1/1/1998   • Smokeless tobacco: Never Used      Comment: continued abstinance   • Alcohol use No       Current Outpatient Prescriptions   Medication Sig Dispense Refill   • azithromycin (ZITHROMAX) 250 MG Tab Take 2 tablets on day 1, then take 1 tablet a day for 4 days. 6 Tab 0   • methylPREDNISolone (MEDROL DOSEPAK) 4 MG Tablet Therapy Pack Take as directed. 21 Tab 0   • denosumab (PROLIA) 60 MG/ML Solution Inject 1 mL as instructed every 6 months. 1.8 mL 1   • lisinopril (PRINIVIL) 10 MG Tab TAKE 1 TABLET DAILY 90 Tab 3   • triamterene-hctz (MAXZIDE-25/DYAZIDE) 37.5-25 MG Tab TAKE 1 TABLET EVERY MORNING 90 Tab 3   • fluticasone (FLONASE) 50 MCG/ACT nasal spray USE 2 SPRAYS NASALLY DAILY 48 g 3   • DULoxetine (CYMBALTA) 60 MG Cap DR Particles delayed-release capsule TAKE 1 CAPSULE EVERY       MORNING 90 Cap 3   • omeprazole (PRILOSEC) 20 MG delayed-release capsule FOR DIRECTIONS ON HOW TO   TAKE THIS MEDICINE, READ   THE ENCLOSED MEDICATION    INFORMATION FORM 90 Cap 3   • SYNTHROID 100 MCG Tab TAKE 1 TABLET DAILY 90 Tab 3   • doxycycline (VIBRAMYCIN) 100 MG Cap Take 1 Cap by mouth 2 times a day. Take until gone. 20 Cap 1   • aripiprazole (ABILIFY) 5 MG tablet TAKE 1 TABLET AT BEDTIME 90 Tab 3   • fenofibrate (TRICOR) 145 MG Tab TAKE 1 TABLET DAILY. 90 Tab 3   • allopurinol (ZYLOPRIM) 100 MG Tab TAKE 1 TABLET DAILY 90 Tab 3   • atorvastatin (LIPITOR) 40 MG Tab TAKE 1 TABLET DAILY 90 Tab 3   •  "omeprazole (PRILOSEC) 20 MG delayed-release capsule FOR DIRECTIONS ON HOW TO   TAKE THIS MEDICINE, READ   THE ENCLOSED MEDICATION    INFORMATION FORM 90 Cap 3   • SPIRIVA HANDIHALER 18 MCG Cap INHALE THE CONTENTS OF ONE CAPSULE DAILY 90 Cap 3   • albuterol 108 (90 Base) MCG/ACT Aero Soln inhalation aerosol Inhale 2 Puffs by mouth every 6 hours as needed for Shortness of Breath. 3 Inhaler 3   • albuterol (PROVENTIL) 2.5mg/3ml Nebu Soln solution for nebulization 3 mL by Nebulization route every four hours as needed for Shortness of Breath. 360 mL 5   • tramadol (ULTRAM) 50 MG Tab TAKE 1 TO 2 TABLETS BY MOUTH 2 TIMES DAILY AS NEEDED FOR SEVERE PAIN 90 Tab 5   • latanoprost (XALATAN) 0.005 % Solution Place 1 Drop in both eyes every evening. 1 Bottle 3     No current facility-administered medications for this visit.           Allergies: Tylenol; Tape; and Latex          ROS   Gen: Denies fever, chills, unintentional weight loss, fatigue, night sweats  E/N/T: Denies nasal congestion, ear pain  Resp:Denies Dyspnea, wheezing, cough, sputum production, hemoptysis  CV: Denies chest pain, chest tightness, palpitations  Sleep:Denies morning headache  Neuro: Denies frequent headaches, weakness, dizziness  GI: Denies acid reflux, N/V  See HPI.  All other systems reviewed and negative          Vital signs for this encounter:  Vitals:    07/22/19 0956   Height: 1.626 m (5' 4\")   Weight: 72.1 kg (159 lb)   Weight % change since last entry.: 0 %   BP: 132/82   Pulse: 86   BMI (Calculated): 27.29   Resp: 16                 Physical Exam:   Appearance: well developed, well nourished, no acute distress.   Eyes: PERRL, EOM intact, sclere white, conjunctiva moist.  Ears: no lesions or deformities.  Hearing: grossly intact.  Nose: no lesions or deformities.  Dentition: good dentition.   Oropharynx: tongue normal, posterior pharynx without erythema or exudate.  Neck: supple, trachea midline, no masses.  Respiratory effort: no intercostal " retractions or use of accessory muscles.  Lung auscultation: Bilateral diminished   Heart auscultation:positive murmur   Extremities: no cyanosis or edema.  Abdomen: soft, non-tender, no masses.  Gait and station: without difficulty   Digits and Nails: no clubbing, cyanosis, petechiae, or nodes.  Cranial nerves: grossly normal.  Motor: no focal deficits observed.   Skin: no rashes, lesions, or ulcers noted.  Orientation: oriented to time, place, and person.  Mood and affect: mood and affect appropriate, normal interaction with examiner.      Assessment   1. COPD exacerbation (HCC)  methylPREDNISolone (MEDROL DOSEPAK) 4 MG Tablet Therapy Pack    EC-ECHOCARDIOGRAM COMPLETE W/O CONT   2. Dyspnea on exertion  Multiple Oximetry    EC-ECHOCARDIOGRAM COMPLETE W/O CONT    REFERRAL TO [facility] Services Requested: Pulmonary Rehab; Reason for Visit: COPD/Emphysema   3. Chronic obstructive pulmonary disease, unspecified COPD type (HCC)  Overnight Oximetry    EC-ECHOCARDIOGRAM COMPLETE W/O CONT    REFERRAL TO [facility] Services Requested: Pulmonary Rehab; Reason for Visit: COPD/Emphysema       Patient was seen for 25 minutes, more than 50% of time spent in face to face review, counseling, and arranging future evaluation and follow up of medical conditions and care relating to medication management, importance of regular exercise and referral to pulmonary rehab which she is amendable to.  We will update an echocardiogram given her last one was in 2015 and she has pretty significant dyspnea on exertion despite preserved oxygenation, has a heart murmur, unable to exercise.Patient is clinically stable and will have the following changes per plan.     PLAN:   Patient Instructions   1) Continue Symbicort 2 Puffs twice a day with mouth rinse.  Continue Spiriva daily.   2) continue rescue inhaler as needed every 4 hours for shortness of breath or wheezing  3) Light  conditioning encouraged -pulmonary rehab referral  4) Continue smoking cessation   5) echocardiogram for further work-up of dyspnea on exertion  6) overnight oximetry on room air  7) Vaccines: Up to date with Pneumovax 23, Prevnar 13  8) Return in about 6 weeks (around 9/2/2019) for follow up with KATLYN Yanez, if not sooner, review of symptoms, Echocardiogram.

## 2019-07-22 NOTE — PATIENT INSTRUCTIONS
1) Continue Symbicort 2 Puffs twice a day with mouth rinse.  Continue Spiriva daily.   2) continue rescue inhaler as needed every 4 hours for shortness of breath or wheezing  3) Light conditioning encouraged -pulmonary rehab referral  4) Continue smoking cessation   5) echocardiogram for further work-up of dyspnea on exertion  6) overnight oximetry on room air  7) Vaccines: Up to date with Pneumovax 23, Prevnar 13  8) Return in about 6 weeks (around 9/2/2019) for follow up with KATLYN Yanez, if not sooner, review of symptoms, Echocardiogram.

## 2019-07-22 NOTE — PROCEDURES
Multi-Ox Readings  Multi Ox #1 Room air   O2 sat % at rest 95   O2 sat % on exertion 92   O2 sat average on exertion     Multi Ox #2     O2 sat % at rest     O2 sat % on exertion     O2 sat average on exertion       Oxygen Use     Oxygen Frequency     Duration of need     Is the patient mobile within the home?     CPAP Use?     BIPAP Use?     Servo Titration

## 2019-07-29 ENCOUNTER — TELEPHONE (OUTPATIENT)
Dept: PULMONOLOGY | Facility: HOSPICE | Age: 69
End: 2019-07-29

## 2019-07-29 NOTE — TELEPHONE ENCOUNTER
Patient called and LVM that VLADIMIR Major had called I did call her back and LVM to call us back.

## 2019-08-01 ENCOUNTER — HOSPITAL ENCOUNTER (OUTPATIENT)
Dept: CARDIOLOGY | Facility: MEDICAL CENTER | Age: 69
End: 2019-08-01
Attending: NURSE PRACTITIONER
Payer: MEDICARE

## 2019-08-01 DIAGNOSIS — R06.09 DYSPNEA ON EXERTION: ICD-10-CM

## 2019-08-01 DIAGNOSIS — J44.1 COPD EXACERBATION (HCC): ICD-10-CM

## 2019-08-01 DIAGNOSIS — J44.9 CHRONIC OBSTRUCTIVE PULMONARY DISEASE, UNSPECIFIED COPD TYPE (HCC): ICD-10-CM

## 2019-08-01 LAB
LV EJECT FRACT  99904: 65
LV EJECT FRACT MOD 2C 99903: 64.03
LV EJECT FRACT MOD 4C 99902: 75.78
LV EJECT FRACT MOD BP 99901: 73.79

## 2019-08-01 PROCEDURE — 93306 TTE W/DOPPLER COMPLETE: CPT

## 2019-08-01 PROCEDURE — 93306 TTE W/DOPPLER COMPLETE: CPT | Mod: 26 | Performed by: INTERNAL MEDICINE

## 2019-08-01 NOTE — TELEPHONE ENCOUNTER
Patient called back, she has completed her Echo and will  Overnight oximetry on 08/05/2019 before follow up scheduled on 09/06/2019 with VLADIMIR Major

## 2019-08-05 ENCOUNTER — HOME STUDY (OUTPATIENT)
Dept: PULMONOLOGY | Facility: HOSPICE | Age: 69
End: 2019-08-05
Attending: NURSE PRACTITIONER
Payer: MEDICARE

## 2019-08-05 DIAGNOSIS — J44.9 CHRONIC OBSTRUCTIVE PULMONARY DISEASE, UNSPECIFIED COPD TYPE (HCC): ICD-10-CM

## 2019-08-05 PROCEDURE — 94762 N-INVAS EAR/PLS OXIMTRY CONT: CPT | Performed by: INTERNAL MEDICINE

## 2019-08-07 ENCOUNTER — TELEPHONE (OUTPATIENT)
Dept: PULMONOLOGY | Facility: HOSPICE | Age: 69
End: 2019-08-07

## 2019-08-07 NOTE — TELEPHONE ENCOUNTER
1. Caller Name: Summer                      Call Back Number: [phone] (home)       2. Message: Pt called and said the pulmonary rehab doesn't have her referral.  Told pt I will refax it to them.      Re-faxed referral, demo, last ov note and most recent PFT to Pulmonary rehab.

## 2019-08-07 NOTE — PROCEDURES
Overnight oximetry performed on room air.  The basal SPO2 is 88.6%.  The patient spent 69.9% of the time below SPO2 of 90%, to a memo of 84%.    Interpretation:  Nocturnal hypoxia.    Recommendations:  Nocturnal oxygen at 2 L/min.

## 2019-08-15 DIAGNOSIS — J44.9 CHRONIC OBSTRUCTIVE PULMONARY DISEASE, UNSPECIFIED COPD TYPE (HCC): ICD-10-CM

## 2019-08-15 RX ORDER — ALBUTEROL SULFATE 90 UG/1
AEROSOL, METERED RESPIRATORY (INHALATION)
Qty: 3 INHALER | Refills: 1 | Status: SHIPPED | OUTPATIENT
Start: 2019-08-15 | End: 2020-12-16 | Stop reason: SDUPTHER

## 2019-08-15 NOTE — TELEPHONE ENCOUNTER
Have we ever prescribed this med? Yes.  If yes, what date? 08/06/18    Last OV: 07/22/19 with Cathy POTTS    Next OV: 09/06/19 with Cathy POTTS    DX:   Chronic obstructive pulmonary disease, unspecified COPD type (HCC) (J44.9)    Medications:   Requested Prescriptions     Pending Prescriptions Disp Refills   • albuterol 108 (90 Base) MCG/ACT Aero Soln inhalation aerosol [Pharmacy Med Name: ALBUTEROL PA INH ] 3 Inhaler 3     Sig: USE 2 INHALATIONS ORALLY   EVERY SIX HOURS AS NEEDED  FOR SHORTNESS OF BREATH

## 2019-08-26 ENCOUNTER — HOSPITAL ENCOUNTER (OUTPATIENT)
Dept: PULMONOLOGY | Facility: MEDICAL CENTER | Age: 69
End: 2019-08-26
Attending: NURSE PRACTITIONER
Payer: MEDICARE

## 2019-08-26 PROCEDURE — G0424 PULMONARY REHAB W EXER: HCPCS

## 2019-09-03 ENCOUNTER — TELEPHONE (OUTPATIENT)
Dept: MEDICAL GROUP | Facility: MEDICAL CENTER | Age: 69
End: 2019-09-03

## 2019-09-03 DIAGNOSIS — M81.0 AGE-RELATED OSTEOPOROSIS WITHOUT CURRENT PATHOLOGICAL FRACTURE: ICD-10-CM

## 2019-09-03 NOTE — TELEPHONE ENCOUNTER
New order for Prolia injection requested by infusion center.    Was the patient seen in the last year in this department? Yes    Does patient have an active prescription for medications requested? No     Received Request Via: Pharmacy

## 2019-09-04 ENCOUNTER — OUTPATIENT INFUSION SERVICES (OUTPATIENT)
Dept: ONCOLOGY | Facility: MEDICAL CENTER | Age: 69
End: 2019-09-04
Attending: FAMILY MEDICINE
Payer: MEDICARE

## 2019-09-04 VITALS
HEIGHT: 63 IN | RESPIRATION RATE: 18 BRPM | WEIGHT: 163.36 LBS | OXYGEN SATURATION: 95 % | HEART RATE: 82 BPM | DIASTOLIC BLOOD PRESSURE: 76 MMHG | SYSTOLIC BLOOD PRESSURE: 130 MMHG | TEMPERATURE: 98.1 F | BODY MASS INDEX: 28.95 KG/M2

## 2019-09-04 LAB
CA-I BLD ISE-SCNC: 0.99 MMOL/L (ref 1.1–1.3)
CREAT BLD-MCNC: 1.4 MG/DL (ref 0.5–1.4)

## 2019-09-04 PROCEDURE — 82565 ASSAY OF CREATININE: CPT

## 2019-09-04 PROCEDURE — 82330 ASSAY OF CALCIUM: CPT

## 2019-09-04 PROCEDURE — 36415 COLL VENOUS BLD VENIPUNCTURE: CPT

## 2019-09-04 PROCEDURE — 96372 THER/PROPH/DIAG INJ SC/IM: CPT

## 2019-09-04 PROCEDURE — 700111 HCHG RX REV CODE 636 W/ 250 OVERRIDE (IP): Mod: JG | Performed by: FAMILY MEDICINE

## 2019-09-04 RX ADMIN — DENOSUMAB 60 MG: 60 INJECTION SUBCUTANEOUS at 12:03

## 2019-09-04 NOTE — PROGRESS NOTES
Per NALINI Garcia, orders received, okay to proceed with Prolia today.  Prolia administered per MD orders to L back of arm via subcutaneous injection.  Pt tolerated injection well and without incident.  Band aid applied to injection site.  Pt left infusion services in no apparent distress after completion of treatment, ambulatory.  Pt to return in 6 months; next appointment scheduled.

## 2019-09-04 NOTE — LETTER
Infusion Services   [address] [place] [address]  [place] [address]  Phone: [phone]  Fax: [phone]              Dear Dr. Palomino,    Your patient, Summer Hatch (: 1950), was scheduled at [facility].  Summer's encounter diagnosis is:  No diagnosis found.  She arrived for her appointment, and  the scheduled treatment was   given. These medications were administered to the patient: We administered denosumab..  Summer Hatch  tolerated treatment. In addition, the following labs were drawn    Recent Results (from the past 24 hour(s))   ISTAT CREATININE    Collection Time: 19 11:21 AM   Result Value Ref Range    Istat Creatinine 1.4 0.5 - 1.4 mg/dL            Her next appointment is rescheduled for 2020.    For more information, you may review the nurse's progress notes in chart review under the notes section.       Sincerely,  Infusion Services

## 2019-09-04 NOTE — PROGRESS NOTES
Pharmacy Note:    Scr = 1.4 mg/dL    with est crcl ~ 44.8 ml/min  Ionized Calcium =   0.99 mmol/L    Dr. Palomino aware of all labs.  Okay to proceed with prolia injection today.    Rocael Jordan, PharmD

## 2019-09-04 NOTE — PROGRESS NOTES
Pt to infusion services ambulatory per self.  Pt here for scheduled q 6 month Prolia injection.  Plan of care reviewed.  Pt verbalizes understanding.  Pt denies any s/sx of infection.  Pt denies any recent or upcoming invasive dental work or oral surgeries.  Pt denies taking Calcium due to hx of kidney disease, however does report taking Vitamin D 1000 I.U. PO daily.  Lab drawn from L-AC using #25G BD needle.  Pt tolerated well.  Blood sample to lab for iSTAT calcium and creatinine.  Ionized calcium = 0.99 today.  Pt updated.  Pt denies any s/sx of hypocalcemia today.  Charge nurse, NALINI Garcia updated and contacting MD office regarding plan of care.  Pt resting in chair.

## 2019-09-06 ENCOUNTER — OFFICE VISIT (OUTPATIENT)
Dept: PULMONOLOGY | Facility: HOSPICE | Age: 69
End: 2019-09-06
Payer: MEDICARE

## 2019-09-06 VITALS
BODY MASS INDEX: 27.66 KG/M2 | HEIGHT: 65 IN | DIASTOLIC BLOOD PRESSURE: 70 MMHG | OXYGEN SATURATION: 93 % | WEIGHT: 166 LBS | HEART RATE: 76 BPM | SYSTOLIC BLOOD PRESSURE: 126 MMHG

## 2019-09-06 DIAGNOSIS — R06.83 SNORING: ICD-10-CM

## 2019-09-06 DIAGNOSIS — I35.1 MODERATE AORTIC INSUFFICIENCY: Primary | ICD-10-CM

## 2019-09-06 DIAGNOSIS — J45.40 MODERATE PERSISTENT ASTHMA WITHOUT COMPLICATION: ICD-10-CM

## 2019-09-06 DIAGNOSIS — J44.9 CHRONIC OBSTRUCTIVE PULMONARY DISEASE, UNSPECIFIED COPD TYPE (HCC): ICD-10-CM

## 2019-09-06 DIAGNOSIS — G47.33 OSA (OBSTRUCTIVE SLEEP APNEA): ICD-10-CM

## 2019-09-06 DIAGNOSIS — R06.09 DYSPNEA ON EXERTION: ICD-10-CM

## 2019-09-06 PROCEDURE — 99214 OFFICE O/P EST MOD 30 MIN: CPT | Performed by: NURSE PRACTITIONER

## 2019-09-06 RX ORDER — TRAZODONE HYDROCHLORIDE 50 MG/1
50 TABLET ORAL NIGHTLY PRN
Qty: 4 TAB | Refills: 0 | Status: SHIPPED | OUTPATIENT
Start: 2019-09-06 | End: 2020-04-30

## 2019-09-06 RX ORDER — METHYLPREDNISOLONE 4 MG/1
TABLET ORAL
Qty: 21 TAB | Refills: 0 | Status: SHIPPED | OUTPATIENT
Start: 2019-09-06 | End: 2019-10-22

## 2019-09-06 RX ORDER — AZITHROMYCIN 250 MG/1
TABLET, FILM COATED ORAL
Qty: 6 TAB | Refills: 0 | Status: SHIPPED | OUTPATIENT
Start: 2019-09-06 | End: 2019-11-08 | Stop reason: SDUPTHER

## 2019-09-06 NOTE — PROGRESS NOTES
CC:  Here for f/u pulmonary issues as listed below    HPI:   Summer presents today for follow up for asthma/COPD.  Past medical history of hypertension, septic arthritis, seizures, neuropathy, vitamin D deficiency, chronic kidney disease stage III, prediabetic, depression, osteoporosis.     Belcher from 1/2019 indicate a Fev1 of 1.42L or 60% predicted with a mild bronchodilator response, Fev1/FVC ratio of 59, last DLCO 77% computed in 2018.  Former smoker, 30-pack-year history, quit in 1998.     She is compliant using Symbicort 160-4.5, 1 puff twice a day with mouth rinse.  She did not realize it was 2 puffs twice a day. Spiriva daily and a Ventolin HFA 2-3 times a day.  She last required her nebulizer when she was sick in April 2019 requiring emergency antibiotics and steroids with improvement.      She feels her dyspnea is worse x 1 week with cough producing white mucus, and wheeze.  She gets short of breath with walking in her home.  She does not exercise due to this.  She typically has a chronic cough producing clear mucus, headaches when she wakes up. She admits to snoring and feeling tired often.  Denies chest tightness, palpitations. She denies difficulty swallowing or choking, but coughs while eating food.  Completed Multi-ox on room air at rest she was 95% and desaturated to a low of 92%.  Her heart rate when starting was 82 and up to 105 bpm.  Her last echocardiogram was in 2015 noting diastolic dysfunction type II, RSVP estimating at 30 mmHg. Updated echo from 8/2019 shows no significant change, except for moderate aortic insufficiency.         Patient Active Problem List    Diagnosis Date Noted   • Status post right hip replacement 10/31/2015     Priority: High   • Chronic kidney disease, stage III (moderate) ([facility]) 09/26/2014     Priority: Medium   • HTN (hypertension) 10/10/2013     Priority: Medium   • MDD (major depressive disorder), recurrent episode, moderate ([facility]) 06/18/2015     Priority: Low   •  Prediabetes 05/15/2015     Priority: Low   • Acquired hypothyroidism 10/22/2013     Priority: Low   • GERD (gastroesophageal reflux disease) 10/22/2013     Priority: Low   • Hyperlipidemia 10/10/2013     Priority: Low   • Moderate persistent asthma without complication 07/22/2019   • History of anaphylaxis 07/17/2019   • Dyspnea on exertion 03/30/2018   • Osteoporosis 04/18/2017   • Compression fracture of thoracic spine, non-traumatic ([facility]) 03/01/2017   • Chronic obstructive pulmonary disease ([facility]) 02/01/2017   • Atherosclerosis of both carotid arteries 10/19/2016   • Bilateral hip pain 03/21/2016   • Atopic dermatitis 01/08/2015   • Vitamin D insufficiency 09/26/2014   • Seasonal allergies 06/13/2014   • Elevated uric acid in blood 03/13/2014   • Neuropathy ([facility]) 01/10/2014   • Screening for breast cancer 10/22/2013   • History of tobacco abuse 10/22/2013   • Seizure disorder ([facility]) 10/10/2013   • CTS (carpal tunnel syndrome) 10/10/2013   • History of septic arthritis 10/09/2013       Past Medical History:   Diagnosis Date   • Arthritis    • Asthma     Uses inhaler PRN.   • Breath shortness     With exertion.   • Bronchitis    • Carpal tunnel syndrome    • COPD    • Dental disorder    • Emphysema of lung ([facility])    • GERD (gastroesophageal reflux disease) 10/22/2013   • Glaucoma     Bilateral.   • History of tobacco abuse 10/22/2013    Quit 1998. Smoked one pack per day for 30 years.   • Hypertension    • Hypothyroidism 10/22/2013   • Indigestion    • Osteoporosis    • Pain     Bilateral hips.   • Pneumonia    • Renal disorder    • Seizure disorder ([facility])     Last seizure 30 years ago.  No medication.       Past Surgical History:   Procedure Laterality Date   • ABDOMINOPLASTY  10/29/2018    Procedure: ABDOMINOPLASTY;  Surgeon: Santiago Campos M.D.;  Location: SURGERY SAME DAY [facility];  Service: Plastics   • HIP REVISION TOTAL Right 11/5/2015    Procedure: Evacuation hematoma, revision total hip;  Surgeon: Nils HOLLAND  JUAREZ Starr;  Location: SURGERY [facility];  Service:    • HIP REVISION TOTAL  10/10/2013    Performed by Nils Starr M.D. at SURGERY [facility]   • INCISION AND DRAINAGE ORTHOPEDIC  10/10/2013    Performed by Nils Starr M.D. at SURGERY [facility]   • APPENDECTOMY     • CARPAL TUNNEL RELEASE     • NERVE ULNAR REPAIR OR EXPLORE     • OTHER ORTHOPEDIC SURGERY     • TONSILLECTOMY         Family History   Problem Relation Age of Onset   • Psychiatric Illness Mother 47        suicide   • Alcohol/Drug Mother    • Cancer Father 72        Lung   • Alcohol/Drug Brother         Recovering       Social History     Tobacco Use   • Smoking status: Former Smoker     Packs/day: 1.00     Years: 30.00     Pack years: 30.00     Types: Cigarettes     Last attempt to quit: 1998     Years since quittin.7   • Smokeless tobacco: Never Used   • Tobacco comment: continued abstinance   Substance Use Topics   • Alcohol use: No     Alcohol/week: 0.0 oz   • Drug use: No       Current Outpatient Medications   Medication Sig Dispense Refill   • azithromycin (ZITHROMAX) 250 MG Tab Take 2 tablets on day 1, then take 1 tablet a day for 4 days. 6 Tab 0   • methylPREDNISolone (MEDROL DOSEPAK) 4 MG Tablet Therapy Pack Take as directed. 21 Tab 0   • traZODone (DESYREL) 50 MG Tab Take 1 Tab by mouth at bedtime as needed for Sleep. 4 Tab 0   • denosumab (PROLIA) 60 MG/ML Solution Prefilled Syringe Inject 1 mL as instructed every 6 months. Subcutaneous 1 mL 1   • albuterol 108 (90 Base) MCG/ACT Aero Soln inhalation aerosol USE 2 INHALATIONS ORALLY   EVERY SIX HOURS AS NEEDED  FOR SHORTNESS OF BREATH 3 Inhaler 1   • lisinopril (PRINIVIL) 10 MG Tab TAKE 1 TABLET DAILY 90 Tab 3   • triamterene-hctz (MAXZIDE-25/DYAZIDE) 37.5-25 MG Tab TAKE 1 TABLET EVERY MORNING 90 Tab 3   • fluticasone (FLONASE) 50 MCG/ACT nasal spray USE 2 SPRAYS NASALLY DAILY 48 g 3   • DULoxetine (CYMBALTA) 60 MG Cap DR Particles delayed-release capsule  "TAKE 1 CAPSULE EVERY       MORNING 90 Cap 3   • SYNTHROID 100 MCG Tab TAKE 1 TABLET DAILY 90 Tab 3   • aripiprazole (ABILIFY) 5 MG tablet TAKE 1 TABLET AT BEDTIME 90 Tab 3   • fenofibrate (TRICOR) 145 MG Tab TAKE 1 TABLET DAILY. 90 Tab 3   • allopurinol (ZYLOPRIM) 100 MG Tab TAKE 1 TABLET DAILY 90 Tab 3   • atorvastatin (LIPITOR) 40 MG Tab TAKE 1 TABLET DAILY 90 Tab 3   • omeprazole (PRILOSEC) 20 MG delayed-release capsule FOR DIRECTIONS ON HOW TO   TAKE THIS MEDICINE, READ   THE ENCLOSED MEDICATION    INFORMATION FORM 90 Cap 3   • albuterol (PROVENTIL) 2.5mg/3ml Nebu Soln solution for nebulization 3 mL by Nebulization route every four hours as needed for Shortness of Breath. 360 mL 5   • tramadol (ULTRAM) 50 MG Tab TAKE 1 TO 2 TABLETS BY MOUTH 2 TIMES DAILY AS NEEDED FOR SEVERE PAIN 90 Tab 5   • latanoprost (XALATAN) 0.005 % Solution Place 1 Drop in both eyes every evening. 1 Bottle 3   • tiotropium (SPIRIVA HANDIHALER) 18 MCG Cap INHALE THE CONTENTS OF ONE CAPSULE DAILY 90 Cap 3   • omeprazole (PRILOSEC) 20 MG delayed-release capsule FOR DIRECTIONS ON HOW TO   TAKE THIS MEDICINE, READ   THE ENCLOSED MEDICATION    INFORMATION FORM 90 Cap 3     No current facility-administered medications for this visit.           Allergies: Tylenol; Tape; and Latex          ROS   Gen: Denies fever, chills, unintentional weight loss, fatigue, night sweats  E/N/T: Denies nasal congestion, ear pain  Resp:Denies  wheezing,hemoptysis  CV: Denies chest pain, chest tightness, palpitations  Sleep:Denies morning headache  Neuro: Denies frequent headaches, weakness, dizziness  GI: Denies acid reflux, N/V  See HPI.  All other systems reviewed and negative          Vital signs for this encounter:  Vitals:    09/06/19 0820   Height: 1.651 m (5' 5\")   Weight: 75.3 kg (166 lb)   Weight % change since last entry.: 0 %   BP: 126/70   Pulse: 76   BMI (Calculated): 27.62                 Physical Exam:   Appearance: well developed, well nourished, no " acute distress.   Eyes: PERRL, EOM intact, sclere white, conjunctiva moist.  Ears: no lesions or deformities.  Hearing: grossly intact.  Nose: no lesions or deformities.  Dentition: good dentition.   Oropharynx: tongue normal, posterior pharynx without erythema or exudate.  Neck: supple, trachea midline, no masses.  Respiratory effort: no intercostal retractions or use of accessory muscles.  Lung auscultation: Bilateral diminished   Heart auscultation: no murmur, rub, or gallop   Extremities: no cyanosis or edema.  Abdomen: soft, non-tender, no masses.  Gait and station: without difficulty   Digits and Nails: no clubbing, cyanosis, petechiae, or nodes.  Cranial nerves: grossly normal.  Motor: no focal deficits observed.   Skin: no rashes, lesions, or ulcers noted.  Orientation: oriented to time, place, and person.  Mood and affect: mood and affect appropriate, normal interaction with examiner.      Assessment   1. Moderate aortic insufficiency  REFERRAL TO CARDIOLOGY    REFERRAL TO [facility] (HIP) Services Requested: Pulmonary Rehab; Reason for Visit: COPD/Emphysema   2. Snoring  Polysomnography, 4 or More    REFERRAL TO [facility] (HIP) Services Requested: Pulmonary Rehab; Reason for Visit: COPD/Emphysema    CANCELED: Polysomnography, 4 or More   3. ELIZABETH (obstructive sleep apnea)  Polysomnography, 4 or More    REFERRAL TO [facility] (HIP) Services Requested: Pulmonary Rehab; Reason for Visit: COPD/Emphysema   4. Chronic obstructive pulmonary disease, unspecified COPD type (HCC)  REFERRAL TO [facility] (HIP) Services Requested: Pulmonary Rehab; Reason for Visit: COPD/Emphysema   5. Dyspnea on exertion  REFERRAL TO [facility] (HIP) Services Requested: Pulmonary Rehab; Reason for Visit: COPD/Emphysema   6. Moderate persistent asthma without complication  REFERRAL TO [facility]  (HIP) Services Requested: Pulmonary Rehab; Reason for Visit: COPD/Emphysema       Patient was seen for 30 minutes, more than 50% of time spent in face to face review, counseling, and arranging future evaluation and follow up of medical conditions and care relating to review of medications, importance of regular exercise and referral to pulmonary rehab. Encourage a sleep study for high suspicion of ELIZABETH. Reviewed case with DR. Leyva who recommends further work up with cardiology given significant dyspnea on exertion despite preserved oxygenation, and stable echocardiogram. Dyspnea is multifactorial and may be related to exacerbation, sedentary lifestyle, moderate aortic insufficiency? Will consider scan next OV for further review. Patient is clinically stable and will have the following changes per plan.     PLAN:   Patient Instructions   1) Continue Symbicort 2 Puffs twice a day with mouth rinse.  Continue Spiriva daily.   2) continue rescue inhaler as needed every 4 hours for shortness of breath or wheezing  3) Light conditioning encouraged -pulmonary rehab referral  4) Continue smoking cessation   5) Referral to cardiology for recommendations regarding moderate aortic insufficiency and if any relation to dyspnea  6) Sleep study with Trazodone   7) Vaccines: Up to date with Pneumovax 23, Prevnar 13  8) Return in about 2 months (around 11/6/2019) for sleep study results, if not sooner, review of symptoms.

## 2019-09-06 NOTE — PATIENT INSTRUCTIONS
1) Continue Symbicort 2 Puffs twice a day with mouth rinse.  Continue Spiriva daily.   2) continue rescue inhaler as needed every 4 hours for shortness of breath or wheezing  3) Light conditioning encouraged -pulmonary rehab referral  4) Continue smoking cessation   5) Referral to cardiology  6) Sleep study with Trazodone   7) Vaccines: Up to date with Pneumovax 23, Prevnar 13  8) Continue smoking cessation   9) Return in about 2 months (around 11/6/2019) for sleep study results, if not sooner, review of symptoms.

## 2019-09-09 DIAGNOSIS — J44.89 ASTHMA WITH COPD: ICD-10-CM

## 2019-09-09 RX ORDER — TIOTROPIUM BROMIDE 18 UG/1
CAPSULE ORAL; RESPIRATORY (INHALATION)
Qty: 90 CAP | Refills: 3 | Status: SHIPPED | OUTPATIENT
Start: 2019-09-09 | End: 2020-07-22

## 2019-09-16 ENCOUNTER — APPOINTMENT (OUTPATIENT)
Dept: PULMONOLOGY | Facility: MEDICAL CENTER | Age: 69
End: 2019-09-16
Payer: MEDICARE

## 2019-09-17 ENCOUNTER — OFFICE VISIT (OUTPATIENT)
Dept: CARDIOLOGY | Facility: MEDICAL CENTER | Age: 69
End: 2019-09-17
Payer: MEDICARE

## 2019-09-17 VITALS
DIASTOLIC BLOOD PRESSURE: 70 MMHG | HEART RATE: 80 BPM | HEIGHT: 65 IN | SYSTOLIC BLOOD PRESSURE: 124 MMHG | OXYGEN SATURATION: 95 % | BODY MASS INDEX: 26.99 KG/M2 | WEIGHT: 162 LBS

## 2019-09-17 DIAGNOSIS — R06.02 SOB (SHORTNESS OF BREATH): ICD-10-CM

## 2019-09-17 DIAGNOSIS — I15.0 RENOVASCULAR HYPERTENSION: ICD-10-CM

## 2019-09-17 DIAGNOSIS — E78.49 OTHER HYPERLIPIDEMIA: ICD-10-CM

## 2019-09-17 DIAGNOSIS — R06.02 SHORTNESS OF BREATH: ICD-10-CM

## 2019-09-17 DIAGNOSIS — R06.09 DYSPNEA ON EXERTION: ICD-10-CM

## 2019-09-17 DIAGNOSIS — I65.23 ATHEROSCLEROSIS OF BOTH CAROTID ARTERIES: ICD-10-CM

## 2019-09-17 PROBLEM — Z87.892 HISTORY OF ANAPHYLAXIS: Status: RESOLVED | Noted: 2019-07-17 | Resolved: 2019-09-17

## 2019-09-17 LAB — EKG IMPRESSION: NORMAL

## 2019-09-17 PROCEDURE — 99204 OFFICE O/P NEW MOD 45 MIN: CPT | Performed by: INTERNAL MEDICINE

## 2019-09-17 PROCEDURE — 93000 ELECTROCARDIOGRAM COMPLETE: CPT | Performed by: INTERNAL MEDICINE

## 2019-09-17 ASSESSMENT — ENCOUNTER SYMPTOMS
BLURRED VISION: 0
MYALGIAS: 0
PALPITATIONS: 0
ABDOMINAL PAIN: 0
LOSS OF CONSCIOUSNESS: 0
EYE PAIN: 0
HEMOPTYSIS: 0
SPUTUM PRODUCTION: 1
COUGH: 1
NAUSEA: 0
DIZZINESS: 0
CHILLS: 0
WHEEZING: 1
BRUISES/BLEEDS EASILY: 0
EYE DISCHARGE: 0
PND: 0
VOMITING: 0
FEVER: 0
NERVOUS/ANXIOUS: 0
DEPRESSION: 0
SHORTNESS OF BREATH: 1

## 2019-09-17 NOTE — PROGRESS NOTES
Chief Complaint   Patient presents with   • Shortness of Breath     new patient       Subjective:   Summer Hatch is a 69 y.o. female who presents today as a new patient.  She was referred by Ms. Major in regards to her ongoing breathlessness with exertion    Considers herself healthy and active.  Used to work for the [employer].  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  More more breathless than the last months.  Not associated with chest discomfort lightheadedness or fainting.  Concerning to her as now she can barely walk across the house without being short of breath.  Better with rest.  Admits to cigarette smoking but quit in 1998.  Lung cancer runs in her family    Past Medical History:   Diagnosis Date   • Arthritis    • Asthma     Uses inhaler PRN.   • Breath shortness     With exertion.   • Bronchitis    • Carpal tunnel syndrome    • COPD    • Dental disorder    • Emphysema of lung (HCC)    • GERD (gastroesophageal reflux disease) 10/22/2013   • Glaucoma     Bilateral.   • History of tobacco abuse 10/22/2013    Quit 1998. Smoked one pack per day for 30 years.   • Hypertension    • Hypothyroidism 10/22/2013   • Indigestion    • Osteoporosis    • Pain     Bilateral hips.   • Pneumonia    • Renal disorder    • Seizure disorder (HCC)     Last seizure 30 years ago.  No medication.     Past Surgical History:   Procedure Laterality Date   • ABDOMINOPLASTY  10/29/2018    Procedure: ABDOMINOPLASTY;  Surgeon: Santiago Campos M.D.;  Location: SURGERY SAME DAY [facility];  Service: Plastics   • HIP REVISION TOTAL Right 11/5/2015    Procedure: Evacuation hematoma, revision total hip;  Surgeon: Nils Starr M.D.;  Location: SURGERY [facility];  Service:    • HIP REVISION TOTAL  10/10/2013    Performed by Nils Starr M.D. at SURGERY [facility]   • INCISION AND DRAINAGE ORTHOPEDIC  10/10/2013    Performed by Nils Starr M.D. at [facility]  TOWER ORS   • APPENDECTOMY     • CARPAL TUNNEL RELEASE     • NERVE ULNAR REPAIR OR EXPLORE     • OTHER ORTHOPEDIC SURGERY     • TONSILLECTOMY       Family History   Problem Relation Age of Onset   • Psychiatric Illness Mother 47        suicide   • Alcohol/Drug Mother    • Cancer Father 72        Lung   • Alcohol/Drug Brother         Recovering     Social History     Socioeconomic History   • Marital status:      Spouse name: Not on file   • Number of children: Not on file   • Years of education: Not on file   • Highest education level: Not on file   Occupational History   • Not on file   Social Needs   • Financial resource strain: Not on file   • Food insecurity:     Worry: Not on file     Inability: Not on file   • Transportation needs:     Medical: Not on file     Non-medical: Not on file   Tobacco Use   • Smoking status: Former Smoker     Packs/day: 1.00     Years: 30.00     Pack years: 30.00     Types: Cigarettes     Last attempt to quit: 1998     Years since quittin.7   • Smokeless tobacco: Never Used   • Tobacco comment: continued abstinance   Substance and Sexual Activity   • Alcohol use: No     Alcohol/week: 0.0 oz   • Drug use: No   • Sexual activity: Not on file   Lifestyle   • Physical activity:     Days per week: Not on file     Minutes per session: Not on file   • Stress: Not on file   Relationships   • Social connections:     Talks on phone: Not on file     Gets together: Not on file     Attends Sabian[employer] service: Not on file     Active member of club or organization: Not on file     Attends meetings of clubs or organizations: Not on file     Relationship status: Not on file   • Intimate partner violence:     Fear of current or ex partner: Not on file     Emotionally abused: Not on file     Physically abused: Not on file     Forced sexual activity: Not on file   Other Topics Concern   • Not on file   Social History Narrative   • Not on file     Allergies   Allergen Reactions   •  Tylenol Anaphylaxis     Lip, throat swelling   • Tape Unspecified     Causes blisters   • Latex Hives     Outpatient Encounter Medications as of 9/17/2019   Medication Sig Dispense Refill   • tiotropium (SPIRIVA HANDIHALER) 18 MCG Cap INHALE THE CONTENTS OF ONE CAPSULE DAILY 90 Cap 3   • denosumab (PROLIA) 60 MG/ML Solution Prefilled Syringe Inject 1 mL as instructed every 6 months. Subcutaneous 1 mL 1   • albuterol 108 (90 Base) MCG/ACT Aero Soln inhalation aerosol USE 2 INHALATIONS ORALLY   EVERY SIX HOURS AS NEEDED  FOR SHORTNESS OF BREATH 3 Inhaler 1   • lisinopril (PRINIVIL) 10 MG Tab TAKE 1 TABLET DAILY 90 Tab 3   • triamterene-hctz (MAXZIDE-25/DYAZIDE) 37.5-25 MG Tab TAKE 1 TABLET EVERY MORNING 90 Tab 3   • fluticasone (FLONASE) 50 MCG/ACT nasal spray USE 2 SPRAYS NASALLY DAILY 48 g 3   • DULoxetine (CYMBALTA) 60 MG Cap DR Particles delayed-release capsule TAKE 1 CAPSULE EVERY       MORNING 90 Cap 3   • SYNTHROID 100 MCG Tab TAKE 1 TABLET DAILY 90 Tab 3   • aripiprazole (ABILIFY) 5 MG tablet TAKE 1 TABLET AT BEDTIME 90 Tab 3   • fenofibrate (TRICOR) 145 MG Tab TAKE 1 TABLET DAILY. 90 Tab 3   • allopurinol (ZYLOPRIM) 100 MG Tab TAKE 1 TABLET DAILY 90 Tab 3   • atorvastatin (LIPITOR) 40 MG Tab TAKE 1 TABLET DAILY 90 Tab 3   • omeprazole (PRILOSEC) 20 MG delayed-release capsule FOR DIRECTIONS ON HOW TO   TAKE THIS MEDICINE, READ   THE ENCLOSED MEDICATION    INFORMATION FORM 90 Cap 3   • albuterol (PROVENTIL) 2.5mg/3ml Nebu Soln solution for nebulization 3 mL by Nebulization route every four hours as needed for Shortness of Breath. 360 mL 5   • tramadol (ULTRAM) 50 MG Tab TAKE 1 TO 2 TABLETS BY MOUTH 2 TIMES DAILY AS NEEDED FOR SEVERE PAIN 90 Tab 5   • latanoprost (XALATAN) 0.005 % Solution Place 1 Drop in both eyes every evening. 1 Bottle 3   • azithromycin (ZITHROMAX) 250 MG Tab Take 2 tablets on day 1, then take 1 tablet a day for 4 days. (Patient not taking: Reported on 9/17/2019) 6 Tab 0   •  "methylPREDNISolone (MEDROL DOSEPAK) 4 MG Tablet Therapy Pack Take as directed. (Patient not taking: Reported on 9/17/2019) 21 Tab 0   • traZODone (DESYREL) 50 MG Tab Take 1 Tab by mouth at bedtime as needed for Sleep. (Patient not taking: Reported on 9/17/2019) 4 Tab 0   • [DISCONTINUED] omeprazole (PRILOSEC) 20 MG delayed-release capsule FOR DIRECTIONS ON HOW TO   TAKE THIS MEDICINE, READ   THE ENCLOSED MEDICATION    INFORMATION FORM 90 Cap 3     No facility-administered encounter medications on file as of 9/17/2019.      Review of Systems   Constitutional: Negative for chills and fever.   HENT: Negative for congestion and hearing loss.    Eyes: Negative for blurred vision, pain and discharge.   Respiratory: Positive for cough, sputum production, shortness of breath and wheezing. Negative for hemoptysis.    Cardiovascular: Negative for chest pain, palpitations, leg swelling and PND.   Gastrointestinal: Negative for abdominal pain, nausea and vomiting.   Genitourinary: Negative for dysuria and urgency.   Musculoskeletal: Negative for joint pain and myalgias.   Skin: Negative for itching and rash.   Neurological: Negative for dizziness and loss of consciousness.   Endo/Heme/Allergies: Negative for environmental allergies. Does not bruise/bleed easily.   Psychiatric/Behavioral: Negative for depression. The patient is not nervous/anxious.    All other systems reviewed and are negative.       Objective:   /70 (BP Location: Left arm, Patient Position: Sitting)   Pulse 80   Ht 1.651 m (5' 5\")   Wt 73.5 kg (162 lb)   SpO2 95%   BMI 26.96 kg/m²     Physical Exam   Constitutional: She is oriented to person, place, and time. She appears well-developed and well-nourished.   HENT:   Nose: Nose normal.   Mouth/Throat: No oropharyngeal exudate.   Eyes: Pupils are equal, round, and reactive to light. EOM are normal. No scleral icterus.   Neck: No JVD present. No thyromegaly present.   Cardiovascular: Normal rate, " regular rhythm and intact distal pulses.   No murmur heard.  Pulmonary/Chest: Effort normal. She has wheezes (Mild left upper lobe). She exhibits no tenderness.   Abdominal: Bowel sounds are normal. She exhibits no distension.   Musculoskeletal: She exhibits no edema or tenderness.   Lymphadenopathy:     She has no cervical adenopathy.   Neurological: She is alert and oriented to person, place, and time.   Skin: Skin is warm and dry.   Psychiatric: She has a normal mood and affect. Her behavior is normal.       Assessment:     1. SOB (shortness of breath)  EKG    ZIO PATCH MONITOR   2. Other hyperlipidemia  EKG   3. Renovascular hypertension     4. Shortness of breath  CT-CHEST (THORAX) WITH    ZIO PATCH MONITOR   5. Atherosclerosis of both carotid arteries     6. Dyspnea on exertion         Medical Decision Making:  Today's Assessment / Status / Plan:     EKG done today and read by me is normal sinus rhythm no hypertrophy normal intervals    Breathlessness  We looked at the images of her echo together.  Right heart looks strong and normal only mildly elevated pulmonary pressures no evidence of valve disease or shunt  We talked about the differential diagnosis especially from the heart standpoint.  Talked about arrhythmia especially heart block a rapid A. fib, talked about ischemia which is unlikely.  We looked at the images of her normal nuclear test 2 years ago for similar reasons  I ordered a 3-week monitor  I also ordered a CT to reevaluate her lung parenchyma and rule out lung cancer especially with her abnormal exam and family history    Atherosclerosis of the carotids  I looked at the images from 2016 mild, reasonable to talk about statin therapy on an ongoing basis after her symptoms have minimized at least been discerned    Cardiovascular risk factors  As above  She gets quite thorough care from her PCP and we reviewed her lipid regimen including her statin    RTC 1 month to review symptom management,  review testing

## 2019-09-18 ENCOUNTER — APPOINTMENT (OUTPATIENT)
Dept: PULMONOLOGY | Facility: MEDICAL CENTER | Age: 69
End: 2019-09-18
Payer: MEDICARE

## 2019-09-20 ENCOUNTER — HOSPITAL ENCOUNTER (OUTPATIENT)
Dept: LAB | Facility: MEDICAL CENTER | Age: 69
End: 2019-09-20
Attending: INTERNAL MEDICINE
Payer: MEDICARE

## 2019-09-20 DIAGNOSIS — I15.0 RENOVASCULAR HYPERTENSION: ICD-10-CM

## 2019-09-20 DIAGNOSIS — T50.8X5A: ICD-10-CM

## 2019-09-20 LAB
ANION GAP SERPL CALC-SCNC: 9 MMOL/L (ref 0–11.9)
BUN SERPL-MCNC: 27 MG/DL (ref 8–22)
CALCIUM SERPL-MCNC: 9.1 MG/DL (ref 8.5–10.5)
CHLORIDE SERPL-SCNC: 102 MMOL/L (ref 96–112)
CO2 SERPL-SCNC: 25 MMOL/L (ref 20–33)
CREAT SERPL-MCNC: 1.62 MG/DL (ref 0.5–1.4)
GLUCOSE SERPL-MCNC: 88 MG/DL (ref 65–99)
POTASSIUM SERPL-SCNC: 4.5 MMOL/L (ref 3.6–5.5)
SODIUM SERPL-SCNC: 136 MMOL/L (ref 135–145)

## 2019-09-20 PROCEDURE — 80048 BASIC METABOLIC PNL TOTAL CA: CPT

## 2019-09-20 PROCEDURE — 36415 COLL VENOUS BLD VENIPUNCTURE: CPT

## 2019-09-20 NOTE — PROGRESS NOTES
Mary from imaging called requesting for BMP prior to CT scheduled on Monday. She will call pt to instruct to get blood test done.     STAT BMP ordered

## 2019-09-23 ENCOUNTER — HOSPITAL ENCOUNTER (OUTPATIENT)
Dept: RADIOLOGY | Facility: MEDICAL CENTER | Age: 69
End: 2019-09-23
Attending: INTERNAL MEDICINE
Payer: MEDICARE

## 2019-09-23 ENCOUNTER — HOSPITAL ENCOUNTER (OUTPATIENT)
Dept: PULMONOLOGY | Facility: MEDICAL CENTER | Age: 69
End: 2019-09-23
Attending: NURSE PRACTITIONER
Payer: MEDICARE

## 2019-09-23 DIAGNOSIS — R06.02 SHORTNESS OF BREATH: ICD-10-CM

## 2019-09-23 PROCEDURE — 700117 HCHG RX CONTRAST REV CODE 255: Performed by: INTERNAL MEDICINE

## 2019-09-23 PROCEDURE — G0424 PULMONARY REHAB W EXER: HCPCS

## 2019-09-23 PROCEDURE — 71260 CT THORAX DX C+: CPT

## 2019-09-23 RX ADMIN — IOHEXOL 50 ML: 350 INJECTION, SOLUTION INTRAVENOUS at 11:15

## 2019-09-24 ENCOUNTER — TELEPHONE (OUTPATIENT)
Dept: CARDIOLOGY | Facility: MEDICAL CENTER | Age: 69
End: 2019-09-24

## 2019-09-24 NOTE — TELEPHONE ENCOUNTER
Result Notes for CT-CHEST (THORAX) WITH   Notes recorded by Yazmin Belcher M.D. on 9/24/2019 at 7:35 AM PDT  Great news, no evidence of concerning lung findings.  They do note changes of emphysema/COPD.  There is some atherosclerosis likely noted in her carotids and its a great idea to stay on a statin.    Hiatal hernia was seen that also she can follow-up with her PCP but is not concerning.    Dr. chua may want to repeat the CAT scan in 1 year to follow-up a very small nodule in her right lung.  Good news so far, follow-up as planned with my note     Called pt, discussed CT Chest result per Dr Belcher and her recommendations, pt verbalizes understanding

## 2019-09-25 ENCOUNTER — HOSPITAL ENCOUNTER (OUTPATIENT)
Dept: PULMONOLOGY | Facility: MEDICAL CENTER | Age: 69
End: 2019-09-25
Attending: NURSE PRACTITIONER
Payer: MEDICARE

## 2019-09-25 PROCEDURE — G0424 PULMONARY REHAB W EXER: HCPCS

## 2019-09-26 ENCOUNTER — NON-PROVIDER VISIT (OUTPATIENT)
Dept: CARDIOLOGY | Facility: MEDICAL CENTER | Age: 69
End: 2019-09-26
Attending: INTERNAL MEDICINE
Payer: MEDICARE

## 2019-09-26 ENCOUNTER — SLEEP STUDY (OUTPATIENT)
Dept: SLEEP MEDICINE | Facility: MEDICAL CENTER | Age: 69
End: 2019-09-26
Attending: NURSE PRACTITIONER
Payer: MEDICARE

## 2019-09-26 ENCOUNTER — TELEPHONE (OUTPATIENT)
Dept: CARDIOLOGY | Facility: MEDICAL CENTER | Age: 69
End: 2019-09-26

## 2019-09-26 ENCOUNTER — HOSPITAL ENCOUNTER (OUTPATIENT)
Dept: RADIOLOGY | Facility: MEDICAL CENTER | Age: 69
End: 2019-09-26
Attending: ORTHOPAEDIC SURGERY
Payer: MEDICARE

## 2019-09-26 DIAGNOSIS — G47.33 OSA (OBSTRUCTIVE SLEEP APNEA): ICD-10-CM

## 2019-09-26 DIAGNOSIS — R06.83 SNORING: ICD-10-CM

## 2019-09-26 DIAGNOSIS — I47.10 SVT (SUPRAVENTRICULAR TACHYCARDIA): ICD-10-CM

## 2019-09-26 DIAGNOSIS — R06.02 SOB (SHORTNESS OF BREATH): ICD-10-CM

## 2019-09-26 DIAGNOSIS — M79.672 LEFT FOOT PAIN: ICD-10-CM

## 2019-09-26 DIAGNOSIS — R06.02 SHORTNESS OF BREATH: ICD-10-CM

## 2019-09-26 PROCEDURE — 73718 MRI LOWER EXTREMITY W/O DYE: CPT | Mod: LT

## 2019-09-26 PROCEDURE — 95810 POLYSOM 6/> YRS 4/> PARAM: CPT | Performed by: INTERNAL MEDICINE

## 2019-09-27 NOTE — PROCEDURES
Comments:  The patient underwent a diagnostic polysomnogram using the standard montage for measurement of parameters of sleep, respiratory events, movement abnormalities, and heart rate and rhythm.   A microphone was used to monitor snoring.  Interpretation:  Study start time was 08:27:57 PM. Diagnostic recording time was 8h 48.0m with a total sleep time of 7h 33.0m resulting in a sleep efficiency of 85.80%%.   Sleep latency from the start of the study was 30 minutes and the latency from sleep to REM was 197 minutes.  In total,84 arousals were scored for an arousal index of 11.1.  Respiratory:  There were a total of 16 apneas consisting of 13 obstructive apneas, 0 mixed apneas, and 3 central apneas. A total of 57 hypopneas were scored.  The apnea index was 2.12 per hour and the hypopnea index was 7.55 per hour resulting in an overall AHI of 9.67.  AHI during rem was 41.9 and AHI while supine was 0.00.  Oximetry:  There was a mean oxygen saturation of 88.0% with a minimum oxygen saturation of 77.0%. Time spent with oxygen saturations below 89% was 275.2 minutes.  Cardiac:  The highest heart rate seen while awake was 111 BPM while the highest heart rate during sleep was 98 BPM with an average sleeping heart rate of 77 BPM.  Limb Movements:  There were a total of 719 PLMs during sleep, of which 58 were PLMS arousals. This resulted in a PLMS index of 95.2 and a PLMS arousal index of 7.7.    The sleep efficiency was normal at 85%.  Sleep architecture showed preserved sleep stages.  Sleep latency was 30 minutes.  EKG showed sinus rhythm.  Elevated periodic limb movements noted with index 95/h.      Once asleep, obstructive apneas and principally hypopneas were observed with overall AHI 9.7/h.  There were oxygen desaturations for 94% of the night, to a memo of 77% during REM sleep.      Split-night criteria was not met.    Interpretation:  Mild obstructive sleep apnea syndrome, AHI 9.7/h associated with desaturations to  77% SPO2.  Periodic limb movement disorder.    Recommendations:  Given the patient's underlying COPD, treatment with CPAP therapy is recommended.  A designated in laboratory CPAP titration is advised for accurate calibration of CPAP/BiPAP pressures.  Periodic limb movement disorder can be exacerbated by sleep apnea or represent a secondary sleep disorder.  Pharmacologic therapy could be considered if clinically indicated.  Treatment of underlying COPD advised.

## 2019-09-30 ENCOUNTER — APPOINTMENT (OUTPATIENT)
Dept: PULMONOLOGY | Facility: MEDICAL CENTER | Age: 69
End: 2019-09-30
Attending: NURSE PRACTITIONER
Payer: MEDICARE

## 2019-10-02 ENCOUNTER — APPOINTMENT (OUTPATIENT)
Dept: PULMONOLOGY | Facility: MEDICAL CENTER | Age: 69
End: 2019-10-02
Attending: NURSE PRACTITIONER
Payer: MEDICARE

## 2019-10-07 ENCOUNTER — HOSPITAL ENCOUNTER (OUTPATIENT)
Dept: PULMONOLOGY | Facility: MEDICAL CENTER | Age: 69
End: 2019-10-07
Attending: NURSE PRACTITIONER
Payer: MEDICARE

## 2019-10-07 PROCEDURE — G0424 PULMONARY REHAB W EXER: HCPCS

## 2019-10-09 ENCOUNTER — HOSPITAL ENCOUNTER (OUTPATIENT)
Dept: PULMONOLOGY | Facility: MEDICAL CENTER | Age: 69
End: 2019-10-09
Attending: NURSE PRACTITIONER
Payer: MEDICARE

## 2019-10-09 PROCEDURE — G0424 PULMONARY REHAB W EXER: HCPCS

## 2019-10-14 ENCOUNTER — HOSPITAL ENCOUNTER (OUTPATIENT)
Dept: PULMONOLOGY | Facility: MEDICAL CENTER | Age: 69
End: 2019-10-14
Attending: NURSE PRACTITIONER
Payer: MEDICARE

## 2019-10-14 PROCEDURE — G0424 PULMONARY REHAB W EXER: HCPCS

## 2019-10-16 ENCOUNTER — HOSPITAL ENCOUNTER (OUTPATIENT)
Dept: PULMONOLOGY | Facility: MEDICAL CENTER | Age: 69
End: 2019-10-16
Attending: NURSE PRACTITIONER
Payer: MEDICARE

## 2019-10-16 PROCEDURE — G0424 PULMONARY REHAB W EXER: HCPCS

## 2019-10-17 PROCEDURE — 0296T PR EXT ECG > 48HR TO 21 DAY RCRD W/CONECT INTL RCRD: CPT | Performed by: INTERNAL MEDICINE

## 2019-10-17 PROCEDURE — 0298T PR EXT ECG > 48HR TO 21 DAY REVIEW AND INTERPRETATN: CPT | Performed by: INTERNAL MEDICINE

## 2019-10-21 ENCOUNTER — HOSPITAL ENCOUNTER (OUTPATIENT)
Dept: PULMONOLOGY | Facility: MEDICAL CENTER | Age: 69
End: 2019-10-21
Attending: NURSE PRACTITIONER
Payer: MEDICARE

## 2019-10-21 PROCEDURE — G0424 PULMONARY REHAB W EXER: HCPCS

## 2019-10-21 RX ORDER — ALLOPURINOL 100 MG/1
TABLET ORAL
Qty: 90 TAB | Refills: 3 | Status: SHIPPED | OUTPATIENT
Start: 2019-10-21 | End: 2020-08-10

## 2019-10-22 ENCOUNTER — OFFICE VISIT (OUTPATIENT)
Dept: MEDICAL GROUP | Facility: MEDICAL CENTER | Age: 69
End: 2019-10-22
Payer: MEDICARE

## 2019-10-22 VITALS
DIASTOLIC BLOOD PRESSURE: 76 MMHG | HEIGHT: 65 IN | BODY MASS INDEX: 27.32 KG/M2 | SYSTOLIC BLOOD PRESSURE: 134 MMHG | OXYGEN SATURATION: 96 % | TEMPERATURE: 98.1 F | WEIGHT: 164 LBS | HEART RATE: 80 BPM

## 2019-10-22 DIAGNOSIS — Z78.0 ASYMPTOMATIC MENOPAUSAL STATE: ICD-10-CM

## 2019-10-22 DIAGNOSIS — K21.9 GASTROESOPHAGEAL REFLUX DISEASE, ESOPHAGITIS PRESENCE NOT SPECIFIED: ICD-10-CM

## 2019-10-22 DIAGNOSIS — M80.00XA AGE-RELATED OSTEOPOROSIS WITH CURRENT PATHOLOGICAL FRACTURE, INITIAL ENCOUNTER: ICD-10-CM

## 2019-10-22 PROCEDURE — 99214 OFFICE O/P EST MOD 30 MIN: CPT | Performed by: FAMILY MEDICINE

## 2019-10-22 RX ORDER — OMEPRAZOLE 20 MG/1
20 CAPSULE, DELAYED RELEASE ORAL 2 TIMES DAILY
Qty: 180 CAP | Refills: 3 | Status: SHIPPED | OUTPATIENT
Start: 2019-10-22 | End: 2020-09-23 | Stop reason: SDUPTHER

## 2019-10-22 NOTE — ASSESSMENT & PLAN NOTE
Patient is taking omeprazole 20 mg daily, but sometimes requires 20 mg twice daily about a couple weeks. If she misses an omeprazole she gets significant GERD. CT scan large hiatal hernia.

## 2019-10-22 NOTE — ASSESSMENT & PLAN NOTE
Recently had a stress fracture in foot from standing. Long history of bilateral stress fractures in feet and compression fracture of thoracic spine from coughing.  Tolerating Prolia, but still getting fractures. Fosamax caused GI upset. She was on Reclast before but still got stress fractures.

## 2019-10-22 NOTE — PROGRESS NOTES
Subjective:   Summer Hatch is a 69 y.o. female here today for osteoporosis, GERD    Osteoporosis  Recently had a stress fracture in foot from standing. Long history of bilateral stress fractures in feet and compression fracture of thoracic spine from coughing.  Tolerating Prolia, but still getting fractures. Fosamax caused GI upset. She was on Reclast before but still got stress fractures.    GERD (gastroesophageal reflux disease)  Patient is taking omeprazole 20 mg daily, but sometimes requires 20 mg twice daily about a couple weeks. If she misses an omeprazole she gets significant GERD. CT scan large hiatal hernia.         Current medicines (including changes today)  Current Outpatient Medications   Medication Sig Dispense Refill   • omeprazole (PRILOSEC) 20 MG delayed-release capsule Take 1 Cap by mouth 2 times a day. 180 Cap 3   • allopurinol (ZYLOPRIM) 100 MG Tab TAKE 1 TABLET DAILY 90 Tab 3   • tiotropium (SPIRIVA HANDIHALER) 18 MCG Cap INHALE THE CONTENTS OF ONE CAPSULE DAILY 90 Cap 3   • azithromycin (ZITHROMAX) 250 MG Tab Take 2 tablets on day 1, then take 1 tablet a day for 4 days. (Patient not taking: Reported on 9/17/2019) 6 Tab 0   • traZODone (DESYREL) 50 MG Tab Take 1 Tab by mouth at bedtime as needed for Sleep. (Patient not taking: Reported on 9/17/2019) 4 Tab 0   • albuterol 108 (90 Base) MCG/ACT Aero Soln inhalation aerosol USE 2 INHALATIONS ORALLY   EVERY SIX HOURS AS NEEDED  FOR SHORTNESS OF BREATH 3 Inhaler 1   • lisinopril (PRINIVIL) 10 MG Tab TAKE 1 TABLET DAILY 90 Tab 3   • triamterene-hctz (MAXZIDE-25/DYAZIDE) 37.5-25 MG Tab TAKE 1 TABLET EVERY MORNING 90 Tab 3   • fluticasone (FLONASE) 50 MCG/ACT nasal spray USE 2 SPRAYS NASALLY DAILY 48 g 3   • DULoxetine (CYMBALTA) 60 MG Cap DR Particles delayed-release capsule TAKE 1 CAPSULE EVERY       MORNING 90 Cap 3   • SYNTHROID 100 MCG Tab TAKE 1 TABLET DAILY 90 Tab 3   • aripiprazole (ABILIFY) 5 MG tablet TAKE 1 TABLET AT BEDTIME 90 Tab 3   •  "fenofibrate (TRICOR) 145 MG Tab TAKE 1 TABLET DAILY. 90 Tab 3   • atorvastatin (LIPITOR) 40 MG Tab TAKE 1 TABLET DAILY 90 Tab 3   • albuterol (PROVENTIL) 2.5mg/3ml Nebu Soln solution for nebulization 3 mL by Nebulization route every four hours as needed for Shortness of Breath. 360 mL 5   • tramadol (ULTRAM) 50 MG Tab TAKE 1 TO 2 TABLETS BY MOUTH 2 TIMES DAILY AS NEEDED FOR SEVERE PAIN 90 Tab 5   • latanoprost (XALATAN) 0.005 % Solution Place 1 Drop in both eyes every evening. 1 Bottle 3     No current facility-administered medications for this visit.      She  has a past medical history of Arthritis, Asthma, Breath shortness, Bronchitis, Carpal tunnel syndrome, COPD, Dental disorder, Emphysema of lung ([facility]), GERD (gastroesophageal reflux disease) (10/22/2013), Glaucoma, History of tobacco abuse (10/22/2013), Hypertension, Hypothyroidism (10/22/2013), Indigestion, Osteoporosis, Pain, Pneumonia, Renal disorder, and Seizure disorder ([facility]).    ROS   Positive foot pain, positive shortness of breath       Objective:     /76 (BP Location: Right arm, Patient Position: Sitting)   Pulse 80   Temp 36.7 °C (98.1 °F)   Ht 1.651 m (5' 5\")   Wt 74.4 kg (164 lb)   SpO2 96%  Body mass index is 27.29 kg/m².   Physical Exam:  Constitutional: Alert, no distress.  Skin: Warm, dry, good turgor, no rashes in visible areas.  Eye: Equal, round and reactive, conjunctiva clear, lids normal.  Psych: Alert and oriented x3, normal affect and mood.        Assessment and Plan:   The following treatment plan was discussed    1. Age-related osteoporosis with current pathological fracture, initial encounter  Uncontrolled with new fracture recently.  DEXA ordered.  Discussed changing patient from Prolia to Forteo, she will think about it because it requires a subcutaneous injection daily.    2. Asymptomatic menopausal state  - DS-BONE DENSITY STUDY (DEXA); Future    3. Gastroesophageal reflux disease, esophagitis presence not " specified  Uncontrolled slightly.  Will increase omeprazole from 20 mg once daily 20 mg up to 2 times daily.  - omeprazole (PRILOSEC) 20 MG delayed-release capsule; Take 1 Cap by mouth 2 times a day.  Dispense: 180 Cap; Refill: 3      Followup: Return if symptoms worsen or fail to improve.

## 2019-10-23 ENCOUNTER — HOSPITAL ENCOUNTER (OUTPATIENT)
Dept: PULMONOLOGY | Facility: MEDICAL CENTER | Age: 69
End: 2019-10-23
Attending: NURSE PRACTITIONER
Payer: MEDICARE

## 2019-10-23 PROCEDURE — G0424 PULMONARY REHAB W EXER: HCPCS

## 2019-10-28 ENCOUNTER — APPOINTMENT (OUTPATIENT)
Dept: PULMONOLOGY | Facility: MEDICAL CENTER | Age: 69
End: 2019-10-28
Attending: NURSE PRACTITIONER
Payer: MEDICARE

## 2019-10-30 ENCOUNTER — APPOINTMENT (OUTPATIENT)
Dept: PULMONOLOGY | Facility: MEDICAL CENTER | Age: 69
End: 2019-10-30
Attending: NURSE PRACTITIONER
Payer: MEDICARE

## 2019-11-04 ENCOUNTER — APPOINTMENT (OUTPATIENT)
Dept: PULMONOLOGY | Facility: MEDICAL CENTER | Age: 69
End: 2019-11-04
Attending: NURSE PRACTITIONER
Payer: MEDICARE

## 2019-11-06 ENCOUNTER — APPOINTMENT (OUTPATIENT)
Dept: PULMONOLOGY | Facility: MEDICAL CENTER | Age: 69
End: 2019-11-06
Attending: NURSE PRACTITIONER
Payer: MEDICARE

## 2019-11-08 ENCOUNTER — OFFICE VISIT (OUTPATIENT)
Dept: PULMONOLOGY | Facility: HOSPICE | Age: 69
End: 2019-11-08
Payer: MEDICARE

## 2019-11-08 VITALS
SYSTOLIC BLOOD PRESSURE: 126 MMHG | DIASTOLIC BLOOD PRESSURE: 64 MMHG | HEIGHT: 65 IN | WEIGHT: 164 LBS | HEART RATE: 78 BPM | BODY MASS INDEX: 27.32 KG/M2 | OXYGEN SATURATION: 93 %

## 2019-11-08 DIAGNOSIS — Z23 NEED FOR VACCINATION: ICD-10-CM

## 2019-11-08 DIAGNOSIS — J44.9 CHRONIC OBSTRUCTIVE PULMONARY DISEASE, UNSPECIFIED COPD TYPE (HCC): ICD-10-CM

## 2019-11-08 DIAGNOSIS — J45.40 MODERATE PERSISTENT ASTHMA WITHOUT COMPLICATION: ICD-10-CM

## 2019-11-08 DIAGNOSIS — G47.33 OSA (OBSTRUCTIVE SLEEP APNEA): ICD-10-CM

## 2019-11-08 PROCEDURE — 90662 IIV NO PRSV INCREASED AG IM: CPT | Performed by: NURSE PRACTITIONER

## 2019-11-08 PROCEDURE — G0008 ADMIN INFLUENZA VIRUS VAC: HCPCS | Performed by: NURSE PRACTITIONER

## 2019-11-08 PROCEDURE — 99214 OFFICE O/P EST MOD 30 MIN: CPT | Mod: 25 | Performed by: NURSE PRACTITIONER

## 2019-11-08 RX ORDER — METHYLPREDNISOLONE 4 MG/1
TABLET ORAL
Qty: 21 TAB | Refills: 1 | Status: SHIPPED | OUTPATIENT
Start: 2019-11-08 | End: 2019-11-11

## 2019-11-08 RX ORDER — AZITHROMYCIN 250 MG/1
TABLET, FILM COATED ORAL
Qty: 6 TAB | Refills: 1 | Status: SHIPPED | OUTPATIENT
Start: 2019-11-08 | End: 2019-11-11

## 2019-11-08 NOTE — PATIENT INSTRUCTIONS
1) Continue Symbicort 2 Puffs twice a day with mouth rinse.  Continue Spiriva daily.   2) continue rescue inhaler as needed every 4 hours for shortness of breath or wheezing  3) Light conditioning encouraged -pulmonary rehab referral  4) Continue smoking cessation   5) Referral to cardiology for recommendations regarding moderate aortic insufficiency and if any relation to dyspnea  7) Vaccines: Up to date with Pneumovax 23, Prevnar 13  8) Start autoCPAP at [id number]  9) Discussed acclimating to machine and change mask within first 30 days if required. Bring chip download to each appointment.  - Reviewed Medicare guidelines: Using the machine 70% of the time for minimum of 4 hours  10) Return in about 4 months (around 3/8/2020), or 2 months for sleep and 4 months for pumonary, for Two seperate appointments, follow up with Cathy Major, KATLYN.  11)  Continue pulmonary rehab

## 2019-11-08 NOTE — PROGRESS NOTES
CC:  Here for f/u sleep and pulmonary issues as listed below    HPI:   Summer presents today for follow up for asthma/COPD and sleep study results.  Past medical history of hypertension, septic arthritis, seizures, neuropathy, vitamin D deficiency, chronic kidney disease stage III, prediabetic, depression, osteoporosis.     Martin from 1/2019 indicate a Fev1 of 1.42L or 60% predicted with a mild bronchodilator response, Fev1/FVC ratio of 59, last DLCO 77% computed in 2018.  Former smoker, 30-pack-year history, quit in 1998.     She is compliant using Symbicort 160-4.5, 2 puff twice a day with mouth rinse.  Spiriva daily and a Ventolin HFA 2-3 times a day, or less without infection.  She last required her nebulizer when she was sick in April 2019, Nov 2019 requiring emergency antibiotics and steroids with improvement.       She feels her dyspnea is has improved with using Symbicort BID. She continues to have some PIMENTEL and lil wheeze.  She typically has a chronic cough producing clear mucus, headaches when she wakes up.   She gets short of breath with walking in her home.  She does not exercise due to this. She started pulmonary rehab with some benefit.      She admits to snoring and feeling tired often.  Denies chest tightness, palpitations. She denies difficulty swallowing or choking, but coughs while eating food.  Completed Multi-ox on room air at rest she was 95% and desaturated to a low of 92%.  Her heart rate when starting was 82 and up to 105 bpm.  Her last echocardiogram was in 2015 noting diastolic dysfunction type II, RSVP estimating at 30 mmHg. Updated echo from 8/2019 shows no significant change, except for moderate aortic insufficiency.     PSG from 9/2019 indicated an AHI of 9.7 that increases to 41.9 in REM and low oxygenation of 77%.      Reviewed results and treatment options including CPAP treatment versus in lab titration, dental appliance, UPPP surgery, and behavioral modifications.  Patient is  amendable to autoCPAP. They understand they may need a future sleep study if treatment is ineffective.   Patient is currently sleeping 5-6 hours per night with 4-5 nighttime awakenings. They have no trouble falling asleep.   They do not feel refreshed in the morning and has morning H/A. They feel tired throughout the day and naps for appx 30-60 min long 1-2x/week.  Patient reports snoring. Denies apnea events and paroxysmal nocturnal dyspnea events. They have never fallen asleep in conversation, at the wheel, or at work.  They deny sleepwalking. Deny symptoms of restless leg.      Patient Active Problem List    Diagnosis Date Noted   • Chronic kidney disease, stage III (moderate) ([facility]) 09/26/2014     Priority: Medium   • HTN (hypertension) 10/10/2013     Priority: Medium   • MDD (major depressive disorder), recurrent episode, moderate ([facility]) 06/18/2015     Priority: Low   • Acquired hypothyroidism 10/22/2013     Priority: Low   • GERD (gastroesophageal reflux disease) 10/22/2013     Priority: Low   • Hyperlipidemia 10/10/2013     Priority: Low   • Moderate persistent asthma without complication 07/22/2019   • Dyspnea on exertion 03/30/2018   • Osteoporosis 04/18/2017   • Compression fracture of thoracic spine, non-traumatic ([facility]) 03/01/2017   • Chronic obstructive pulmonary disease ([facility]) 02/01/2017   • Atherosclerosis of both carotid arteries 10/19/2016   • Atopic dermatitis 01/08/2015   • Neuropathy ([facility]) 01/10/2014   • Seizure disorder ([facility]) 10/10/2013   • CTS (carpal tunnel syndrome) 10/10/2013   • History of septic arthritis 10/09/2013       Past Medical History:   Diagnosis Date   • Arthritis    • Asthma     Uses inhaler PRN.   • Breath shortness     With exertion.   • Bronchitis    • Carpal tunnel syndrome    • COPD    • Dental disorder    • Emphysema of lung ([facility])    • GERD (gastroesophageal reflux disease) 10/22/2013   • Glaucoma     Bilateral.   • History of tobacco abuse 10/22/2013    Quit 1998. Smoked one  pack per day for 30 years.   • Hypertension    • Hypothyroidism 10/22/2013   • Indigestion    • Osteoporosis    • Pain     Bilateral hips.   • Pneumonia    • Renal disorder    • Seizure disorder (HCC)     Last seizure 30 years ago.  No medication.       Past Surgical History:   Procedure Laterality Date   • ABDOMINOPLASTY  10/29/2018    Procedure: ABDOMINOPLASTY;  Surgeon: Santiago Campos M.D.;  Location: SURGERY SAME DAY [facility];  Service: Plastics   • HIP REVISION TOTAL Right 2015    Procedure: Evacuation hematoma, revision total hip;  Surgeon: Nils Starr M.D.;  Location: SURGERY [facility];  Service:    • HIP REVISION TOTAL  10/10/2013    Performed by Nils Starr M.D. at SURGERY [facility]   • INCISION AND DRAINAGE ORTHOPEDIC  10/10/2013    Performed by Nils Starr M.D. at [facility]   • APPENDECTOMY     • CARPAL TUNNEL RELEASE     • NERVE ULNAR REPAIR OR EXPLORE     • OTHER ORTHOPEDIC SURGERY     • TONSILLECTOMY         Family History   Problem Relation Age of Onset   • Psychiatric Illness Mother 47        suicide   • Alcohol/Drug Mother    • Cancer Father 72        Lung   • Alcohol/Drug Brother         Recovering       Social History     Socioeconomic History   • Marital status:      Spouse name: Not on file   • Number of children: Not on file   • Years of education: Not on file   • Highest education level: Not on file   Occupational History   • Not on file   Social Needs   • Financial resource strain: Not on file   • Food insecurity:     Worry: Not on file     Inability: Not on file   • Transportation needs:     Medical: Not on file     Non-medical: Not on file   Tobacco Use   • Smoking status: Former Smoker     Packs/day: 1.00     Years: 30.00     Pack years: 30.00     Types: Cigarettes     Last attempt to quit: 1998     Years since quittin.8   • Smokeless tobacco: Never Used   • Tobacco comment: continued abstinance   Substance and Sexual  Activity   • Alcohol use: No     Alcohol/week: 0.0 oz   • Drug use: No   • Sexual activity: Not on file   Lifestyle   • Physical activity:     Days per week: Not on file     Minutes per session: Not on file   • Stress: Not on file   Relationships   • Social connections:     Talks on phone: Not on file     Gets together: Not on file     Attends [employer] service: Not on file     Active member of club or organization: Not on file     Attends meetings of clubs or organizations: Not on file     Relationship status: Not on file   • Intimate partner violence:     Fear of current or ex partner: Not on file     Emotionally abused: Not on file     Physically abused: Not on file     Forced sexual activity: Not on file   Other Topics Concern   • Not on file   Social History Narrative   • Not on file       Current Outpatient Medications   Medication Sig Dispense Refill   • methylPREDNISolone (MEDROL DOSEPAK) 4 MG Tablet Therapy Pack Take as directed. 21 Tab 1   • azithromycin (ZITHROMAX) 250 MG Tab Take 2 tablets on day 1, then take 1 tablet a day for 4 days. 6 Tab 1   • omeprazole (PRILOSEC) 20 MG delayed-release capsule Take 1 Cap by mouth 2 times a day. 180 Cap 3   • allopurinol (ZYLOPRIM) 100 MG Tab TAKE 1 TABLET DAILY 90 Tab 3   • tiotropium (SPIRIVA HANDIHALER) 18 MCG Cap INHALE THE CONTENTS OF ONE CAPSULE DAILY 90 Cap 3   • traZODone (DESYREL) 50 MG Tab Take 1 Tab by mouth at bedtime as needed for Sleep. 4 Tab 0   • albuterol 108 (90 Base) MCG/ACT Aero Soln inhalation aerosol USE 2 INHALATIONS ORALLY   EVERY SIX HOURS AS NEEDED  FOR SHORTNESS OF BREATH 3 Inhaler 1   • lisinopril (PRINIVIL) 10 MG Tab TAKE 1 TABLET DAILY 90 Tab 3   • triamterene-hctz (MAXZIDE-25/DYAZIDE) 37.5-25 MG Tab TAKE 1 TABLET EVERY MORNING 90 Tab 3   • fluticasone (FLONASE) 50 MCG/ACT nasal spray USE 2 SPRAYS NASALLY DAILY 48 g 3   • DULoxetine (CYMBALTA) 60 MG Cap DR Particles delayed-release capsule TAKE 1 CAPSULE EVERY       MORNING 90 Cap 3   •  "SYNTHROID 100 MCG Tab TAKE 1 TABLET DAILY 90 Tab 3   • aripiprazole (ABILIFY) 5 MG tablet TAKE 1 TABLET AT BEDTIME 90 Tab 3   • fenofibrate (TRICOR) 145 MG Tab TAKE 1 TABLET DAILY. 90 Tab 3   • atorvastatin (LIPITOR) 40 MG Tab TAKE 1 TABLET DAILY 90 Tab 3   • albuterol (PROVENTIL) 2.5mg/3ml Nebu Soln solution for nebulization 3 mL by Nebulization route every four hours as needed for Shortness of Breath. 360 mL 5   • tramadol (ULTRAM) 50 MG Tab TAKE 1 TO 2 TABLETS BY MOUTH 2 TIMES DAILY AS NEEDED FOR SEVERE PAIN 90 Tab 5   • latanoprost (XALATAN) 0.005 % Solution Place 1 Drop in both eyes every evening. 1 Bottle 3     No current facility-administered medications for this visit.           Allergies: Tylenol; Tape; and Latex      ROS   Gen: Denies fever, chills, unintentional weight loss, fatigue, night sweats  E/N/T: Denies ear pain, nasal congestion  Resp:Denies  sputum production, hemoptysis  CV: Denies chest pain, chest tightness, palpitations, BLE edema  Sleep:Denies morning headache, insomnia, daytime somnolence, snoring, gasping for air, apnea  Neuro: Denies frequent headaches, weakness, dizziness  GI: Denies N/V, acid reflux/heartburn  See HPI.  All other systems reviewed and negative      Vital signs for this encounter:  Vitals:    11/08/19 0819   Height: 1.651 m (5' 5\")   Weight: 74.4 kg (164 lb)   Weight % change since last entry.: 0 %   BP: 126/64   Pulse: 78   BMI (Calculated): 27.29                 Physical Exam:   Appearance: well developed, well nourished, no acute distress.  Eyes: PERRL, EOM intact, sclere white, conjunctiva moist.  Ears: no lesions or deformities.  Hearing: grossly intact.  Nose: no lesions or deformities.  Dentition: good dentition.  Oropharynx: tongue normal, posterior pharynx without erythema or exudate.  Neck: supple, trachea midline, no masses.  Respiratory effort: no intercostal retractions or use of accessory muscles.  Lung auscultation: Bilateral diminished   Heart " auscultation: + murmur  Extremities: no cyanosis or edema.  Abdomen: soft, non-tender, no masses.  Gait and station: grossly normal   Digits and Nails: no clubbing, cyanosis, petechiae, or nodes.  Cranial nerves: grossly normal.  Motor: no focal deficits observed.  Skin: no rashes, lesions, or ulcers noted.  Orientation: oriented to time, place, and person.  Mood and affect: mood and affect appropriate, normal interaction with examiner.    Assessment   1. ELIZABETH (obstructive sleep apnea)  DME CPAP    Influenza Vaccine, High Dose (65+ Only)   2. Need for vaccination  Influenza Vaccine, High Dose (65+ Only)   3. Moderate persistent asthma without complication     4. Chronic obstructive pulmonary disease, unspecified COPD type (HCC)         Patient was seen for 25 minutes, more than 50% of time spent in face to face review, counseling, and arranging future evaluation and follow up of medical conditions and care related to review of sleep study and treatment, review of pathophysiology of ELIZABETH including cardiac and neurologic risk factors.  Review of medication management, continue importance of regular exercise with pulmonary rehab. Patient is clinically stable and will proceed with following plan.     PLAN:   Patient Instructions   1) Continue Symbicort 2 Puffs twice a day with mouth rinse.  Continue Spiriva daily.   2) continue rescue inhaler as needed every 4 hours for shortness of breath or wheezing  3) Light conditioning encouraged -pulmonary rehab referral  4) Continue smoking cessation   5) Referral to cardiology for recommendations regarding moderate aortic insufficiency and if any relation to dyspnea  7) Vaccines: Up to date with Pneumovax 23, Prevnar 13  8) Start autoCPAP at [id number]  9) Discussed acclimating to machine and change mask within first 30 days if required. Bring chip download to each appointment.  - Reviewed Medicare guidelines: Using the machine 70% of the time for minimum of 4 hours  10) Return in  about 4 months (around 3/8/2020), or 2 months for sleep and 4 months for pumonary, for Two seperate appointments, follow up with Cathy Major, APRN.  11)  Continue pulmonary rehab

## 2019-11-11 ENCOUNTER — HOSPITAL ENCOUNTER (OUTPATIENT)
Dept: PULMONOLOGY | Facility: MEDICAL CENTER | Age: 69
End: 2019-11-11
Attending: NURSE PRACTITIONER
Payer: MEDICARE

## 2019-11-11 PROCEDURE — G0424 PULMONARY REHAB W EXER: HCPCS

## 2019-11-11 RX ORDER — METHYLPREDNISOLONE 4 MG/1
TABLET ORAL
Qty: 21 TAB | Refills: 1 | Status: SHIPPED | OUTPATIENT
Start: 2019-11-11 | End: 2020-09-23

## 2019-11-11 RX ORDER — AZITHROMYCIN 250 MG/1
TABLET, FILM COATED ORAL
Qty: 6 TAB | Refills: 1 | Status: SHIPPED | OUTPATIENT
Start: 2019-11-11 | End: 2020-04-30

## 2019-11-13 ENCOUNTER — HOSPITAL ENCOUNTER (OUTPATIENT)
Dept: PULMONOLOGY | Facility: MEDICAL CENTER | Age: 69
End: 2019-11-13
Attending: NURSE PRACTITIONER
Payer: MEDICARE

## 2019-11-13 PROCEDURE — G0424 PULMONARY REHAB W EXER: HCPCS

## 2019-11-18 ENCOUNTER — HOSPITAL ENCOUNTER (OUTPATIENT)
Dept: PULMONOLOGY | Facility: MEDICAL CENTER | Age: 69
End: 2019-11-18
Attending: NURSE PRACTITIONER
Payer: MEDICARE

## 2019-11-18 PROCEDURE — G0424 PULMONARY REHAB W EXER: HCPCS

## 2019-11-20 ENCOUNTER — HOSPITAL ENCOUNTER (OUTPATIENT)
Dept: PULMONOLOGY | Facility: MEDICAL CENTER | Age: 69
End: 2019-11-20
Attending: NURSE PRACTITIONER
Payer: MEDICARE

## 2019-11-20 PROCEDURE — G0424 PULMONARY REHAB W EXER: HCPCS

## 2019-11-25 ENCOUNTER — HOSPITAL ENCOUNTER (OUTPATIENT)
Dept: PULMONOLOGY | Facility: MEDICAL CENTER | Age: 69
End: 2019-11-25
Attending: NURSE PRACTITIONER
Payer: MEDICARE

## 2019-11-25 PROCEDURE — G0424 PULMONARY REHAB W EXER: HCPCS

## 2019-11-27 ENCOUNTER — APPOINTMENT (OUTPATIENT)
Dept: PULMONOLOGY | Facility: MEDICAL CENTER | Age: 69
End: 2019-11-27
Attending: NURSE PRACTITIONER
Payer: MEDICARE

## 2019-12-03 ENCOUNTER — PATIENT MESSAGE (OUTPATIENT)
Dept: MEDICAL GROUP | Facility: MEDICAL CENTER | Age: 69
End: 2019-12-03

## 2019-12-04 RX ORDER — SULFAMETHOXAZOLE AND TRIMETHOPRIM 800; 160 MG/1; MG/1
1 TABLET ORAL 2 TIMES DAILY
Qty: 10 TAB | Refills: 0 | Status: SHIPPED | OUTPATIENT
Start: 2019-12-04 | End: 2019-12-09

## 2019-12-11 ENCOUNTER — APPOINTMENT (OUTPATIENT)
Dept: PULMONOLOGY | Facility: MEDICAL CENTER | Age: 69
End: 2019-12-11
Attending: NURSE PRACTITIONER
Payer: MEDICARE

## 2019-12-18 ENCOUNTER — HOSPITAL ENCOUNTER (OUTPATIENT)
Dept: PULMONOLOGY | Facility: MEDICAL CENTER | Age: 69
End: 2019-12-18
Attending: NURSE PRACTITIONER
Payer: MEDICARE

## 2019-12-18 PROCEDURE — G0424 PULMONARY REHAB W EXER: HCPCS

## 2019-12-23 ENCOUNTER — HOSPITAL ENCOUNTER (OUTPATIENT)
Dept: PULMONOLOGY | Facility: MEDICAL CENTER | Age: 69
End: 2019-12-23
Attending: NURSE PRACTITIONER
Payer: MEDICARE

## 2019-12-23 PROCEDURE — G0424 PULMONARY REHAB W EXER: HCPCS

## 2019-12-23 RX ORDER — ARIPIPRAZOLE 5 MG/1
TABLET ORAL
Qty: 90 TAB | Refills: 3 | Status: SHIPPED | OUTPATIENT
Start: 2019-12-23 | End: 2020-01-03 | Stop reason: SDUPTHER

## 2019-12-30 ENCOUNTER — HOSPITAL ENCOUNTER (OUTPATIENT)
Dept: PULMONOLOGY | Facility: MEDICAL CENTER | Age: 69
End: 2019-12-30
Attending: NURSE PRACTITIONER
Payer: MEDICARE

## 2019-12-30 PROCEDURE — G0424 PULMONARY REHAB W EXER: HCPCS

## 2019-12-30 NOTE — TELEPHONE ENCOUNTER
Problem: PAIN - ADULT  Goal: Verbalizes/displays adequate comfort level or baseline comfort level  Description  Interventions:  - Encourage patient to monitor pain and request assistance  - Assess pain using appropriate pain scale  - Administer analgesics based on type and severity of pain and evaluate response  - Implement non-pharmacological measures as appropriate and evaluate response  - Consider cultural and social influences on pain and pain management  - Notify physician/advanced practitioner if interventions unsuccessful or patient reports new pain  Outcome: Progressing     Problem: INFECTION - ADULT  Goal: Absence or prevention of progression during hospitalization  Description  INTERVENTIONS:  - Assess and monitor for signs and symptoms of infection  - Monitor lab/diagnostic results  - Monitor all insertion sites, i e  indwelling lines, tubes, and drains  - Monitor endotracheal if appropriate and nasal secretions for changes in amount and color  - [place] appropriate cooling/warming therapies per order  - Administer medications as ordered  - Instruct and encourage patient and family to use good hand hygiene technique  - Identify and instruct in appropriate isolation precautions for identified infection/condition  Outcome: Progressing  Goal: Absence of fever/infection during neutropenic period  Description  INTERVENTIONS:  - Monitor WBC    Outcome: Progressing     Problem: SAFETY ADULT  Goal: Patient will remain free of falls  Description  INTERVENTIONS:  - Assess patient frequently for physical needs  -  Identify cognitive and physical deficits and behaviors that affect risk of falls    -  [place] fall precautions as indicated by assessment   - Educate patient/family on patient safety including physical limitations  - Instruct patient to call for assistance with activity based on assessment  - Modify environment to reduce risk of injury  - Consider OT/PT consult to assist with Was the patient seen in the last year in this department? Yes     Does patient have an active prescription for medications requested? Yes     Received Request Via: Pharmacy     strengthening/mobility  Outcome: Progressing  Goal: Maintain or return to baseline ADL function  Description  INTERVENTIONS:  -  Assess patient's ability to carry out ADLs; assess patient's baseline for ADL function and identify physical deficits which impact ability to perform ADLs (bathing, care of mouth/teeth, toileting, grooming, dressing, etc )  - Assess/evaluate cause of self-care deficits   - Assess range of motion  - Assess patient's mobility; develop plan if impaired  - Assess patient's need for assistive devices and provide as appropriate  - Encourage maximum independence but intervene and supervise when necessary  - Involve family in performance of ADLs  - Assess for home care needs following discharge   - Consider OT consult to assist with ADL evaluation and planning for discharge  - Provide patient education as appropriate  Outcome: Progressing  Goal: Maintain or return mobility status to optimal level  Description  INTERVENTIONS:  - Assess patient's baseline mobility status (ambulation, transfers, stairs, etc )    - Identify cognitive and physical deficits and behaviors that affect mobility  - Identify mobility aids required to assist with transfers and/or ambulation (gait belt, sit-to-stand, lift, walker, cane, etc )  - [place] fall precautions as indicated by assessment  - Record patient progress and toleration of activity level on Mobility SBAR; progress patient to next Phase/Stage  - Instruct patient to call for assistance with activity based on assessment  - Consider rehabilitation consult to assist with strengthening/weightbearing, etc   Outcome: Progressing     Problem: DISCHARGE PLANNING  Goal: Discharge to home or other facility with appropriate resources  Description  INTERVENTIONS:  - Identify barriers to discharge w/patient and caregiver  - Arrange for needed discharge resources and transportation as appropriate  - Identify discharge learning needs (meds, wound care, etc )  - Arrange for interpretive services to assist at discharge as needed  - Refer to Case Management Department for coordinating discharge planning if the patient needs post-hospital services based on physician/advanced practitioner order or complex needs related to functional status, cognitive ability, or social support system  Outcome: Progressing     Problem: Knowledge Deficit  Goal: Patient/family/caregiver demonstrates understanding of disease process, treatment plan, medications, and discharge instructions  Description  Complete learning assessment and assess knowledge base    Interventions:  - Provide teaching at level of understanding  - Provide teaching via preferred learning methods  Outcome: Progressing     Problem: GASTROINTESTINAL - ADULT  Goal: Minimal or absence of nausea and/or vomiting  Description  INTERVENTIONS:  - Administer IV fluids if ordered to ensure adequate hydration  - Maintain NPO status until nausea and vomiting are resolved  - Nasogastric tube if ordered  - Administer ordered antiemetic medications as needed  - Provide nonpharmacologic comfort measures as appropriate  - Advance diet as tolerated, if ordered  - Consider nutrition services referral to assist patient with adequate nutrition and appropriate food choices  Outcome: Progressing  Goal: Maintains or returns to baseline bowel function  Description  INTERVENTIONS:  - Assess bowel function  - Encourage oral fluids to ensure adequate hydration  - Administer IV fluids if ordered to ensure adequate hydration  - Administer ordered medications as needed  - Encourage mobilization and activity  - Consider nutritional services referral to assist patient with adequate nutrition and appropriate food choices  Outcome: Progressing  Goal: Maintains adequate nutritional intake  Description  INTERVENTIONS:  - Monitor percentage of each meal consumed  - Identify factors contributing to decreased intake, treat as appropriate  - Assist with meals as needed  - Monitor I&O, weight, and lab values if indicated  - Obtain nutrition services referral as needed  Outcome: Progressing     Problem: METABOLIC, FLUID AND ELECTROLYTES - ADULT  Goal: Electrolytes maintained within normal limits  Description  INTERVENTIONS:  - Monitor labs and assess patient for signs and symptoms of electrolyte imbalances  - Administer electrolyte replacement as ordered  - Monitor response to electrolyte replacements, including repeat lab results as appropriate  - Instruct patient on fluid and nutrition as appropriate  Outcome: Progressing  Goal: Fluid balance maintained  Description  INTERVENTIONS:  - Monitor labs   - Monitor I/O and WT  - Instruct patient on fluid and nutrition as appropriate  - Assess for signs & symptoms of volume excess or deficit  Outcome: Progressing  Goal: Glucose maintained within target range  Description  INTERVENTIONS:  - Monitor Blood Glucose as ordered  - Assess for signs and symptoms of hyperglycemia and hypoglycemia  - Administer ordered medications to maintain glucose within target range  - Assess nutritional intake and initiate nutrition service referral as needed  Outcome: Progressing     Problem: Potential for Falls  Goal: Patient will remain free of falls  Description  INTERVENTIONS:  - Assess patient frequently for physical needs  -  Identify cognitive and physical deficits and behaviors that affect risk of falls    -  [place] fall precautions as indicated by assessment   - Educate patient/family on patient safety including physical limitations  - Instruct patient to call for assistance with activity based on assessment  - Modify environment to reduce risk of injury  - Consider OT/PT consult to assist with strengthening/mobility  Outcome: Progressing

## 2020-01-03 RX ORDER — ARIPIPRAZOLE 5 MG/1
5 TABLET ORAL DAILY
Qty: 90 TAB | Refills: 3 | Status: SHIPPED | OUTPATIENT
Start: 2020-01-03 | End: 2020-04-09 | Stop reason: SDUPTHER

## 2020-01-06 ENCOUNTER — APPOINTMENT (OUTPATIENT)
Dept: PULMONOLOGY | Facility: MEDICAL CENTER | Age: 70
End: 2020-01-06
Attending: NURSE PRACTITIONER
Payer: MEDICARE

## 2020-01-08 ENCOUNTER — HOSPITAL ENCOUNTER (OUTPATIENT)
Dept: PULMONOLOGY | Facility: MEDICAL CENTER | Age: 70
End: 2020-01-08
Attending: NURSE PRACTITIONER
Payer: MEDICARE

## 2020-01-08 PROCEDURE — G0424 PULMONARY REHAB W EXER: HCPCS

## 2020-01-09 DIAGNOSIS — E78.00 PURE HYPERCHOLESTEROLEMIA: ICD-10-CM

## 2020-01-09 DIAGNOSIS — J44.9 CHRONIC OBSTRUCTIVE PULMONARY DISEASE, UNSPECIFIED COPD TYPE (HCC): ICD-10-CM

## 2020-01-09 RX ORDER — BUDESONIDE AND FORMOTEROL FUMARATE DIHYDRATE 160; 4.5 UG/1; UG/1
AEROSOL RESPIRATORY (INHALATION)
Qty: 1 INHALER | Refills: 11 | Status: SHIPPED | OUTPATIENT
Start: 2020-01-09 | End: 2020-12-16 | Stop reason: SDUPTHER

## 2020-01-09 RX ORDER — ATORVASTATIN CALCIUM 40 MG/1
TABLET, FILM COATED ORAL
Qty: 90 TAB | Refills: 3 | Status: SHIPPED | OUTPATIENT
Start: 2020-01-09 | End: 2020-10-26

## 2020-01-09 NOTE — TELEPHONE ENCOUNTER
Have we ever prescribed this med? Yes.  If yes, what date? 8/6/18    Last OV: 11/8/19 VLADIMIR POTTS     Next OV: 2/12/2020 VLADIMIR POTTS     DX: Chronic obstructive pulmonary disease, unspecified COPD type (HCC) (J44.9    Medications: SYMBICORT 160-4.5 MCG/ACT Aerosol

## 2020-01-10 ENCOUNTER — PATIENT MESSAGE (OUTPATIENT)
Dept: MEDICAL GROUP | Facility: MEDICAL CENTER | Age: 70
End: 2020-01-10

## 2020-01-10 RX ORDER — SULFAMETHOXAZOLE AND TRIMETHOPRIM 800; 160 MG/1; MG/1
1 TABLET ORAL 2 TIMES DAILY
Qty: 10 TAB | Refills: 0 | Status: SHIPPED | OUTPATIENT
Start: 2020-01-10 | End: 2020-01-15

## 2020-01-10 NOTE — TELEPHONE ENCOUNTER
From: Summer Hatch  To: Jl Palomino M.D.  Sent: 1/10/2020 6:57 AM PST  Subject: Non-Urgent Medical Question    Hi Dr. Palomino: I have another urinay infection. Do you want me to come in to [facility] and give a sample or can you call n a prespreciption ithout it? Just let me know. Thanks

## 2020-01-13 ENCOUNTER — HOSPITAL ENCOUNTER (OUTPATIENT)
Dept: PULMONOLOGY | Facility: MEDICAL CENTER | Age: 70
End: 2020-01-13
Attending: NURSE PRACTITIONER
Payer: MEDICARE

## 2020-01-13 PROCEDURE — G0424 PULMONARY REHAB W EXER: HCPCS

## 2020-01-17 RX ORDER — FENOFIBRATE 145 MG/1
TABLET, COATED ORAL
Qty: 90 TAB | Refills: 3 | Status: SHIPPED | OUTPATIENT
Start: 2020-01-17 | End: 2020-11-16 | Stop reason: SDUPTHER

## 2020-02-03 RX ORDER — LEVOTHYROXINE SODIUM 100 MCG
TABLET ORAL
Qty: 90 TAB | Refills: 3 | Status: SHIPPED | OUTPATIENT
Start: 2020-02-03 | End: 2020-11-20

## 2020-02-12 ENCOUNTER — SLEEP CENTER VISIT (OUTPATIENT)
Dept: SLEEP MEDICINE | Facility: MEDICAL CENTER | Age: 70
End: 2020-02-12
Payer: MEDICARE

## 2020-02-12 VITALS
BODY MASS INDEX: 27.16 KG/M2 | DIASTOLIC BLOOD PRESSURE: 62 MMHG | HEART RATE: 85 BPM | OXYGEN SATURATION: 93 % | WEIGHT: 163 LBS | HEIGHT: 65 IN | SYSTOLIC BLOOD PRESSURE: 128 MMHG

## 2020-02-12 DIAGNOSIS — G47.33 OSA (OBSTRUCTIVE SLEEP APNEA): ICD-10-CM

## 2020-02-12 DIAGNOSIS — J45.40 MODERATE PERSISTENT ASTHMA WITHOUT COMPLICATION: ICD-10-CM

## 2020-02-12 DIAGNOSIS — J44.9 CHRONIC OBSTRUCTIVE PULMONARY DISEASE, UNSPECIFIED COPD TYPE (HCC): ICD-10-CM

## 2020-02-12 DIAGNOSIS — R06.09 DYSPNEA ON EXERTION: ICD-10-CM

## 2020-02-12 PROCEDURE — 99214 OFFICE O/P EST MOD 30 MIN: CPT | Performed by: NURSE PRACTITIONER

## 2020-02-12 NOTE — PROGRESS NOTES
CC:  Here for f/u sleep and pulmonary issues as listed below    HPI:   Summer presents today for follow up for asthma/COPD and ELIZABETH.  Past medical history of hypertension, septic arthritis, seizures, neuropathy, vitamin D deficiency, chronic kidney disease stage III, prediabetic, depression, osteoporosis.     Spirometry from January 2019 shows FEV1 of 1.42 L or 60% predicted, FEV1/FVC ratio of 59, moderate bronchodilator response, improved in comparison to March 2018.  DLCO was last calculated 2018 at 77%.  She is a former smoker, 30-pack-year history, quit in 1998.  She had a CAT scan completed 9-23-12 2019 showing a 5 mm pulmonary nodule in the right middle lobe, emphysema, large hiatal hernia, fatty liver.  We will follow-up September 2020.  Echocardiogram from 8-2019 showed mild left ventricular hypertrophy, moderate aortic insufficiency, estimated ejection fraction to be 65%, RVSP estimated at 30 mmHg.She was referred to cardiology for abnormal echocardiogram, but has not been able to establish quite yet.      She is compliant using Symbicort 160-4.5, 2 puff twice a day with mouth rinse.  Spiriva daily and a Ventolin HFA 2-3 times a day, or less without infection.  She last required her nebulizer when she was sick in April 2019, Nov 2019 requiring emergency antibiotics and steroids with improvement.  She gets short of breath with walking in her home.  She does not exercise due to this. She started pulmonary rehab with some benefit.       PSG from 9/2019 indicated an AHI of 9.7 that increases to 41.9 in REM and low oxygenation of 77%.    Currently she is being treated with autoCPAP @ [id number].  Compliance download from the dates 1/13/2020 - 2/11/2020 indicates she is wearing the device 100% for an avg of 5 hours and 31 minutes per night with a reduced AHI of 1.5.  Wakes 4-5x/night to use BR. She goes to sleep at 7. Wakes at 11, sews for 2 hours, goes to sleep on couch, wakes at 3 or 4am. These sleep patterns  started appx 3 years ago after CKD, waking frequently for BR.   She does tolerate pressure and mask well.  She wakes up refreshed and is less tired throughout the day.They feel tired throughout the day and naps for appx 30-60 min long 1-2x/week.  She  denies morning H/A and sleep better overall. She will continue to clean supplies weekly and change them as insurance allows.        Patient Active Problem List    Diagnosis Date Noted   • Chronic kidney disease, stage III (moderate) ([facility]) 09/26/2014     Priority: Medium   • HTN (hypertension) 10/10/2013     Priority: Medium   • MDD (major depressive disorder), recurrent episode, moderate ([facility]) 06/18/2015     Priority: Low   • Acquired hypothyroidism 10/22/2013     Priority: Low   • GERD (gastroesophageal reflux disease) 10/22/2013     Priority: Low   • Hyperlipidemia 10/10/2013     Priority: Low   • Moderate persistent asthma without complication 07/22/2019   • Dyspnea on exertion 03/30/2018   • Osteoporosis 04/18/2017   • Compression fracture of thoracic spine, non-traumatic ([facility]) 03/01/2017   • Chronic obstructive pulmonary disease ([facility]) 02/01/2017   • Atherosclerosis of both carotid arteries 10/19/2016   • Atopic dermatitis 01/08/2015   • Neuropathy ([facility]) 01/10/2014   • Seizure disorder ([facility]) 10/10/2013   • CTS (carpal tunnel syndrome) 10/10/2013   • History of septic arthritis 10/09/2013       Past Medical History:   Diagnosis Date   • Arthritis    • Asthma     Uses inhaler PRN.   • Breath shortness     With exertion.   • Bronchitis    • Carpal tunnel syndrome    • COPD    • Dental disorder    • Emphysema of lung ([facility])    • GERD (gastroesophageal reflux disease) 10/22/2013   • Glaucoma     Bilateral.   • History of tobacco abuse 10/22/2013    Quit 1998. Smoked one pack per day for 30 years.   • Hypertension    • Hypothyroidism 10/22/2013   • Indigestion    • Osteoporosis    • Pain     Bilateral hips.   • Pneumonia    • Renal disorder    • Seizure disorder ([facility])      Last seizure 30 years ago.  No medication.       Past Surgical History:   Procedure Laterality Date   • ABDOMINOPLASTY  10/29/2018    Procedure: ABDOMINOPLASTY;  Surgeon: Santiago Campos M.D.;  Location: SURGERY SAME DAY [facility];  Service: Plastics   • HIP REVISION TOTAL Right 2015    Procedure: Evacuation hematoma, revision total hip;  Surgeon: Nils Starr M.D.;  Location: SURGERY [facility];  Service:    • HIP REVISION TOTAL  10/10/2013    Performed by Nils Starr M.D. at SURGERY [facility]   • INCISION AND DRAINAGE ORTHOPEDIC  10/10/2013    Performed by Nils Starr M.D. at SURGERY [facility]   • APPENDECTOMY     • CARPAL TUNNEL RELEASE     • NERVE ULNAR REPAIR OR EXPLORE     • OTHER ORTHOPEDIC SURGERY     • TONSILLECTOMY         Family History   Problem Relation Age of Onset   • Psychiatric Illness Mother 47        suicide   • Alcohol/Drug Mother    • Cancer Father 72        Lung   • Alcohol/Drug Brother         Recovering       Social History     Socioeconomic History   • Marital status:      Spouse name: Not on file   • Number of children: Not on file   • Years of education: Not on file   • Highest education level: Not on file   Occupational History   • Not on file   Social Needs   • Financial resource strain: Not on file   • Food insecurity     Worry: Not on file     Inability: Not on file   • Transportation needs     Medical: Not on file     Non-medical: Not on file   Tobacco Use   • Smoking status: Former Smoker     Packs/day: 1.00     Years: 30.00     Pack years: 30.00     Types: Cigarettes     Last attempt to quit: 1998     Years since quittin.1   • Smokeless tobacco: Never Used   • Tobacco comment: continued abstinance   Substance and Sexual Activity   • Alcohol use: No     Alcohol/week: 0.0 oz   • Drug use: No   • Sexual activity: Not on file   Lifestyle   • Physical activity     Days per week: Not on file     Minutes per session: Not on file   •  Stress: Not on file   Relationships   • Social connections     Talks on phone: Not on file     Gets together: Not on file     Attends Cheondoism service: Not on file     Active member of club or organization: Not on file     Attends meetings of clubs or organizations: Not on file     Relationship status: Not on file   • Intimate partner violence     Fear of current or ex partner: Not on file     Emotionally abused: Not on file     Physically abused: Not on file     Forced sexual activity: Not on file   Other Topics Concern   • Not on file   Social History Narrative   • Not on file       Current Outpatient Medications   Medication Sig Dispense Refill   • SYNTHROID 100 MCG Tab TAKE 1 TABLET DAILY 90 Tab 3   • fenofibrate (TRICOR) 145 MG Tab TAKE 1 TABLET DAILY. 90 Tab 3   • SYMBICORT 160-4.5 MCG/ACT Aerosol USE 2 INHALATIONS ORALLY   TWICE DAILY (USE SPACER) - RINSE MOUTH AFTER EACH USE 1 Inhaler 11   • atorvastatin (LIPITOR) 40 MG Tab TAKE 1 TABLET DAILY 90 Tab 3   • aripiprazole (ABILIFY) 5 MG tablet Take 1 Tab by mouth every day. 90 Tab 3   • azithromycin (ZITHROMAX) 250 MG Tab Take 2 tablets on day 1, then take 1 tablet a day for 4 days. 6 Tab 1   • omeprazole (PRILOSEC) 20 MG delayed-release capsule Take 1 Cap by mouth 2 times a day. 180 Cap 3   • allopurinol (ZYLOPRIM) 100 MG Tab TAKE 1 TABLET DAILY 90 Tab 3   • tiotropium (SPIRIVA HANDIHALER) 18 MCG Cap INHALE THE CONTENTS OF ONE CAPSULE DAILY 90 Cap 3   • traZODone (DESYREL) 50 MG Tab Take 1 Tab by mouth at bedtime as needed for Sleep. 4 Tab 0   • albuterol 108 (90 Base) MCG/ACT Aero Soln inhalation aerosol USE 2 INHALATIONS ORALLY   EVERY SIX HOURS AS NEEDED  FOR SHORTNESS OF BREATH 3 Inhaler 1   • lisinopril (PRINIVIL) 10 MG Tab TAKE 1 TABLET DAILY 90 Tab 3   • fluticasone (FLONASE) 50 MCG/ACT nasal spray USE 2 SPRAYS NASALLY DAILY 48 g 3   • DULoxetine (CYMBALTA) 60 MG Cap DR Particles delayed-release capsule TAKE 1 CAPSULE EVERY       MORNING 90 Cap 3   •  "albuterol (PROVENTIL) 2.5mg/3ml Nebu Soln solution for nebulization 3 mL by Nebulization route every four hours as needed for Shortness of Breath. 360 mL 5   • tramadol (ULTRAM) 50 MG Tab TAKE 1 TO 2 TABLETS BY MOUTH 2 TIMES DAILY AS NEEDED FOR SEVERE PAIN 90 Tab 5   • latanoprost (XALATAN) 0.005 % Solution Place 1 Drop in both eyes every evening. 1 Bottle 3   • methylPREDNISolone (MEDROL DOSEPAK) 4 MG Tablet Therapy Pack Take as directed. (Patient not taking: Reported on 2/12/2020) 21 Tab 1   • triamterene-hctz (MAXZIDE-25/DYAZIDE) 37.5-25 MG Tab TAKE 1 TABLET EVERY MORNING 90 Tab 3     No current facility-administered medications for this visit.           Allergies: Tylenol; Tape; and Latex      ROS   Gen: Denies fever, chills, unintentional weight loss, fatigue, night sweats  E/N/T: Denies ear pain, nasal congestion  Resp:Denies  wheezing, sputum production, hemoptysis  CV: Denies chest pain, chest tightness, palpitations, BLE edema  Sleep:Denies morning headache, insomnia, daytime somnolence, snoring, gasping for air, apnea  Neuro: Denies frequent headaches, weakness, dizziness  GI: Denies N/V, acid reflux/heartburn  See HPI.  All other systems reviewed and negative      Vital signs for this encounter:  Vitals:    02/12/20 1430   Height: 1.651 m (5' 5\")   Weight: 73.9 kg (163 lb)   Weight % change since last entry.: 0 %   BP: 128/62   Pulse: 85   BMI (Calculated): 27.12                 Physical Exam:   Appearance: well developed, well nourished, no acute distress.  Eyes: PERRL, EOM intact, sclere white, conjunctiva moist.  Ears: no lesions or deformities.  Hearing: grossly intact.  Nose: no lesions or deformities.  Dentition: good dentition.  Oropharynx: tongue normal, posterior pharynx without erythema or exudate.  Neck: supple, trachea midline, no masses.  Respiratory effort: no intercostal retractions or use of accessory muscles.  Lung auscultation: Bilateral diminished   Heart auscultation: no murmur, rub, " or gallop.   Extremities: no cyanosis or edema.  Abdomen: soft, non-tender, no masses.  Gait and station: grossly normal   Digits and Nails: no clubbing, cyanosis, petechiae, or nodes.  Cranial nerves: grossly normal.  Motor: no focal deficits observed.  Skin: no rashes, lesions, or ulcers noted.  Orientation: oriented to time, place, and person.  Mood and affect: mood and affect appropriate, normal interaction with examiner.    Assessment   1. Moderate persistent asthma without complication     2. Chronic obstructive pulmonary disease, unspecified COPD type (HCC)     3. Dyspnea on exertion     4. ELIZABETH (obstructive sleep apnea)         Patient was seen for 25 minutes, more than 50% of time spent in face to face review, counseling, and arranging future evaluation and follow up of medical conditions and care related to review of medications, current symptoms, encourage pulmonary rehab exercises at home to increase stamina, encourage follow-up with cardiology for abnormal echocardiogram.  Dyspnea is likely multifactorial and likely relating to COPD, sedentary lifestyle, moderate aortic insufficiency noted on echocardiogram.  Also reviewed ELIZABETH and sleep hygiene, given she has no consistent sleep time at this time but unfortunately has been her pattern since she was diagnosed with CKD and requiring frequent bathroom breaks.  She is clinically stable and will proceed with following plan.     PLAN:   Patient Instructions   1) Continue Symbicort 2 Puffs twice a day with mouth rinse.  Continue Spiriva daily.   2) continue rescue inhaler as needed every 4 hours for shortness of breath or wheezing  3) Light conditioning encouraged -continue pulmonary rehab   4) Continue smoking cessation   5) Referral to cardiology for recommendations regarding moderate aortic insufficiency and if any relation to dyspnea  7) Vaccines: Up to date with Pneumovax 23, Prevnar 13  8) Continue autoCPAP at [id number]  9) Return in about 4 months  (around 3/8/2020), or 2 months for sleep and 4 months for pumonary, for Two seperate appointments, follow up with KATLYN Yanez.  110)  Continue pulmonary rehab

## 2020-02-20 NOTE — PATIENT INSTRUCTIONS
1) Continue Symbicort 2 Puffs twice a day with mouth rinse.  Continue Spiriva daily.   2) continue rescue inhaler as needed every 4 hours for shortness of breath or wheezing  3) Light conditioning encouraged -pulmonary rehab referral  4) Continue smoking cessation   5) Referral to cardiology for recommendations regarding moderate aortic insufficiency and if any relation to dyspnea  7) Vaccines: Up to date with Pneumovax 23, Prevnar 13  8) Start autoCPAP at [id number]  9) Discussed acclimating to machine and change mask within first 30 days if required. Bring chip download to each appointment.  - Reviewed Medicare guidelines: Using the machine 70% of the time for minimum of 4 hours  10) Return in about 4 months (around 3/8/2020), or 2 months for sleep and 4 months for pumonary, for Two seperate appointments, follow up with Cathy Major, KATLYN.  11)  Continue pulmonary rehab

## 2020-03-09 ENCOUNTER — OFFICE VISIT (OUTPATIENT)
Dept: PULMONOLOGY | Facility: HOSPICE | Age: 70
End: 2020-03-09
Payer: MEDICARE

## 2020-03-09 VITALS
SYSTOLIC BLOOD PRESSURE: 128 MMHG | WEIGHT: 160 LBS | DIASTOLIC BLOOD PRESSURE: 64 MMHG | HEART RATE: 71 BPM | BODY MASS INDEX: 26.66 KG/M2 | OXYGEN SATURATION: 93 % | HEIGHT: 65 IN

## 2020-03-09 DIAGNOSIS — R91.1 PULMONARY NODULE: ICD-10-CM

## 2020-03-09 DIAGNOSIS — J45.40 MODERATE PERSISTENT ASTHMA WITHOUT COMPLICATION: ICD-10-CM

## 2020-03-09 DIAGNOSIS — J44.9 CHRONIC OBSTRUCTIVE PULMONARY DISEASE, UNSPECIFIED COPD TYPE (HCC): ICD-10-CM

## 2020-03-09 PROCEDURE — 99214 OFFICE O/P EST MOD 30 MIN: CPT | Performed by: NURSE PRACTITIONER

## 2020-03-09 ASSESSMENT — FIBROSIS 4 INDEX: FIB4 SCORE: 1.48

## 2020-03-09 NOTE — PATIENT INSTRUCTIONS
1) Continue Symbicort 2 Puffs twice a day with mouth rinse.  Continue Spiriva daily.   2) Continue rescue inhaler as needed every 4 hours for shortness of breath or wheezing  3) Light conditioning encouraged -pulmonary rehab referral  4) Continue smoking cessation   5) CT f/u 9/2020 for pulmonary nodule  6) Vaccines: Up to date with Pneumovax 23, Prevnar 13  7) Return in about 6 months (around 9/9/2020) for follow up with KATLYN Yanez, if not sooner, Compliance, CAT scan results.

## 2020-03-09 NOTE — PROGRESS NOTES
CC:  Here for f/u sleep and pulmonary issues as listed below    HPI:   Summer presents today for follow up for asthma/COPD and ELIZABETH with CT results.  Past medical history of hypertension, septic arthritis, seizures, neuropathy, vitamin D deficiency, chronic kidney disease stage III, prediabetic, depression, osteoporosis.     Martin from 1/2019 indicate a Fev1 of 1.42L or 60% predicted with a mild bronchodilator response, Fev1/FVC ratio of 59, last DLCO 77% computed in 2018.  Former smoker, 30-pack-year history, quit in 1998.     Since last office visit she completed pulmonary rehab with great improvement of overall shortness of breath.  She continues to utilize exercises at least 3 times a week with benefit.  She was referred to cardiology for further evaluation for persistent dyspnea.  A CAT scan of her lungs was completed 9- showing a 5 mm pulmonary nodule in the right middle lobe.  Emphysematous changes.  She is compliant using Symbicort 160-4.5, 2 puff twice a day with mouth rinse.  Spiriva daily and a Ventolin HFA 1-2x times a week, or less without infection.  She last required her nebulizer when she was sick in April 2019, Nov 2019 requiring emergency antibiotics and steroids with improvement.  She continues to have some PIMENTEL and lil wheeze.  She typically has a chronic cough producing clear mucus, headaches when she wakes up. She gets short of breath with walking in her home.         She is also seen as a sleep apnea patient, see last office visit from 2- for further details.  PSG from 9/2019 indicated an AHI of 9.7 that increases to 41.9 in REM and low oxygenation of 77%.  Currently she is being treated with autoCPAP @ 5-75moG85.        Patient Active Problem List    Diagnosis Date Noted   • Chronic kidney disease, stage III (moderate) (HCC) 09/26/2014     Priority: Medium   • HTN (hypertension) 10/10/2013     Priority: Medium   • MDD (major depressive disorder), recurrent episode, moderate (HCC)  06/18/2015     Priority: Low   • Acquired hypothyroidism 10/22/2013     Priority: Low   • GERD (gastroesophageal reflux disease) 10/22/2013     Priority: Low   • Hyperlipidemia 10/10/2013     Priority: Low   • Moderate persistent asthma without complication 07/22/2019   • Dyspnea on exertion 03/30/2018   • Osteoporosis 04/18/2017   • Compression fracture of thoracic spine, non-traumatic ([facility]) 03/01/2017   • Chronic obstructive pulmonary disease ([facility]) 02/01/2017   • Atherosclerosis of both carotid arteries 10/19/2016   • Atopic dermatitis 01/08/2015   • Neuropathy ([facility]) 01/10/2014   • Seizure disorder ([facility]) 10/10/2013   • CTS (carpal tunnel syndrome) 10/10/2013   • History of septic arthritis 10/09/2013       Past Medical History:   Diagnosis Date   • Arthritis    • Asthma     Uses inhaler PRN.   • Breath shortness     With exertion.   • Bronchitis    • Carpal tunnel syndrome    • COPD    • Dental disorder    • Emphysema of lung ([facility])    • GERD (gastroesophageal reflux disease) 10/22/2013   • Glaucoma     Bilateral.   • History of tobacco abuse 10/22/2013    Quit 1998. Smoked one pack per day for 30 years.   • Hypertension    • Hypothyroidism 10/22/2013   • Indigestion    • Osteoporosis    • Pain     Bilateral hips.   • Pneumonia    • Renal disorder    • Seizure disorder ([facility])     Last seizure 30 years ago.  No medication.       Past Surgical History:   Procedure Laterality Date   • ABDOMINOPLASTY  10/29/2018    Procedure: ABDOMINOPLASTY;  Surgeon: Santiago Campos M.D.;  Location: SURGERY SAME DAY [facility];  Service: Plastics   • HIP REVISION TOTAL Right 11/5/2015    Procedure: Evacuation hematoma, revision total hip;  Surgeon: Nils Starr M.D.;  Location: SURGERY [facility];  Service:    • HIP REVISION TOTAL  10/10/2013    Performed by Nils Starr M.D. at [facility]   • INCISION AND DRAINAGE ORTHOPEDIC  10/10/2013    Performed by Nils Starr M.D. at [facility]   •  APPENDECTOMY     • CARPAL TUNNEL RELEASE     • NERVE ULNAR REPAIR OR EXPLORE     • OTHER ORTHOPEDIC SURGERY     • TONSILLECTOMY         Family History   Problem Relation Age of Onset   • Psychiatric Illness Mother 47        suicide   • Alcohol/Drug Mother    • Cancer Father 72        Lung   • Alcohol/Drug Brother         Recovering       Social History     Socioeconomic History   • Marital status:      Spouse name: Not on file   • Number of children: Not on file   • Years of education: Not on file   • Highest education level: Not on file   Occupational History   • Not on file   Social Needs   • Financial resource strain: Not on file   • Food insecurity     Worry: Not on file     Inability: Not on file   • Transportation needs     Medical: Not on file     Non-medical: Not on file   Tobacco Use   • Smoking status: Former Smoker     Packs/day: 1.00     Years: 30.00     Pack years: 30.00     Types: Cigarettes     Last attempt to quit: 1998     Years since quittin.2   • Smokeless tobacco: Never Used   • Tobacco comment: continued abstinance   Substance and Sexual Activity   • Alcohol use: No     Alcohol/week: 0.0 oz   • Drug use: No   • Sexual activity: Not on file   Lifestyle   • Physical activity     Days per week: Not on file     Minutes per session: Not on file   • Stress: Not on file   Relationships   • Social connections     Talks on phone: Not on file     Gets together: Not on file     Attends Faith service: Not on file     Active member of club or organization: Not on file     Attends meetings of clubs or organizations: Not on file     Relationship status: Not on file   • Intimate partner violence     Fear of current or ex partner: Not on file     Emotionally abused: Not on file     Physically abused: Not on file     Forced sexual activity: Not on file   Other Topics Concern   • Not on file   Social History Narrative   • Not on file       Current Outpatient Medications   Medication Sig  Dispense Refill   • SYNTHROID 100 MCG Tab TAKE 1 TABLET DAILY 90 Tab 3   • fenofibrate (TRICOR) 145 MG Tab TAKE 1 TABLET DAILY. 90 Tab 3   • SYMBICORT 160-4.5 MCG/ACT Aerosol USE 2 INHALATIONS ORALLY   TWICE DAILY (USE SPACER) - RINSE MOUTH AFTER EACH USE 1 Inhaler 11   • atorvastatin (LIPITOR) 40 MG Tab TAKE 1 TABLET DAILY 90 Tab 3   • aripiprazole (ABILIFY) 5 MG tablet Take 1 Tab by mouth every day. 90 Tab 3   • methylPREDNISolone (MEDROL DOSEPAK) 4 MG Tablet Therapy Pack Take as directed. 21 Tab 1   • azithromycin (ZITHROMAX) 250 MG Tab Take 2 tablets on day 1, then take 1 tablet a day for 4 days. 6 Tab 1   • omeprazole (PRILOSEC) 20 MG delayed-release capsule Take 1 Cap by mouth 2 times a day. 180 Cap 3   • allopurinol (ZYLOPRIM) 100 MG Tab TAKE 1 TABLET DAILY 90 Tab 3   • tiotropium (SPIRIVA HANDIHALER) 18 MCG Cap INHALE THE CONTENTS OF ONE CAPSULE DAILY 90 Cap 3   • albuterol 108 (90 Base) MCG/ACT Aero Soln inhalation aerosol USE 2 INHALATIONS ORALLY   EVERY SIX HOURS AS NEEDED  FOR SHORTNESS OF BREATH 3 Inhaler 1   • lisinopril (PRINIVIL) 10 MG Tab TAKE 1 TABLET DAILY 90 Tab 3   • triamterene-hctz (MAXZIDE-25/DYAZIDE) 37.5-25 MG Tab TAKE 1 TABLET EVERY MORNING 90 Tab 3   • fluticasone (FLONASE) 50 MCG/ACT nasal spray USE 2 SPRAYS NASALLY DAILY 48 g 3   • DULoxetine (CYMBALTA) 60 MG Cap DR Particles delayed-release capsule TAKE 1 CAPSULE EVERY       MORNING 90 Cap 3   • albuterol (PROVENTIL) 2.5mg/3ml Nebu Soln solution for nebulization 3 mL by Nebulization route every four hours as needed for Shortness of Breath. 360 mL 5   • tramadol (ULTRAM) 50 MG Tab TAKE 1 TO 2 TABLETS BY MOUTH 2 TIMES DAILY AS NEEDED FOR SEVERE PAIN 90 Tab 5   • latanoprost (XALATAN) 0.005 % Solution Place 1 Drop in both eyes every evening. 1 Bottle 3   • traZODone (DESYREL) 50 MG Tab Take 1 Tab by mouth at bedtime as needed for Sleep. (Patient not taking: Reported on 3/9/2020) 4 Tab 0     No current facility-administered medications for  "this visit.           Allergies: Tylenol; Tape; and Latex      ROS   Gen: Denies fever, chills, unintentional weight loss, fatigue, night sweats  E/N/T: Denies ear pain, nasal congestion  Resp:Denieswheezing, sputum production, hemoptysis  CV: Denies chest pain, chest tightness, palpitations, BLE edema  Sleep:Denies morning headache, insomnia, daytime somnolence, snoring, gasping for air, apnea  Neuro: Denies frequent headaches, weakness, dizziness  GI: Denies N/V, acid reflux/heartburn  See HPI.  All other systems reviewed and negative      Vital signs for this encounter:  Vitals:    03/09/20 0844   Height: 1.651 m (5' 5\")   Weight: 72.6 kg (160 lb)   Weight % change since last entry.: 0 %   BP: 128/64   Pulse: 71   BMI (Calculated): 26.63                 Physical Exam:   Appearance: well developed, well nourished, no acute distress.  Eyes: PERRL, EOM intact, sclere white, conjunctiva moist.  Ears: no lesions or deformities.  Hearing: grossly intact.  Nose: no lesions or deformities.  Dentition: good dentition.  Oropharynx: tongue normal, posterior pharynx without erythema or exudate.  Neck: supple, trachea midline, no masses.  Respiratory effort: no intercostal retractions or use of accessory muscles.  Lung auscultation: Bilateral diminished   Heart auscultation: no murmur, rub, or gallop.   Extremities: no cyanosis or edema.  Abdomen: soft, non-tender, no masses.  Gait and station: grossly normal   Digits and Nails: no clubbing, cyanosis, petechiae, or nodes.  Cranial nerves: grossly normal.  Motor: no focal deficits observed.  Skin: no rashes, lesions, or ulcers noted.  Orientation: oriented to time, place, and person.  Mood and affect: mood and affect appropriate, normal interaction with examiner.    Assessment   1. Chronic obstructive pulmonary disease, unspecified COPD type (HCC)  CT-CHEST (THORAX) W/O   2. Pulmonary nodule  CT-CHEST (THORAX) W/O   3. Moderate persistent asthma without complication          " Patient is clinically stable and will proceed with following plan.  Face-to-face time greater than 50% of 25 minutes reviewing CAT scan results and the need for follow-up x2 years for pulmonary nodule.  Patient is doing remarkably well after completing pulmonary rehab course.  Encourage her to continue exercise course and continue treatment plan as follows    PLAN:   Patient Instructions   1) Continue Symbicort 2 Puffs twice a day with mouth rinse.  Continue Spiriva daily.   2) Continue rescue inhaler as needed every 4 hours for shortness of breath or wheezing  3) Light conditioning encouraged -pulmonary rehab referral  4) Continue smoking cessation   5) CT f/u 9/2020 for pulmonary nodule  6) Vaccines: Up to date with Pneumovax 23, Prevnar 13  7) Return in about 6 months (around 9/9/2020) for follow up with KATLYN Yanez, if not sooner, Compliance, CAT scan results.

## 2020-03-30 RX ORDER — DULOXETIN HYDROCHLORIDE 60 MG/1
CAPSULE, DELAYED RELEASE ORAL
Qty: 90 CAP | Refills: 3 | Status: SHIPPED | OUTPATIENT
Start: 2020-03-30 | End: 2021-01-25 | Stop reason: SDUPTHER

## 2020-04-09 ENCOUNTER — PATIENT MESSAGE (OUTPATIENT)
Dept: MEDICAL GROUP | Facility: MEDICAL CENTER | Age: 70
End: 2020-04-09

## 2020-04-09 DIAGNOSIS — Z78.0 ASYMPTOMATIC MENOPAUSAL STATE: ICD-10-CM

## 2020-04-09 DIAGNOSIS — M80.00XA AGE-RELATED OSTEOPOROSIS WITH CURRENT PATHOLOGICAL FRACTURE, INITIAL ENCOUNTER: ICD-10-CM

## 2020-04-09 RX ORDER — DENOSUMAB 60 MG/ML
60 INJECTION SUBCUTANEOUS
Qty: 1 ML | Refills: 1 | Status: SHIPPED
Start: 2020-04-09

## 2020-04-09 RX ORDER — ARIPIPRAZOLE 5 MG/1
5 TABLET ORAL DAILY
Qty: 90 TAB | Refills: 3 | Status: SHIPPED | OUTPATIENT
Start: 2020-04-09 | End: 2021-02-17 | Stop reason: SDUPTHER

## 2020-04-09 NOTE — TELEPHONE ENCOUNTER
From: Summer Hatch  To: Jl Palomino M.D.  Sent: 4/9/2020 8:01 AM PDT  Subject: Non-Urgent Medical Question    Hi Dr Palomino. Hope your well. I need 3 things  1. Refill my aripiprazole 5 mg. I've tried thru [facility] with out success. Please sent it to [facility].  2. I broke my foot again. Dr Mcdermott at [facility] wants me to have a dexa scan.  3. I need my semi annual Prolia shot. Was due in March but didn't want to go to the hospital in light of their caseload. Think I should now with my foot broken.    Thanks Summer aHtch

## 2020-04-26 ENCOUNTER — OFFICE VISIT (OUTPATIENT)
Dept: URGENT CARE | Facility: PHYSICIAN GROUP | Age: 70
End: 2020-04-26
Payer: MEDICARE

## 2020-04-26 ENCOUNTER — HOSPITAL ENCOUNTER (OUTPATIENT)
Dept: RADIOLOGY | Facility: MEDICAL CENTER | Age: 70
End: 2020-04-26
Attending: PHYSICIAN ASSISTANT
Payer: MEDICARE

## 2020-04-26 ENCOUNTER — HOSPITAL ENCOUNTER (OUTPATIENT)
Facility: MEDICAL CENTER | Age: 70
End: 2020-04-26
Attending: PHYSICIAN ASSISTANT
Payer: MEDICARE

## 2020-04-26 VITALS
HEIGHT: 65 IN | HEART RATE: 80 BPM | BODY MASS INDEX: 26.66 KG/M2 | DIASTOLIC BLOOD PRESSURE: 78 MMHG | WEIGHT: 160 LBS | RESPIRATION RATE: 18 BRPM | SYSTOLIC BLOOD PRESSURE: 108 MMHG | OXYGEN SATURATION: 95 % | TEMPERATURE: 97 F

## 2020-04-26 DIAGNOSIS — R10.32 LEFT LOWER QUADRANT ABDOMINAL PAIN: ICD-10-CM

## 2020-04-26 DIAGNOSIS — K57.32 DIVERTICULITIS OF COLON: ICD-10-CM

## 2020-04-26 LAB
APPEARANCE UR: CLEAR
BILIRUB UR STRIP-MCNC: NEGATIVE MG/DL
COLOR UR AUTO: YELLOW
GLUCOSE UR STRIP.AUTO-MCNC: NEGATIVE MG/DL
KETONES UR STRIP.AUTO-MCNC: NEGATIVE MG/DL
LEUKOCYTE ESTERASE UR QL STRIP.AUTO: NORMAL
NITRITE UR QL STRIP.AUTO: NEGATIVE
PH UR STRIP.AUTO: 7 [PH] (ref 5–8)
PROT UR QL STRIP: NEGATIVE MG/DL
RBC UR QL AUTO: NEGATIVE
SP GR UR STRIP.AUTO: 1.01
UROBILINOGEN UR STRIP-MCNC: 0.2 MG/DL

## 2020-04-26 PROCEDURE — 81002 URINALYSIS NONAUTO W/O SCOPE: CPT | Performed by: PHYSICIAN ASSISTANT

## 2020-04-26 PROCEDURE — 87086 URINE CULTURE/COLONY COUNT: CPT

## 2020-04-26 PROCEDURE — 74176 CT ABD & PELVIS W/O CONTRAST: CPT

## 2020-04-26 PROCEDURE — 99214 OFFICE O/P EST MOD 30 MIN: CPT | Performed by: PHYSICIAN ASSISTANT

## 2020-04-26 RX ORDER — METRONIDAZOLE 500 MG/1
500 TABLET ORAL 3 TIMES DAILY
Qty: 21 TAB | Refills: 0 | Status: SHIPPED | OUTPATIENT
Start: 2020-04-26 | End: 2020-05-03

## 2020-04-26 RX ORDER — CIPROFLOXACIN 500 MG/1
500 TABLET, FILM COATED ORAL 2 TIMES DAILY
Qty: 14 TAB | Refills: 0 | Status: SHIPPED | OUTPATIENT
Start: 2020-04-26 | End: 2020-05-03

## 2020-04-26 ASSESSMENT — ENCOUNTER SYMPTOMS
FLANK PAIN: 0
MYALGIAS: 0
BLOOD IN STOOL: 0
COUGH: 0
SPUTUM PRODUCTION: 0
CONSTIPATION: 0
DIZZINESS: 0
BACK PAIN: 0
NAUSEA: 0
BRUISES/BLEEDS EASILY: 0
VOMITING: 0
CHILLS: 0
NECK PAIN: 0
ABDOMINAL PAIN: 1
WEIGHT LOSS: 1
SHORTNESS OF BREATH: 0
HEARTBURN: 0
HEADACHES: 0
DIARRHEA: 0
FEVER: 0

## 2020-04-26 ASSESSMENT — FIBROSIS 4 INDEX: FIB4 SCORE: 1.48

## 2020-04-26 NOTE — PROGRESS NOTES
"Subjective:      Summer Hatch is a 69 y.o. female who presents with Abdominal Pain (lower L abd, cramping after eating x5days)            HPI  61-year-old female presents urgent care with new problem of constant left lower quadrant abdominal pain described as \"cramping\" onset about 5 days ago. Soft bowel movements, no diarrhea, melena, or blood in stools.   Decreased PO intake secondary to pain, patient reports she has lost 3 pounds over the last few days.   Pain rated at 6/10 last night, Tramadol with some relief. Currently 3/10.   Denies fevers, vomiting, or urinary symptoms.   Hx of tummy tuck x 1.5 years ago, otherwise no abdominal surgeries.   Denies other associated aggravating or alleviating factors.     Review of Systems   Constitutional: Positive for weight loss. Negative for chills, fever and malaise/fatigue.   Respiratory: Negative for cough, sputum production and shortness of breath.    Cardiovascular: Negative for chest pain.   Gastrointestinal: Positive for abdominal pain. Negative for blood in stool, constipation, diarrhea, heartburn, melena, nausea and vomiting.   Genitourinary: Negative for dysuria, flank pain, frequency, hematuria and urgency.   Musculoskeletal: Negative for back pain, myalgias and neck pain.   Neurological: Negative for dizziness and headaches.   Endo/Heme/Allergies: Does not bruise/bleed easily.   All other systems reviewed and are negative.      Past Medical History:   Diagnosis Date   • Arthritis    • Asthma     Uses inhaler PRN.   • Breath shortness     With exertion.   • Bronchitis    • Carpal tunnel syndrome    • COPD    • Dental disorder    • Emphysema of lung (HCC)    • GERD (gastroesophageal reflux disease) 10/22/2013   • Glaucoma     Bilateral.   • History of tobacco abuse 10/22/2013    Quit 1998. Smoked one pack per day for 30 years.   • Hypertension    • Hypothyroidism 10/22/2013   • Indigestion    • Osteoporosis    • Pain     Bilateral hips.   • Pneumonia    • " "Renal disorder    • Seizure disorder (HCC)     Last seizure 30 years ago.  No medication.     Medications and allergies reviewed in epic.  Social History     Tobacco Use   • Smoking status: Former Smoker     Packs/day: 1.00     Years: 30.00     Pack years: 30.00     Types: Cigarettes     Last attempt to quit: 1998     Years since quittin.3   • Smokeless tobacco: Never Used   • Tobacco comment: continued abstinance   Substance Use Topics   • Alcohol use: No     Alcohol/week: 0.0 oz      Objective:     /78 (BP Location: Right arm, Patient Position: Sitting, BP Cuff Size: Adult)   Pulse 80   Temp 36.1 °C (97 °F) (Temporal)   Resp 18   Ht 1.651 m (5' 5\")   Wt 72.6 kg (160 lb)   SpO2 95%   BMI 26.63 kg/m²      Physical Exam  Vitals signs reviewed.   Constitutional:       Appearance: She is well-developed.   HENT:      Head: Normocephalic and atraumatic.      Mouth/Throat:      Mouth: Mucous membranes are moist.      Pharynx: Oropharynx is clear.   Eyes:      General: No scleral icterus.     Conjunctiva/sclera: Conjunctivae normal.   Neck:      Musculoskeletal: Normal range of motion and neck supple.   Cardiovascular:      Rate and Rhythm: Normal rate and regular rhythm.      Heart sounds: Normal heart sounds.   Pulmonary:      Effort: Pulmonary effort is normal. No respiratory distress.      Breath sounds: Normal breath sounds.   Abdominal:      General: Bowel sounds are increased. There is no distension.      Palpations: Abdomen is soft.      Tenderness: There is abdominal tenderness in the left lower quadrant. There is no guarding or rebound. Negative signs include David's sign and McBurney's sign.   Musculoskeletal: Normal range of motion.   Skin:     General: Skin is warm and dry.      Coloration: Skin is not jaundiced.   Neurological:      General: No focal deficit present.      Mental Status: She is alert and oriented to person, place, and time.   Psychiatric:         Mood and Affect: Mood " normal.         Behavior: Behavior normal.         Thought Content: Thought content normal.         Judgment: Judgment normal.                 Assessment/Plan:     1. Left lower quadrant abdominal pain  CT-ABDOMEN-PELVIS W/O    POCT Urinalysis    URINE CULTURE(NEW)       2. Diverticulitis of colon  ciprofloxacin (CIPRO) 500 MG Tab    metroNIDAZOLE (FLAGYL) 500 MG Tab       CT Abd/Pelvis w/o:   IMPRESSION:     1.  Wall thickening and probable inflammation around a segment of sigmoid colon is likely related to acute diverticulitis. Evaluation is significantly limited secondary to artifact from bilateral hip arthroplasties. Consider colonoscopy to exclude   underlying neoplasm after acute illness resolves, if clinically indicated. No pneumoperitoneum or abscess is appreciated.     2.  Atherosclerosis.     3.  Cholelithiasis.        Recommend patient drink plenty fluids and rest.  Return for reevaluation or proceed to ED if symptoms persist or worsen.  Follow-up with primary care provider in 2 weeks.  Discussed red flags and reasons to return to UC or ED.  Pt and/or family verbalized understanding of diagnosis and follow up instructions and was offered informational handout on diagnosis.  PT discharged.

## 2020-04-28 LAB
BACTERIA UR CULT: NORMAL
SIGNIFICANT IND 70042: NORMAL
SITE SITE: NORMAL
SOURCE SOURCE: NORMAL

## 2020-04-29 ENCOUNTER — TELEPHONE (OUTPATIENT)
Dept: ONCOLOGY | Facility: MEDICAL CENTER | Age: 70
End: 2020-04-29

## 2020-04-30 ENCOUNTER — OUTPATIENT INFUSION SERVICES (OUTPATIENT)
Dept: ONCOLOGY | Facility: MEDICAL CENTER | Age: 70
End: 2020-04-30
Attending: FAMILY MEDICINE
Payer: MEDICARE

## 2020-04-30 VITALS
RESPIRATION RATE: 17 BRPM | WEIGHT: 158.51 LBS | TEMPERATURE: 97.9 F | OXYGEN SATURATION: 96 % | BODY MASS INDEX: 27.06 KG/M2 | SYSTOLIC BLOOD PRESSURE: 136 MMHG | HEART RATE: 87 BPM | DIASTOLIC BLOOD PRESSURE: 63 MMHG | HEIGHT: 64 IN

## 2020-04-30 DIAGNOSIS — M81.0 AGE-RELATED OSTEOPOROSIS WITHOUT CURRENT PATHOLOGICAL FRACTURE: ICD-10-CM

## 2020-04-30 LAB
CA-I BLD ISE-SCNC: 1.14 MMOL/L (ref 1.1–1.3)
CREAT BLD-MCNC: 1.5 MG/DL (ref 0.5–1.4)

## 2020-04-30 PROCEDURE — 82565 ASSAY OF CREATININE: CPT

## 2020-04-30 PROCEDURE — 306780 HCHG STAT FOR TRANSFUSION PER CASE

## 2020-04-30 PROCEDURE — 82330 ASSAY OF CALCIUM: CPT

## 2020-04-30 PROCEDURE — 96372 THER/PROPH/DIAG INJ SC/IM: CPT

## 2020-04-30 PROCEDURE — 700111 HCHG RX REV CODE 636 W/ 250 OVERRIDE (IP): Mod: JG | Performed by: FAMILY MEDICINE

## 2020-04-30 RX ORDER — M-VIT,TX,IRON,MINS/CALC/FOLIC 27MG-0.4MG
1 TABLET ORAL DAILY
COMMUNITY

## 2020-04-30 RX ADMIN — DENOSUMAB 60 MG: 60 INJECTION SUBCUTANEOUS at 08:56

## 2020-04-30 ASSESSMENT — FIBROSIS 4 INDEX: FIB4 SCORE: 1.48

## 2020-04-30 NOTE — PROGRESS NOTES
Pharmacy Note:    Scr = 1.5 mg/dL    estCrCL ~ 40 mL/min  iCal =   1.14 mmol/L  Last denosumab treatment on record: 9/4/19    Okay to proceed with prolia injection today.    Jorge Gaitan, PharmD

## 2020-05-14 RX ORDER — LISINOPRIL 10 MG/1
TABLET ORAL
Qty: 90 TAB | Refills: 3 | Status: SHIPPED | OUTPATIENT
Start: 2020-05-14 | End: 2021-02-26 | Stop reason: SDUPTHER

## 2020-05-20 RX ORDER — FLUTICASONE PROPIONATE 50 MCG
1 SPRAY, SUSPENSION (ML) NASAL 2 TIMES DAILY
Qty: 48 G | Refills: 3 | Status: SHIPPED | OUTPATIENT
Start: 2020-05-20 | End: 2020-08-18 | Stop reason: SDUPTHER

## 2020-05-21 NOTE — TELEPHONE ENCOUNTER
Was the patient seen in the last year in this department? Yes    Does patient have an active prescription for medications requested? No     Received Request Via: Pharmacy  
Negative

## 2020-06-28 RX ORDER — TRIAMTERENE AND HYDROCHLOROTHIAZIDE 37.5; 25 MG/1; MG/1
TABLET ORAL
Qty: 90 TAB | Refills: 3 | Status: SHIPPED | OUTPATIENT
Start: 2020-06-28 | End: 2021-04-20 | Stop reason: SDUPTHER

## 2020-07-22 DIAGNOSIS — J44.89 ASTHMA WITH COPD (HCC): ICD-10-CM

## 2020-07-22 RX ORDER — TIOTROPIUM BROMIDE 18 UG/1
CAPSULE ORAL; RESPIRATORY (INHALATION)
Qty: 90 CAP | Refills: 3 | Status: SHIPPED | OUTPATIENT
Start: 2020-07-22 | End: 2020-12-16 | Stop reason: SDUPTHER

## 2020-08-10 RX ORDER — ALLOPURINOL 100 MG/1
TABLET ORAL
Qty: 90 TAB | Refills: 3 | Status: SHIPPED | OUTPATIENT
Start: 2020-08-10 | End: 2021-06-30 | Stop reason: SDUPTHER

## 2020-08-19 RX ORDER — FLUTICASONE PROPIONATE 50 MCG
1 SPRAY, SUSPENSION (ML) NASAL 2 TIMES DAILY
Qty: 48 G | Refills: 3 | Status: SHIPPED | OUTPATIENT
Start: 2020-08-19 | End: 2021-01-25 | Stop reason: SDUPTHER

## 2020-09-08 ENCOUNTER — HOSPITAL ENCOUNTER (OUTPATIENT)
Dept: RADIOLOGY | Facility: MEDICAL CENTER | Age: 70
End: 2020-09-08
Attending: NURSE PRACTITIONER
Payer: MEDICARE

## 2020-09-08 DIAGNOSIS — J44.9 CHRONIC OBSTRUCTIVE PULMONARY DISEASE, UNSPECIFIED COPD TYPE (HCC): ICD-10-CM

## 2020-09-08 DIAGNOSIS — R91.1 PULMONARY NODULE: ICD-10-CM

## 2020-09-08 PROCEDURE — 71250 CT THORAX DX C-: CPT

## 2020-09-10 ENCOUNTER — PATIENT MESSAGE (OUTPATIENT)
Dept: MEDICAL GROUP | Facility: MEDICAL CENTER | Age: 70
End: 2020-09-10

## 2020-09-10 DIAGNOSIS — N18.30 CHRONIC KIDNEY DISEASE, STAGE III (MODERATE): ICD-10-CM

## 2020-09-10 DIAGNOSIS — E78.2 MIXED HYPERLIPIDEMIA: ICD-10-CM

## 2020-09-10 DIAGNOSIS — I15.0 RENOVASCULAR HYPERTENSION: ICD-10-CM

## 2020-09-11 NOTE — TELEPHONE ENCOUNTER
From: Summer Hatch  To: Jl Palomino M.D.  Sent: 9/10/2020 6:31 PM PDT  Subject: Non-Urgent Medical Question    Dr Palomino. I've scheduled an annual exam for sept 23. Could you send lab script for my labs to [facility] so that we can discuss during my visit. Also had a ct of my lung Tuesday that I wish to discuss with you  Thanks and see you soon

## 2020-09-16 ENCOUNTER — HOSPITAL ENCOUNTER (OUTPATIENT)
Dept: LAB | Facility: MEDICAL CENTER | Age: 70
End: 2020-09-16
Attending: FAMILY MEDICINE
Payer: MEDICARE

## 2020-09-16 DIAGNOSIS — N18.30 CHRONIC KIDNEY DISEASE, STAGE III (MODERATE): ICD-10-CM

## 2020-09-16 DIAGNOSIS — I15.0 RENOVASCULAR HYPERTENSION: ICD-10-CM

## 2020-09-16 DIAGNOSIS — E78.2 MIXED HYPERLIPIDEMIA: ICD-10-CM

## 2020-09-16 LAB
25(OH)D3 SERPL-MCNC: 37 NG/ML (ref 30–100)
ALBUMIN SERPL BCP-MCNC: 4.4 G/DL (ref 3.2–4.9)
ALBUMIN/GLOB SERPL: 1.6 G/DL
ALP SERPL-CCNC: 40 U/L (ref 30–99)
ALT SERPL-CCNC: 25 U/L (ref 2–50)
ANION GAP SERPL CALC-SCNC: 12 MMOL/L (ref 7–16)
AST SERPL-CCNC: 29 U/L (ref 12–45)
BASOPHILS # BLD AUTO: 1.6 % (ref 0–1.8)
BASOPHILS # BLD: 0.1 K/UL (ref 0–0.12)
BILIRUB SERPL-MCNC: 0.4 MG/DL (ref 0.1–1.5)
BUN SERPL-MCNC: 28 MG/DL (ref 8–22)
CALCIUM SERPL-MCNC: 9.5 MG/DL (ref 8.5–10.5)
CHLORIDE SERPL-SCNC: 99 MMOL/L (ref 96–112)
CHOLEST SERPL-MCNC: 181 MG/DL (ref 100–199)
CO2 SERPL-SCNC: 25 MMOL/L (ref 20–33)
CREAT SERPL-MCNC: 1.33 MG/DL (ref 0.5–1.4)
EOSINOPHIL # BLD AUTO: 0.27 K/UL (ref 0–0.51)
EOSINOPHIL NFR BLD: 4.2 % (ref 0–6.9)
ERYTHROCYTE [DISTWIDTH] IN BLOOD BY AUTOMATED COUNT: 45.1 FL (ref 35.9–50)
FASTING STATUS PATIENT QL REPORTED: NORMAL
GLOBULIN SER CALC-MCNC: 2.7 G/DL (ref 1.9–3.5)
GLUCOSE SERPL-MCNC: 99 MG/DL (ref 65–99)
HCT VFR BLD AUTO: 44.4 % (ref 37–47)
HDLC SERPL-MCNC: 74 MG/DL
HGB BLD-MCNC: 13.9 G/DL (ref 12–16)
IMM GRANULOCYTES # BLD AUTO: 0.03 K/UL (ref 0–0.11)
IMM GRANULOCYTES NFR BLD AUTO: 0.5 % (ref 0–0.9)
LDLC SERPL CALC-MCNC: 89 MG/DL
LYMPHOCYTES # BLD AUTO: 1.83 K/UL (ref 1–4.8)
LYMPHOCYTES NFR BLD: 28.8 % (ref 22–41)
MCH RBC QN AUTO: 26.3 PG (ref 27–33)
MCHC RBC AUTO-ENTMCNC: 31.3 G/DL (ref 33.6–35)
MCV RBC AUTO: 83.9 FL (ref 81.4–97.8)
MONOCYTES # BLD AUTO: 0.76 K/UL (ref 0–0.85)
MONOCYTES NFR BLD AUTO: 11.9 % (ref 0–13.4)
NEUTROPHILS # BLD AUTO: 3.37 K/UL (ref 2–7.15)
NEUTROPHILS NFR BLD: 53 % (ref 44–72)
NRBC # BLD AUTO: 0 K/UL
NRBC BLD-RTO: 0 /100 WBC
PLATELET # BLD AUTO: 258 K/UL (ref 164–446)
PMV BLD AUTO: 10.6 FL (ref 9–12.9)
POTASSIUM SERPL-SCNC: 4.6 MMOL/L (ref 3.6–5.5)
PROT SERPL-MCNC: 7.1 G/DL (ref 6–8.2)
RBC # BLD AUTO: 5.29 M/UL (ref 4.2–5.4)
SODIUM SERPL-SCNC: 136 MMOL/L (ref 135–145)
TRIGL SERPL-MCNC: 90 MG/DL (ref 0–149)
TSH SERPL DL<=0.005 MIU/L-ACNC: 3.17 UIU/ML (ref 0.38–5.33)
WBC # BLD AUTO: 6.4 K/UL (ref 4.8–10.8)

## 2020-09-16 PROCEDURE — 80061 LIPID PANEL: CPT

## 2020-09-16 PROCEDURE — 80053 COMPREHEN METABOLIC PANEL: CPT

## 2020-09-16 PROCEDURE — 36415 COLL VENOUS BLD VENIPUNCTURE: CPT

## 2020-09-16 PROCEDURE — 84443 ASSAY THYROID STIM HORMONE: CPT

## 2020-09-16 PROCEDURE — 85025 COMPLETE CBC W/AUTO DIFF WBC: CPT

## 2020-09-16 PROCEDURE — 82306 VITAMIN D 25 HYDROXY: CPT

## 2020-09-23 ENCOUNTER — OFFICE VISIT (OUTPATIENT)
Dept: MEDICAL GROUP | Facility: MEDICAL CENTER | Age: 70
End: 2020-09-23
Payer: MEDICARE

## 2020-09-23 VITALS
HEIGHT: 65 IN | DIASTOLIC BLOOD PRESSURE: 80 MMHG | OXYGEN SATURATION: 92 % | WEIGHT: 158 LBS | TEMPERATURE: 97.2 F | HEART RATE: 85 BPM | BODY MASS INDEX: 26.33 KG/M2 | SYSTOLIC BLOOD PRESSURE: 130 MMHG

## 2020-09-23 DIAGNOSIS — J43.9 PULMONARY EMPHYSEMA, UNSPECIFIED EMPHYSEMA TYPE (HCC): ICD-10-CM

## 2020-09-23 DIAGNOSIS — F33.1 MDD (MAJOR DEPRESSIVE DISORDER), RECURRENT EPISODE, MODERATE (HCC): ICD-10-CM

## 2020-09-23 DIAGNOSIS — E78.2 MIXED HYPERLIPIDEMIA: ICD-10-CM

## 2020-09-23 DIAGNOSIS — E03.9 ACQUIRED HYPOTHYROIDISM: ICD-10-CM

## 2020-09-23 DIAGNOSIS — I15.0 RENOVASCULAR HYPERTENSION: ICD-10-CM

## 2020-09-23 DIAGNOSIS — K21.9 GASTROESOPHAGEAL REFLUX DISEASE, ESOPHAGITIS PRESENCE NOT SPECIFIED: ICD-10-CM

## 2020-09-23 DIAGNOSIS — Z12.31 ENCOUNTER FOR SCREENING MAMMOGRAM FOR BREAST CANCER: ICD-10-CM

## 2020-09-23 DIAGNOSIS — Z12.11 COLON CANCER SCREENING: ICD-10-CM

## 2020-09-23 DIAGNOSIS — G40.909 SEIZURE DISORDER (HCC): ICD-10-CM

## 2020-09-23 DIAGNOSIS — Z00.00 MEDICARE ANNUAL WELLNESS VISIT, SUBSEQUENT: ICD-10-CM

## 2020-09-23 DIAGNOSIS — Z23 NEED FOR VACCINATION: ICD-10-CM

## 2020-09-23 DIAGNOSIS — N18.30 CHRONIC KIDNEY DISEASE, STAGE III (MODERATE): ICD-10-CM

## 2020-09-23 PROBLEM — M48.54XA COMPRESSION FRACTURE OF THORACIC SPINE, NON-TRAUMATIC (HCC): Status: RESOLVED | Noted: 2017-03-01 | Resolved: 2020-09-23

## 2020-09-23 PROCEDURE — G0008 ADMIN INFLUENZA VIRUS VAC: HCPCS | Performed by: FAMILY MEDICINE

## 2020-09-23 PROCEDURE — G0439 PPPS, SUBSEQ VISIT: HCPCS | Performed by: FAMILY MEDICINE

## 2020-09-23 PROCEDURE — 90662 IIV NO PRSV INCREASED AG IM: CPT | Performed by: FAMILY MEDICINE

## 2020-09-23 RX ORDER — ZOSTER VACCINE RECOMBINANT, ADJUVANTED 50 MCG/0.5
0.5 KIT INTRAMUSCULAR ONCE
Qty: 1 EACH | Refills: 1 | Status: SHIPPED | OUTPATIENT
Start: 2020-09-23 | End: 2020-09-23

## 2020-09-23 RX ORDER — OMEPRAZOLE 20 MG/1
20 CAPSULE, DELAYED RELEASE ORAL DAILY
Qty: 90 CAP | Refills: 3 | Status: SHIPPED | OUTPATIENT
Start: 2020-09-23 | End: 2020-11-30 | Stop reason: SDUPTHER

## 2020-09-23 SDOH — HEALTH STABILITY: MENTAL HEALTH: HOW MANY STANDARD DRINKS CONTAINING ALCOHOL DO YOU HAVE ON A TYPICAL DAY?: 1 OR 2

## 2020-09-23 SDOH — HEALTH STABILITY: MENTAL HEALTH: HOW OFTEN DO YOU HAVE A DRINK CONTAINING ALCOHOL?: MONTHLY OR LESS

## 2020-09-23 SDOH — HEALTH STABILITY: MENTAL HEALTH: HOW OFTEN DO YOU HAVE 6 OR MORE DRINKS ON ONE OCCASION?: NEVER

## 2020-09-23 ASSESSMENT — PATIENT HEALTH QUESTIONNAIRE - PHQ9: CLINICAL INTERPRETATION OF PHQ2 SCORE: 0

## 2020-09-23 ASSESSMENT — FIBROSIS 4 INDEX: FIB4 SCORE: 1.55

## 2020-09-23 ASSESSMENT — ENCOUNTER SYMPTOMS: GENERAL WELL-BEING: GOOD

## 2020-09-23 ASSESSMENT — ACTIVITIES OF DAILY LIVING (ADL): BATHING_REQUIRES_ASSISTANCE: 0

## 2020-09-23 NOTE — PROGRESS NOTES
Chief Complaint   Patient presents with   • Annual Wellness Visit         HPI:  Summer Hatch is a 70 y.o. here for Medicare Annual Wellness Visit     Patient Active Problem List    Diagnosis Date Noted   • Chronic kidney disease, stage III (moderate) ([facility]) 09/26/2014     Priority: Medium   • HTN (hypertension) 10/10/2013     Priority: Medium   • MDD (major depressive disorder), recurrent episode, moderate ([facility]) 06/18/2015     Priority: Low   • Acquired hypothyroidism 10/22/2013     Priority: Low   • GERD (gastroesophageal reflux disease) 10/22/2013     Priority: Low   • Hyperlipidemia 10/10/2013     Priority: Low   • Moderate persistent asthma without complication 07/22/2019   • Dyspnea on exertion 03/30/2018   • Osteoporosis 04/18/2017   • Chronic obstructive pulmonary disease ([facility]) 02/01/2017   • Atherosclerosis of both carotid arteries 10/19/2016   • Atopic dermatitis 01/08/2015   • Neuropathy 01/10/2014   • Seizure disorder ([facility]) 10/10/2013   • CTS (carpal tunnel syndrome) 10/10/2013   • History of septic arthritis 10/09/2013       Current Outpatient Medications   Medication Sig Dispense Refill   • Zoster Vac Recomb Adjuvanted (SHINGRIX) 50 MCG/0.5ML Recon Susp 0.5 mL by Intramuscular route Once for 1 dose. Repeat in 2-6 months if available 1 Each 1   • omeprazole (PRILOSEC) 20 MG delayed-release capsule Take 1 Cap by mouth every day. 90 Cap 3   • fluticasone (FLONASE) 50 MCG/ACT nasal spray Spray 1 Spray in nose 2 times a day. 48 g 3   • allopurinol (ZYLOPRIM) 100 MG Tab TAKE 1 TABLET DAILY 90 Tab 3   • tiotropium (SPIRIVA HANDIHALER) 18 MCG Cap INHALE THE CONTENTS OF ONE CAPSULE DAILY 90 Cap 3   • triamterene-hctz (MAXZIDE-25/DYAZIDE) 37.5-25 MG Tab TAKE 1 TABLET EVERY MORNING 90 Tab 3   • lisinopril (PRINIVIL) 10 MG Tab TAKE 1 TABLET DAILY 90 Tab 3   • VITAMIN D PO Take  by mouth.     • therapeutic multivitamin-minerals (THERAGRAN-M) Tab Take 1 Tab by mouth every day.     • aripiprazole (ABILIFY) 5 MG  tablet Take 1 Tab by mouth every day. 90 Tab 3   • denosumab (PROLIA) 60 MG/ML Solution Prefilled Syringe Inject 1 mL as instructed every 6 months. 1 mL 1   • DULoxetine (CYMBALTA) 60 MG Cap DR Particles delayed-release capsule TAKE 1 CAPSULE EVERY       MORNING 90 Cap 3   • SYNTHROID 100 MCG Tab TAKE 1 TABLET DAILY 90 Tab 3   • fenofibrate (TRICOR) 145 MG Tab TAKE 1 TABLET DAILY. 90 Tab 3   • SYMBICORT 160-4.5 MCG/ACT Aerosol USE 2 INHALATIONS ORALLY   TWICE DAILY (USE SPACER) - RINSE MOUTH AFTER EACH USE 1 Inhaler 11   • atorvastatin (LIPITOR) 40 MG Tab TAKE 1 TABLET DAILY 90 Tab 3   • albuterol 108 (90 Base) MCG/ACT Aero Soln inhalation aerosol USE 2 INHALATIONS ORALLY   EVERY SIX HOURS AS NEEDED  FOR SHORTNESS OF BREATH 3 Inhaler 1   • albuterol (PROVENTIL) 2.5mg/3ml Nebu Soln solution for nebulization 3 mL by Nebulization route every four hours as needed for Shortness of Breath. 360 mL 5   • tramadol (ULTRAM) 50 MG Tab TAKE 1 TO 2 TABLETS BY MOUTH 2 TIMES DAILY AS NEEDED FOR SEVERE PAIN 90 Tab 5   • latanoprost (XALATAN) 0.005 % Solution Place 1 Drop in both eyes every evening. 1 Bottle 3     No current facility-administered medications for this visit.             Current supplements as per medication list.       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     She  reports that she quit smoking about 22 years ago. Her smoking use included cigarettes. She has a 30.00 pack-year smoking history. She has never used smokeless tobacco. She reports previous alcohol use. She reports that she does not use drugs.  Counseling given: Not Answered  Comment: continued abstinance      DPA/Advanced Directive:  Patient has Advanced Directive on file.     ROS:    Gait: Uses no assistive device  Ostomy: No  Other tubes: No  Amputations: No  Chronic oxygen use:  No  Last eye exam: patient reports about 6 months ago  Wears hearing aids: No   : Reports urinary leakage during the last 6 months that has not interfered at all with their daily activities or sleep.      Depression Screening    Little interest or pleasure in doing things?  0 - not at all  Feeling down, depressed , or hopeless? 0 - not at all  Patient Health Questionnaire Score: 0     If depressive symptoms identified deferred to follow up visit unless specifically addressed in assessment and plan.    Interpretation of PHQ-9 Total Score   Score Severity   1-4 No Depression   5-9 Mild Depression   10-14 Moderate Depression   15-19 Moderately Severe Depression   20-27 Severe Depression    Screening for Cognitive Impairment    Three Minute Recall (river, nation, finger) 0/3    Lorenzo clock face with all 12 numbers and set the hands to show 10 past 11.  Yes 5/5  Cognitive concerns identified deferred for follow up unless specifically addressed in assessment and plan.    Fall Risk Assessment    Has the patient had two or more falls in the last year or any fall with injury in the last year?  No    Safety Assessment    Throw rugs on floor.  No  Handrails on all stairs.  Yes  Good lighting in all hallways.  Yes  Difficulty hearing.  No  Patient counseled about all safety risks that were identified.    Functional Assessment ADLs    Are there any barriers preventing you from cooking for yourself or meeting nutritional needs?  No.    Are there any barriers preventing you from driving safely or obtaining transportation?  No.    Are there any barriers preventing you from using a telephone or calling for help?  No.    Are there any barriers preventing you from shopping?  No.    Are there any barriers preventing you from taking care of your own finances?  No.    Are there any barriers preventing you from managing your medications?  No.    Are there any barriers preventing you from showering, bathing or dressing yourself?  No.     Are you currently engaging in any exercise or physical activity?  No.     What is your perception of your health?  Good.      Health Maintenance Summary                IMM ZOSTER VACCINES Overdue 2001     MAMMOGRAM Overdue 2017      Done 2016 MA-SCREEN MAMMO W/CAD-BILAT     Patient has more history with this topic...    COLONOSCOPY Overdue 2020      Done 2010     Annual Pulmonary Function Test / Spirometry Overdue 2020      Done 2019 AMB SPIROMETRY     Patient has more history with this topic...    Annual Wellness Visit Overdue 2020      Done 2019 SUBSEQUENT ANNUAL WELLNESS VISIT-INCLUDES PPPS ()     Patient has more history with this topic...    IMM INFLUENZA Overdue 2020      Done 2019 Imm Admin: Influenza Vaccine Adult HD     Patient has more history with this topic...    BONE DENSITY Next Due 2022      Done 2017 DS-BONE DENSITY STUDY (DEXA)    IMM DTaP/Tdap/Td Vaccine Next Due 2/15/2028      Done 2/15/2018 Imm Admin: Tdap Vaccine          Patient Care Team:  Jl Palomino M.D. as PCP - General (Family Medicine)  Mya West M.D. as Consulting Physician (Nephrology)  JOSE BROWN as Respiratory (DME Supplier)        Social History     Tobacco Use   • Smoking status: Former Smoker     Packs/day: 1.00     Years: 30.00     Pack years: 30.00     Types: Cigarettes     Quit date: 1998     Years since quittin.7   • Smokeless tobacco: Never Used   • Tobacco comment: continued abstinance   Substance Use Topics   • Alcohol use: Not Currently     Alcohol/week: 0.0 oz     Frequency: Monthly or less     Drinks per session: 1 or 2     Binge frequency: Never     Comment: occ   • Drug use: No     Family History   Problem Relation Age of Onset   • Psychiatric Illness Mother 47        suicide   • Alcohol/Drug Mother    • Cancer Father 72        Lung   • Alcohol/Drug Brother         Recovering     She  has a past medical history of Arthritis,  "Asthma, Breath shortness, Bronchitis, Carpal tunnel syndrome, COPD, Dental disorder, Emphysema of lung (HCC), GERD (gastroesophageal reflux disease) (10/22/2013), Glaucoma, History of tobacco abuse (10/22/2013), Hypertension, Hypothyroidism (10/22/2013), Indigestion, Osteoporosis, Pain, Pneumonia, Renal disorder, and Seizure disorder ([facility]).   Past Surgical History:   Procedure Laterality Date   • ABDOMINOPLASTY  10/29/2018    Procedure: ABDOMINOPLASTY;  Surgeon: Santiago Campos M.D.;  Location: SURGERY SAME DAY [facility];  Service: Plastics   • HIP REVISION TOTAL Right 11/5/2015    Procedure: Evacuation hematoma, revision total hip;  Surgeon: Nils Starr M.D.;  Location: SURGERY [facility];  Service:    • HIP REVISION TOTAL  10/10/2013    Performed by Nils tSarr M.D. at SURGERY [facility]   • INCISION AND DRAINAGE ORTHOPEDIC  10/10/2013    Performed by Nils Starr M.D. at [facility]   • APPENDECTOMY     • CARPAL TUNNEL RELEASE     • NERVE ULNAR REPAIR OR EXPLORE     • OTHER ORTHOPEDIC SURGERY     • TONSILLECTOMY         Exam:   /80 (BP Location: Right arm, Patient Position: Sitting, BP Cuff Size: Adult)   Pulse 85   Temp 36.2 °C (97.2 °F) (Temporal)   Ht 1.651 m (5' 5\")   Wt 71.7 kg (158 lb)   SpO2 92%  Body mass index is 26.29 kg/m².    Constitutional: Alert, no distress.  Skin: Warm, dry, good turgor, no rashes in visible areas.  Eye: Equal, round and reactive, conjunctiva clear, lids normal.  Respiratory: Unlabored respiratory effort, lungs clear to auscultation, no wheezes, no ronchi.  Cardiovascular: Normal S1, S2, no murmur, no edema.  Psych: Alert and oriented x3, normal affect and mood.      Assessment and Plan. The following treatment and monitoring plan is recommended:    1. Medicare annual wellness visit, subsequent  - Subsequent Annual Wellness Visit - Includes PPPS ()    2. Colon cancer screening  - Subsequent Annual Wellness Visit - Includes " PPPS ()  - REFERRAL TO GASTROENTEROLOGY    3. Encounter for screening mammogram for breast cancer  - Subsequent Annual Wellness Visit - Includes PPPS ()  - MA-SCREENING MAMMO BILAT W/CAD; Future    4. Need for vaccination  - Subsequent Annual Wellness Visit - Includes PPPS ()  - Zoster Vac Recomb Adjuvanted (SHINGRIX) 50 MCG/0.5ML Recon Susp; 0.5 mL by Intramuscular route Once for 1 dose. Repeat in 2-6 months if available  Dispense: 1 Each; Refill: 1  - Influenza Vaccine, High Dose (65+ Only)    5. MDD (major depressive disorder), recurrent episode, moderate (HCC)  Controlled.  Continue Cymbalta and Abilify.  - Subsequent Annual Wellness Visit - Includes PPPS ()    6. Renovascular hypertension  Controlled.  Continue lisinopril.  - Subsequent Annual Wellness Visit - Includes PPPS ()  - CBC WITH DIFFERENTIAL; Future  - Comp Metabolic Panel; Future    7. Chronic kidney disease, stage III (moderate) (HCC)  Stable.  Follow-up labs annually.  - Subsequent Annual Wellness Visit - Includes PPPS ()    8. Mixed hyperlipidemia  Reviewed labs with patient.  Controlled.  Continue atorvastatin.  - Subsequent Annual Wellness Visit - Includes PPPS ()  - Comp Metabolic Panel; Future  - Lipid Profile; Future    9. Acquired hypothyroidism  Reviewed labs with patient.  Controlled.  Continue Synthroid 100 mcg daily.  - Subsequent Annual Wellness Visit - Includes PPPS ()  - TSH WITH REFLEX TO FT4; Future    10. Seizure disorder (HCC)  Controlled, no recent seizures.  - Subsequent Annual Wellness Visit - Includes PPPS ()    11. Pulmonary emphysema, unspecified emphysema type (HCC)  Stable.  Continue following with pulmonology.  - Subsequent Annual Wellness Visit - Includes PPPS ()    12. Gastroesophageal reflux disease, esophagitis presence not specified  Controlled with omeprazole 20 mg once a day instead of twice a day.  Continue omeprazole 20 mg once a day to see if we can reduce  fracture risk.  - Subsequent Annual Wellness Visit - Includes PPPS ()  - omeprazole (PRILOSEC) 20 MG delayed-release capsule; Take 1 Cap by mouth every day.  Dispense: 90 Cap; Refill: 3            Services suggested: No services needed at this time  Health Care Screening: Age-appropriate preventive services recommended by USPTF and ACIP covered by Medicare were discussed today. Services ordered if indicated and agreed upon by the patient.  Referrals offered: Community-based lifestyle interventions to reduce health risks and promote self-management and wellness, fall prevention, nutrition, physical activity, tobacco-use cessation, weight loss, and mental health services as per orders if indicated.    Discussion today about general wellness and lifestyle habits:    · Prevent falls and reduce trip hazards; Cautioned about securing or removing rugs.  · Have a working fire alarm and carbon monoxide detector;   · Engage in regular physical activity and social activities     Follow-up: Return in about 1 year (around 9/23/2021) for Annual.

## 2020-09-24 ENCOUNTER — OFFICE VISIT (OUTPATIENT)
Dept: PULMONOLOGY | Facility: HOSPICE | Age: 70
End: 2020-09-24
Payer: MEDICARE

## 2020-09-24 VITALS
HEART RATE: 78 BPM | OXYGEN SATURATION: 93 % | SYSTOLIC BLOOD PRESSURE: 112 MMHG | BODY MASS INDEX: 26.33 KG/M2 | DIASTOLIC BLOOD PRESSURE: 60 MMHG | TEMPERATURE: 97.9 F | RESPIRATION RATE: 16 BRPM | HEIGHT: 65 IN | WEIGHT: 158 LBS

## 2020-09-24 DIAGNOSIS — J44.9 CHRONIC OBSTRUCTIVE PULMONARY DISEASE, UNSPECIFIED COPD TYPE (HCC): ICD-10-CM

## 2020-09-24 DIAGNOSIS — R91.1 PULMONARY NODULE: ICD-10-CM

## 2020-09-24 DIAGNOSIS — G47.33 OSA (OBSTRUCTIVE SLEEP APNEA): ICD-10-CM

## 2020-09-24 DIAGNOSIS — M81.0 OSTEOPOROSIS, UNSPECIFIED OSTEOPOROSIS TYPE, UNSPECIFIED PATHOLOGICAL FRACTURE PRESENCE: ICD-10-CM

## 2020-09-24 PROCEDURE — 99214 OFFICE O/P EST MOD 30 MIN: CPT | Performed by: INTERNAL MEDICINE

## 2020-09-24 ASSESSMENT — ENCOUNTER SYMPTOMS
EYE PAIN: 0
TREMORS: 0
SORE THROAT: 0
DIARRHEA: 0
NECK PAIN: 0
PALPITATIONS: 0
WHEEZING: 0
DIAPHORESIS: 0
FEVER: 0
FALLS: 0
PHOTOPHOBIA: 0
FOCAL WEAKNESS: 0
SHORTNESS OF BREATH: 0
DOUBLE VISION: 0
VOMITING: 0
HEMOPTYSIS: 0
DIZZINESS: 0
EYE REDNESS: 0
BACK PAIN: 0
ORTHOPNEA: 0
NAUSEA: 0
ABDOMINAL PAIN: 0
EYE DISCHARGE: 0
HEADACHES: 0
WEIGHT LOSS: 0
COUGH: 0
HEARTBURN: 0
WEAKNESS: 0
SPEECH CHANGE: 0
BLURRED VISION: 0
SINUS PAIN: 0
CONSTIPATION: 0
DEPRESSION: 0
SPUTUM PRODUCTION: 0
PND: 0
MYALGIAS: 0
STRIDOR: 0
CLAUDICATION: 0
CHILLS: 0

## 2020-09-24 ASSESSMENT — FIBROSIS 4 INDEX: FIB4 SCORE: 1.55
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
## 2020-10-08 ENCOUNTER — PATIENT MESSAGE (OUTPATIENT)
Dept: MEDICAL GROUP | Facility: MEDICAL CENTER | Age: 70
End: 2020-10-08

## 2020-10-08 DIAGNOSIS — R30.0 DYSURIA: ICD-10-CM

## 2020-10-08 RX ORDER — SULFAMETHOXAZOLE AND TRIMETHOPRIM 800; 160 MG/1; MG/1
1 TABLET ORAL 2 TIMES DAILY
Qty: 10 TAB | Refills: 0 | Status: SHIPPED | OUTPATIENT
Start: 2020-10-08 | End: 2020-11-12 | Stop reason: SDUPTHER

## 2020-10-08 NOTE — TELEPHONE ENCOUNTER
From: Summer Hatch  To: Jl Palomino M.D.  Sent: 10/8/2020 12:37 PM PDT  Subject: Non-Urgent Medical Question    Hi Dr Palomino. I findthat I have a urinary tract infection. Could you please send in a urine sample tst to [facility] and I'll get a test? Thanks

## 2020-10-14 ENCOUNTER — HOSPITAL ENCOUNTER (OUTPATIENT)
Dept: RADIOLOGY | Facility: MEDICAL CENTER | Age: 70
End: 2020-10-14
Attending: FAMILY MEDICINE
Payer: MEDICARE

## 2020-10-14 DIAGNOSIS — Z12.31 VISIT FOR SCREENING MAMMOGRAM: ICD-10-CM

## 2020-10-14 PROCEDURE — 77067 SCR MAMMO BI INCL CAD: CPT

## 2020-10-25 DIAGNOSIS — E78.00 PURE HYPERCHOLESTEROLEMIA: ICD-10-CM

## 2020-10-26 RX ORDER — ATORVASTATIN CALCIUM 40 MG/1
TABLET, FILM COATED ORAL
Qty: 90 TAB | Refills: 3 | Status: SHIPPED | OUTPATIENT
Start: 2020-10-26 | End: 2021-09-03 | Stop reason: SDUPTHER

## 2020-11-02 ENCOUNTER — OUTPATIENT INFUSION SERVICES (OUTPATIENT)
Dept: ONCOLOGY | Facility: MEDICAL CENTER | Age: 70
End: 2020-11-02
Attending: FAMILY MEDICINE
Payer: MEDICARE

## 2020-11-02 VITALS
BODY MASS INDEX: 28.23 KG/M2 | HEART RATE: 66 BPM | RESPIRATION RATE: 18 BRPM | OXYGEN SATURATION: 96 % | HEIGHT: 64 IN | DIASTOLIC BLOOD PRESSURE: 51 MMHG | WEIGHT: 165.34 LBS | TEMPERATURE: 98 F | SYSTOLIC BLOOD PRESSURE: 108 MMHG

## 2020-11-02 DIAGNOSIS — M81.0 AGE-RELATED OSTEOPOROSIS WITHOUT CURRENT PATHOLOGICAL FRACTURE: ICD-10-CM

## 2020-11-02 LAB
CA-I BLD ISE-SCNC: 1.17 MMOL/L (ref 1.1–1.3)
CREAT BLD-MCNC: 1.4 MG/DL (ref 0.5–1.4)

## 2020-11-02 PROCEDURE — 700111 HCHG RX REV CODE 636 W/ 250 OVERRIDE (IP): Mod: JG | Performed by: FAMILY MEDICINE

## 2020-11-02 PROCEDURE — 82565 ASSAY OF CREATININE: CPT

## 2020-11-02 PROCEDURE — 82330 ASSAY OF CALCIUM: CPT

## 2020-11-02 PROCEDURE — 96372 THER/PROPH/DIAG INJ SC/IM: CPT | Performed by: CLINICAL NURSE SPECIALIST

## 2020-11-02 PROCEDURE — 36415 COLL VENOUS BLD VENIPUNCTURE: CPT | Performed by: CLINICAL NURSE SPECIALIST

## 2020-11-02 RX ADMIN — DENOSUMAB 60 MG: 60 INJECTION SUBCUTANEOUS at 08:49

## 2020-11-02 ASSESSMENT — FIBROSIS 4 INDEX: FIB4 SCORE: 1.55

## 2020-11-02 NOTE — PROGRESS NOTES
Ms. Hatch returns to infusion for Prolia.  She takes vit D only d/t kidney disease.  No concerns/complaints.  Istat labs drawn and within parameters to treat. Prolia injected into left back arm.  Pt discharged ambulatory to home in stable condition.

## 2020-11-02 NOTE — PROGRESS NOTES
Pharmacy note  Cr = 1.4, CrCl ~ 45 ml/min  Ionized Ca = 1.17  OK for denosumab (Prolia) 60 mg SQ today

## 2020-11-12 ENCOUNTER — PATIENT MESSAGE (OUTPATIENT)
Dept: MEDICAL GROUP | Facility: MEDICAL CENTER | Age: 70
End: 2020-11-12

## 2020-11-12 DIAGNOSIS — R30.0 DYSURIA: ICD-10-CM

## 2020-11-12 RX ORDER — SULFAMETHOXAZOLE AND TRIMETHOPRIM 800; 160 MG/1; MG/1
1 TABLET ORAL 2 TIMES DAILY
Qty: 10 TAB | Refills: 0 | Status: SHIPPED | OUTPATIENT
Start: 2020-11-12 | End: 2020-11-17

## 2020-11-12 NOTE — TELEPHONE ENCOUNTER
From: Summer Hatch  To: Jl Palomino M.D.  Sent: 11/12/2020 1:13 AM [facility]  Subject: Prescription Question    Hi Dr Palomino. I have another urinary infection. I had a colonosposey Tuesday and its possible that either during the colon cleanse or the procedure that I picked up a bug. The last urinary infection I had a month ago cleared right up after the antibotitics that you prescribed were taken. Will you please prescribe again?    Sorry to hear that you're leaving. Many thanks for all of your help these years - it is much appreciated. I wish you well. Do you have a physician you would refer me to?

## 2020-11-16 RX ORDER — FENOFIBRATE 145 MG/1
TABLET, COATED ORAL
Qty: 90 TAB | Refills: 3 | Status: SHIPPED | OUTPATIENT
Start: 2020-11-16

## 2020-11-20 RX ORDER — LEVOTHYROXINE SODIUM 100 MCG
TABLET ORAL
Qty: 90 TAB | Refills: 3 | Status: SHIPPED | OUTPATIENT
Start: 2020-11-20 | End: 2021-09-03 | Stop reason: SDUPTHER

## 2020-11-23 ENCOUNTER — OFFICE VISIT (OUTPATIENT)
Dept: MEDICAL GROUP | Facility: MEDICAL CENTER | Age: 70
End: 2020-11-23
Payer: MEDICARE

## 2020-11-23 VITALS
WEIGHT: 166.2 LBS | TEMPERATURE: 97.7 F | SYSTOLIC BLOOD PRESSURE: 108 MMHG | HEIGHT: 65 IN | HEART RATE: 83 BPM | OXYGEN SATURATION: 91 % | DIASTOLIC BLOOD PRESSURE: 58 MMHG | BODY MASS INDEX: 27.69 KG/M2

## 2020-11-23 DIAGNOSIS — E78.2 MIXED HYPERLIPIDEMIA: ICD-10-CM

## 2020-11-23 DIAGNOSIS — I15.0 RENOVASCULAR HYPERTENSION: ICD-10-CM

## 2020-11-23 DIAGNOSIS — J43.9 PULMONARY EMPHYSEMA, UNSPECIFIED EMPHYSEMA TYPE (HCC): ICD-10-CM

## 2020-11-23 DIAGNOSIS — J45.40 MODERATE PERSISTENT ASTHMA WITHOUT COMPLICATION: ICD-10-CM

## 2020-11-23 DIAGNOSIS — G62.9 NEUROPATHY: ICD-10-CM

## 2020-11-23 DIAGNOSIS — M81.0 OSTEOPOROSIS, UNSPECIFIED OSTEOPOROSIS TYPE, UNSPECIFIED PATHOLOGICAL FRACTURE PRESENCE: ICD-10-CM

## 2020-11-23 DIAGNOSIS — H40.89 OTHER SPECIFIED GLAUCOMA, UNSPECIFIED LATERALITY: ICD-10-CM

## 2020-11-23 DIAGNOSIS — K21.9 GASTROESOPHAGEAL REFLUX DISEASE, UNSPECIFIED WHETHER ESOPHAGITIS PRESENT: ICD-10-CM

## 2020-11-23 DIAGNOSIS — E03.9 ACQUIRED HYPOTHYROIDISM: ICD-10-CM

## 2020-11-23 DIAGNOSIS — N18.30 STAGE 3 CHRONIC KIDNEY DISEASE, UNSPECIFIED WHETHER STAGE 3A OR 3B CKD: ICD-10-CM

## 2020-11-23 DIAGNOSIS — G40.909 SEIZURE DISORDER (HCC): ICD-10-CM

## 2020-11-23 PROBLEM — R06.09 DYSPNEA ON EXERTION: Status: RESOLVED | Noted: 2018-03-30 | Resolved: 2020-11-23

## 2020-11-23 PROCEDURE — 99214 OFFICE O/P EST MOD 30 MIN: CPT | Performed by: FAMILY MEDICINE

## 2020-11-23 ASSESSMENT — FIBROSIS 4 INDEX: FIB4 SCORE: 1.55

## 2020-11-23 ASSESSMENT — ENCOUNTER SYMPTOMS
PALPITATIONS: 0
SHORTNESS OF BREATH: 0
WEAKNESS: 0
NAUSEA: 0
FEVER: 0
CHILLS: 0
DEPRESSION: 0
CONSTIPATION: 0
DIARRHEA: 0
VOMITING: 0
BLURRED VISION: 0

## 2020-11-23 NOTE — PROGRESS NOTES
History of Present Illness  70 year old female presents to clinic to establish care.  She does have a fairly significant past medical history.  She has a history of stage III kidney disease, although on her last labs her GFR has increased to 40.  She does follow with nephrology for this.    She also has a history of a seizure disorder.  She does not take any medications for this.  She has not had a seizure for approximately 30 years.  Prior to that she had about 5 seizures total.    She does also have hypertension, for which she takes 3 medications.  She does check her blood pressures regularly at home, her numbers are usually , DBP 60s.  She denies any chest pain, palpitations, change in vision, or headache.  She also has dyslipidemia and does take a statin and fenofibrate.    She has some neuropathy to both of her upper extremities, tennis elbow.  She did see a specialist in [place] who started her on Cymbalta and Abilify for this about 12 or 13 years ago.  She does find this has improved her symptoms in both of her arms.  Unfortunately she does still have some problems with her right elbow, and does wear strap with it.  She does also go to pain management for her right tennis elbow, and they are planning an implant in January to help with the pain.    She also has a history of emphysema and asthma, for which she sees pulmonology.  She has a Spiriva and Symbicort daily, and only needs to use albuterol 1-2 times per week.    She also has a history of osteoporosis, she is on Prolia for this.  She has had multiple fractures in the past, her last was in March 2020, when she broke her foot.  Otherwise, she feels this is going well.  She has had multiple surgeries on her hips bilaterally, and does have chronic pain in them now.  She takes tramadol at night for this.  Otherwise is able to do her activities of daily living without concern.    She has a history of hypothyroidism, for which she takes Synthroid.   He has had no recent changes in her dosing.  She denies any drastic weight changes, no heat or cold intolerance.    She has been diagnosed with glaucoma, and follows with ophthalmology for this.  She does take drops daily as well.  She denies any recent changes to her vision.    She denies any other questions or concerns at this time.    ROS  Review of Systems   Constitutional: Negative for chills and fever.   HENT: Negative for hearing loss.    Eyes: Negative for blurred vision.   Respiratory: Negative for shortness of breath.    Cardiovascular: Negative for chest pain and palpitations.   Gastrointestinal: Negative for constipation, diarrhea, nausea and vomiting.   Genitourinary: Negative for dysuria and hematuria.   Skin: Negative for rash.   Neurological: Negative for weakness.   Psychiatric/Behavioral: Negative for depression.     Medications  Current Outpatient Medications   Medication Sig Dispense Refill   • SYNTHROID 100 MCG Tab TAKE 1 TABLET DAILY 90 Tab 3   • fenofibrate (TRICOR) 145 MG Tab TAKE 1 TABLET DAILY. 90 Tab 3   • atorvastatin (LIPITOR) 40 MG Tab TAKE 1 TABLET DAILY 90 Tab 3   • FIBER ADULT GUMMIES PO Take  by mouth.     • omeprazole (PRILOSEC) 20 MG delayed-release capsule Take 1 Cap by mouth every day. 90 Cap 3   • fluticasone (FLONASE) 50 MCG/ACT nasal spray Spray 1 Spray in nose 2 times a day. 48 g 3   • allopurinol (ZYLOPRIM) 100 MG Tab TAKE 1 TABLET DAILY 90 Tab 3   • tiotropium (SPIRIVA HANDIHALER) 18 MCG Cap INHALE THE CONTENTS OF ONE CAPSULE DAILY 90 Cap 3   • triamterene-hctz (MAXZIDE-25/DYAZIDE) 37.5-25 MG Tab TAKE 1 TABLET EVERY MORNING 90 Tab 3   • lisinopril (PRINIVIL) 10 MG Tab TAKE 1 TABLET DAILY 90 Tab 3   • VITAMIN D PO Take 1,000 Units by mouth.     • therapeutic multivitamin-minerals (THERAGRAN-M) Tab Take 1 Tab by mouth every day.     • aripiprazole (ABILIFY) 5 MG tablet Take 1 Tab by mouth every day. 90 Tab 3   • denosumab (PROLIA) 60 MG/ML Solution Prefilled Syringe Inject 1  mL as instructed every 6 months. 1 mL 1   • DULoxetine (CYMBALTA) 60 MG Cap DR Particles delayed-release capsule TAKE 1 CAPSULE EVERY       MORNING 90 Cap 3   • SYMBICORT 160-4.5 MCG/ACT Aerosol USE 2 INHALATIONS ORALLY   TWICE DAILY (USE SPACER) - RINSE MOUTH AFTER EACH USE 1 Inhaler 11   • albuterol 108 (90 Base) MCG/ACT Aero Soln inhalation aerosol USE 2 INHALATIONS ORALLY   EVERY SIX HOURS AS NEEDED  FOR SHORTNESS OF BREATH 3 Inhaler 1   • albuterol (PROVENTIL) 2.5mg/3ml Nebu Soln solution for nebulization 3 mL by Nebulization route every four hours as needed for Shortness of Breath. 360 mL 5   • tramadol (ULTRAM) 50 MG Tab TAKE 1 TO 2 TABLETS BY MOUTH 2 TIMES DAILY AS NEEDED FOR SEVERE PAIN (Patient taking differently: Take 50 mg by mouth every day.) 90 Tab 5   • latanoprost (XALATAN) 0.005 % Solution Place 1 Drop in both eyes every evening. 1 Bottle 3     No current facility-administered medications for this visit.      Allergies  Allergies   Allergen Reactions   • Tylenol Anaphylaxis     Lip, throat swelling   • Tape Unspecified     Causes blisters   • Latex Hives     Problem List  Patient Active Problem List   Diagnosis   • History of septic arthritis   • HTN (hypertension)   • Seizure disorder ([facility])   • Hyperlipidemia   • Acquired hypothyroidism   • GERD (gastroesophageal reflux disease)   • Neuropathy   • Chronic kidney disease, stage III (moderate)   • Chronic obstructive pulmonary disease (HCC)   • Osteoporosis   • Moderate persistent asthma without complication   • Other specified glaucoma     Past Medical History  Past Medical History:   Diagnosis Date   • Arthritis    • Asthma     Uses inhaler PRN.   • Breath shortness     With exertion.   • Bronchitis    • Carpal tunnel syndrome    • COPD    • Dental disorder    • Emphysema of lung ([facility])    • GERD (gastroesophageal reflux disease) 10/22/2013   • Glaucoma     Bilateral.   • History of tobacco abuse 10/22/2013    Quit 1998. Smoked one pack per day for  "30 years.   • Hypertension    • Hypothyroidism 10/22/2013   • Indigestion    • Osteoporosis    • Pain     Bilateral hips.   • Pneumonia    • Renal disorder    • Seizure disorder (HCC)     Last seizure 30 years ago.  No medication.     Past Surgical History  Past Surgical History:   Procedure Laterality Date   • ABDOMINOPLASTY  10/29/2018    Procedure: ABDOMINOPLASTY;  Surgeon: Santiago Campos M.D.;  Location: SURGERY SAME DAY [facility];  Service: Plastics   • HIP REVISION TOTAL Right 11/5/2015    Procedure: Evacuation hematoma, revision total hip;  Surgeon: Nils Starr M.D.;  Location: SURGERY [facility];  Service:    • HIP REVISION TOTAL  10/10/2013    Performed by Nils Starr M.D. at SURGERY [facility]   • INCISION AND DRAINAGE ORTHOPEDIC  10/10/2013    Performed by Nils Starr M.D. at SURGERY [facility]   • CARPAL TUNNEL RELEASE     • NERVE ULNAR REPAIR OR EXPLORE     • OTHER ORTHOPEDIC SURGERY     • TONSILLECTOMY       Past Family History  Family History   Problem Relation Age of Onset   • Psychiatric Illness Mother 47        suicide   • Alcohol/Drug Mother    • Heart Disease Mother    • Cancer Father 72        Lung, smoke   • Alcohol abuse Sister    • Alcohol/Drug Brother         Recovering   • Arthritis Brother    • No Known Problems Daughter      Social History  She reports eating a healthy and balanced diet, unfortunately has not been getting regular exercise since March when she broke her foot. She is currently retired, but previously worked as the  of [employer].  She denies any alcohol or illicit drug use.  She is currently not using any tobacco products, but she did previously smoked 2 packs of cigarettes per day for 32 years total. She is sexually active with one, male partner and they do not use any barrier contraception.    Physical Exam  /58   Pulse 83   Temp 36.5 °C (97.7 °F) (Temporal)   Ht 1.651 m (5' 5\")   Wt 75.4 kg (166 lb 3.2 oz)   " SpO2 91%   BMI 27.66 kg/m²   Physical Exam   Constitutional: She is well-developed, well-nourished, and in no distress. No distress.   HENT:   Head: Normocephalic and atraumatic.   Right Ear: Tympanic membrane, external ear and ear canal normal.   Left Ear: Tympanic membrane, external ear and ear canal normal.   Eyes: Pupils are equal, round, and reactive to light. Right eye exhibits no discharge. Left eye exhibits no discharge. No scleral icterus.   Neck: No thyromegaly present.   Cardiovascular: Normal rate and regular rhythm.   Murmur (systolic) heard.  Pulmonary/Chest: Effort normal and breath sounds normal. No respiratory distress.   Abdominal: Soft. Bowel sounds are normal. She exhibits no distension. There is no abdominal tenderness.   Musculoskeletal:         General: No edema.   Neurological: She is alert.   Skin: Skin is warm and dry. She is not diaphoretic.   Psychiatric: Affect and judgment normal.     Assessment & Plan  1. Stage 3 chronic kidney disease, unspecified whether stage 3a or 3b CKD  This problem is new to me, but is chronic and stable.    2. Seizure disorder (HCC)  This problem is new to me, but is chronic and stable.    3. Renovascular hypertension  4. Mixed hyperlipidemia  This problem is new to me, but is chronic and stable.  Continue same medications.    5. Neuropathy  This problem is new to me, but is chronic and stable.  Continue same medications.    6. Pulmonary emphysema, unspecified emphysema type (HCC)  7. Moderate persistent asthma without complication  This problem is new to me, but is chronic and stable.  Continue same medications.  Continue to follow with pulmonology.    8. Osteoporosis, unspecified osteoporosis type, unspecified pathological fracture presence  This problem is new to me, but is chronic and stable.  Continue same medications.    9. Acquired hypothyroidism  This problem is new to me, but is chronic and stable.  Last TSH was 9/16/2020 and was normal at 3.170.  continue same medications.    10. Gastroesophageal reflux disease, unspecified whether esophagitis present  This problem is new to me, but is chronic and stable.  Continue same medications.    11. Other specified glaucoma, unspecified laterality  This problem is new to me, but is chronic and stable.  Continue same medications.  Continue to follow with ophthalmology    Return in about 1 year (around 11/23/2021) for Annual physical.    Sandra Caceres M.D.

## 2020-11-30 ENCOUNTER — PATIENT MESSAGE (OUTPATIENT)
Dept: MEDICAL GROUP | Facility: MEDICAL CENTER | Age: 70
End: 2020-11-30

## 2020-11-30 DIAGNOSIS — K21.9 GASTROESOPHAGEAL REFLUX DISEASE: ICD-10-CM

## 2020-11-30 RX ORDER — OMEPRAZOLE 20 MG/1
20 CAPSULE, DELAYED RELEASE ORAL DAILY
Qty: 30 CAP | Refills: 0 | Status: SHIPPED | OUTPATIENT
Start: 2020-11-30 | End: 2020-12-23

## 2020-11-30 NOTE — TELEPHONE ENCOUNTER
From: Summer Hatch  To: Sandra Caceres M.D.  Sent: 11/30/2020 7:41 AM [facility]  Subject: Prescription Question    Hi DR Caceres I don't have enough of my omepraole 20 mg cap to last for my trip to [place] and don't have time to order from mail service Could you please call in a 30 day prescription to the [facility] pharmacy on [address] and I'll  today. Many thanks.

## 2020-11-30 NOTE — PATIENT COMMUNICATION
Received request via: Patient    Was the patient seen in the last year in this department? Yes    Does the patient have an active prescription (recently filled or refills available) for medication(s) requested? No       Requested Prescriptions     Pending Prescriptions Disp Refills   • omeprazole (PRILOSEC) 20 MG delayed-release capsule 90 Cap      Sig: Take 1 Cap by mouth every day.

## 2020-12-16 ENCOUNTER — PATIENT MESSAGE (OUTPATIENT)
Dept: SLEEP MEDICINE | Facility: MEDICAL CENTER | Age: 70
End: 2020-12-16

## 2020-12-16 DIAGNOSIS — J44.89 ASTHMA WITH COPD (HCC): ICD-10-CM

## 2020-12-16 DIAGNOSIS — J44.9 CHRONIC OBSTRUCTIVE PULMONARY DISEASE, UNSPECIFIED COPD TYPE (HCC): ICD-10-CM

## 2020-12-16 RX ORDER — BUDESONIDE AND FORMOTEROL FUMARATE DIHYDRATE 160; 4.5 UG/1; UG/1
AEROSOL RESPIRATORY (INHALATION)
Qty: 1 EACH | Refills: 6 | Status: SHIPPED | OUTPATIENT
Start: 2020-12-16 | End: 2021-06-07

## 2020-12-16 RX ORDER — BUDESONIDE AND FORMOTEROL FUMARATE DIHYDRATE 160; 4.5 UG/1; UG/1
AEROSOL RESPIRATORY (INHALATION)
Qty: 1 EACH | Refills: 6 | Status: SHIPPED | OUTPATIENT
Start: 2020-12-16 | End: 2020-12-16 | Stop reason: SDUPTHER

## 2020-12-16 RX ORDER — ALBUTEROL SULFATE 90 UG/1
AEROSOL, METERED RESPIRATORY (INHALATION)
Qty: 1 EACH | Refills: 11 | Status: SHIPPED | OUTPATIENT
Start: 2020-12-16 | End: 2021-04-06 | Stop reason: SDUPTHER

## 2020-12-16 RX ORDER — TIOTROPIUM BROMIDE 18 UG/1
CAPSULE ORAL; RESPIRATORY (INHALATION)
Qty: 90 CAP | Refills: 3 | Status: SHIPPED | OUTPATIENT
Start: 2020-12-16 | End: 2021-03-08 | Stop reason: SDUPTHER

## 2020-12-16 RX ORDER — TIOTROPIUM BROMIDE 18 UG/1
CAPSULE ORAL; RESPIRATORY (INHALATION)
Qty: 90 CAP | Refills: 3 | Status: SHIPPED | OUTPATIENT
Start: 2020-12-16 | End: 2020-12-16 | Stop reason: SDUPTHER

## 2020-12-16 RX ORDER — ALBUTEROL SULFATE 90 UG/1
AEROSOL, METERED RESPIRATORY (INHALATION)
Qty: 1 EACH | Refills: 11 | Status: SHIPPED | OUTPATIENT
Start: 2020-12-16 | End: 2020-12-16 | Stop reason: SDUPTHER

## 2021-01-06 ENCOUNTER — HOSPITAL ENCOUNTER (OUTPATIENT)
Dept: LAB | Facility: MEDICAL CENTER | Age: 71
End: 2021-01-06
Attending: PAIN MEDICINE
Payer: MEDICARE

## 2021-01-06 ENCOUNTER — HOSPITAL ENCOUNTER (OUTPATIENT)
Dept: RADIOLOGY | Facility: MEDICAL CENTER | Age: 71
End: 2021-01-06
Payer: MEDICARE

## 2021-01-06 ENCOUNTER — HOSPITAL ENCOUNTER (OUTPATIENT)
Dept: RADIOLOGY | Facility: MEDICAL CENTER | Age: 71
End: 2021-01-06
Attending: INTERNAL MEDICINE
Payer: MEDICARE

## 2021-01-06 DIAGNOSIS — Z01.818 PREOP EXAMINATION: ICD-10-CM

## 2021-01-06 DIAGNOSIS — R91.1 PULMONARY NODULE: ICD-10-CM

## 2021-01-06 DIAGNOSIS — Z01.818 PREOPERATIVE CLEARANCE: ICD-10-CM

## 2021-01-06 DIAGNOSIS — R07.81 RIB PAIN: ICD-10-CM

## 2021-01-06 LAB
ALBUMIN SERPL BCP-MCNC: 4.3 G/DL (ref 3.2–4.9)
ALBUMIN/GLOB SERPL: 1.6 G/DL
ALP SERPL-CCNC: 40 U/L (ref 30–99)
ALT SERPL-CCNC: 28 U/L (ref 2–50)
ANION GAP SERPL CALC-SCNC: 12 MMOL/L (ref 7–16)
APTT PPP: 27.8 SEC (ref 24.7–36)
AST SERPL-CCNC: 27 U/L (ref 12–45)
BASOPHILS # BLD AUTO: 1.3 % (ref 0–1.8)
BASOPHILS # BLD: 0.09 K/UL (ref 0–0.12)
BILIRUB SERPL-MCNC: 0.2 MG/DL (ref 0.1–1.5)
BUN SERPL-MCNC: 34 MG/DL (ref 8–22)
CALCIUM SERPL-MCNC: 9.7 MG/DL (ref 8.5–10.5)
CHLORIDE SERPL-SCNC: 100 MMOL/L (ref 96–112)
CO2 SERPL-SCNC: 22 MMOL/L (ref 20–33)
CREAT SERPL-MCNC: 2.1 MG/DL (ref 0.5–1.4)
EOSINOPHIL # BLD AUTO: 0.17 K/UL (ref 0–0.51)
EOSINOPHIL NFR BLD: 2.5 % (ref 0–6.9)
ERYTHROCYTE [DISTWIDTH] IN BLOOD BY AUTOMATED COUNT: 45 FL (ref 35.9–50)
GLOBULIN SER CALC-MCNC: 2.7 G/DL (ref 1.9–3.5)
GLUCOSE SERPL-MCNC: 94 MG/DL (ref 65–99)
HCT VFR BLD AUTO: 39.3 % (ref 37–47)
HGB BLD-MCNC: 12.1 G/DL (ref 12–16)
IMM GRANULOCYTES # BLD AUTO: 0.05 K/UL (ref 0–0.11)
IMM GRANULOCYTES NFR BLD AUTO: 0.7 % (ref 0–0.9)
INR PPP: 0.91 (ref 0.87–1.13)
LYMPHOCYTES # BLD AUTO: 1.94 K/UL (ref 1–4.8)
LYMPHOCYTES NFR BLD: 28.4 % (ref 22–41)
MCH RBC QN AUTO: 26 PG (ref 27–33)
MCHC RBC AUTO-ENTMCNC: 30.8 G/DL (ref 33.6–35)
MCV RBC AUTO: 84.3 FL (ref 81.4–97.8)
MONOCYTES # BLD AUTO: 0.77 K/UL (ref 0–0.85)
MONOCYTES NFR BLD AUTO: 11.3 % (ref 0–13.4)
NEUTROPHILS # BLD AUTO: 3.8 K/UL (ref 2–7.15)
NEUTROPHILS NFR BLD: 55.8 % (ref 44–72)
NRBC # BLD AUTO: 0 K/UL
NRBC BLD-RTO: 0 /100 WBC
PLATELET # BLD AUTO: 292 K/UL (ref 164–446)
PMV BLD AUTO: 11 FL (ref 9–12.9)
POTASSIUM SERPL-SCNC: 4.9 MMOL/L (ref 3.6–5.5)
PROT SERPL-MCNC: 7 G/DL (ref 6–8.2)
PROTHROMBIN TIME: 12.5 SEC (ref 12–14.6)
RBC # BLD AUTO: 4.66 M/UL (ref 4.2–5.4)
SODIUM SERPL-SCNC: 134 MMOL/L (ref 135–145)
WBC # BLD AUTO: 6.8 K/UL (ref 4.8–10.8)

## 2021-01-06 PROCEDURE — 71045 X-RAY EXAM CHEST 1 VIEW: CPT

## 2021-01-06 PROCEDURE — 80053 COMPREHEN METABOLIC PANEL: CPT

## 2021-01-06 PROCEDURE — 36415 COLL VENOUS BLD VENIPUNCTURE: CPT | Mod: GA

## 2021-01-06 PROCEDURE — 85610 PROTHROMBIN TIME: CPT | Mod: GA

## 2021-01-06 PROCEDURE — 85730 THROMBOPLASTIN TIME PARTIAL: CPT | Mod: GA

## 2021-01-06 PROCEDURE — 81003 URINALYSIS AUTO W/O SCOPE: CPT

## 2021-01-06 PROCEDURE — 85025 COMPLETE CBC W/AUTO DIFF WBC: CPT

## 2021-01-07 ENCOUNTER — HOSPITAL ENCOUNTER (OUTPATIENT)
Dept: CARDIOLOGY | Facility: MEDICAL CENTER | Age: 71
End: 2021-01-07
Attending: PAIN MEDICINE
Payer: MEDICARE

## 2021-01-07 LAB
APPEARANCE UR: CLEAR
BILIRUB UR QL STRIP.AUTO: NEGATIVE
COLOR UR: YELLOW
EKG IMPRESSION: NORMAL
GLUCOSE UR STRIP.AUTO-MCNC: NEGATIVE MG/DL
KETONES UR STRIP.AUTO-MCNC: NEGATIVE MG/DL
LEUKOCYTE ESTERASE UR QL STRIP.AUTO: NEGATIVE
MICRO URNS: NORMAL
NITRITE UR QL STRIP.AUTO: NEGATIVE
PH UR STRIP.AUTO: 6.5 [PH] (ref 5–8)
PROT UR QL STRIP: NEGATIVE MG/DL
RBC UR QL AUTO: NEGATIVE
SP GR UR STRIP.AUTO: 1.01
UROBILINOGEN UR STRIP.AUTO-MCNC: 0.2 MG/DL

## 2021-01-07 PROCEDURE — 93005 ELECTROCARDIOGRAM TRACING: CPT | Performed by: PAIN MEDICINE

## 2021-01-07 PROCEDURE — 93010 ELECTROCARDIOGRAM REPORT: CPT | Performed by: INTERNAL MEDICINE

## 2021-01-25 ENCOUNTER — PATIENT MESSAGE (OUTPATIENT)
Dept: MEDICAL GROUP | Facility: MEDICAL CENTER | Age: 71
End: 2021-01-25

## 2021-01-25 RX ORDER — DULOXETIN HYDROCHLORIDE 60 MG/1
CAPSULE, DELAYED RELEASE ORAL
Qty: 90 CAP | Refills: 3 | Status: SHIPPED | OUTPATIENT
Start: 2021-01-25

## 2021-01-25 RX ORDER — FLUTICASONE PROPIONATE 50 MCG
1 SPRAY, SUSPENSION (ML) NASAL 2 TIMES DAILY
Qty: 48 G | Refills: 0 | Status: SHIPPED | OUTPATIENT
Start: 2021-01-25 | End: 2021-05-26 | Stop reason: SDUPTHER

## 2021-01-25 NOTE — TELEPHONE ENCOUNTER
From: Summer Hatch  To: Sandra Caceres M.D.  Sent: 1/25/2021 8:32 AM PST  Subject: Prescription Question    Hi Dr Caceres: Could you please renew my prescription for fluticasone 50 MCG/ACT nasal spray  Commonly known as: FLONASE. Thank you

## 2021-02-02 ENCOUNTER — HOSPITAL ENCOUNTER (OUTPATIENT)
Dept: LAB | Facility: MEDICAL CENTER | Age: 71
End: 2021-02-02
Attending: INTERNAL MEDICINE
Payer: MEDICARE

## 2021-02-02 LAB
BASOPHILS # BLD AUTO: 1.5 % (ref 0–1.8)
BASOPHILS # BLD: 0.09 K/UL (ref 0–0.12)
CREAT UR-MCNC: 32.62 MG/DL
EOSINOPHIL # BLD AUTO: 0.13 K/UL (ref 0–0.51)
EOSINOPHIL NFR BLD: 2.1 % (ref 0–6.9)
ERYTHROCYTE [DISTWIDTH] IN BLOOD BY AUTOMATED COUNT: 44.5 FL (ref 35.9–50)
HCT VFR BLD AUTO: 39.3 % (ref 37–47)
HGB BLD-MCNC: 12.1 G/DL (ref 12–16)
IMM GRANULOCYTES # BLD AUTO: 0.02 K/UL (ref 0–0.11)
IMM GRANULOCYTES NFR BLD AUTO: 0.3 % (ref 0–0.9)
LYMPHOCYTES # BLD AUTO: 1.78 K/UL (ref 1–4.8)
LYMPHOCYTES NFR BLD: 29.2 % (ref 22–41)
MCH RBC QN AUTO: 26.3 PG (ref 27–33)
MCHC RBC AUTO-ENTMCNC: 30.8 G/DL (ref 33.6–35)
MCV RBC AUTO: 85.4 FL (ref 81.4–97.8)
MONOCYTES # BLD AUTO: 0.71 K/UL (ref 0–0.85)
MONOCYTES NFR BLD AUTO: 11.6 % (ref 0–13.4)
NEUTROPHILS # BLD AUTO: 3.37 K/UL (ref 2–7.15)
NEUTROPHILS NFR BLD: 55.3 % (ref 44–72)
NRBC # BLD AUTO: 0 K/UL
NRBC BLD-RTO: 0 /100 WBC
PLATELET # BLD AUTO: 249 K/UL (ref 164–446)
PMV BLD AUTO: 10.7 FL (ref 9–12.9)
PROT UR-MCNC: <4 MG/DL (ref 0–15)
PROT/CREAT UR: NORMAL MG/G (ref 10–107)
RBC # BLD AUTO: 4.6 M/UL (ref 4.2–5.4)
WBC # BLD AUTO: 6.1 K/UL (ref 4.8–10.8)

## 2021-02-02 PROCEDURE — 85025 COMPLETE CBC W/AUTO DIFF WBC: CPT

## 2021-02-02 PROCEDURE — 82570 ASSAY OF URINE CREATININE: CPT

## 2021-02-02 PROCEDURE — 84156 ASSAY OF PROTEIN URINE: CPT

## 2021-02-02 PROCEDURE — 82306 VITAMIN D 25 HYDROXY: CPT

## 2021-02-02 PROCEDURE — 81003 URINALYSIS AUTO W/O SCOPE: CPT

## 2021-02-02 PROCEDURE — 80053 COMPREHEN METABOLIC PANEL: CPT

## 2021-02-02 PROCEDURE — 36415 COLL VENOUS BLD VENIPUNCTURE: CPT

## 2021-02-02 PROCEDURE — 84550 ASSAY OF BLOOD/URIC ACID: CPT

## 2021-02-03 LAB
25(OH)D3 SERPL-MCNC: 39 NG/ML (ref 30–100)
ALBUMIN SERPL BCP-MCNC: 4.1 G/DL (ref 3.2–4.9)
ALBUMIN/GLOB SERPL: 1.5 G/DL
ALP SERPL-CCNC: 42 U/L (ref 30–99)
ALT SERPL-CCNC: 25 U/L (ref 2–50)
ANION GAP SERPL CALC-SCNC: 9 MMOL/L (ref 7–16)
APPEARANCE UR: CLEAR
AST SERPL-CCNC: 32 U/L (ref 12–45)
BILIRUB SERPL-MCNC: 0.3 MG/DL (ref 0.1–1.5)
BILIRUB UR QL STRIP.AUTO: NEGATIVE
BUN SERPL-MCNC: 25 MG/DL (ref 8–22)
CALCIUM SERPL-MCNC: 9 MG/DL (ref 8.5–10.5)
CHLORIDE SERPL-SCNC: 101 MMOL/L (ref 96–112)
CO2 SERPL-SCNC: 23 MMOL/L (ref 20–33)
COLOR UR: YELLOW
CREAT SERPL-MCNC: 1.49 MG/DL (ref 0.5–1.4)
FASTING STATUS PATIENT QL REPORTED: NORMAL
GLOBULIN SER CALC-MCNC: 2.7 G/DL (ref 1.9–3.5)
GLUCOSE SERPL-MCNC: 86 MG/DL (ref 65–99)
GLUCOSE UR STRIP.AUTO-MCNC: NEGATIVE MG/DL
KETONES UR STRIP.AUTO-MCNC: NEGATIVE MG/DL
LEUKOCYTE ESTERASE UR QL STRIP.AUTO: NEGATIVE
MICRO URNS: NORMAL
NITRITE UR QL STRIP.AUTO: NEGATIVE
PH UR STRIP.AUTO: 7 [PH] (ref 5–8)
POTASSIUM SERPL-SCNC: 4.7 MMOL/L (ref 3.6–5.5)
PROT SERPL-MCNC: 6.8 G/DL (ref 6–8.2)
PROT UR QL STRIP: NEGATIVE MG/DL
RBC UR QL AUTO: NEGATIVE
SODIUM SERPL-SCNC: 133 MMOL/L (ref 135–145)
SP GR UR STRIP.AUTO: 1.01
URATE SERPL-MCNC: 4.5 MG/DL (ref 1.9–8.2)
UROBILINOGEN UR STRIP.AUTO-MCNC: 0.2 MG/DL

## 2021-02-17 ENCOUNTER — PATIENT MESSAGE (OUTPATIENT)
Dept: MEDICAL GROUP | Facility: MEDICAL CENTER | Age: 71
End: 2021-02-17

## 2021-02-17 RX ORDER — ARIPIPRAZOLE 5 MG/1
5 TABLET ORAL DAILY
Qty: 90 TABLET | Refills: 3 | Status: SHIPPED | OUTPATIENT
Start: 2021-02-17

## 2021-02-28 RX ORDER — LISINOPRIL 10 MG/1
TABLET ORAL
Qty: 90 TABLET | Refills: 2 | Status: SHIPPED | OUTPATIENT
Start: 2021-02-28

## 2021-03-03 DIAGNOSIS — Z23 NEED FOR VACCINATION: ICD-10-CM

## 2021-03-08 ENCOUNTER — PATIENT MESSAGE (OUTPATIENT)
Dept: SLEEP MEDICINE | Facility: MEDICAL CENTER | Age: 71
End: 2021-03-08

## 2021-03-08 DIAGNOSIS — J44.89 ASTHMA WITH COPD (HCC): ICD-10-CM

## 2021-03-08 RX ORDER — TIOTROPIUM BROMIDE 18 UG/1
CAPSULE ORAL; RESPIRATORY (INHALATION)
Qty: 90 CAPSULE | Refills: 3 | Status: SHIPPED | OUTPATIENT
Start: 2021-03-08

## 2021-03-08 NOTE — PATIENT COMMUNICATION
Have we ever prescribed this med? Yes.  If yes, what date? 12/19/20    Last OV: 09/24/20    Next OV: none scheudled    DX: COPD    Medications: Spiriva

## 2021-03-08 NOTE — TELEPHONE ENCOUNTER
From: Summer Hatch  To: Physician Assistant Nirali Trinidad  Sent: 3/8/2021 7:09 AM PST  Subject: Prescription Question    Dr. Leyva: Could you please refill my Spiriva prescription Thanks   Yes

## 2021-03-11 ENCOUNTER — IMMUNIZATION (OUTPATIENT)
Dept: FAMILY PLANNING/WOMEN'S HEALTH CLINIC | Facility: IMMUNIZATION CENTER | Age: 71
End: 2021-03-11
Attending: INTERNAL MEDICINE
Payer: MEDICARE

## 2021-03-11 DIAGNOSIS — Z23 ENCOUNTER FOR VACCINATION: Primary | ICD-10-CM

## 2021-03-11 DIAGNOSIS — Z23 NEED FOR VACCINATION: ICD-10-CM

## 2021-03-11 PROCEDURE — 91300 PFIZER SARS-COV-2 VACCINE: CPT

## 2021-03-11 PROCEDURE — 0001A PFIZER SARS-COV-2 VACCINE: CPT

## 2021-03-30 ENCOUNTER — TELEPHONE (OUTPATIENT)
Dept: SLEEP MEDICINE | Facility: MEDICAL CENTER | Age: 71
End: 2021-03-30

## 2021-03-30 DIAGNOSIS — J44.89 ASTHMA WITH COPD (HCC): ICD-10-CM

## 2021-03-30 NOTE — TELEPHONE ENCOUNTER
Surgical clearance being requested by [facility] in regards to her ELIZABETH.    Compliance scanned in.    Let me know if any further testing or imaging is needed for clearance.    (Contact info for Xochitl at [facility] @ [phone])

## 2021-03-31 NOTE — TELEPHONE ENCOUNTER
Xochitl from [facility] called for surgery clearance. She is having a spinal cord stim implant placed.  Her phone number is [phone]

## 2021-03-31 NOTE — TELEPHONE ENCOUNTER
Patient needs a f/u with Dr. Leyva. She is due for 6 month f/u for pulm, and is also due to ELIZABETH f/u which be addressed by Dr. Leyva or she can have 2 separate visits 1 with palm and the other with sleep.  Patient also should complete a PFT which I will order today.  After review of PFTs and ELIZABETH will determine if she can have a clearance for spinal cord stimulator procedure.

## 2021-04-01 ENCOUNTER — PATIENT MESSAGE (OUTPATIENT)
Dept: SLEEP MEDICINE | Facility: MEDICAL CENTER | Age: 71
End: 2021-04-01

## 2021-04-01 NOTE — TELEPHONE ENCOUNTER
From: Summer Hatch  To: Medical Assistant Catalina HOLLAND  Sent: 4/1/2021 10:57 AM PDT  Subject: Surgical Clearance    Monday morning please       ----- Message -----   From:Medical Assistant Catalina HOLLAND   Sent:4/1/2021 10:56 AM PDT   To:Summer Hatch   Subject:RE: Surgical Clearance    Thank you. Scheduled you with Dr Leyva on 04/6/21 at 10:10am. For your pulmonary function Test, I can schedule you on Monday. Morning or Afternoon?  Thanks,  Catalina       ----- Message -----   From:Summer Hatch   Sent:4/1/2021 10:44 AM PDT   To:Medical Assistant Catalina HOLLAND   Subject:RE: Surgical Clearance    April 6 would be fine I'll see you then      ----- Message -----   From:Agusto HOLLAND   Sent:4/1/2021 10:05 AM PDT   To:Summer Hatch   Subject:Surgical Clearance    Good Morning Dr Wilber Wheeler's office called and was requesting a surgical clearance from Dr Leyva. Currently Dr Leyva is not in the office. Per Zohreh POTTS, she said you would need to make a follow up appt with Dr Leyva and complete a Pulmonary function test before they can determine if you are cleared.   I can schedule you an appointment on 04/06/2020 at 10:10am with Dr Leyva? and I get get you an appointment to do a pulmonary function test prior.     ThanksCatalina

## 2021-04-01 NOTE — TELEPHONE ENCOUNTER
Communicated via bizsolt with pt. Scheduled pt a PFT on 04/05/2021 and a follow up appt with Dr Leyva on 04/06/2021.      Called Xochitl(?) with Dr Merino's office and lm, notifying pt has an appt with Dr Leyva re: clearance on 04/06/2021, if she has any questions she can return my call at [phone].

## 2021-04-02 ENCOUNTER — IMMUNIZATION (OUTPATIENT)
Dept: FAMILY PLANNING/WOMEN'S HEALTH CLINIC | Facility: IMMUNIZATION CENTER | Age: 71
End: 2021-04-02
Attending: INTERNAL MEDICINE
Payer: MEDICARE

## 2021-04-02 DIAGNOSIS — Z23 ENCOUNTER FOR VACCINATION: Primary | ICD-10-CM

## 2021-04-02 PROCEDURE — 91300 PFIZER SARS-COV-2 VACCINE: CPT

## 2021-04-02 PROCEDURE — 0002A PFIZER SARS-COV-2 VACCINE: CPT

## 2021-04-05 ENCOUNTER — NON-PROVIDER VISIT (OUTPATIENT)
Dept: SLEEP MEDICINE | Facility: MEDICAL CENTER | Age: 71
End: 2021-04-05
Attending: NURSE PRACTITIONER
Payer: MEDICARE

## 2021-04-05 VITALS — BODY MASS INDEX: 28 KG/M2 | WEIGHT: 164 LBS | HEIGHT: 64 IN

## 2021-04-05 DIAGNOSIS — J44.89 ASTHMA WITH COPD (HCC): ICD-10-CM

## 2021-04-05 PROCEDURE — 94060 EVALUATION OF WHEEZING: CPT | Performed by: INTERNAL MEDICINE

## 2021-04-05 PROCEDURE — 94726 PLETHYSMOGRAPHY LUNG VOLUMES: CPT | Performed by: INTERNAL MEDICINE

## 2021-04-05 PROCEDURE — 94729 DIFFUSING CAPACITY: CPT | Performed by: INTERNAL MEDICINE

## 2021-04-05 ASSESSMENT — PULMONARY FUNCTION TESTS
FVC_PERCENT_PREDICTED: 85
FEV1/FVC_PERCENT_PREDICTED: 68
FEV1_LLN: 1.87
FEV1/FVC_PERCENT_LLN: 65
FEV1/FVC_PERCENT_PREDICTED: 78
FEV1_PERCENT_PREDICTED: 59
FVC_PERCENT_PREDICTED: 87
FEV1/FVC: 54
FEV1/FVC_PERCENT_CHANGE: 350
FEV1_PERCENT_CHANGE: 2
FEV1_PERCENT_PREDICTED: 63
FEV1: 1.33
FEV1/FVC_PERCENT_PREDICTED: 69
FEV1/FVC: 54
FVC: 2.53
FVC: 2.47
FEV1/FVC_PREDICTED: 78
FVC_PREDICTED: 2.88
FEV1_PERCENT_CHANGE: 7
FEV1/FVC: 57
FEV1/FVC_PERCENT_PREDICTED: 72
FEV1/FVC: 56.52
FEV1/FVC_PERCENT_PREDICTED: 72
FEV1: 1.43
FEV1/FVC_PERCENT_CHANGE: 5
FEV1_PREDICTED: 2.24
FVC_LLN: 2.41

## 2021-04-05 ASSESSMENT — FIBROSIS 4 INDEX: FIB4 SCORE: 1.77

## 2021-04-05 NOTE — PROCEDURES
Tech: Kim Mcduffie, RRT, CPFT  Tech notes: Good patient effort & cooperation.  Light headedness after FVC and MVV.  Except DLCO IVC less than 90% of VC, the results of this test meet the ATS/ERS standards for acceptability & reproducibility.  Test was performed on the [employer] Body Plethysmograph-Elite DX system.  Predicted values were GLI-2012 for spirometry, GLI- 2017 for DLCO, ITS for Lung Volumes.  The DLCO was uncorrected for Hgb.  A bronchodilator of Ventolin HFA -2puffs via spacer administered.  DLCO performed during dilation period.    Interpretation:  1.  Baseline spirometry shows airflow obstruction with FEV1/FVC ratio 54 and an FEV1 of 1.33 L or 59% predicted.  2.  There is trend toward bronchodilator response but does not meet ATS criteria.  3.  Total lung capacity is within normal limits at 5.57 L or 110% predicted.  Residual volume is elevated and there is borderline air trapping.  4.  Diffusion capacity is mildly reduced at 70% predicted.  Pulmonary function testing shows airflow obstruction with mildly reduced DLCO consistent with a diagnosis of COPD.

## 2021-04-06 ENCOUNTER — OFFICE VISIT (OUTPATIENT)
Dept: SLEEP MEDICINE | Facility: MEDICAL CENTER | Age: 71
End: 2021-04-06
Payer: MEDICARE

## 2021-04-06 VITALS
HEART RATE: 78 BPM | DIASTOLIC BLOOD PRESSURE: 68 MMHG | OXYGEN SATURATION: 90 % | TEMPERATURE: 97.2 F | BODY MASS INDEX: 27.49 KG/M2 | SYSTOLIC BLOOD PRESSURE: 112 MMHG | HEIGHT: 65 IN | RESPIRATION RATE: 16 BRPM | WEIGHT: 165 LBS

## 2021-04-06 DIAGNOSIS — Z01.818 PREOPERATIVE EXAMINATION: ICD-10-CM

## 2021-04-06 DIAGNOSIS — G62.9 NEUROPATHY: ICD-10-CM

## 2021-04-06 DIAGNOSIS — R91.1 LUNG NODULE: ICD-10-CM

## 2021-04-06 DIAGNOSIS — J44.9 CHRONIC OBSTRUCTIVE PULMONARY DISEASE, UNSPECIFIED COPD TYPE (HCC): ICD-10-CM

## 2021-04-06 PROCEDURE — 99214 OFFICE O/P EST MOD 30 MIN: CPT | Performed by: INTERNAL MEDICINE

## 2021-04-06 RX ORDER — PREDNISONE 10 MG/1
TABLET ORAL
Qty: 18 TABLET | Refills: 2 | Status: SHIPPED | OUTPATIENT
Start: 2021-04-06 | End: 2021-04-20

## 2021-04-06 RX ORDER — ALBUTEROL SULFATE 90 UG/1
AEROSOL, METERED RESPIRATORY (INHALATION)
Qty: 3 EACH | Refills: 3 | Status: SHIPPED | OUTPATIENT
Start: 2021-04-06

## 2021-04-06 ASSESSMENT — ENCOUNTER SYMPTOMS
BLURRED VISION: 0
EYE DISCHARGE: 0
WEIGHT LOSS: 0
MYALGIAS: 0
NAUSEA: 0
COUGH: 0
SORE THROAT: 0
PND: 0
BACK PAIN: 0
SHORTNESS OF BREATH: 1
TREMORS: 0
VOMITING: 0
CLAUDICATION: 0
STRIDOR: 0
PHOTOPHOBIA: 0
EYE REDNESS: 0
WEAKNESS: 0
DIAPHORESIS: 0
FEVER: 0
DOUBLE VISION: 0
NECK PAIN: 0
SPEECH CHANGE: 0
SINUS PAIN: 0
HEMOPTYSIS: 0
CHILLS: 0
DIARRHEA: 0
PALPITATIONS: 0
FOCAL WEAKNESS: 0
FALLS: 0
DIZZINESS: 0
SPUTUM PRODUCTION: 0
ABDOMINAL PAIN: 0
HEADACHES: 0
CONSTIPATION: 0
EYE PAIN: 0
ORTHOPNEA: 0
HEARTBURN: 0
WHEEZING: 0
DEPRESSION: 0

## 2021-04-06 ASSESSMENT — FIBROSIS 4 INDEX: FIB4 SCORE: 1.77

## 2021-04-06 NOTE — PROGRESS NOTES
Chief Complaint   Patient presents with   • Pulmonary Nodule     last seen 9/24/20 Surgical clearance [facility]    • Results     PFT 4/5/21, CXR 1/6/21          HPI: This patient is a 70 y.o. female whom is followed in our clinic for COPD and pulmonary nodule last seen by me on 9/24/20.  The patient also carries a diagnosis of mild obstructive sleep apnea on CPAP therapy.  She is followed by sleep medicine for this.  She is a former tobacco user with roughly 30-pack-year history and quit in 1998.  She had a CT scan in September 2019 that showed right lower lobe pulmonary nodule roughly 5 x 3 mm with f/u CT 9/8/20 showed ing stable nodule. COPD regimen includes Symbicort 160, Spiriva and Ventolin rescue inhaler as needed.  She completed pulmonary rehab in January of last year and benefited from this significantly.  She does have a diagnosis of osteoporosis with a history of bilateral hip replacement and chronic issues with her metatarsals that has limited her activity since then and she suffers from significant neuropathy followed by renal orthopedic clinic where she is planning on having surgery for neuropathy in the right ulnar nerve.  She has not been hospitalized for COPD exacerbation in the past year and self treated with oral prednisone on only one occasion with complete resolution of symptoms.  Outside of that she uses her rescue inhaler at most 3 times per week.  Pulmonary function testing done yesterday showed an FEV1 postbronchodilator of 1.43 L or 63% predicted which is grossly unchanged from PFTs in 2019.  Total lung capacity within normal limits and DLCO mildly reduced at 70% predicted.  The patient has never required supplemental oxygen.  She presents today for routine follow-up as well as surgical clearance.  She is scheduled to get surveillance CT in September after which we planned on stopping surveillance imaging if stable.    Past Medical History:   Diagnosis Date   • Arthritis    •  Asthma     Uses inhaler PRN.   • Breath shortness     With exertion.   • Bronchitis    • Carpal tunnel syndrome    • COPD    • Dental disorder    • Emphysema of lung (HCC)    • GERD (gastroesophageal reflux disease) 10/22/2013   • Glaucoma     Bilateral.   • History of tobacco abuse 10/22/2013    Quit . Smoked one pack per day for 30 years.   • Hypertension    • Hypothyroidism 10/22/2013   • Indigestion    • Osteoporosis    • Pain     Bilateral hips.   • Pneumonia    • Renal disorder    • Seizure disorder (HCC)     Last seizure 30 years ago.  No medication.       Social History     Socioeconomic History   • Marital status:      Spouse name: Not on file   • Number of children: Not on file   • Years of education: Not on file   • Highest education level: Not on file   Occupational History   • Not on file   Tobacco Use   • Smoking status: Former Smoker     Packs/day: 1.00     Years: 30.00     Pack years: 30.00     Types: Cigarettes     Quit date: 1998     Years since quittin.2   • Smokeless tobacco: Never Used   • Tobacco comment: continued abstinance   Substance and Sexual Activity   • Alcohol use: Not Currently     Alcohol/week: 0.0 oz     Comment: occ   • Drug use: No   • Sexual activity: Not Currently   Other Topics Concern   • Not on file   Social History Narrative   • Not on file     Social Determinants of Health     Financial Resource Strain:    • Difficulty of Paying Living Expenses:    Food Insecurity:    • Worried About Running Out of Food in the Last Year:    • Ran Out of Food in the Last Year:    Transportation Needs:    • Lack of Transportation (Medical):    • Lack of Transportation (Non-Medical):    Physical Activity:    • Days of Exercise per Week:    • Minutes of Exercise per Session:    Stress:    • Feeling of Stress :    Social Connections:    • Frequency of Communication with Friends and Family:    • Frequency of Social Gatherings with Friends and Family:    • Attends Taoist  Services:    • Active Member of Clubs or Organizations:    • Attends Club or Organization Meetings:    • Marital Status:    Intimate Partner Violence:    • Fear of Current or Ex-Partner:    • Emotionally Abused:    • Physically Abused:    • Sexually Abused:        Family History   Problem Relation Age of Onset   • Psychiatric Illness Mother 47        suicide   • Alcohol/Drug Mother    • Heart Disease Mother    • Cancer Father 72        Lung, smoke   • Alcohol abuse Sister    • Alcohol/Drug Brother         Recovering   • Arthritis Brother    • No Known Problems Daughter        Current Outpatient Medications on File Prior to Visit   Medication Sig Dispense Refill   • tiotropium (SPIRIVA HANDIHALER) 18 MCG Cap INHALE THE CONTENTS OF ONE CAPSULE DAILY 90 capsule 3   • lisinopril (PRINIVIL) 10 MG Tab TAKE 1 TABLET DAILY 90 tablet 2   • ARIPiprazole (ABILIFY) 5 MG tablet Take 1 tablet by mouth every day. 90 tablet 3   • fluticasone (FLONASE) 50 MCG/ACT nasal spray Administer 1 Spray into affected nostril(S) 2 times a day. (Patient taking differently: Administer 1 Spray into affected nostril(S) every day.) 48 g 0   • DULoxetine (CYMBALTA) 60 MG Cap DR Particles delayed-release capsule TAKE 1 CAPSULE EVERY       MORNING 90 Cap 3   • omeprazole (PRILOSEC) 20 MG delayed-release capsule TAKE 1 CAPSULE BY MOUTH EVERY DAY 90 Cap 3   • SYNTHROID 100 MCG Tab TAKE 1 TABLET DAILY 90 Tab 3   • fenofibrate (TRICOR) 145 MG Tab TAKE 1 TABLET DAILY. 90 Tab 3   • atorvastatin (LIPITOR) 40 MG Tab TAKE 1 TABLET DAILY 90 Tab 3   • FIBER ADULT GUMMIES PO Take  by mouth.     • allopurinol (ZYLOPRIM) 100 MG Tab TAKE 1 TABLET DAILY 90 Tab 3   • triamterene-hctz (MAXZIDE-25/DYAZIDE) 37.5-25 MG Tab TAKE 1 TABLET EVERY MORNING 90 Tab 3   • VITAMIN D PO Take 1,000 Units by mouth.     • therapeutic multivitamin-minerals (THERAGRAN-M) Tab Take 1 Tab by mouth every day.     • denosumab (PROLIA) 60 MG/ML Solution Prefilled Syringe Inject 1 mL as  instructed every 6 months. 1 mL 1   • albuterol (PROVENTIL) 2.5mg/3ml Nebu Soln solution for nebulization 3 mL by Nebulization route every four hours as needed for Shortness of Breath. 360 mL 5   • tramadol (ULTRAM) 50 MG Tab TAKE 1 TO 2 TABLETS BY MOUTH 2 TIMES DAILY AS NEEDED FOR SEVERE PAIN (Patient taking differently: Take 50 mg by mouth every day.) 90 Tab 5   • latanoprost (XALATAN) 0.005 % Solution Place 1 Drop in both eyes every evening. 1 Bottle 3   • budesonide-formoterol (SYMBICORT) 160-4.5 MCG/ACT Aerosol USE 2 INHALATIONS ORALLY   TWICE DAILY (USE SPACER) - RINSE MOUTH AFTER EACH USE 1 Each 6     No current facility-administered medications on file prior to visit.       Tylenol, Tape, and Latex      ROS:   Review of Systems   Constitutional: Negative for chills, diaphoresis, fever, malaise/fatigue and weight loss.   HENT: Negative for congestion, ear discharge, ear pain, hearing loss, nosebleeds, sinus pain, sore throat and tinnitus.    Eyes: Negative for blurred vision, double vision, photophobia, pain, discharge and redness.   Respiratory: Positive for shortness of breath. Negative for cough, hemoptysis, sputum production, wheezing and stridor.    Cardiovascular: Negative for chest pain, palpitations, orthopnea, claudication, leg swelling and PND.   Gastrointestinal: Negative for abdominal pain, constipation, diarrhea, heartburn, nausea and vomiting.   Genitourinary: Negative for dysuria and urgency.   Musculoskeletal: Negative for back pain, falls, joint pain, myalgias and neck pain.   Skin: Negative for itching and rash.   Neurological: Negative for dizziness, tremors, speech change, focal weakness, weakness and headaches.        Neuropathy   Endo/Heme/Allergies: Negative for environmental allergies.   Psychiatric/Behavioral: Negative for depression.       /68 (BP Location: Right arm, Patient Position: Sitting, BP Cuff Size: Adult)   Pulse 78   Temp 36.2 °C (97.2 °F) (Temporal)   Resp 16    "Ht 1.651 m (5' 5\")   Wt 74.8 kg (165 lb)   SpO2 90%   Physical Exam  Constitutional:       General: She is not in acute distress.     Appearance: Normal appearance. She is well-developed. She is obese.   HENT:      Head: Normocephalic and atraumatic.      Right Ear: External ear normal.      Left Ear: External ear normal.      Nose: Nose normal. No congestion.      Mouth/Throat:      Mouth: Mucous membranes are moist.      Pharynx: Oropharynx is clear. No oropharyngeal exudate.   Eyes:      General: No scleral icterus.     Extraocular Movements: Extraocular movements intact.      Conjunctiva/sclera: Conjunctivae normal.      Pupils: Pupils are equal, round, and reactive to light.   Neck:      Vascular: No JVD.      Trachea: No tracheal deviation.   Cardiovascular:      Rate and Rhythm: Normal rate and regular rhythm.      Heart sounds: Normal heart sounds. No murmur. No friction rub. No gallop.    Pulmonary:      Effort: Pulmonary effort is normal. No accessory muscle usage or respiratory distress.      Breath sounds: Normal breath sounds. No wheezing or rales.   Abdominal:      General: There is no distension.      Palpations: Abdomen is soft.      Tenderness: There is no abdominal tenderness.   Musculoskeletal:         General: No tenderness or deformity. Normal range of motion.      Cervical back: Normal range of motion and neck supple.      Right lower leg: No edema.      Left lower leg: No edema.   Lymphadenopathy:      Cervical: No cervical adenopathy.   Skin:     General: Skin is warm and dry.      Findings: No rash.      Nails: There is no clubbing.   Neurological:      Mental Status: She is alert and oriented to person, place, and time.      Cranial Nerves: No cranial nerve deficit.      Gait: Gait normal.   Psychiatric:         Mood and Affect: Mood normal.         Behavior: Behavior normal.         PFTs as reviewed by me personally: as per hPI    Imaging as reviewed by me personally: As per " HPI    Assessment:  1. Chronic obstructive pulmonary disease, unspecified COPD type (HCC)  albuterol 108 (90 Base) MCG/ACT Aero Soln inhalation aerosol    predniSONE (DELTASONE) 10 MG Tab   2. Neuropathy     3. Lung nodule     4. Preoperative examination         Plan:  1.  This is chronic and moderate in severity with overall stability and PFTs and symptoms.  I did refill her emergency prednisone and we will continue her current regimen of Symbicort 160, Spiriva and short acting bronchodilators as needed.  She remains tobacco free and has completed pulmonary rehab.  We did discuss the importance of ongoing activity and I recommended a walking to minimize effects on her lower extremities.  In the meantime she remains tobacco free and is up-to-date on vaccines.  I will see her back in the next 6 months with CT chest at that time.  2.  Patient plans to undergo surgical intervention on the left upper extremity.  She is low risk of pulmonary complications from a minor procedure and at this point okay to proceed with orthopedic surgery.  3.  Follow-up CT in September.  If stable we can stop surveillance imaging.  4.  See discussion above.  Patient is low risk from pulmonary standpoint for perioperative complications regarding her pulmonary status following minor orthopedic procedure.  All bronchodilator should be continued.  Postoperative airway clearance with incentive spirometry and nebulized bronchodilators also recommended.  Return in about 6 months (around 10/6/2021) for with CT chest .

## 2021-04-06 NOTE — TELEPHONE ENCOUNTER
Pt seen today with Dr. Leyva 4/6/21. Progress note faxed to [facility] [phone] for surgical clearance.

## 2021-04-14 ENCOUNTER — PRE-ADMISSION TESTING (OUTPATIENT)
Dept: ADMISSIONS | Facility: MEDICAL CENTER | Age: 71
End: 2021-04-14
Attending: PAIN MEDICINE
Payer: MEDICARE

## 2021-04-14 DIAGNOSIS — Z01.812 PRE-OPERATIVE LABORATORY EXAMINATION: ICD-10-CM

## 2021-04-14 LAB
ANION GAP SERPL CALC-SCNC: 9 MMOL/L (ref 7–16)
APPEARANCE UR: CLEAR
APTT PPP: 24.9 SEC (ref 24.7–36)
BACTERIA #/AREA URNS HPF: ABNORMAL /HPF
BILIRUB UR QL STRIP.AUTO: NEGATIVE
BUN SERPL-MCNC: 23 MG/DL (ref 8–22)
CALCIUM SERPL-MCNC: 8.8 MG/DL (ref 8.4–10.2)
CHLORIDE SERPL-SCNC: 104 MMOL/L (ref 96–112)
CO2 SERPL-SCNC: 24 MMOL/L (ref 20–33)
COLOR UR: YELLOW
CREAT SERPL-MCNC: 1.39 MG/DL (ref 0.5–1.4)
EPI CELLS #/AREA URNS HPF: ABNORMAL /HPF
ERYTHROCYTE [DISTWIDTH] IN BLOOD BY AUTOMATED COUNT: 43.7 FL (ref 35.9–50)
GLUCOSE SERPL-MCNC: 97 MG/DL (ref 65–99)
GLUCOSE UR STRIP.AUTO-MCNC: NEGATIVE MG/DL
HCT VFR BLD AUTO: 40.5 % (ref 37–47)
HGB BLD-MCNC: 12.6 G/DL (ref 12–16)
HYALINE CASTS #/AREA URNS LPF: ABNORMAL /LPF
INR PPP: 0.96 (ref 0.87–1.13)
KETONES UR STRIP.AUTO-MCNC: NEGATIVE MG/DL
LEUKOCYTE ESTERASE UR QL STRIP.AUTO: ABNORMAL
MCH RBC QN AUTO: 26.3 PG (ref 27–33)
MCHC RBC AUTO-ENTMCNC: 31.1 G/DL (ref 33.6–35)
MCV RBC AUTO: 84.6 FL (ref 81.4–97.8)
MICRO URNS: ABNORMAL
NITRITE UR QL STRIP.AUTO: NEGATIVE
PH UR STRIP.AUTO: 7 [PH] (ref 5–8)
PLATELET # BLD AUTO: 261 K/UL (ref 164–446)
PMV BLD AUTO: 10.3 FL (ref 9–12.9)
POTASSIUM SERPL-SCNC: 4.3 MMOL/L (ref 3.6–5.5)
PROT UR QL STRIP: NEGATIVE MG/DL
PROTHROMBIN TIME: 12.5 SEC (ref 12–14.6)
RBC # BLD AUTO: 4.79 M/UL (ref 4.2–5.4)
RBC # URNS HPF: ABNORMAL /HPF
RBC UR QL AUTO: NEGATIVE
SODIUM SERPL-SCNC: 137 MMOL/L (ref 135–145)
SP GR UR STRIP.AUTO: 1.01
WBC # BLD AUTO: 6.3 K/UL (ref 4.8–10.8)
WBC #/AREA URNS HPF: ABNORMAL /HPF

## 2021-04-14 PROCEDURE — 85730 THROMBOPLASTIN TIME PARTIAL: CPT

## 2021-04-14 PROCEDURE — 36415 COLL VENOUS BLD VENIPUNCTURE: CPT

## 2021-04-14 PROCEDURE — 81001 URINALYSIS AUTO W/SCOPE: CPT

## 2021-04-14 PROCEDURE — 85610 PROTHROMBIN TIME: CPT

## 2021-04-14 PROCEDURE — 85027 COMPLETE CBC AUTOMATED: CPT

## 2021-04-14 PROCEDURE — 80048 BASIC METABOLIC PNL TOTAL CA: CPT

## 2021-04-14 RX ORDER — CEPHALEXIN 250 MG/1
1 CAPSULE ORAL DAILY
COMMUNITY
Start: 2021-04-12 | End: 2021-06-09

## 2021-04-14 ASSESSMENT — FIBROSIS 4 INDEX: FIB4 SCORE: 1.77

## 2021-04-14 NOTE — PREPROCEDURE INSTRUCTIONS
Hx and meds reviewed, pre op instructions given, handouts reviewed, questions answered.  Pt instructed to continue regularly prescribed medications through day before surgery.Per anesthesia protocol pt instructed to take these medications with a sip of water the day of surgery- if needed albuterol neb and inh, symbicort, flonase if needed, synthroid, prilosec and spiriva.Pt states to take cephalexin DOS per Dr Stone Anesthesia fasting guidelines reviewed with pt. Covid testing scheduled and pt aware to self isolate after test. Placed on LITTLE protocol and asked to bring mask DOS.

## 2021-04-19 ENCOUNTER — PRE-ADMISSION TESTING (OUTPATIENT)
Dept: ADMISSIONS | Facility: MEDICAL CENTER | Age: 71
End: 2021-04-19
Attending: PAIN MEDICINE
Payer: MEDICARE

## 2021-04-19 DIAGNOSIS — Z01.812 PRE-OPERATIVE LABORATORY EXAMINATION: ICD-10-CM

## 2021-04-19 LAB
COVID ORDER STATUS COVID19: NORMAL
SARS-COV-2 RNA RESP QL NAA+PROBE: NOTDETECTED
SPECIMEN SOURCE: NORMAL

## 2021-04-19 PROCEDURE — U0003 INFECTIOUS AGENT DETECTION BY NUCLEIC ACID (DNA OR RNA); SEVERE ACUTE RESPIRATORY SYNDROME CORONAVIRUS 2 (SARS-COV-2) (CORONAVIRUS DISEASE [COVID-19]), AMPLIFIED PROBE TECHNIQUE, MAKING USE OF HIGH THROUGHPUT TECHNOLOGIES AS DESCRIBED BY CMS-2020-01-R: HCPCS

## 2021-04-19 PROCEDURE — U0005 INFEC AGEN DETEC AMPLI PROBE: HCPCS

## 2021-04-19 PROCEDURE — C9803 HOPD COVID-19 SPEC COLLECT: HCPCS

## 2021-04-20 ENCOUNTER — PATIENT MESSAGE (OUTPATIENT)
Dept: MEDICAL GROUP | Facility: MEDICAL CENTER | Age: 71
End: 2021-04-20

## 2021-04-20 ENCOUNTER — PATIENT MESSAGE (OUTPATIENT)
Dept: SLEEP MEDICINE | Facility: MEDICAL CENTER | Age: 71
End: 2021-04-20

## 2021-04-20 DIAGNOSIS — J44.9 CHRONIC OBSTRUCTIVE PULMONARY DISEASE, UNSPECIFIED COPD TYPE (HCC): ICD-10-CM

## 2021-04-20 RX ORDER — TRIAMTERENE AND HYDROCHLOROTHIAZIDE 37.5; 25 MG/1; MG/1
TABLET ORAL
Qty: 90 TABLET | Refills: 3 | Status: ON HOLD | OUTPATIENT
Start: 2021-04-20 | End: 2021-08-21

## 2021-04-20 RX ORDER — PREDNISONE 10 MG/1
TABLET ORAL
Qty: 18 TABLET | Refills: 2 | Status: SHIPPED | OUTPATIENT
Start: 2021-04-20 | End: 2021-07-27

## 2021-04-20 NOTE — PATIENT COMMUNICATION
Have we ever prescribed this med? Yes.  If yes, what date? 4/6/21 [facility] - [place], [place] - [address] AT Portal to Registered [facility] Sites     Last OV: 4/6/21 Dr. Leyva     Next OV: 10/6/21 Dr. Leyva     DX: Chronic obstructive pulmonary disease, unspecified COPD type (HCC) (J44.9)    Medications: prednisone 10

## 2021-04-22 ENCOUNTER — APPOINTMENT (OUTPATIENT)
Dept: RADIOLOGY | Facility: MEDICAL CENTER | Age: 71
End: 2021-04-22
Attending: PAIN MEDICINE
Payer: MEDICARE

## 2021-04-22 ENCOUNTER — ANESTHESIA EVENT (OUTPATIENT)
Dept: SURGERY | Facility: MEDICAL CENTER | Age: 71
End: 2021-04-22
Payer: MEDICARE

## 2021-04-22 ENCOUNTER — ANESTHESIA (OUTPATIENT)
Dept: SURGERY | Facility: MEDICAL CENTER | Age: 71
End: 2021-04-22
Payer: MEDICARE

## 2021-04-22 ENCOUNTER — HOSPITAL ENCOUNTER (OUTPATIENT)
Facility: MEDICAL CENTER | Age: 71
End: 2021-04-22
Attending: PAIN MEDICINE | Admitting: PAIN MEDICINE
Payer: MEDICARE

## 2021-04-22 VITALS
RESPIRATION RATE: 16 BRPM | HEART RATE: 68 BPM | SYSTOLIC BLOOD PRESSURE: 139 MMHG | TEMPERATURE: 97.9 F | BODY MASS INDEX: 27.4 KG/M2 | DIASTOLIC BLOOD PRESSURE: 69 MMHG | OXYGEN SATURATION: 94 % | HEIGHT: 65 IN | WEIGHT: 164.46 LBS

## 2021-04-22 PROCEDURE — 700101 HCHG RX REV CODE 250: Performed by: ANESTHESIOLOGY

## 2021-04-22 PROCEDURE — 160009 HCHG ANES TIME/MIN: Performed by: PAIN MEDICINE

## 2021-04-22 PROCEDURE — C1816 RECEIVER/TRANSMITTER, NEURO: HCPCS | Performed by: PAIN MEDICINE

## 2021-04-22 PROCEDURE — 73070 X-RAY EXAM OF ELBOW: CPT | Mod: RT

## 2021-04-22 PROCEDURE — 501838 HCHG SUTURE GENERAL: Performed by: PAIN MEDICINE

## 2021-04-22 PROCEDURE — 700111 HCHG RX REV CODE 636 W/ 250 OVERRIDE (IP): Performed by: ANESTHESIOLOGY

## 2021-04-22 PROCEDURE — 160041 HCHG SURGERY MINUTES - EA ADDL 1 MIN LEVEL 4: Performed by: PAIN MEDICINE

## 2021-04-22 PROCEDURE — 500002 HCHG ADHESIVE, DERMABOND: Performed by: PAIN MEDICINE

## 2021-04-22 PROCEDURE — A9270 NON-COVERED ITEM OR SERVICE: HCPCS | Performed by: PAIN MEDICINE

## 2021-04-22 PROCEDURE — 502000 HCHG MISC OR IMPLANTS RC 0278: Performed by: PAIN MEDICINE

## 2021-04-22 PROCEDURE — 160048 HCHG OR STATISTICAL LEVEL 1-5: Performed by: PAIN MEDICINE

## 2021-04-22 PROCEDURE — 160035 HCHG PACU - 1ST 60 MINS PHASE I: Performed by: PAIN MEDICINE

## 2021-04-22 PROCEDURE — 700102 HCHG RX REV CODE 250 W/ 637 OVERRIDE(OP): Performed by: PAIN MEDICINE

## 2021-04-22 PROCEDURE — 700105 HCHG RX REV CODE 258: Performed by: PAIN MEDICINE

## 2021-04-22 PROCEDURE — 160046 HCHG PACU - 1ST 60 MINS PHASE II: Performed by: PAIN MEDICINE

## 2021-04-22 PROCEDURE — 502240 HCHG MISC OR SUPPLY RC 0272: Performed by: PAIN MEDICINE

## 2021-04-22 PROCEDURE — 160002 HCHG RECOVERY MINUTES (STAT): Performed by: PAIN MEDICINE

## 2021-04-22 PROCEDURE — 700101 HCHG RX REV CODE 250: Performed by: PAIN MEDICINE

## 2021-04-22 PROCEDURE — 160029 HCHG SURGERY MINUTES - 1ST 30 MINS LEVEL 4: Performed by: PAIN MEDICINE

## 2021-04-22 PROCEDURE — 500866 HCHG NEEDLE, SPINAL 25G: Performed by: PAIN MEDICINE

## 2021-04-22 PROCEDURE — 160025 RECOVERY II MINUTES (STATS): Performed by: PAIN MEDICINE

## 2021-04-22 RX ORDER — LABETALOL HYDROCHLORIDE 5 MG/ML
5 INJECTION, SOLUTION INTRAVENOUS
Status: DISCONTINUED | OUTPATIENT
Start: 2021-04-22 | End: 2021-04-22 | Stop reason: HOSPADM

## 2021-04-22 RX ORDER — ONDANSETRON 2 MG/ML
4 INJECTION INTRAMUSCULAR; INTRAVENOUS
Status: DISCONTINUED | OUTPATIENT
Start: 2021-04-22 | End: 2021-04-22 | Stop reason: HOSPADM

## 2021-04-22 RX ORDER — CELECOXIB 200 MG/1
200 CAPSULE ORAL ONCE
Status: DISCONTINUED | OUTPATIENT
Start: 2021-04-22 | End: 2021-04-22 | Stop reason: HOSPADM

## 2021-04-22 RX ORDER — LIDOCAINE HYDROCHLORIDE 20 MG/ML
INJECTION, SOLUTION EPIDURAL; INFILTRATION; INTRACAUDAL; PERINEURAL PRN
Status: DISCONTINUED | OUTPATIENT
Start: 2021-04-22 | End: 2021-04-22 | Stop reason: SURG

## 2021-04-22 RX ORDER — BUPIVACAINE HYDROCHLORIDE AND EPINEPHRINE 5; 5 MG/ML; UG/ML
INJECTION, SOLUTION EPIDURAL; INTRACAUDAL; PERINEURAL
Status: DISCONTINUED | OUTPATIENT
Start: 2021-04-22 | End: 2021-04-22 | Stop reason: HOSPADM

## 2021-04-22 RX ORDER — SODIUM CHLORIDE, SODIUM LACTATE, POTASSIUM CHLORIDE, CALCIUM CHLORIDE 600; 310; 30; 20 MG/100ML; MG/100ML; MG/100ML; MG/100ML
INJECTION, SOLUTION INTRAVENOUS CONTINUOUS
Status: DISCONTINUED | OUTPATIENT
Start: 2021-04-22 | End: 2021-04-22 | Stop reason: HOSPADM

## 2021-04-22 RX ORDER — OXYCODONE HCL 5 MG/5 ML
5 SOLUTION, ORAL ORAL
Status: DISCONTINUED | OUTPATIENT
Start: 2021-04-22 | End: 2021-04-22 | Stop reason: HOSPADM

## 2021-04-22 RX ORDER — CEFAZOLIN SODIUM 1 G/3ML
INJECTION, POWDER, FOR SOLUTION INTRAMUSCULAR; INTRAVENOUS PRN
Status: DISCONTINUED | OUTPATIENT
Start: 2021-04-22 | End: 2021-04-22 | Stop reason: SURG

## 2021-04-22 RX ORDER — LIDOCAINE HYDROCHLORIDE 10 MG/ML
INJECTION, SOLUTION INFILTRATION; PERINEURAL
Status: DISCONTINUED | OUTPATIENT
Start: 2021-04-22 | End: 2021-04-22 | Stop reason: HOSPADM

## 2021-04-22 RX ORDER — MIDAZOLAM HYDROCHLORIDE 1 MG/ML
INJECTION INTRAMUSCULAR; INTRAVENOUS PRN
Status: DISCONTINUED | OUTPATIENT
Start: 2021-04-22 | End: 2021-04-22 | Stop reason: SURG

## 2021-04-22 RX ORDER — LABETALOL HYDROCHLORIDE 5 MG/ML
INJECTION, SOLUTION INTRAVENOUS PRN
Status: DISCONTINUED | OUTPATIENT
Start: 2021-04-22 | End: 2021-04-22 | Stop reason: SURG

## 2021-04-22 RX ORDER — OXYCODONE HCL 5 MG/5 ML
10 SOLUTION, ORAL ORAL
Status: DISCONTINUED | OUTPATIENT
Start: 2021-04-22 | End: 2021-04-22 | Stop reason: HOSPADM

## 2021-04-22 RX ADMIN — LABETALOL HYDROCHLORIDE 10 MG: 5 INJECTION, SOLUTION INTRAVENOUS at 14:52

## 2021-04-22 RX ADMIN — LABETALOL HYDROCHLORIDE 10 MG: 5 INJECTION, SOLUTION INTRAVENOUS at 15:47

## 2021-04-22 RX ADMIN — FENTANYL CITRATE 25 MCG: 50 INJECTION, SOLUTION INTRAMUSCULAR; INTRAVENOUS at 14:06

## 2021-04-22 RX ADMIN — LABETALOL HYDROCHLORIDE 10 MG: 5 INJECTION, SOLUTION INTRAVENOUS at 15:07

## 2021-04-22 RX ADMIN — PROPOFOL 60 MG: 10 INJECTION, EMULSION INTRAVENOUS at 14:16

## 2021-04-22 RX ADMIN — SODIUM CHLORIDE, POTASSIUM CHLORIDE, SODIUM LACTATE AND CALCIUM CHLORIDE: 600; 310; 30; 20 INJECTION, SOLUTION INTRAVENOUS at 13:26

## 2021-04-22 RX ADMIN — LIDOCAINE HYDROCHLORIDE 0.5 ML: 10 INJECTION, SOLUTION INFILTRATION; PERINEURAL at 13:25

## 2021-04-22 RX ADMIN — MIDAZOLAM HYDROCHLORIDE 1 MG: 1 INJECTION, SOLUTION INTRAMUSCULAR; INTRAVENOUS at 14:06

## 2021-04-22 RX ADMIN — LIDOCAINE HYDROCHLORIDE 20 MG: 20 INJECTION, SOLUTION EPIDURAL; INFILTRATION; INTRACAUDAL; PERINEURAL at 14:16

## 2021-04-22 RX ADMIN — CEFAZOLIN 2 G: 1 INJECTION, POWDER, FOR SOLUTION INTRAVENOUS at 14:06

## 2021-04-22 RX ADMIN — FENTANYL CITRATE 25 MCG: 50 INJECTION, SOLUTION INTRAMUSCULAR; INTRAVENOUS at 15:23

## 2021-04-22 RX ADMIN — POVIDONE-IODINE 15 ML: 10 SOLUTION TOPICAL at 12:39

## 2021-04-22 ASSESSMENT — FIBROSIS 4 INDEX: FIB4 SCORE: 1.69

## 2021-04-22 ASSESSMENT — PAIN SCALES - GENERAL: PAIN_LEVEL: 0

## 2021-04-22 NOTE — ANESTHESIA PREPROCEDURE EVALUATION
Relevant Problems   PULMONARY   (+) Chronic obstructive pulmonary disease (HCC)   (+) Moderate persistent asthma without complication      NEURO   (+) History of septic arthritis   (+) Seizure disorder (HCC)      CARDIAC   (+) HTN (hypertension)      GI   (+) GERD (gastroesophageal reflux disease)         (+) Chronic kidney disease, stage III (moderate)      ENDO   (+) Acquired hypothyroidism       Physical Exam    Airway   Mallampati: II  TM distance: >3 FB  Neck ROM: full       Cardiovascular - normal exam  Rhythm: regular  Rate: normal  (-) murmur     Dental - normal exam           Pulmonary - normal exam  Breath sounds clear to auscultation     Abdominal    Neurological - normal exam                 Anesthesia Plan    ASA 3   ASA physical status 3 criteria: COPD    Plan - MAC               Induction: intravenous    Postoperative Plan: Postoperative administration of opioids is intended.    Pertinent diagnostic labs and testing reviewed    Informed Consent:    Anesthetic plan and risks discussed with patient.

## 2021-04-22 NOTE — OP REPORT
OPERATIVE NOTE:    Summer Hatch   4/22/2021    Pre-Op Diagnosis Codes:     * Neuropathic pain [M79.2]     Post-Op Diagnosis Codes:     * Neuropathic pain [M79.2]            PROCEDURE: Procedure(s) and Anesthesia Type:     * INSERTION, NEUROSTIMULATOR, PERMANENT, SPINAL CORD - ARM, PERIPHERAL NERVE - MAC            SURGEON: Surgeon(s) and Role:     * Sandra Stone M.D. - Primary           Anesthesiologist: Issac López M.D.  Circulator: Heather C Cogan, R.N.  Scrub Person: Amrik Crespo  Radiology Technologist: Issa Paula               ESTIMATED BLOOD LOSS: Minimal            IMPLANTS:  Stimwave Peripheral Nerve Stimulator            COMPLICATIONS: No immediate complications observed            DISPOSITION: Home           TECHNIQUE:   History/physical examination/medications/allergies/applicable labs were reviewed.  No changes and no contraindications were found.  Full description of the procedure was provided as well as benefits and possible complications including transient increased pain, stomach irritation, mood alteration, transient weakness or paresthesias as well as more serious nerve injury, bleeding, infection or allergic reaction.  Informed consent was obtained and documented.    Following full written informed consent, the patient was escorted to the surgical suite and placed in the supine position.  A preprocedure timeout was performed in the presence of surgical staff, confirming the patient's identification, procedure, site and side as well as allergy verification.    The device packaging containing the electrode array was on package and kept in the sterile field.  An optimal trajectory targeting the posterior antebrachial cutaneous nerve was identified via dynamic ultrasound guidance.  Using a skin marker, a 1 cm sagittal line was marked over the needle entry location proximally.  The skin and deeper tissues were anesthetized using a mixture of 1% lidocaine and 0.25% bupivacaine with 1:  100,000 epinephrine.  A small incision was made with the scalpel to allow for easy insertion of the introducer.  The introducer was passed through the subcutaneous tissues toward the nerve under dynamic ultrasound visualization.  The introducer was advanced using a tenting approach to stay within the subcutaneous layer and to prevent penetration into muscular fascia.  The introducer was placed at a shallow angle no more than 10 degrees.    The electrode array was inserted through the introducer and advanced to the median nerve proximal to the elbow.  The introducer was removed and the tines deployed.  Next, the  was removed from the packaging and kept in the sterile field.  After infiltration of local anesthetic, the  pocket incision was made around 6 cm away from the incision location for subcutaneous placement and fixation of the  element.  After thorough irrigation, good hemostasis was confirmed.  The tunneler was passed below the skin and directed into the  pocket incision towards the initial entry for the electrode array.  The tubing from the electrode array was tunneled in the proximal direction from the electrode array entry point to the second area  pocket incision through the length of the tunneler.  The tubing attached to the electrode array was mated with the  element, which is a coiled  placed in the  pocket incision.  The system was then tested intraoperatively with an external transmitter querying the patient to obtain a good pain perception threshold.  Good paresthesia covering the pain area was obtained.  The  coil was secured in place under the skin with a nonabsorbable suture and the pocket was closed with a running subcuticular absorbable suture and Dermabond. Fluoroscopic image was obtained. The patient tolerated the procedure well.  After observation in the PACU, the patient was discharged with instructions and follow-up.   They were also provided contact information to call regarding any concerning symptoms or questions.    FOLLOW-UP: Patient will be seen in the clinic for wound check within 24 hours.

## 2021-04-22 NOTE — ANESTHESIA POSTPROCEDURE EVALUATION
Patient: Summer Hatch    Procedure Summary     Date: 04/22/21 Room / Location:  OR 03 / SURGERY [facility]    Anesthesia Start: 1403 Anesthesia Stop: 1547    Procedure: INSERTION, NEUROSTIMULATOR, PERMANENT, SPINAL CORD - ARM, PERIPHERAL NERVE (Right Arm Upper) Diagnosis: (NEUROPATHIC PAIN)    Surgeons: Sandra Stone M.D. Responsible Provider: Issac López M.D.    Anesthesia Type: MAC ASA Status: 3          Final Anesthesia Type: MAC  Last vitals  BP   Blood Pressure : (!) 181/82    Temp   36.6 °C (97.9 °F)    Pulse   64   Resp   16    SpO2   91 %      Anesthesia Post Evaluation    Patient location during evaluation: PACU  Patient participation: complete - patient participated  Level of consciousness: awake and alert  Pain score: 0    Airway patency: patent  Anesthetic complications: no  Cardiovascular status: hemodynamically stable  Respiratory status: acceptable  Hydration status: euvolemic    PONV: none          No complications documented.     Nurse Pain Score: 0 (NPRS)

## 2021-04-22 NOTE — OR NURSING
1545 To PACU from OR via gurney, side rails up x 2 for safety, lungs clear bilaterally, pt arrives to PACU awake and breathing easy and unlabored. Responds appropriately to RN; denies pain or nausea. Hypertensive on arrival; Dr López to give add'l dose of Labetolol here in PACU with VORB to discharge with BP <180 and DBP <90. VORB no need to follow STOPBANG protocol due to sedation for procedure. Tegaderm covering incision with edges approximated to R upper arm.   1600 Pt remains awake and denies pain or nausea.   1610 No changes. Pt reports urge to void. BP back to baseline. Denies nausea or pain. Meets criteria for transfer to stage II.   1612 patient to stage 2 and up to BR to void; pt able to void without difficulty. Patient settled in recliner chair post short ambulation from BR - pt already dressed with assist by RN in BR. R arm elevated with ice pack over CDI dressing. Pt denies pain or nausea.   1650 D/Tucker to care of family post uneventful stay in PACU 2.

## 2021-04-22 NOTE — OR SURGEON
Immediate Post OP Note    Pre-Op Diagnosis Codes:     * Neuropathic pain [M79.2]    Post-Op Diagnosis Codes:     * Neuropathic pain [M79.2]    Procedure(s):  INSERTION, NEUROSTIMULATOR, PERMANENT, SPINAL CORD - ARM, PERIPHERAL NERVE - Wound Class: Clean    Surgeon(s):  Sandra Stone M.D.    Anesthesiologist/Type of Anesthesia:  Anesthesiologist: Issac López M.D./KIMBERLEE    Surgical Staff:  Circulator: Heather C Cogan, R.N.  Scrub Person: Amrik Crespo  Radiology Technologist: Issa Paula    Specimens removed if any:  * No specimens in log *    Estimated Blood Loss: None    Findings: None    Complications: None        4/22/2021 3:57 PM Sandra Stone M.D.

## 2021-04-22 NOTE — DISCHARGE INSTRUCTIONS
ACTIVITY: Rest and take it easy for the first 24 hours.  A responsible adult is recommended to remain with you during that time.  It is normal to feel sleepy.  We encourage you to not do anything that requires balance, judgment or coordination.    MILD FLU-LIKE SYMPTOMS ARE NORMAL. YOU MAY EXPERIENCE GENERALIZED MUSCLE ACHES, THROAT IRRITATION, HEADACHE AND/OR SOME NAUSEA.    FOR 24 HOURS DO NOT:  Drive, operate machinery or run household appliances.  Drink beer or alcoholic beverages.   Make important decisions or sign legal documents.    SPECIAL INSTRUCTIONS: Elevate right arm. Ice pack as needed over dressing. No weight bearing restrictions to right arm.     DIET: To avoid nausea, slowly advance diet as tolerated, avoiding spicy or greasy foods for the first day.  Add more substantial food to your diet according to your physician's instructions.  INCREASE FLUIDS AND FIBER TO AVOID CONSTIPATION.    SURGICAL DRESSING/BATHING: Keep dressing clean, dry and intact until otherwise instructed.     FOLLOW-UP APPOINTMENT:  A follow-up appointment should be arranged with your doctor in office as instructed; call to schedule.    You should CALL YOUR PHYSICIAN if you develop:  Fever greater than 101 degrees F.  Pain not relieved by medication, or persistent nausea or vomiting.  Excessive bleeding (blood soaking through dressing) or unexpected drainage from the wound.  Extreme redness or swelling around the incision site, drainage of pus or foul smelling drainage.  Inability to urinate or empty your bladder within 8 hours.  Problems with breathing or chest pain.    You should call 911 if you develop problems with breathing or chest pain.  If you are unable to contact your doctor or surgical center, you should go to the nearest emergency room or urgent care center.  Physician's telephone #: [phone]    If any questions arise, call your doctor.  If your doctor is not available, please feel free to call the Surgical Center  at [phone]. The Contact Center is open Monday through Friday 7AM to 5PM and may speak to a nurse at [phone], or toll free at [phone].     A registered nurse may call you a few days after your surgery to see how you are doing after your procedure.    MEDICATIONS: Resume taking daily medication.  Take prescribed pain medication with food.  If no medication is prescribed, you may take non-aspirin pain medication if needed.  PAIN MEDICATION CAN BE VERY CONSTIPATING.  Take a stool softener or laxative such as senokot, pericolace, or milk of magnesia if needed.    Prescription given for Tramadol.  Last pain medication given at _________.    If your physician has prescribed pain medication that includes Acetaminophen (Tylenol), do not take additional Acetaminophen (Tylenol) while taking the prescribed medication.    Depression / Suicide Risk    As you are discharged from this [facility] facility, it is important to learn how to keep safe from harming yourself.    Recognize the warning signs:  · Abrupt changes in personality, positive or negative- including increase in energy   · Giving away possessions  · Change in eating patterns- significant weight changes-  positive or negative  · Change in sleeping patterns- unable to sleep or sleeping all the time   · Unwillingness or inability to communicate  · Depression  · Unusual sadness, discouragement and loneliness  · Talk of wanting to die  · Neglect of personal appearance   · Rebelliousness- reckless behavior  · Withdrawal from people/activities they love  · Confusion- inability to concentrate     If you or a loved one observes any of these behaviors or has concerns about self-harm, here's what you can do:  · Talk about it- your feelings and reasons for harming yourself  · Remove any means that you might use to hurt yourself (examples: pills, rope, extension cords, firearm)  · Get professional help from the community (Mental Health, Substance Abuse,  psychological counseling)  · Do not be alone:Call your Safe Contact- someone whom you trust who will be there for you.  · Call your local CRISIS HOTLINE 264-0890 or 810-085-0402  · Call your local Children's Mobile Crisis Response Team [place] (764) 356-8141 or www.Lim[employer]  · Call the toll free National Suicide Prevention Hotlines   · National Suicide Prevention Lifeline 704-310-SRAL (7184)  · National Hope Line Network 800-SUICIDE (433-8898)

## 2021-04-22 NOTE — OR NURSING
Patient allergies and NPO status verified, home medication reconciliation completed and belongings secured. Surgical site verified with patient. Patient verbalizes understanding of pain scale, expected course of stay and plan of care; patient and family state verbal understanding at this time. IV access established. Sequentials in place as ordered.    CPAP mask in separate labeled bag with belongings and inhaler in chart in bag.

## 2021-04-22 NOTE — ANESTHESIA TIME REPORT
Anesthesia Start and Stop Event Times     Date Time Event    4/22/2021 1328 Ready for Procedure     1403 Anesthesia Start     1547 Anesthesia Stop        Responsible Staff  04/22/21    Name Role Begin End    Issac López M.D. Anesth 1403 1547        Preop Diagnosis (Free Text):  Pre-op Diagnosis     NEUROPATHIC PAIN        Preop Diagnosis (Codes):    Post op Diagnosis  Neuropathic pain  right arm chronic pain    Premium Reason  A. 3PM - 7AM    Comments:

## 2021-05-26 ENCOUNTER — PATIENT MESSAGE (OUTPATIENT)
Dept: MEDICAL GROUP | Facility: MEDICAL CENTER | Age: 71
End: 2021-05-26

## 2021-05-26 RX ORDER — FLUTICASONE PROPIONATE 50 MCG
1 SPRAY, SUSPENSION (ML) NASAL DAILY
Qty: 11.1 ML | Refills: 2 | Status: SHIPPED | OUTPATIENT
Start: 2021-05-26 | End: 2021-08-16

## 2021-06-06 DIAGNOSIS — J44.9 CHRONIC OBSTRUCTIVE PULMONARY DISEASE, UNSPECIFIED COPD TYPE (HCC): ICD-10-CM

## 2021-06-07 RX ORDER — BUDESONIDE AND FORMOTEROL FUMARATE DIHYDRATE 160; 4.5 UG/1; UG/1
AEROSOL RESPIRATORY (INHALATION)
Qty: 3 EACH | Refills: 3 | Status: SHIPPED | OUTPATIENT
Start: 2021-06-07

## 2021-06-07 NOTE — TELEPHONE ENCOUNTER
Caller Name: Summer Irene Watson                 Call Back Number: [phone] (home) [phone] (work)        Patient approves a detailed voicemail message: N\A    Have we ever prescribed this med? Yes.  If yes, what date? 12/16/2020    Last OV: 4/6/21 Dr. Leyva     Next OV: 10/6/21 Dr. Leyva     DX: COPD     Medications:  Requested Prescriptions     Pending Prescriptions Disp Refills   • budesonide-formoterol (SYMBICORT) 160-4.5 MCG/ACT Aerosol [Pharmacy Med Name: BUDES/FORMOT -4.5] 10.2 g 6     Sig: USE 2 INHALATIONS ORALLY   TWICE DAILY , USE WITH     SPACER RINSE MOUTH AFTER   EACH USE

## 2021-06-09 ENCOUNTER — OUTPATIENT INFUSION SERVICES (OUTPATIENT)
Dept: ONCOLOGY | Facility: MEDICAL CENTER | Age: 71
End: 2021-06-09
Attending: FAMILY MEDICINE
Payer: MEDICARE

## 2021-06-09 VITALS
WEIGHT: 166.89 LBS | RESPIRATION RATE: 18 BRPM | BODY MASS INDEX: 30.71 KG/M2 | OXYGEN SATURATION: 93 % | SYSTOLIC BLOOD PRESSURE: 137 MMHG | TEMPERATURE: 97.3 F | HEIGHT: 62 IN | HEART RATE: 74 BPM | DIASTOLIC BLOOD PRESSURE: 56 MMHG

## 2021-06-09 DIAGNOSIS — M81.0 OSTEOPOROSIS, UNSPECIFIED OSTEOPOROSIS TYPE, UNSPECIFIED PATHOLOGICAL FRACTURE PRESENCE: ICD-10-CM

## 2021-06-09 DIAGNOSIS — M81.0 AGE-RELATED OSTEOPOROSIS WITHOUT CURRENT PATHOLOGICAL FRACTURE: ICD-10-CM

## 2021-06-09 LAB
CA-I BLD ISE-SCNC: 1.27 MMOL/L (ref 1.1–1.3)
CREAT BLD-MCNC: 1.6 MG/DL (ref 0.5–1.4)

## 2021-06-09 PROCEDURE — 82565 ASSAY OF CREATININE: CPT

## 2021-06-09 PROCEDURE — 96372 THER/PROPH/DIAG INJ SC/IM: CPT

## 2021-06-09 PROCEDURE — 82330 ASSAY OF CALCIUM: CPT

## 2021-06-09 PROCEDURE — 36415 COLL VENOUS BLD VENIPUNCTURE: CPT

## 2021-06-09 PROCEDURE — 700111 HCHG RX REV CODE 636 W/ 250 OVERRIDE (IP): Mod: JG | Performed by: FAMILY MEDICINE

## 2021-06-09 RX ADMIN — DENOSUMAB 60 MG: 60 INJECTION SUBCUTANEOUS at 08:33

## 2021-06-09 ASSESSMENT — FIBROSIS 4 INDEX: FIB4 SCORE: 1.72

## 2021-06-09 NOTE — PROGRESS NOTES
Summer arrived ambulatory for Q 6 month Prolia injection. She denies any s/s acute infection or illness, denies dental procedures in the past 4 weeks or upcoming dental procedures, denies s/s of hypocalcimia.  Pt confirms taking vitamin D at home, does not take calcium supplements due to chronic kidney disease.  Istat Ca/Cr lab drawn from left AC, sterile gauze and coban to site.  Lab parameters met, Prolia injected Sub Q to back left arm per MAR, bandaid applied to site.  Summer tolerated well, discharged in no apparent distress, next appointment scheduled and confirmed.

## 2021-06-30 ENCOUNTER — TELEPHONE (OUTPATIENT)
Dept: SLEEP MEDICINE | Facility: MEDICAL CENTER | Age: 71
End: 2021-06-30

## 2021-06-30 ENCOUNTER — PATIENT MESSAGE (OUTPATIENT)
Dept: MEDICAL GROUP | Facility: MEDICAL CENTER | Age: 71
End: 2021-06-30

## 2021-06-30 RX ORDER — ALLOPURINOL 100 MG/1
100 TABLET ORAL DAILY
Qty: 90 TABLET | Refills: 3 | Status: SHIPPED | OUTPATIENT
Start: 2021-06-30

## 2021-06-30 NOTE — TELEPHONE ENCOUNTER
"Received fax from [facility] requesting surgical clearance.   \"Dr. Gupta would like to move forward with a peripheral nerve stimulator revision .\"       1. Caller Name: [place] Irene Watson                 Call Back Number: [phone] (home) [phone] (work)      Patient approves a detailed voicemail message: N\A    2.  What is the procedure: peripheral nerve stimulator revision    3.  When is the procedure scheduled: unknown     4.  Who is the Surgeon: Dr. Gupta     5. Has the patient completed any screening tests for the procedure: unknown     6. Has PCP received a written request from Surgeon: unknown     7. Patient scheduled for an appointment for this clearance?:  no    8. Last OV: 4/6/21 Dr. Leyva     9. Next OV: 10/6/21 Dr. Leyva     10. Last CXR: 1/6/21 CXR 1 view     11. Last PFT: 4/5/21     12. Last CT 9/8/2020 Ct Chest thorax     Current Medications  Current Outpatient Medications   Medication Sig Dispense Refill   • allopurinol (ZYLOPRIM) 100 MG Tab Take 1 tablet by mouth every day. 90 tablet 3   • budesonide-formoterol (SYMBICORT) 160-4.5 MCG/ACT Aerosol USE 2 INHALATIONS ORALLY   TWICE DAILY , USE WITH     SPACER RINSE MOUTH AFTER   EACH USE 3 Each 3   • fluticasone (FLONASE) 50 MCG/ACT nasal spray Administer 1 Spray into affected nostril(S) every day. 11.1 mL 2   • triamterene-hctz (MAXZIDE-25/DYAZIDE) 37.5-25 MG Tab TAKE 1 TABLET EVERY MORNING 90 tablet 3   • predniSONE (DELTASONE) 10 MG Tab Take 30mg x 3 days, then take 20mg x 3 days, then take 10mg x 3 days, with food, then discontinue. 18 tablet 2   • albuterol 108 (90 Base) MCG/ACT Aero Soln inhalation aerosol USE 2 INHALATIONS ORALLY   EVERY SIX HOURS AS NEEDED  FOR SHORTNESS OF BREATH 3 Each 3   • tiotropium (SPIRIVA HANDIHALER) 18 MCG Cap INHALE THE CONTENTS OF ONE CAPSULE DAILY 90 capsule 3   • lisinopril (PRINIVIL) 10 MG Tab TAKE 1 TABLET DAILY 90 tablet 2   • ARIPiprazole (ABILIFY) 5 MG tablet Take 1 tablet by mouth every " day. 90 tablet 3   • DULoxetine (CYMBALTA) 60 MG Cap DR Particles delayed-release capsule TAKE 1 CAPSULE EVERY       MORNING 90 Cap 3   • omeprazole (PRILOSEC) 20 MG delayed-release capsule TAKE 1 CAPSULE BY MOUTH EVERY DAY 90 Cap 3   • SYNTHROID 100 MCG Tab TAKE 1 TABLET DAILY 90 Tab 3   • fenofibrate (TRICOR) 145 MG Tab TAKE 1 TABLET DAILY. 90 Tab 3   • atorvastatin (LIPITOR) 40 MG Tab TAKE 1 TABLET DAILY 90 Tab 3   • FIBER ADULT GUMMIES PO Take  by mouth.     • VITAMIN D PO Take 1,000 Units by mouth.     • therapeutic multivitamin-minerals (THERAGRAN-M) Tab Take 1 Tab by mouth every day.     • denosumab (PROLIA) 60 MG/ML Solution Prefilled Syringe Inject 1 mL as instructed every 6 months. 1 mL 1   • albuterol (PROVENTIL) 2.5mg/3ml Nebu Soln solution for nebulization 3 mL by Nebulization route every four hours as needed for Shortness of Breath. 360 mL 5   • tramadol (ULTRAM) 50 MG Tab TAKE 1 TO 2 TABLETS BY MOUTH 2 TIMES DAILY AS NEEDED FOR SEVERE PAIN (Patient taking differently: Take 50 mg by mouth every day.) 90 Tab 5   • latanoprost (XALATAN) 0.005 % Solution Place 1 Drop in both eyes every evening. 1 Bottle 3     No current facility-administered medications for this visit.       Please advise.

## 2021-07-06 NOTE — TELEPHONE ENCOUNTER
Last ov note 4/6/21 faxed to Dr. Merino at [facility] with cover sheet. Awaiting confirmation.

## 2021-07-06 NOTE — TELEPHONE ENCOUNTER
Stanley Alfaro, you should be able to fax my last clinic note.  I cleared her as the last bullet point.

## 2021-07-08 NOTE — PROGRESS NOTES
"History of Present Illness  71 year old female presents to clinic for painful lump on her left wrist.  She states the lump first appeared approximately one month ago.  There was no inciting event or trauma. It has not changed in size since that time.  There is some associated pain, especially when she rests her palm down.  She is right-hand dominant.  She has had carpal tunnel surgery in her wrists bilaterally approximately 20 years ago.    She has a history of stage III kidney disease, which is chronic and stable.  She continues to to stay well-hydrated and avoid nephrotoxic medications.  She is following with nephrology.      She had a previous seizure disorder, that no longer requires any medications.  Last seizure was greater than 30 years ago.    Is taking Spiriva and Symbicort daily for her emphysema.  She does use her rescue inhaler an average of 1-2 times per week.  She continues to follow with pulmonology.  She denies any new cough or shortness of breath.    She denies any other questions or concerns at this time.    Current problem list, current medication, and past medical/surgical history were reviewed.     ROS  See HPI    Physical Exam  /72 (BP Location: Left arm, Patient Position: Sitting, BP Cuff Size: Adult)   Pulse 78   Temp 36.5 °C (97.7 °F) (Temporal)   Ht 1.575 m (5' 2\")   Wt 75.7 kg (166 lb 14.2 oz)   SpO2 98%   BMI 30.52 kg/m²   Physical Exam  Constitutional:       General: She is not in acute distress.     Appearance: She is not diaphoretic.   Cardiovascular:      Rate and Rhythm: Normal rate and regular rhythm.      Heart sounds: Normal heart sounds.   Pulmonary:      Effort: Pulmonary effort is normal. No respiratory distress.      Breath sounds: Normal breath sounds.   Abdominal:      General: Bowel sounds are normal.      Palpations: Abdomen is soft.   Musculoskeletal:      Comments: Round, nonmobile, mass to the volar side of her left wrist.  It is approximately 6 mm in " diameter.  It is tender to palpation.   Skin:     General: Skin is warm and dry.   Neurological:      Mental Status: She is alert.   Psychiatric:         Mood and Affect: Affect normal.         Judgment: Judgment normal.       Assessment & Plan  1. Ganglion cyst of volar aspect of left wrist  Referral to hand surgery has been placed.  - REFERRAL TO HAND SURGERY    2. Stage 3 chronic kidney disease, unspecified whether stage 3a or 3b CKD (HCC)  Chronic and stable.  Continue to monitor.    3. Seizure disorder (HCC)  Chronic and stable.  Continue to monitor.    4. Pulmonary emphysema, unspecified emphysema type (HCC)  Chronic and stable.  Continue with the same medications.    Return if symptoms worsen or fail to improve.    Sandra Caceres M.D.

## 2021-07-12 ENCOUNTER — OFFICE VISIT (OUTPATIENT)
Dept: MEDICAL GROUP | Facility: MEDICAL CENTER | Age: 71
End: 2021-07-12
Payer: MEDICARE

## 2021-07-12 VITALS
DIASTOLIC BLOOD PRESSURE: 72 MMHG | WEIGHT: 166.89 LBS | HEART RATE: 78 BPM | SYSTOLIC BLOOD PRESSURE: 126 MMHG | TEMPERATURE: 97.7 F | BODY MASS INDEX: 30.71 KG/M2 | OXYGEN SATURATION: 98 % | HEIGHT: 62 IN

## 2021-07-12 DIAGNOSIS — G40.909 SEIZURE DISORDER (HCC): ICD-10-CM

## 2021-07-12 DIAGNOSIS — N18.30 STAGE 3 CHRONIC KIDNEY DISEASE, UNSPECIFIED WHETHER STAGE 3A OR 3B CKD: ICD-10-CM

## 2021-07-12 DIAGNOSIS — M67.432 GANGLION CYST OF VOLAR ASPECT OF LEFT WRIST: ICD-10-CM

## 2021-07-12 DIAGNOSIS — J43.9 PULMONARY EMPHYSEMA, UNSPECIFIED EMPHYSEMA TYPE (HCC): ICD-10-CM

## 2021-07-12 PROCEDURE — 99214 OFFICE O/P EST MOD 30 MIN: CPT | Performed by: FAMILY MEDICINE

## 2021-07-12 ASSESSMENT — PATIENT HEALTH QUESTIONNAIRE - PHQ9: CLINICAL INTERPRETATION OF PHQ2 SCORE: 0

## 2021-07-12 ASSESSMENT — FIBROSIS 4 INDEX: FIB4 SCORE: 1.72

## 2021-07-14 ENCOUNTER — HOSPITAL ENCOUNTER (OUTPATIENT)
Facility: MEDICAL CENTER | Age: 71
End: 2021-07-14
Attending: PAIN MEDICINE | Admitting: PAIN MEDICINE
Payer: MEDICARE

## 2021-07-26 PROBLEM — M67.432 GANGLION CYST OF VOLAR ASPECT OF LEFT WRIST: Status: ACTIVE | Noted: 2021-07-26

## 2021-07-26 PROBLEM — M18.12 PRIMARY OSTEOARTHRITIS OF FIRST CARPOMETACARPAL JOINT OF LEFT HAND: Status: ACTIVE | Noted: 2021-07-26

## 2021-07-27 ENCOUNTER — HOSPITAL ENCOUNTER (OUTPATIENT)
Dept: LAB | Facility: MEDICAL CENTER | Age: 71
End: 2021-07-27
Attending: INTERNAL MEDICINE
Payer: MEDICARE

## 2021-07-27 ENCOUNTER — HOSPITAL ENCOUNTER (OUTPATIENT)
Dept: RADIOLOGY | Facility: MEDICAL CENTER | Age: 71
End: 2021-07-27
Attending: PAIN MEDICINE | Admitting: PAIN MEDICINE
Payer: MEDICARE

## 2021-07-27 ENCOUNTER — PRE-ADMISSION TESTING (OUTPATIENT)
Dept: ADMISSIONS | Facility: MEDICAL CENTER | Age: 71
End: 2021-07-27
Attending: PAIN MEDICINE
Payer: MEDICARE

## 2021-07-27 ENCOUNTER — HOSPITAL ENCOUNTER (OUTPATIENT)
Facility: MEDICAL CENTER | Age: 71
End: 2021-07-27
Attending: INTERNAL MEDICINE
Payer: MEDICARE

## 2021-07-27 VITALS — WEIGHT: 167.55 LBS | BODY MASS INDEX: 27.92 KG/M2 | HEIGHT: 65 IN

## 2021-07-27 DIAGNOSIS — Z01.810 PRE-OPERATIVE CARDIOVASCULAR EXAMINATION: ICD-10-CM

## 2021-07-27 DIAGNOSIS — Z01.812 PRE-OPERATIVE LABORATORY EXAMINATION: ICD-10-CM

## 2021-07-27 LAB
ALBUMIN SERPL BCP-MCNC: 4.3 G/DL (ref 3.2–4.9)
ALBUMIN/GLOB SERPL: 1.7 G/DL
ALP SERPL-CCNC: 46 U/L (ref 30–99)
ALT SERPL-CCNC: 31 U/L (ref 2–50)
ANION GAP SERPL CALC-SCNC: 11 MMOL/L (ref 7–16)
APPEARANCE UR: CLEAR
APTT PPP: 25.2 SEC (ref 24.7–36)
AST SERPL-CCNC: 31 U/L (ref 12–45)
BASOPHILS # BLD AUTO: 1.1 % (ref 0–1.8)
BASOPHILS # BLD: 0.08 K/UL (ref 0–0.12)
BILIRUB SERPL-MCNC: 0.3 MG/DL (ref 0.1–1.5)
BILIRUB UR QL STRIP.AUTO: NEGATIVE
BUN SERPL-MCNC: 27 MG/DL (ref 8–22)
CALCIUM SERPL-MCNC: 8.6 MG/DL (ref 8.4–10.2)
CHLORIDE SERPL-SCNC: 100 MMOL/L (ref 96–112)
CO2 SERPL-SCNC: 24 MMOL/L (ref 20–33)
COLOR UR: YELLOW
CREAT SERPL-MCNC: 1.25 MG/DL (ref 0.5–1.4)
CREAT UR-MCNC: 31.48 MG/DL
CREAT UR-MCNC: 31.73 MG/DL
EKG IMPRESSION: NORMAL
EOSINOPHIL # BLD AUTO: 0.15 K/UL (ref 0–0.51)
EOSINOPHIL NFR BLD: 2.1 % (ref 0–6.9)
ERYTHROCYTE [DISTWIDTH] IN BLOOD BY AUTOMATED COUNT: 45.5 FL (ref 35.9–50)
FERRITIN SERPL-MCNC: 40.4 NG/ML (ref 10–291)
GLOBULIN SER CALC-MCNC: 2.6 G/DL (ref 1.9–3.5)
GLUCOSE SERPL-MCNC: 98 MG/DL (ref 65–99)
GLUCOSE UR STRIP.AUTO-MCNC: NEGATIVE MG/DL
HCT VFR BLD AUTO: 41.4 % (ref 37–47)
HGB BLD-MCNC: 12.8 G/DL (ref 12–16)
IMM GRANULOCYTES # BLD AUTO: 0.05 K/UL (ref 0–0.11)
IMM GRANULOCYTES NFR BLD AUTO: 0.7 % (ref 0–0.9)
INR PPP: 0.97 (ref 0.87–1.13)
IRON SERPL-MCNC: 65 UG/DL (ref 40–170)
KETONES UR STRIP.AUTO-MCNC: NEGATIVE MG/DL
LEUKOCYTE ESTERASE UR QL STRIP.AUTO: NEGATIVE
LYMPHOCYTES # BLD AUTO: 1.56 K/UL (ref 1–4.8)
LYMPHOCYTES NFR BLD: 22.1 % (ref 22–41)
MAGNESIUM SERPL-MCNC: 1.6 MG/DL (ref 1.5–2.5)
MCH RBC QN AUTO: 26.4 PG (ref 27–33)
MCHC RBC AUTO-ENTMCNC: 30.9 G/DL (ref 33.6–35)
MCV RBC AUTO: 85.4 FL (ref 81.4–97.8)
MICRO URNS: NORMAL
MICROALBUMIN UR-MCNC: <1.2 MG/DL
MICROALBUMIN/CREAT UR: NORMAL MG/G (ref 0–30)
MONOCYTES # BLD AUTO: 0.79 K/UL (ref 0–0.85)
MONOCYTES NFR BLD AUTO: 11.2 % (ref 0–13.4)
NEUTROPHILS # BLD AUTO: 4.44 K/UL (ref 2–7.15)
NEUTROPHILS NFR BLD: 62.8 % (ref 44–72)
NITRITE UR QL STRIP.AUTO: NEGATIVE
NRBC # BLD AUTO: 0 K/UL
NRBC BLD-RTO: 0 /100 WBC
PH UR STRIP.AUTO: 7.5 [PH] (ref 5–8)
PHOSPHATE SERPL-MCNC: 2.7 MG/DL (ref 2.5–4.5)
PLATELET # BLD AUTO: 248 K/UL (ref 164–446)
PMV BLD AUTO: 10.3 FL (ref 9–12.9)
POTASSIUM SERPL-SCNC: 4.5 MMOL/L (ref 3.6–5.5)
PROT SERPL-MCNC: 6.9 G/DL (ref 6–8.2)
PROT UR QL STRIP: NEGATIVE MG/DL
PROT UR-MCNC: 4 MG/DL (ref 0–15)
PROT/CREAT UR: 126 MG/G (ref 10–107)
PROTHROMBIN TIME: 12.1 SEC (ref 12–14.6)
RBC # BLD AUTO: 4.85 M/UL (ref 4.2–5.4)
RBC UR QL AUTO: NEGATIVE
SODIUM SERPL-SCNC: 135 MMOL/L (ref 135–145)
SP GR UR STRIP.AUTO: 1.01
WBC # BLD AUTO: 7.1 K/UL (ref 4.8–10.8)

## 2021-07-27 PROCEDURE — 82043 UR ALBUMIN QUANTITATIVE: CPT

## 2021-07-27 PROCEDURE — 81003 URINALYSIS AUTO W/O SCOPE: CPT

## 2021-07-27 PROCEDURE — 80053 COMPREHEN METABOLIC PANEL: CPT

## 2021-07-27 PROCEDURE — 82728 ASSAY OF FERRITIN: CPT

## 2021-07-27 PROCEDURE — 84100 ASSAY OF PHOSPHORUS: CPT

## 2021-07-27 PROCEDURE — 84156 ASSAY OF PROTEIN URINE: CPT

## 2021-07-27 PROCEDURE — 85610 PROTHROMBIN TIME: CPT

## 2021-07-27 PROCEDURE — 80053 COMPREHEN METABOLIC PANEL: CPT | Mod: 91

## 2021-07-27 PROCEDURE — 85730 THROMBOPLASTIN TIME PARTIAL: CPT

## 2021-07-27 PROCEDURE — 71046 X-RAY EXAM CHEST 2 VIEWS: CPT

## 2021-07-27 PROCEDURE — 83540 ASSAY OF IRON: CPT

## 2021-07-27 PROCEDURE — 93010 ELECTROCARDIOGRAM REPORT: CPT | Performed by: INTERNAL MEDICINE

## 2021-07-27 PROCEDURE — 93005 ELECTROCARDIOGRAM TRACING: CPT

## 2021-07-27 PROCEDURE — 82570 ASSAY OF URINE CREATININE: CPT

## 2021-07-27 PROCEDURE — 36415 COLL VENOUS BLD VENIPUNCTURE: CPT

## 2021-07-27 PROCEDURE — 85025 COMPLETE CBC W/AUTO DIFF WBC: CPT

## 2021-07-27 PROCEDURE — 83735 ASSAY OF MAGNESIUM: CPT

## 2021-07-27 ASSESSMENT — FIBROSIS 4 INDEX: FIB4 SCORE: 1.72

## 2021-07-28 NOTE — OR NURSING
"Preadmit appointment: \" Preparing for your Procedure information\" sheet given to patient with verbal and written instructions. Patient instructed to continue prescribed medications through the day before surgery, instructed to take the following medications the day of surgery per anesthesia protocol:  PROVENTIL, ALBUTEROL INHALER, verbal and written instructions give to bring DOS, SYMBICORT, CYMBALTA, FLONASE, XALATAN, PRILOSEC, SYNTHROID, SPIRIVA, ULTRAM          Verbal and written instructions on wear a mask in public and with persons known not to have had the Covid vaccine prior to surgery and covid symptoms to watch for given to patient, pt advised to notify MD if any symptoms develop    CBC W/DIFF, CMP, UA W/C&S,PT, PTT, ECG, CXR 2 VIEW ordered and collected 07/27/21.    Pt states watched the pre-admit video.    "

## 2021-07-30 LAB
ALBUMIN SERPL BCP-MCNC: 4.4 G/DL (ref 3.2–4.9)
ALBUMIN/GLOB SERPL: 1.7 G/DL
ALP SERPL-CCNC: 44 U/L (ref 30–99)
ALT SERPL-CCNC: 30 U/L (ref 2–50)
ANION GAP SERPL CALC-SCNC: 20 MMOL/L (ref 7–16)
AST SERPL-CCNC: 38 U/L (ref 12–45)
BILIRUB SERPL-MCNC: 0.3 MG/DL (ref 0.1–1.5)
BUN SERPL-MCNC: 27 MG/DL (ref 8–22)
CALCIUM SERPL-MCNC: 8.6 MG/DL (ref 8.5–10.5)
CHLORIDE SERPL-SCNC: 100 MMOL/L (ref 96–112)
CO2 SERPL-SCNC: 17 MMOL/L (ref 20–33)
CREAT SERPL-MCNC: 1.33 MG/DL (ref 0.5–1.4)
GLOBULIN SER CALC-MCNC: 2.6 G/DL (ref 1.9–3.5)
GLUCOSE SERPL-MCNC: 92 MG/DL (ref 65–99)
POTASSIUM SERPL-SCNC: 4.9 MMOL/L (ref 3.6–5.5)
PROT SERPL-MCNC: 7 G/DL (ref 6–8.2)
SODIUM SERPL-SCNC: 137 MMOL/L (ref 135–145)

## 2021-08-12 DIAGNOSIS — R05.9 COUGH: ICD-10-CM

## 2021-08-13 ENCOUNTER — HOSPITAL ENCOUNTER (OUTPATIENT)
Facility: MEDICAL CENTER | Age: 71
End: 2021-08-13
Attending: INTERNAL MEDICINE
Payer: MEDICARE

## 2021-08-13 ENCOUNTER — HOSPITAL ENCOUNTER (OUTPATIENT)
Dept: RADIOLOGY | Facility: MEDICAL CENTER | Age: 71
End: 2021-08-13
Attending: INTERNAL MEDICINE
Payer: MEDICARE

## 2021-08-13 ENCOUNTER — OFFICE VISIT (OUTPATIENT)
Dept: MEDICAL GROUP | Facility: MEDICAL CENTER | Age: 71
End: 2021-08-13
Payer: MEDICARE

## 2021-08-13 VITALS
SYSTOLIC BLOOD PRESSURE: 122 MMHG | DIASTOLIC BLOOD PRESSURE: 60 MMHG | HEIGHT: 65 IN | WEIGHT: 167 LBS | HEART RATE: 88 BPM | TEMPERATURE: 97 F | OXYGEN SATURATION: 94 % | RESPIRATION RATE: 16 BRPM | BODY MASS INDEX: 27.82 KG/M2

## 2021-08-13 DIAGNOSIS — J44.1 COPD EXACERBATION (HCC): ICD-10-CM

## 2021-08-13 DIAGNOSIS — Z11.59 ENCOUNTER FOR SCREENING FOR OTHER VIRAL DISEASES: ICD-10-CM

## 2021-08-13 DIAGNOSIS — J98.8 RESPIRATORY TRACT INFECTION: ICD-10-CM

## 2021-08-13 DIAGNOSIS — R05.9 COUGH: ICD-10-CM

## 2021-08-13 PROCEDURE — 99214 OFFICE O/P EST MOD 30 MIN: CPT | Performed by: INTERNAL MEDICINE

## 2021-08-13 PROCEDURE — U0003 INFECTIOUS AGENT DETECTION BY NUCLEIC ACID (DNA OR RNA); SEVERE ACUTE RESPIRATORY SYNDROME CORONAVIRUS 2 (SARS-COV-2) (CORONAVIRUS DISEASE [COVID-19]), AMPLIFIED PROBE TECHNIQUE, MAKING USE OF HIGH THROUGHPUT TECHNOLOGIES AS DESCRIBED BY CMS-2020-01-R: HCPCS

## 2021-08-13 PROCEDURE — U0005 INFEC AGEN DETEC AMPLI PROBE: HCPCS

## 2021-08-13 PROCEDURE — 71046 X-RAY EXAM CHEST 2 VIEWS: CPT

## 2021-08-13 RX ORDER — PREDNISONE 10 MG/1
TABLET ORAL
Qty: 18 TABLET | Refills: 0 | Status: SHIPPED | OUTPATIENT
Start: 2021-08-13 | End: 2021-08-16 | Stop reason: SDUPTHER

## 2021-08-13 ASSESSMENT — FIBROSIS 4 INDEX: FIB4 SCORE: 1.96

## 2021-08-13 NOTE — PROGRESS NOTES
Established Patient    Summer Hatch is a 71 y.o. female who presents today with the following:    CC:   Chief Complaint   Patient presents with   • Cough     X2 days   • Headache   • Coronavirus Screening     Exposure x 2 days       HPI:   Chest congestion, cough with yellow thick sputum since 8/4/21  Shortness of breath, runny nose, sinus congestion, wheezing.  Little body-ache, diaphoresis  Decreased taste  COPD, she started prednisone, azithromycin  On symbicort, albuterol as needed    Denies fever, chills, sore throat, nausea, vomiting, heartburn, abdominal pain, diarrhea, dysuria.       Exposed to son in law on Wednesday who tested positive for  COVID-19   She is allergic tylenol    Her home oxygen saturation ihas been above 90%    Current Outpatient Medications   Medication Sig Dispense Refill   • predniSONE (DELTASONE) 10 MG Tab Take 30mg x 3 days, then take 20mg x 3 days, then take 10mg x 3 days, with food, then discontinue. 18 Tablet 0   • allopurinol (ZYLOPRIM) 100 MG Tab Take 1 tablet by mouth every day. 90 tablet 3   • budesonide-formoterol (SYMBICORT) 160-4.5 MCG/ACT Aerosol USE 2 INHALATIONS ORALLY   TWICE DAILY , USE WITH     SPACER RINSE MOUTH AFTER   EACH USE 3 Each 3   • fluticasone (FLONASE) 50 MCG/ACT nasal spray Administer 1 Spray into affected nostril(S) every day. 11.1 mL 2   • triamterene-hctz (MAXZIDE-25/DYAZIDE) 37.5-25 MG Tab TAKE 1 TABLET EVERY MORNING 90 tablet 3   • albuterol 108 (90 Base) MCG/ACT Aero Soln inhalation aerosol USE 2 INHALATIONS ORALLY   EVERY SIX HOURS AS NEEDED  FOR SHORTNESS OF BREATH 3 Each 3   • tiotropium (SPIRIVA HANDIHALER) 18 MCG Cap INHALE THE CONTENTS OF ONE CAPSULE DAILY 90 capsule 3   • lisinopril (PRINIVIL) 10 MG Tab TAKE 1 TABLET DAILY 90 tablet 2   • ARIPiprazole (ABILIFY) 5 MG tablet Take 1 tablet by mouth every day. 90 tablet 3   • DULoxetine (CYMBALTA) 60 MG Cap DR Particles delayed-release capsule TAKE 1 CAPSULE EVERY       MORNING 90 Cap 3   •  "omeprazole (PRILOSEC) 20 MG delayed-release capsule TAKE 1 CAPSULE BY MOUTH EVERY DAY 90 Cap 3   • SYNTHROID 100 MCG Tab TAKE 1 TABLET DAILY 90 Tab 3   • fenofibrate (TRICOR) 145 MG Tab TAKE 1 TABLET DAILY. 90 Tab 3   • atorvastatin (LIPITOR) 40 MG Tab TAKE 1 TABLET DAILY 90 Tab 3   • FIBER ADULT GUMMIES PO Take  by mouth.     • VITAMIN D PO Take 1,000 Units by mouth.     • therapeutic multivitamin-minerals (THERAGRAN-M) Tab Take 1 Tab by mouth every day.     • denosumab (PROLIA) 60 MG/ML Solution Prefilled Syringe Inject 1 mL as instructed every 6 months. 1 mL 1   • albuterol (PROVENTIL) 2.5mg/3ml Nebu Soln solution for nebulization 3 mL by Nebulization route every four hours as needed for Shortness of Breath. 360 mL 5   • tramadol (ULTRAM) 50 MG Tab TAKE 1 TO 2 TABLETS BY MOUTH 2 TIMES DAILY AS NEEDED FOR SEVERE PAIN (Patient taking differently: Take 50 mg by mouth every day.) 90 Tab 5   • latanoprost (XALATAN) 0.005 % Solution Place 1 Drop in both eyes every evening. 1 Bottle 3     No current facility-administered medications for this visit.       Allergies, past medical history, past surgical history, medications, family history, social history reviewed and updated.    ROS   Constitutional: Denies fevers or chills  Eyes: Denies changes in vision  Ears/Nose/Throat/Mouth: per HPI  Cardiovascular: per HPI  Respiratory:  Per HPI  Gastrointestinal/Hepatic: Denies abd pain, nausea, vomiting   Genitourinary: Denies dysuria or frequency  Musculoskeletal/Rheum: Denies joint pain and swelling   Neurological: Denies headache  Psychiatric: Denies mood disorder   Endocrine: Denies hx of diabetes or thyroid dysfunction  Heme/Oncology/Lymph Nodes: Denies weight changes or enlarged LNs.    Physical Exam  Vitals: /60 (BP Location: Left arm, Patient Position: Sitting, BP Cuff Size: Adult)   Pulse 88   Temp 36.1 °C (97 °F) (Temporal)   Resp 16   Ht 1.651 m (5' 5\")   Wt 75.8 kg (167 lb)   SpO2 94%   BMI 27.79 kg/m² "   General: Alert, pleasant, NAD  HEENT: Normocephalic.  EOMI, no icterus or pallor.  Conjunctivae and lids normal. External ears normal. Wearing a mask. Oropharynx non-erythematous, mucous membranes moist.  Neck supple.  No thyromegaly or masses palpated.   Lymph: No cervical or supraclavicular lymphadenopathy.  Cardiovascular: Regular rate and rhythm.   Respiratory: Normal respiratory effort.  Clear to auscultation bilaterally.  Abdomen: Non-distended, soft, non-tender  Skin: Warm, dry, no rashes.  Musculoskeletal: Gait is normal.  Moves all extremities well.  Extremities: No leg edema.  Radial pulses 2+ symmetric.   Psych:  Affect/mood is normal, judgement is good, memory is intact, grooming is appropriate.    8/13/2021 8:46 AM     HISTORY/REASON FOR EXAM:  Shortness of Breath  Cough.     TECHNIQUE/EXAM DESCRIPTION AND NUMBER OF VIEWS:  Two views of the chest.     COMPARISON:  7/27/2021     FINDINGS:  Cardiomediastinal contour is within normal limits.  Atherosclerotic calcification of thoracic aorta.  Hiatal hernia again seen.  No focal pulmonary consolidation.  No pleural fluid collection or pneumothorax.  Lower thoracic compression deformities with prior vertebral augmentation.     IMPRESSION:     No acute cardiopulmonary disease.    Assessment and Plan    Summer was seen today for cough, headache and coronavirus screening.    Diagnoses and all orders for this visit:      COPD exacerbation (HCC)  Respiratory tract infection  Encounter for screening for other viral diseases  -     predniSONE (DELTASONE) 10 MG Tab; Take 30mg x 3 days, then take 20mg x 3 days, then take 10mg x 3 days, with food, then discontinue.        -  Complete azithromycin        - Symbicort and albuterol as needed         - keep Oxygen saturation 90-94% due to her COPD        - chest x ray today unremarkable.   -     SARS-CoV-2 PCR (24 hour In-House): Collect NP swab in VTM; Future  Complete azithromycin  --Increase fluid intake,  rest.  --Nasal irrigation or a Neti-pot.  --Humidifier in room or hot shower/bath.  --warm salt water gargles for sore throat  --Guaifenesin-Dextromethorphan for cough  Such as mucinex  -- cool compresses if fever (she is anaphylactic to tylenol), she has chronic kidney disease, so she avoid taking NSAIDs.  She normally takes tramadol for aches and pain.  --Intranasal steroids (Flonase, prescription or over-the-counter).  --Saline nasal spray  --Prevention: Wash hands, cover cough, avoid immunocompromised patients, and stay at home.  -- seek medical attention if symptoms worsen or do not improve  -- go to ER if oxygen saturation <90%, or worsening shortness of breath  -- recommend to follow up with pulmonology        Follow-up:Return if symptoms worsen or fail to improve.    This note was created using voice recognition software. There may be unintended errors in spelling, grammar or content.

## 2021-08-14 LAB
COVID ORDER STATUS COVID19: NORMAL
SARS-COV-2 RNA RESP QL NAA+PROBE: DETECTED
SPECIMEN SOURCE: ABNORMAL

## 2021-08-16 ENCOUNTER — PATIENT MESSAGE (OUTPATIENT)
Dept: SLEEP MEDICINE | Facility: MEDICAL CENTER | Age: 71
End: 2021-08-16

## 2021-08-16 DIAGNOSIS — J44.1 COPD EXACERBATION (HCC): ICD-10-CM

## 2021-08-16 RX ORDER — AZITHROMYCIN 250 MG/1
TABLET, FILM COATED ORAL
Qty: 6 TABLET | Refills: 1 | Status: ON HOLD | OUTPATIENT
Start: 2021-08-16 | End: 2021-08-21

## 2021-08-16 RX ORDER — PREDNISONE 10 MG/1
TABLET ORAL
Qty: 18 TABLET | Refills: 1 | Status: ON HOLD | OUTPATIENT
Start: 2021-08-16 | End: 2021-08-21

## 2021-08-19 ENCOUNTER — APPOINTMENT (OUTPATIENT)
Dept: RADIOLOGY | Facility: MEDICAL CENTER | Age: 71
DRG: 640 | End: 2021-08-19
Attending: EMERGENCY MEDICINE
Payer: MEDICARE

## 2021-08-19 ENCOUNTER — HOSPITAL ENCOUNTER (INPATIENT)
Facility: MEDICAL CENTER | Age: 71
LOS: 2 days | DRG: 640 | End: 2021-08-21
Attending: EMERGENCY MEDICINE | Admitting: STUDENT IN AN ORGANIZED HEALTH CARE EDUCATION/TRAINING PROGRAM
Payer: MEDICARE

## 2021-08-19 DIAGNOSIS — R79.89 ELEVATED TROPONIN: ICD-10-CM

## 2021-08-19 DIAGNOSIS — N17.9 ACUTE KIDNEY INJURY (HCC): ICD-10-CM

## 2021-08-19 DIAGNOSIS — E87.1 HYPONATREMIA: ICD-10-CM

## 2021-08-19 DIAGNOSIS — U07.1 COVID-19: ICD-10-CM

## 2021-08-19 PROBLEM — F32.9 MAJOR DEPRESSIVE DISORDER: Status: ACTIVE | Noted: 2021-08-19

## 2021-08-19 LAB
ALBUMIN SERPL BCP-MCNC: 3.4 G/DL (ref 3.2–4.9)
ALBUMIN/GLOB SERPL: 1 G/DL
ALP SERPL-CCNC: 45 U/L (ref 30–99)
ALT SERPL-CCNC: 25 U/L (ref 2–50)
ANION GAP SERPL CALC-SCNC: 12 MMOL/L (ref 7–16)
ANION GAP SERPL CALC-SCNC: 9 MMOL/L (ref 7–16)
AST SERPL-CCNC: 32 U/L (ref 12–45)
BASOPHILS # BLD AUTO: 0.3 % (ref 0–1.8)
BASOPHILS # BLD: 0.02 K/UL (ref 0–0.12)
BILIRUB SERPL-MCNC: 0.4 MG/DL (ref 0.1–1.5)
BUN SERPL-MCNC: 30 MG/DL (ref 8–22)
BUN SERPL-MCNC: 31 MG/DL (ref 8–22)
CALCIUM SERPL-MCNC: 10 MG/DL (ref 8.4–10.2)
CALCIUM SERPL-MCNC: 9.2 MG/DL (ref 8.4–10.2)
CHLORIDE SERPL-SCNC: 93 MMOL/L (ref 96–112)
CHLORIDE SERPL-SCNC: 96 MMOL/L (ref 96–112)
CO2 SERPL-SCNC: 20 MMOL/L (ref 20–33)
CO2 SERPL-SCNC: 20 MMOL/L (ref 20–33)
CREAT SERPL-MCNC: 1.52 MG/DL (ref 0.5–1.4)
CREAT SERPL-MCNC: 1.59 MG/DL (ref 0.5–1.4)
CRP SERPL HS-MCNC: 5.84 MG/DL (ref 0–0.75)
D DIMER PPP IA.FEU-MCNC: 0.78 UG/ML (FEU) (ref 0–0.5)
EKG IMPRESSION: NORMAL
EOSINOPHIL # BLD AUTO: 0.02 K/UL (ref 0–0.51)
EOSINOPHIL NFR BLD: 0.3 % (ref 0–6.9)
ERYTHROCYTE [DISTWIDTH] IN BLOOD BY AUTOMATED COUNT: 41.2 FL (ref 35.9–50)
GLOBULIN SER CALC-MCNC: 3.3 G/DL (ref 1.9–3.5)
GLUCOSE SERPL-MCNC: 104 MG/DL (ref 65–99)
GLUCOSE SERPL-MCNC: 109 MG/DL (ref 65–99)
HCT VFR BLD AUTO: 42.3 % (ref 37–47)
HGB BLD-MCNC: 13.4 G/DL (ref 12–16)
IMM GRANULOCYTES # BLD AUTO: 0.03 K/UL (ref 0–0.11)
IMM GRANULOCYTES NFR BLD AUTO: 0.4 % (ref 0–0.9)
LACTATE BLD-SCNC: 1.2 MMOL/L (ref 0.5–2)
LYMPHOCYTES # BLD AUTO: 1.38 K/UL (ref 1–4.8)
LYMPHOCYTES NFR BLD: 19.6 % (ref 22–41)
MCH RBC QN AUTO: 25.8 PG (ref 27–33)
MCHC RBC AUTO-ENTMCNC: 31.7 G/DL (ref 33.6–35)
MCV RBC AUTO: 81.5 FL (ref 81.4–97.8)
MONOCYTES # BLD AUTO: 0.78 K/UL (ref 0–0.85)
MONOCYTES NFR BLD AUTO: 11.1 % (ref 0–13.4)
NEUTROPHILS # BLD AUTO: 4.8 K/UL (ref 2–7.15)
NEUTROPHILS NFR BLD: 68.3 % (ref 44–72)
NRBC # BLD AUTO: 0 K/UL
NRBC BLD-RTO: 0 /100 WBC
PLATELET # BLD AUTO: 261 K/UL (ref 164–446)
PMV BLD AUTO: 10.2 FL (ref 9–12.9)
POTASSIUM SERPL-SCNC: 5.3 MMOL/L (ref 3.6–5.5)
POTASSIUM SERPL-SCNC: 5.5 MMOL/L (ref 3.6–5.5)
PROCALCITONIN SERPL-MCNC: 0.12 NG/ML
PROT SERPL-MCNC: 6.7 G/DL (ref 6–8.2)
RBC # BLD AUTO: 5.19 M/UL (ref 4.2–5.4)
SODIUM SERPL-SCNC: 125 MMOL/L (ref 135–145)
SODIUM SERPL-SCNC: 125 MMOL/L (ref 135–145)
TROPONIN T SERPL-MCNC: 27 NG/L (ref 6–19)
WBC # BLD AUTO: 7 K/UL (ref 4.8–10.8)

## 2021-08-19 PROCEDURE — 700102 HCHG RX REV CODE 250 W/ 637 OVERRIDE(OP): Performed by: STUDENT IN AN ORGANIZED HEALTH CARE EDUCATION/TRAINING PROGRAM

## 2021-08-19 PROCEDURE — 87040 BLOOD CULTURE FOR BACTERIA: CPT | Mod: 91

## 2021-08-19 PROCEDURE — 700105 HCHG RX REV CODE 258: Performed by: STUDENT IN AN ORGANIZED HEALTH CARE EDUCATION/TRAINING PROGRAM

## 2021-08-19 PROCEDURE — 94640 AIRWAY INHALATION TREATMENT: CPT

## 2021-08-19 PROCEDURE — 770006 HCHG ROOM/CARE - MED/SURG/GYN SEMI*

## 2021-08-19 PROCEDURE — 8E0ZXY6 ISOLATION: ICD-10-PCS | Performed by: STUDENT IN AN ORGANIZED HEALTH CARE EDUCATION/TRAINING PROGRAM

## 2021-08-19 PROCEDURE — 71045 X-RAY EXAM CHEST 1 VIEW: CPT

## 2021-08-19 PROCEDURE — 84145 PROCALCITONIN (PCT): CPT

## 2021-08-19 PROCEDURE — 84484 ASSAY OF TROPONIN QUANT: CPT

## 2021-08-19 PROCEDURE — 700117 HCHG RX CONTRAST REV CODE 255: Performed by: EMERGENCY MEDICINE

## 2021-08-19 PROCEDURE — A9270 NON-COVERED ITEM OR SERVICE: HCPCS | Performed by: STUDENT IN AN ORGANIZED HEALTH CARE EDUCATION/TRAINING PROGRAM

## 2021-08-19 PROCEDURE — 99223 1ST HOSP IP/OBS HIGH 75: CPT | Performed by: STUDENT IN AN ORGANIZED HEALTH CARE EDUCATION/TRAINING PROGRAM

## 2021-08-19 PROCEDURE — 36415 COLL VENOUS BLD VENIPUNCTURE: CPT

## 2021-08-19 PROCEDURE — 80053 COMPREHEN METABOLIC PANEL: CPT

## 2021-08-19 PROCEDURE — 85025 COMPLETE CBC W/AUTO DIFF WBC: CPT

## 2021-08-19 PROCEDURE — 85379 FIBRIN DEGRADATION QUANT: CPT

## 2021-08-19 PROCEDURE — 700105 HCHG RX REV CODE 258: Performed by: EMERGENCY MEDICINE

## 2021-08-19 PROCEDURE — 93005 ELECTROCARDIOGRAM TRACING: CPT | Performed by: EMERGENCY MEDICINE

## 2021-08-19 PROCEDURE — 71275 CT ANGIOGRAPHY CHEST: CPT | Mod: ME

## 2021-08-19 PROCEDURE — 83605 ASSAY OF LACTIC ACID: CPT

## 2021-08-19 PROCEDURE — 80048 BASIC METABOLIC PNL TOTAL CA: CPT

## 2021-08-19 PROCEDURE — 86140 C-REACTIVE PROTEIN: CPT

## 2021-08-19 PROCEDURE — 99285 EMERGENCY DEPT VISIT HI MDM: CPT

## 2021-08-19 RX ORDER — ONDANSETRON 4 MG/1
4 TABLET, ORALLY DISINTEGRATING ORAL EVERY 6 HOURS PRN
Status: DISCONTINUED | OUTPATIENT
Start: 2021-08-19 | End: 2021-08-21 | Stop reason: HOSPADM

## 2021-08-19 RX ORDER — SODIUM CHLORIDE 9 MG/ML
INJECTION, SOLUTION INTRAVENOUS CONTINUOUS
Status: ACTIVE | OUTPATIENT
Start: 2021-08-19 | End: 2021-08-19

## 2021-08-19 RX ORDER — BUDESONIDE AND FORMOTEROL FUMARATE DIHYDRATE 160; 4.5 UG/1; UG/1
2 AEROSOL RESPIRATORY (INHALATION) 2 TIMES DAILY
Status: DISCONTINUED | OUTPATIENT
Start: 2021-08-19 | End: 2021-08-21 | Stop reason: HOSPADM

## 2021-08-19 RX ORDER — OMEPRAZOLE 20 MG/1
20 CAPSULE, DELAYED RELEASE ORAL DAILY
Status: DISCONTINUED | OUTPATIENT
Start: 2021-08-20 | End: 2021-08-21 | Stop reason: HOSPADM

## 2021-08-19 RX ORDER — TRAMADOL HYDROCHLORIDE 50 MG/1
50 TABLET ORAL EVERY EVENING
Status: DISCONTINUED | OUTPATIENT
Start: 2021-08-19 | End: 2021-08-21 | Stop reason: HOSPADM

## 2021-08-19 RX ORDER — HYDRALAZINE HYDROCHLORIDE 20 MG/ML
10 INJECTION INTRAMUSCULAR; INTRAVENOUS EVERY 6 HOURS PRN
Status: DISCONTINUED | OUTPATIENT
Start: 2021-08-19 | End: 2021-08-21 | Stop reason: HOSPADM

## 2021-08-19 RX ORDER — LATANOPROST 50 UG/ML
1 SOLUTION/ DROPS OPHTHALMIC EVERY EVENING
Status: DISCONTINUED | OUTPATIENT
Start: 2021-08-19 | End: 2021-08-21 | Stop reason: HOSPADM

## 2021-08-19 RX ORDER — LABETALOL HYDROCHLORIDE 5 MG/ML
10 INJECTION, SOLUTION INTRAVENOUS EVERY 6 HOURS PRN
Status: DISCONTINUED | OUTPATIENT
Start: 2021-08-19 | End: 2021-08-21 | Stop reason: HOSPADM

## 2021-08-19 RX ORDER — FENOFIBRATE 134 MG/1
134 CAPSULE ORAL DAILY
Status: DISCONTINUED | OUTPATIENT
Start: 2021-08-20 | End: 2021-08-21 | Stop reason: HOSPADM

## 2021-08-19 RX ORDER — ARIPIPRAZOLE 10 MG/1
5 TABLET ORAL DAILY
Status: DISCONTINUED | OUTPATIENT
Start: 2021-08-20 | End: 2021-08-21 | Stop reason: HOSPADM

## 2021-08-19 RX ORDER — GUAIFENESIN 600 MG/1
600 TABLET, EXTENDED RELEASE ORAL 2 TIMES DAILY PRN
Status: DISCONTINUED | OUTPATIENT
Start: 2021-08-19 | End: 2021-08-21 | Stop reason: HOSPADM

## 2021-08-19 RX ORDER — SODIUM CHLORIDE 9 MG/ML
1000 INJECTION, SOLUTION INTRAVENOUS ONCE
Status: COMPLETED | OUTPATIENT
Start: 2021-08-19 | End: 2021-08-19

## 2021-08-19 RX ORDER — ATORVASTATIN CALCIUM 40 MG/1
40 TABLET, FILM COATED ORAL EVERY EVENING
Status: DISCONTINUED | OUTPATIENT
Start: 2021-08-19 | End: 2021-08-21 | Stop reason: HOSPADM

## 2021-08-19 RX ORDER — LEVOTHYROXINE SODIUM 0.1 MG/1
100 TABLET ORAL
Status: DISCONTINUED | OUTPATIENT
Start: 2021-08-20 | End: 2021-08-21 | Stop reason: HOSPADM

## 2021-08-19 RX ORDER — DULOXETIN HYDROCHLORIDE 30 MG/1
60 CAPSULE, DELAYED RELEASE ORAL DAILY
Status: DISCONTINUED | OUTPATIENT
Start: 2021-08-20 | End: 2021-08-21 | Stop reason: HOSPADM

## 2021-08-19 RX ORDER — TRAMADOL HYDROCHLORIDE 50 MG/1
50 TABLET ORAL EVERY EVENING
COMMUNITY

## 2021-08-19 RX ORDER — ONDANSETRON 2 MG/ML
4 INJECTION INTRAMUSCULAR; INTRAVENOUS EVERY 6 HOURS PRN
Status: DISCONTINUED | OUTPATIENT
Start: 2021-08-19 | End: 2021-08-21 | Stop reason: HOSPADM

## 2021-08-19 RX ADMIN — ATORVASTATIN CALCIUM 40 MG: 40 TABLET, FILM COATED ORAL at 17:15

## 2021-08-19 RX ADMIN — SODIUM CHLORIDE: 9 INJECTION, SOLUTION INTRAVENOUS at 17:19

## 2021-08-19 RX ADMIN — BUDESONIDE AND FORMOTEROL FUMARATE DIHYDRATE 2 PUFF: 160; 4.5 AEROSOL RESPIRATORY (INHALATION) at 17:11

## 2021-08-19 RX ADMIN — SODIUM CHLORIDE 1000 ML: 9 INJECTION, SOLUTION INTRAVENOUS at 12:39

## 2021-08-19 RX ADMIN — LATANOPROST 1 DROP: 50 SOLUTION OPHTHALMIC at 21:22

## 2021-08-19 RX ADMIN — TRAMADOL HYDROCHLORIDE 50 MG: 50 TABLET, FILM COATED ORAL at 17:15

## 2021-08-19 RX ADMIN — IOHEXOL 50 ML: 350 INJECTION, SOLUTION INTRAVENOUS at 11:51

## 2021-08-19 ASSESSMENT — FIBROSIS 4 INDEX: FIB4 SCORE: 1.96

## 2021-08-19 ASSESSMENT — ENCOUNTER SYMPTOMS
SHORTNESS OF BREATH: 1
PALPITATIONS: 0
NAUSEA: 0
DEPRESSION: 0
FEVER: 0
HEARTBURN: 0
ABDOMINAL PAIN: 1
SPUTUM PRODUCTION: 0
DIZZINESS: 0
MYALGIAS: 1
DOUBLE VISION: 0
BLURRED VISION: 0
COUGH: 1
WEAKNESS: 1
SORE THROAT: 0
CHILLS: 1
FOCAL WEAKNESS: 0
VOMITING: 0
HEADACHES: 0
NECK PAIN: 0

## 2021-08-19 ASSESSMENT — PATIENT HEALTH QUESTIONNAIRE - PHQ9
SUM OF ALL RESPONSES TO PHQ9 QUESTIONS 1 AND 2: 0
SUM OF ALL RESPONSES TO PHQ9 QUESTIONS 1 AND 2: 0
1. LITTLE INTEREST OR PLEASURE IN DOING THINGS: NOT AT ALL
2. FEELING DOWN, DEPRESSED, IRRITABLE, OR HOPELESS: NOT AT ALL
1. LITTLE INTEREST OR PLEASURE IN DOING THINGS: NOT AT ALL
2. FEELING DOWN, DEPRESSED, IRRITABLE, OR HOPELESS: NOT AT ALL

## 2021-08-19 ASSESSMENT — LIFESTYLE VARIABLES
ON A TYPICAL DAY WHEN YOU DRINK ALCOHOL HOW MANY DRINKS DO YOU HAVE: 0
EVER FELT BAD OR GUILTY ABOUT YOUR DRINKING: NO
TOTAL SCORE: 0
HOW MANY TIMES IN THE PAST YEAR HAVE YOU HAD 5 OR MORE DRINKS IN A DAY: 0
AVERAGE NUMBER OF DAYS PER WEEK YOU HAVE A DRINK CONTAINING ALCOHOL: 0
TOTAL SCORE: 0
EVER HAD A DRINK FIRST THING IN THE MORNING TO STEADY YOUR NERVES TO GET RID OF A HANGOVER: NO
HAVE PEOPLE ANNOYED YOU BY CRITICIZING YOUR DRINKING: NO
TOTAL SCORE: 0
CONSUMPTION TOTAL: NEGATIVE
ALCOHOL_USE: NO
HAVE YOU EVER FELT YOU SHOULD CUT DOWN ON YOUR DRINKING: NO

## 2021-08-19 ASSESSMENT — COGNITIVE AND FUNCTIONAL STATUS - GENERAL
MOBILITY SCORE: 24
SUGGESTED CMS G CODE MODIFIER MOBILITY: CH
DAILY ACTIVITIY SCORE: 24
SUGGESTED CMS G CODE MODIFIER DAILY ACTIVITY: CH

## 2021-08-19 ASSESSMENT — PAIN DESCRIPTION - PAIN TYPE: TYPE: ACUTE PAIN

## 2021-08-19 NOTE — H&P
Hospital Medicine History & Physical Note    Date of Service  8/19/2021    Primary Care Physician  Sandra Caceres M.D.    Consultants  N/A    Code Status  Full Code    Chief Complaint  Chief Complaint   Patient presents with   • Shortness of Breath     SOB and cough  Pain to chest and back  covid positive and vaccinated       History of Presenting Illness  71 y.o. F with COPD (not on home O2),  CKD III, HLD, HTN and depression presented to the ED on 8/19/2021 c/o cough, bilateral chest pain from the cough itself which she describes as rough and sharp.  There is no other chest pain.  No exertional component to the pain.  She did initially have some body aches and fever although this seems better.  No abdominal pain, nausea vomiting or diarrhea, although states she has not been eating very much at all, has been drinking some water.  No leg pain or swelling.    She received her Covid vaccination in March, she did test positive for Covid on 8/14. She went to PCP clinic on 8/13 for a similar reason - DC home with prednisone taper course.      In ED, In afebrile, vitals wnl except mild tachypnea. Pertinent exam findings: poor air entry, diffuse rhonchi. Otherwise, appeared comfortable. Labs notable for hyponatremia, hypochloremia and elevated Cr from baseline. Trop 27, EKG was NSR.  Procal and lactate not elevated. CXR no acute cardiopulm abnormalities. Elevated D-dimer 0.78 - CTA done in ED showed no PE. Pt was provided with gentle IVF and Pt was then referred to Hospitalist services for further management.     Code status discussed upon admission; she agrees to maintain FULL Code at this point.     I discussed the plan of care with patient, bedside RN and ERP.    Review of Systems  Review of Systems   Constitutional: Positive for chills and malaise/fatigue. Negative for fever.   HENT: Negative for congestion and sore throat.    Eyes: Negative for blurred vision and double vision.   Respiratory: Positive for cough and  shortness of breath. Negative for sputum production.    Cardiovascular: Positive for chest pain. Negative for palpitations and leg swelling.   Gastrointestinal: Positive for abdominal pain. Negative for heartburn, nausea and vomiting.   Genitourinary: Negative for dysuria, frequency and urgency.   Musculoskeletal: Positive for myalgias. Negative for neck pain.   Neurological: Positive for weakness. Negative for dizziness, focal weakness and headaches.   Psychiatric/Behavioral: Negative for depression.       Past Medical History   has a past medical history of Arthritis, Asthma, Breath shortness, Bronchitis, Carpal tunnel syndrome, COPD, Dental disorder, Emphysema of lung ([facility]), GERD (gastroesophageal reflux disease) (10/22/2013), Glaucoma, Heart murmur, Hiatus hernia syndrome, High cholesterol, History of tobacco abuse (10/22/2013), Hypertension, Hypothyroidism (10/22/2013), Osteoporosis, Pain, Pneumonia, Renal disorder, Right elbow pain, Seizure disorder ([facility]), and Sleep apnea.    Surgical History   has a past surgical history that includes nerve ulnar repair or explore; hip revision total (10/10/2013); incision and drainage orthopedic (10/10/2013); carpal tunnel release; hip revision total (Right, 11/5/2015); tonsillectomy; abdominoplasty (10/29/2018); hip revision total (Left, 2001, 2003, 2004); hip arthroplasty total (Right, 10/31/2015); hip arthroplasty mis total (Left, 03/06/1998); and pr implant neurostim/ (Right, 4/22/2021).     Family History  family history includes Alcohol abuse in her sister; Alcohol/Drug in her brother and mother; Arthritis in her brother; Cancer (age of onset: 72) in her father; Heart Disease in her mother; No Known Problems in her daughter; Psychiatric Illness (age of onset: 47) in her mother.   Family history reviewed with patient. There is no family history that is pertinent to the chief complaint.     Social History   reports that she quit smoking about 23 years ago. Her  "smoking use included cigarettes. She has a 30.00 pack-year smoking history. She has never used smokeless tobacco. She reports previous alcohol use. She reports that she does not use drugs.    Allergies  Allergies   Allergen Reactions   • Acetaminophen Anaphylaxis and Swelling     Lip & throat swelling     • Latex Hives   • Tape Unspecified     Causes blisters, Paper tape and \"the kind of tape used for wounds\"       Medications  Prior to Admission Medications   Prescriptions Last Dose Informant Patient Reported? Taking?   ARIPiprazole (ABILIFY) 5 MG tablet   No No   Sig: Take 1 tablet by mouth every day.   DULoxetine (CYMBALTA) 60 MG Cap DR Particles delayed-release capsule   No No   Sig: TAKE 1 CAPSULE EVERY       MORNING   FIBER ADULT GUMMIES PO   Yes No   Sig: Take  by mouth.   SYNTHROID 100 MCG Tab   No No   Sig: TAKE 1 TABLET DAILY   VITAMIN D PO   Yes No   Sig: Take 1,000 Units by mouth.   albuterol (PROVENTIL) 2.5mg/3ml Nebu Soln solution for nebulization  Patient No No   Sig: 3 mL by Nebulization route every four hours as needed for Shortness of Breath.   albuterol 108 (90 Base) MCG/ACT Aero Soln inhalation aerosol   No No   Sig: USE 2 INHALATIONS ORALLY   EVERY SIX HOURS AS NEEDED  FOR SHORTNESS OF BREATH   allopurinol (ZYLOPRIM) 100 MG Tab   No No   Sig: Take 1 tablet by mouth every day.   atorvastatin (LIPITOR) 40 MG Tab   No No   Sig: TAKE 1 TABLET DAILY   azithromycin (ZITHROMAX) 250 MG Tab   No No   Sig: Take 2 tablets on day 1, then take 1 tablet a day for 4 days.   budesonide-formoterol (SYMBICORT) 160-4.5 MCG/ACT Aerosol   No No   Sig: USE 2 INHALATIONS ORALLY   TWICE DAILY , USE WITH     SPACER RINSE MOUTH AFTER   EACH USE   denosumab (PROLIA) 60 MG/ML Solution Prefilled Syringe   No No   Sig: Inject 1 mL as instructed every 6 months.   fenofibrate (TRICOR) 145 MG Tab   No No   Sig: TAKE 1 TABLET DAILY.   fluticasone (FLONASE) 50 MCG/ACT nasal spray   No No   Sig: SPRAY 1 SPRAY INTO AFFECTED NOSTRIL " EVERY DAY   latanoprost (XALATAN) 0.005 % Solution  Patient No No   Sig: Place 1 Drop in both eyes every evening.   lisinopril (PRINIVIL) 10 MG Tab   No No   Sig: TAKE 1 TABLET DAILY   omeprazole (PRILOSEC) 20 MG delayed-release capsule   No No   Sig: TAKE 1 CAPSULE BY MOUTH EVERY DAY   predniSONE (DELTASONE) 10 MG Tab   No No   Sig: Take 30mg x 3 days, then take 20mg x 3 days, then take 10mg x 3 days, with food, then discontinue.   therapeutic multivitamin-minerals (THERAGRAN-M) Tab   Yes No   Sig: Take 1 Tab by mouth every day.   tiotropium (SPIRIVA HANDIHALER) 18 MCG Cap   No No   Sig: INHALE THE CONTENTS OF ONE CAPSULE DAILY   tramadol (ULTRAM) 50 MG Tab  Patient No No   Sig: TAKE 1 TO 2 TABLETS BY MOUTH 2 TIMES DAILY AS NEEDED FOR SEVERE PAIN   Patient taking differently: Take 50 mg by mouth every day.   triamterene-hctz (MAXZIDE-25/DYAZIDE) 37.5-25 MG Tab   No No   Sig: TAKE 1 TABLET EVERY MORNING      Facility-Administered Medications: None       Physical Exam  Temp:  [36.4 °C (97.6 °F)] 36.4 °C (97.6 °F)  Pulse:  [] 94  Resp:  [20] 20  BP: (108-113)/(61-70) 108/61  SpO2:  [92 %-93 %] 93 %    Physical Exam  Vitals and nursing note reviewed.   Constitutional:       General: She is not in acute distress.     Appearance: Normal appearance. She is not ill-appearing.   HENT:      Head: Normocephalic and atraumatic.      Mouth/Throat:      Mouth: Mucous membranes are moist.      Pharynx: Oropharynx is clear.   Eyes:      General: No scleral icterus.     Conjunctiva/sclera: Conjunctivae normal.   Cardiovascular:      Rate and Rhythm: Normal rate and regular rhythm.      Pulses: Normal pulses.      Heart sounds: Normal heart sounds.   Pulmonary:      Effort: No respiratory distress.      Breath sounds: Rhonchi present. No wheezing.      Comments: Shallow breathing   Fair air entry  Abdominal:      General: Bowel sounds are normal. There is no distension.      Palpations: Abdomen is soft.      Tenderness: There  is no abdominal tenderness.   Musculoskeletal:         General: No swelling or tenderness. Normal range of motion.   Skin:     General: Skin is warm and dry.      Capillary Refill: Capillary refill takes less than 2 seconds.   Neurological:      General: No focal deficit present.      Mental Status: She is alert and oriented to person, place, and time. Mental status is at baseline.   Psychiatric:         Mood and Affect: Mood normal.         Laboratory:  Recent Labs     08/19/21  1030   WBC 7.0   RBC 5.19   HEMOGLOBIN 13.4   HEMATOCRIT 42.3   MCV 81.5   MCH 25.8*   MCHC 31.7*   RDW 41.2   PLATELETCT 261   MPV 10.2     Recent Labs     08/19/21  1030   SODIUM 125*   POTASSIUM 5.5   CHLORIDE 93*   CO2 20   GLUCOSE 104*   BUN 30*   CREATININE 1.59*   CALCIUM 10.0     Recent Labs     08/19/21  1030   ALTSGPT 25   ASTSGOT 32   ALKPHOSPHAT 45   TBILIRUBIN 0.4   GLUCOSE 104*         No results for input(s): NTPROBNP in the last 72 hours.      Recent Labs     08/19/21  1030   TROPONINT 27*       Imaging:  CT-CTA CHEST PULMONARY ARTERY W/ RECONS   Final Result      1.  No CT evidence for pulmonary emboli.   2.  Patchy peripheral interstitial opacities in both lungs which may indicate multifocal pneumonitis, both active and sequela from prior infection.   3.  Small adjacent nodules LEFT lung apex measuring up to 5 mm, similar to prior.   4.  Slight increase in size of small RIGHT lower lobe nodule now measuring 4 x 6 mm, previously 4 mm.      Low Risk: No routine follow-up      High Risk: Optional CT at 12 months      Comments: Use most suspicious nodule as guide to management. Follow-up intervals may vary according to size and risk.      Low Risk - Minimal or absent history of smoking and of other known risk factors.      High Risk - History of smoking or of other known risk factors.      Note: These recommendations do not apply to lung cancer screening, patients with immunosuppression, or patients with known primary cancer.       Fleischner Society 2017 Guidelines for Management of Incidentally Detected Pulmonary Nodules in Adults      DX-CHEST-PORTABLE (1 VIEW)   Final Result      No evidence of acute cardiopulmonary process.          X-Ray:  I have personally reviewed the images and compared with prior images. and My impression is: no acute cardiopulm abnormalities  EKG:  I have personally reviewed the images and compared with prior images. and My impression is: NSR    Assessment/Plan:  I anticipate this patient will require at least two midnights for appropriate medical management, necessitating inpatient admission.    * Hyponatremia- (present on admission)  Assessment & Plan  Likely from dehydration and poor appetite during Covid-19 infection (plus on thiazide diuretics)  Gentle hydration provided - 1L in ED and 83 cc/hr for 10hrs here  Encouraged oral intake and will monitor     COVID-19 virus infection- (present on admission)  Assessment & Plan  Supportive Care  Supplement O2 as needed to maintain O2 above 89% (currently not needing O2)  Incentive spirometer    - as patient is NOT hypoxic, has not started on Decadron (Pt also just completed 1 week prednisone taper dosing)  - limit IVFs and keep patient on the drier-side. Will closely monitor on gentle hydration (to complete 8/20 AM)    - Hold off on antibiotics.  Procal wnl  - D-dimer elevated; CTA done - no PE; c/w enoxaparin ppx dosing     - continue symptom control. Monitor for fevers.  - continue contact and droplet isolation with eye protection.  - closely monitor clinically. Check/repeat inflammatory markers if with worsening hypoxia (<87% on 4L) and clinically decompensating.         Chronic obstructive pulmonary disease (HCC)- (present on admission)  Assessment & Plan  C/w home ICS/LABA bid and spiriva daily   Duoneb prn  Not in acute exacerbation    Major depressive disorder- (present on admission)  Assessment & Plan  C/w home cymbalta and abilify (?psychotics  component)    Primary osteoarthritis of first carpometacarpal joint of left hand- (present on admission)  Assessment & Plan  C/w home tramadol 50mg prn at home    Osteoporosis- (present on admission)  Assessment & Plan  On Prolia at home    Chronic kidney disease, stage III (moderate) (HCC)- (present on admission)  Assessment & Plan  Noted ZARI on CKD III  Likely pre-renal cause with poor oral intake  Gentle hydration being provided  Monitor oral intake    Avoid nephrotoxins    Neuropathy- (present on admission)  Assessment & Plan  Reported has a nerve stimulator in right elbow   Was supposed to have an OR on 8/17 but need to reschedule due to Covid    GERD (gastroesophageal reflux disease)- (present on admission)  Assessment & Plan  C/w home omeprazole    Acquired hypothyroidism- (present on admission)  Assessment & Plan  C/w home levothyroxine dosing    Hyperlipidemia- (present on admission)  Assessment & Plan  C/w home statin and fenofibrate    HTN (hypertension)- (present on admission)  Assessment & Plan  Holding home lisinopril and HCTZ/triamterene given ZARI and hyponatremia  Goal <140/90           VTE prophylaxis: SCDs/TEDs and enoxaparin ppx     I have performed the physical examination, and reviewed updated ROS and plan today 8/19/2021.

## 2021-08-19 NOTE — ED PROVIDER NOTES
ED Provider Note    ER PROVIDER NOTE        CHIEF COMPLAINT  Chief Complaint   Patient presents with   • Shortness of Breath     SOB and cough  Pain to chest and back  covid positive and vaccinated       HPI  Summer Hatch is a 71 y.o. female who presents to the emergency department complaining of cough, bilateral chest pain with the cough itself, that is both rough and sharp.  There is no other chest pain.  No exertional component to the pain..  She has had some associated shortness of breath as well.  She did initially have some body aches and fever although this seems better.  No abdominal pain, nausea vomiting or diarrhea, although states she has not been eating very much at all, has been drinking some water.  No leg pain or swelling.    She received her Covid vaccination in March, she did test positive for Covid on 8/14    REVIEW OF SYSTEMS  Pertinent positives include cough. Pertinent negatives include no vomiting. See HPI for details. All other systems reviewed and are negative.    PAST MEDICAL HISTORY   has a past medical history of Arthritis, Asthma, Breath shortness, Bronchitis, Carpal tunnel syndrome, COPD, Dental disorder, Emphysema of lung ([facility]), GERD (gastroesophageal reflux disease) (10/22/2013), Glaucoma, Heart murmur, Hiatus hernia syndrome, High cholesterol, History of tobacco abuse (10/22/2013), Hypertension, Hypothyroidism (10/22/2013), Osteoporosis, Pain, Pneumonia, Renal disorder, Right elbow pain, Seizure disorder ([facility]), and Sleep apnea.    SURGICAL HISTORY   has a past surgical history that includes nerve ulnar repair or explore; hip revision total (10/10/2013); incision and drainage orthopedic (10/10/2013); carpal tunnel release; hip revision total (Right, 11/5/2015); tonsillectomy; abdominoplasty (10/29/2018); hip revision total (Left, 2001, 2003, 2004); hip arthroplasty total (Right, 10/31/2015); hip arthroplasty mis total (Left, 03/06/1998); and implant neurostim/ (Right,  2021).    FAMILY HISTORY  Family History   Problem Relation Age of Onset   • Psychiatric Illness Mother 47        suicide   • Alcohol/Drug Mother    • Heart Disease Mother    • Cancer Father 72        Lung, smoke   • Alcohol abuse Sister    • Alcohol/Drug Brother         Recovering   • Arthritis Brother    • No Known Problems Daughter        SOCIAL HISTORY  Social History     Socioeconomic History   • Marital status:      Spouse name: Not on file   • Number of children: Not on file   • Years of education: Not on file   • Highest education level: Not on file   Occupational History   • Not on file   Tobacco Use   • Smoking status: Former Smoker     Packs/day: 1.00     Years: 30.00     Pack years: 30.00     Types: Cigarettes     Quit date: 1998     Years since quittin.6   • Smokeless tobacco: Never Used   • Tobacco comment: continued abstinance   Vaping Use   • Vaping Use: Never used   Substance and Sexual Activity   • Alcohol use: Not Currently     Alcohol/week: 0.0 oz     Comment: occ   • Drug use: No   • Sexual activity: Not Currently   Other Topics Concern   • Not on file   Social History Narrative   • Not on file     Social Determinants of Health     Financial Resource Strain:    • Difficulty of Paying Living Expenses:    Food Insecurity:    • Worried About Running Out of Food in the Last Year:    • Ran Out of Food in the Last Year:    Transportation Needs:    • Lack of Transportation (Medical):    • Lack of Transportation (Non-Medical):    Physical Activity:    • Days of Exercise per Week:    • Minutes of Exercise per Session:    Stress:    • Feeling of Stress :    Social Connections:    • Frequency of Communication with Friends and Family:    • Frequency of Social Gatherings with Friends and Family:    • Attends Mandae[employer] Services:    • Active Member of Clubs or Organizations:    • Attends Club or Organization Meetings:    • Marital Status:    Intimate Partner Violence:    • Fear of Current  "or Ex-Partner:    • Emotionally Abused:    • Physically Abused:    • Sexually Abused:       Social History     Substance and Sexual Activity   Drug Use No       CURRENT MEDICATIONS  Home Medications    **Home medications have not yet been reviewed for this encounter**         ALLERGIES  Allergies   Allergen Reactions   • Tylenol Anaphylaxis     Lip, throat swelling   • Tape Unspecified     Causes blisters, Paper tape and \"the kind of tape used for wounds\"   • Latex Hives       PHYSICAL EXAM  VITAL SIGNS: /61   Pulse 94   Temp 36.4 °C (97.6 °F) (Temporal)   Resp 20   Ht 1.651 m (5' 5\")   Wt 75.1 kg (165 lb 9.1 oz)   SpO2 93%   BMI 27.55 kg/m²   Pulse ox interpretation: I interpret this pulse ox as normal.    Constitutional: Alert in no apparent distress.  HENT: No signs of trauma, Bilateral external ears normal, Nose normal.   Eyes: Pupils are equal and reactive, Conjunctiva normal, Non-icteric.   Neck: Normal range of motion, No tenderness, Supple, No stridor.   Lymphatic: No lymphadenopathy noted.   Cardiovascular: Regular rate and rhythm, no murmurs.   Thorax & Lungs: Normal breath sounds, No respiratory distress, No wheezing, No chest tenderness.   Abdomen: Bowel sounds normal, Soft, No tenderness, No masses, No pulsatile masses. No peritoneal signs.  Skin: Warm, Dry, No erythema, No rash.   Back: No bony tenderness, No CVA tenderness.   Extremities: Intact distal pulses, No edema, No tenderness, No cyanosis,   Musculoskeletal: Good range of motion in all major joints. No tenderness to palpation or major deformities noted.   Neurologic: Alert , Normal motor function, Normal sensory function, No focal deficits noted.   Psychiatric: Affect normal, Judgment normal, Mood normal.     DIAGNOSTIC STUDIES / PROCEDURES    EKG  Results for orders placed or performed during the hospital encounter of 08/19/21   CBC WITH DIFFERENTIAL   Result Value Ref Range    WBC 7.0 4.8 - 10.8 K/uL    RBC 5.19 4.20 - 5.40 M/uL "    Hemoglobin 13.4 12.0 - 16.0 g/dL    Hematocrit 42.3 37.0 - 47.0 %    MCV 81.5 81.4 - 97.8 fL    MCH 25.8 (L) 27.0 - 33.0 pg    MCHC 31.7 (L) 33.6 - 35.0 g/dL    RDW 41.2 35.9 - 50.0 fL    Platelet Count 261 164 - 446 K/uL    MPV 10.2 9.0 - 12.9 fL    Neutrophils-Polys 68.30 44.00 - 72.00 %    Lymphocytes 19.60 (L) 22.00 - 41.00 %    Monocytes 11.10 0.00 - 13.40 %    Eosinophils 0.30 0.00 - 6.90 %    Basophils 0.30 0.00 - 1.80 %    Immature Granulocytes 0.40 0.00 - 0.90 %    Nucleated RBC 0.00 /100 WBC    Neutrophils (Absolute) 4.80 2.00 - 7.15 K/uL    Lymphs (Absolute) 1.38 1.00 - 4.80 K/uL    Monos (Absolute) 0.78 0.00 - 0.85 K/uL    Eos (Absolute) 0.02 0.00 - 0.51 K/uL    Baso (Absolute) 0.02 0.00 - 0.12 K/uL    Immature Granulocytes (abs) 0.03 0.00 - 0.11 K/uL    NRBC (Absolute) 0.00 K/uL   COMP METABOLIC PANEL   Result Value Ref Range    Sodium 125 (L) 135 - 145 mmol/L    Potassium 5.5 3.6 - 5.5 mmol/L    Chloride 93 (L) 96 - 112 mmol/L    Co2 20 20 - 33 mmol/L    Anion Gap 12.0 7.0 - 16.0    Glucose 104 (H) 65 - 99 mg/dL    Bun 30 (H) 8 - 22 mg/dL    Creatinine 1.59 (H) 0.50 - 1.40 mg/dL    Calcium 10.0 8.4 - 10.2 mg/dL    AST(SGOT) 32 12 - 45 U/L    ALT(SGPT) 25 2 - 50 U/L    Alkaline Phosphatase 45 30 - 99 U/L    Total Bilirubin 0.4 0.1 - 1.5 mg/dL    Albumin 3.4 3.2 - 4.9 g/dL    Total Protein 6.7 6.0 - 8.2 g/dL    Globulin 3.3 1.9 - 3.5 g/dL    A-G Ratio 1.0 g/dL   TROPONIN   Result Value Ref Range    Troponin T 27 (H) 6 - 19 ng/L   D-Dimer   Result Value Ref Range    D-Dimer Screen 0.78 (H) 0.00 - 0.50 ug/mL (FEU)   CRP Quantitative (Non-Cardiac)   Result Value Ref Range    Stat C-Reactive Protein 5.84 (H) 0.00 - 0.75 mg/dL   Procalcitonin   Result Value Ref Range    Procalcitonin 0.12 <0.25 ng/mL   LACTIC ACID   Result Value Ref Range    Lactic Acid 1.2 0.5 - 2.0 mmol/L   ESTIMATED GFR   Result Value Ref Range    GFR If  39 (A) >60 mL/min/1.73 m 2    GFR If Non  32 (A)  >60 mL/min/1.73 m 2   EKG   Result Value Ref Range    Report       [facility] Emergency Dept.    Test Date:  2021  Pt Name:    KANA YIN                 Department: [facility]  MRN:        [id number]                      [facility]  Gender:     Female                       Technician: [id number]  :        [id number]                   Requested By:BILL GUERIN  Order #:    [id number]                    Reading MD: BILL GUERIN MD    Measurements  Intervals                                Axis  Rate:       95                           P:          88  IN:         166                          QRS:        49  QRSD:       77                           T:          59  QT:         314  QTc:        395    Interpretive Statements  Sinus rhythm  LAE, consider biatrial enlargement    Compared to ECG 2021 11:08:20  no change  Electronically Signed On 2021 10:21:06 PDT by BILL GUERIN MD           RADIOLOGY  CT-CTA CHEST PULMONARY ARTERY W/ RECONS   Final Result      1.  No CT evidence for pulmonary emboli.   2.  Patchy peripheral interstitial opacities in both lungs which may indicate multifocal pneumonitis, both active and sequela from prior infection.   3.  Small adjacent nodules LEFT lung apex measuring up to 5 mm, similar to prior.   4.  Slight increase in size of small RIGHT lower lobe nodule now measuring 4 x 6 mm, previously 4 mm.      Low Risk: No routine follow-up      High Risk: Optional CT at 12 months      Comments: Use most suspicious nodule as guide to management. Follow-up intervals may vary according to size and risk.      Low Risk - Minimal or absent history of smoking and of other known risk factors.      High Risk - History of smoking or of other known risk factors.      Note: These recommendations do not apply to lung cancer screening, patients with immunosuppression, or patients with known primary cancer.      Fleischner Society 2017 Guidelines for  Management of Incidentally Detected Pulmonary Nodules in Adults      DX-CHEST-PORTABLE (1 VIEW)   Final Result      No evidence of acute cardiopulmonary process.        The radiologist's interpretation of all radiological studies have been reviewed and images independently viewed by me.    COURSE & MEDICAL DECISION MAKING  Nursing notes, VS, PMSFHx reviewed in chart.    9:59 AM Patient seen and examined at bedside.  Ordered for CBC, CMP, troponin, D-dimer, procalcitonin, x-ray, ECG, sepsis bundle to evaluate her symptoms.     Patient's D-dimer has returned, CTA ordered    12:15 PM  Patient is reevaluated, updated on all results plan for hospitalization to which she is agreeable    Discussed case with hospitalist for admission    HYDRATION: Based on the patient's presentation of Acute Kindney Injury the patient was given IV fluids. IV Hydration was used because oral hydration was not as rapid as required. Upon recheck following hydration, the patient was improved.    Decision Making:  This is a 71 y.o. female presenting with cough as well as some decreased appetite and right chest pain. She does have Covid, she is vaccinated does not appear to have severe Covid is not hypoxemic or have any respiratory distress. Did consider pulmonary embolism, her D-dimer was elevated so CT was obtained which was unremarkable. Procalcitonin is negative, no findings suggestive of concomitant bacterial pneumonia. She has not been eating well and does appear dehydrated with acute kidney injury as well as significant hyponatremia and will be hospitalized for further care    Patient is hospitalized in stable condition    FINAL IMPRESSION  1. Hyponatremia    2. Acute kidney injury (HCC)    3. Elevated troponin    4. COVID-19          The note accurately reflects work and decisions made by me.  Sj Belcher M.D.  8/19/2021  12:24 PM

## 2021-08-19 NOTE — ED TRIAGE NOTES
Cough, sob, chest pain , and back pain. Patient has been vaccinated but received a positive covid test this week.

## 2021-08-19 NOTE — ED NOTES
Pharmacy Medication Reconciliation    ~Med rec updated and complete per pt at bedside  ~Allergies have been verified and updated  ~Pt home pharmacy:[facility] [facility]      ~Patient reports that she finished a ZPak some time last week

## 2021-08-19 NOTE — ASSESSMENT & PLAN NOTE
Reported has a nerve stimulator in right elbow   Was supposed to have an OR on 8/17 but need to reschedule due to Covid

## 2021-08-19 NOTE — ASSESSMENT & PLAN NOTE
Noted ZARI on CKD III  Likely pre-renal cause with poor oral intake  Gentle hydration being provided  Monitor oral intake    Avoid nephrotoxins

## 2021-08-19 NOTE — ASSESSMENT & PLAN NOTE
Likely from dehydration and poor appetite during Covid-19 infection (plus on thiazide diuretics)  Got gentle hydration provided - 1L in ED and 83 cc/hr for 10hrs here  Encouraged oral intake and will monitor   Started salt tablets

## 2021-08-19 NOTE — ED NOTES
ERP at bedside. Pt agrees with plan of care discussed by ERP. AIDET acknowledged with patient. IV established. Blood sent to lab. Toño in low position, side rail up for pt safety. Call light within reach. Will continue to monitor.

## 2021-08-19 NOTE — ASSESSMENT & PLAN NOTE
Supportive Care  Supplement O2 as needed to maintain O2 above 89% (currently not needing O2)  Incentive spirometer    - as patient is NOT hypoxic, has not started on Decadron (Pt also just completed 1 week prednisone taper dosing)  - limit IVFs and keep patient on the drier-side. Will closely monitor on gentle hydration (to complete 8/20 AM)    - Hold off on antibiotics.  Procal wnl  - D-dimer elevated; CTA done - no PE; c/w enoxaparin ppx dosing     - continue symptom control. Monitor for fevers.  - continue contact and droplet isolation with eye protection.  - closely monitor clinically. Check/repeat inflammatory markers if with worsening hypoxia (<87% on 4L) and clinically decompensating.

## 2021-08-20 LAB
ANION GAP SERPL CALC-SCNC: 10 MMOL/L (ref 7–16)
BASOPHILS # BLD AUTO: 0.4 % (ref 0–1.8)
BASOPHILS # BLD: 0.02 K/UL (ref 0–0.12)
BUN SERPL-MCNC: 29 MG/DL (ref 8–22)
CALCIUM SERPL-MCNC: 9.2 MG/DL (ref 8.4–10.2)
CHLORIDE SERPL-SCNC: 98 MMOL/L (ref 96–112)
CO2 SERPL-SCNC: 19 MMOL/L (ref 20–33)
CREAT SERPL-MCNC: 1.38 MG/DL (ref 0.5–1.4)
EOSINOPHIL # BLD AUTO: 0.03 K/UL (ref 0–0.51)
EOSINOPHIL NFR BLD: 0.6 % (ref 0–6.9)
ERYTHROCYTE [DISTWIDTH] IN BLOOD BY AUTOMATED COUNT: 40.8 FL (ref 35.9–50)
GLUCOSE SERPL-MCNC: 107 MG/DL (ref 65–99)
HCT VFR BLD AUTO: 36.2 % (ref 37–47)
HGB BLD-MCNC: 11.6 G/DL (ref 12–16)
IMM GRANULOCYTES # BLD AUTO: 0.03 K/UL (ref 0–0.11)
IMM GRANULOCYTES NFR BLD AUTO: 0.6 % (ref 0–0.9)
LYMPHOCYTES # BLD AUTO: 1.23 K/UL (ref 1–4.8)
LYMPHOCYTES NFR BLD: 24.1 % (ref 22–41)
MCH RBC QN AUTO: 26 PG (ref 27–33)
MCHC RBC AUTO-ENTMCNC: 32 G/DL (ref 33.6–35)
MCV RBC AUTO: 81 FL (ref 81.4–97.8)
MONOCYTES # BLD AUTO: 0.74 K/UL (ref 0–0.85)
MONOCYTES NFR BLD AUTO: 14.5 % (ref 0–13.4)
NEUTROPHILS # BLD AUTO: 3.06 K/UL (ref 2–7.15)
NEUTROPHILS NFR BLD: 59.8 % (ref 44–72)
NRBC # BLD AUTO: 0 K/UL
NRBC BLD-RTO: 0 /100 WBC
PLATELET # BLD AUTO: 214 K/UL (ref 164–446)
PMV BLD AUTO: 10.2 FL (ref 9–12.9)
POTASSIUM SERPL-SCNC: 5.4 MMOL/L (ref 3.6–5.5)
RBC # BLD AUTO: 4.47 M/UL (ref 4.2–5.4)
SODIUM SERPL-SCNC: 126 MMOL/L (ref 135–145)
SODIUM SERPL-SCNC: 127 MMOL/L (ref 135–145)
WBC # BLD AUTO: 5.1 K/UL (ref 4.8–10.8)

## 2021-08-20 PROCEDURE — 85025 COMPLETE CBC W/AUTO DIFF WBC: CPT

## 2021-08-20 PROCEDURE — 99232 SBSQ HOSP IP/OBS MODERATE 35: CPT | Performed by: STUDENT IN AN ORGANIZED HEALTH CARE EDUCATION/TRAINING PROGRAM

## 2021-08-20 PROCEDURE — 80048 BASIC METABOLIC PNL TOTAL CA: CPT

## 2021-08-20 PROCEDURE — A9270 NON-COVERED ITEM OR SERVICE: HCPCS | Performed by: STUDENT IN AN ORGANIZED HEALTH CARE EDUCATION/TRAINING PROGRAM

## 2021-08-20 PROCEDURE — 770006 HCHG ROOM/CARE - MED/SURG/GYN SEMI*

## 2021-08-20 PROCEDURE — 700102 HCHG RX REV CODE 250 W/ 637 OVERRIDE(OP): Performed by: STUDENT IN AN ORGANIZED HEALTH CARE EDUCATION/TRAINING PROGRAM

## 2021-08-20 PROCEDURE — 84295 ASSAY OF SERUM SODIUM: CPT

## 2021-08-20 PROCEDURE — 94760 N-INVAS EAR/PLS OXIMETRY 1: CPT

## 2021-08-20 PROCEDURE — 700111 HCHG RX REV CODE 636 W/ 250 OVERRIDE (IP): Performed by: STUDENT IN AN ORGANIZED HEALTH CARE EDUCATION/TRAINING PROGRAM

## 2021-08-20 PROCEDURE — 94640 AIRWAY INHALATION TREATMENT: CPT

## 2021-08-20 PROCEDURE — 94660 CPAP INITIATION&MGMT: CPT

## 2021-08-20 RX ORDER — SODIUM CHLORIDE 1 G/1
1 TABLET ORAL
Status: DISCONTINUED | OUTPATIENT
Start: 2021-08-20 | End: 2021-08-21 | Stop reason: HOSPADM

## 2021-08-20 RX ADMIN — SODIUM CHLORIDE 1 G: 1 TABLET ORAL at 12:45

## 2021-08-20 RX ADMIN — DULOXETINE HYDROCHLORIDE 60 MG: 30 CAPSULE, DELAYED RELEASE ORAL at 05:49

## 2021-08-20 RX ADMIN — BUDESONIDE AND FORMOTEROL FUMARATE DIHYDRATE 2 PUFF: 160; 4.5 AEROSOL RESPIRATORY (INHALATION) at 17:19

## 2021-08-20 RX ADMIN — TIOTROPIUM BROMIDE INHALATION SPRAY 5 MCG: 3.12 SPRAY, METERED RESPIRATORY (INHALATION) at 06:06

## 2021-08-20 RX ADMIN — LATANOPROST 1 DROP: 50 SOLUTION OPHTHALMIC at 21:51

## 2021-08-20 RX ADMIN — ATORVASTATIN CALCIUM 40 MG: 40 TABLET, FILM COATED ORAL at 17:20

## 2021-08-20 RX ADMIN — OMEPRAZOLE 20 MG: 20 CAPSULE, DELAYED RELEASE ORAL at 05:50

## 2021-08-20 RX ADMIN — BUDESONIDE AND FORMOTEROL FUMARATE DIHYDRATE 2 PUFF: 160; 4.5 AEROSOL RESPIRATORY (INHALATION) at 06:06

## 2021-08-20 RX ADMIN — FENOFIBRATE 134 MG: 134 CAPSULE ORAL at 05:50

## 2021-08-20 RX ADMIN — SODIUM CHLORIDE 1 G: 1 TABLET ORAL at 17:20

## 2021-08-20 RX ADMIN — ENOXAPARIN SODIUM 40 MG: 40 INJECTION SUBCUTANEOUS at 05:47

## 2021-08-20 RX ADMIN — TRAMADOL HYDROCHLORIDE 50 MG: 50 TABLET, FILM COATED ORAL at 17:20

## 2021-08-20 RX ADMIN — GUAIFENESIN 600 MG: 600 TABLET, EXTENDED RELEASE ORAL at 06:06

## 2021-08-20 RX ADMIN — ARIPIPRAZOLE 5 MG: 10 TABLET ORAL at 05:50

## 2021-08-20 RX ADMIN — LEVOTHYROXINE SODIUM 100 MCG: 0.1 TABLET ORAL at 05:49

## 2021-08-20 ASSESSMENT — ENCOUNTER SYMPTOMS
FOCAL WEAKNESS: 0
VOMITING: 0
SPUTUM PRODUCTION: 1
HEADACHES: 0
MYALGIAS: 0
FEVER: 0
NAUSEA: 0
SHORTNESS OF BREATH: 0
CHILLS: 0
ABDOMINAL PAIN: 0
WEAKNESS: 1
EYES NEGATIVE: 1
COUGH: 1

## 2021-08-20 ASSESSMENT — PAIN DESCRIPTION - PAIN TYPE: TYPE: ACUTE PAIN

## 2021-08-20 NOTE — PROGRESS NOTES
Report received from Lena and pt arrived to the floor via gurney.  Pt ambulated to the bed independently.  Pt on 2 lt/min and she reports none at home but where cpap.  Admit profile completed.  Dinner tray given along with evening medications.

## 2021-08-20 NOTE — PROGRESS NOTES
ISOLATION PRECAUTIONS EDUCATION    Educated PATIENT, FAMILY, S.O: patient and family member on isolation for COVID-19.    Educated on reason for isolation, how the infection may be transmitted, and how to help prevent transmission to others. Educated precautions involves staff and visitors wearing PPE, following Standard Precautions and performing meticulous hand hygiene in order to prevent transmission of infection.     Enhanced Droplet Precautions: Educated that Enhanced Droplet Precautions involves staff and visitors wearing a surgical mask when in the patient room.     In addition,  educated that they may leave their room, but prior to exiting the patient room each time, the patient needs to have on a fresh patient gown, a surgical mask must be worn by the patient while out of the patient room, and perform hand hygiene immediately prior to exiting the room.       Patient transport and mobilization on unit  Educated that they may leave their room, but prior to exiting, the patient needs to have on a fresh patient gown, ensure the potentially infectious area is covered, performing appropriate hand hygiene immediately prior to exiting the room.

## 2021-08-20 NOTE — PROGRESS NOTES
Hospital Medicine Daily Progress Note    Date of Service  8/20/2021    Chief Complaint  Summer Hatch is a 71 y.o. female admitted 8/19/2021 with SOB    Hospital Course  A 71-year-old woman with h/o COPD (not on home oxygen), HTN, HLD, CKD st 3, depression, recently diagnosed COVID 19 infection (8/13/21) presented with cough, b/l chest pain. COVID vaccinated (in March).  CXR showed no acute cardiopulmonary abnormalities. D dimer 0.78. CTA no PE.    Interval Problem Update  8/20: Patient reports cough, productive yellow phlegm. Afebrile, hemodynamically stable. On room air. Na 125->127->126. Ordered sodium tablets. Creat 1.52->1.38. COVID 19 test from 8/13/21 positive. Patient does not qualify for home oxygen.    I have personally seen and examined the patient at bedside. I discussed the plan of care with patient, bedside RN, charge RN,  and pharmacy.    Consultants/Specialty  None    Code Status  Full Code    Disposition  Patient is not medically cleared.   Anticipate discharge to to home with close outpatient follow-up.  I have placed the appropriate orders for post-discharge needs.    Review of Systems  Review of Systems   Constitutional: Positive for malaise/fatigue. Negative for chills and fever.   HENT: Negative.    Eyes: Negative.    Respiratory: Positive for cough and sputum production. Negative for shortness of breath.    Cardiovascular: Negative for chest pain.   Gastrointestinal: Negative for abdominal pain, nausea and vomiting.   Genitourinary: Negative for dysuria.   Musculoskeletal: Negative for myalgias.   Skin: Negative for rash.   Neurological: Positive for weakness. Negative for focal weakness and headaches.        Physical Exam  Temp:  [36.7 °C (98 °F)-37.1 °C (98.7 °F)] 36.8 °C (98.3 °F)  Pulse:  [69-96] 69  Resp:  [18-20] 18  BP: (100-122)/(41-73) 106/41  SpO2:  [92 %-96 %] 94 %    Physical Exam  Vitals and nursing note reviewed.   Constitutional:       Appearance: She is  ill-appearing.   HENT:      Head: Normocephalic.      Mouth/Throat:      Mouth: Mucous membranes are moist.      Pharynx: Oropharynx is clear.   Eyes:      Pupils: Pupils are equal, round, and reactive to light.   Cardiovascular:      Rate and Rhythm: Normal rate and regular rhythm.      Heart sounds: Normal heart sounds.   Pulmonary:      Effort: Pulmonary effort is normal.      Breath sounds: Rhonchi present. No wheezing.   Abdominal:      General: Abdomen is flat. Bowel sounds are normal.      Tenderness: There is no abdominal tenderness.   Musculoskeletal:         General: Normal range of motion.      Cervical back: Normal range of motion.   Skin:     General: Skin is warm.   Neurological:      General: No focal deficit present.      Mental Status: She is alert and oriented to person, place, and time.         Fluids    Intake/Output Summary (Last 24 hours) at 8/20/2021 1339  Last data filed at 8/20/2021 1000  Gross per 24 hour   Intake 240 ml   Output --   Net 240 ml       Laboratory  Recent Labs     08/19/21  1030 08/20/21  0352   WBC 7.0 5.1   RBC 5.19 4.47   HEMOGLOBIN 13.4 11.6*   HEMATOCRIT 42.3 36.2*   MCV 81.5 81.0*   MCH 25.8* 26.0*   MCHC 31.7* 32.0*   RDW 41.2 40.8   PLATELETCT 261 214   MPV 10.2 10.2     Recent Labs     08/19/21  1030 08/19/21  1030 08/19/21  1618 08/20/21  0352 08/20/21  1139   SODIUM 125*   < > 125* 127* 126*   POTASSIUM 5.5  --  5.3 5.4  --    CHLORIDE 93*  --  96 98  --    CO2 20  --  20 19*  --    GLUCOSE 104*  --  109* 107*  --    BUN 30*  --  31* 29*  --    CREATININE 1.59*  --  1.52* 1.38  --    CALCIUM 10.0  --  9.2 9.2  --     < > = values in this interval not displayed.                   Imaging  CT-CTA CHEST PULMONARY ARTERY W/ RECONS   Final Result      1.  No CT evidence for pulmonary emboli.   2.  Patchy peripheral interstitial opacities in both lungs which may indicate multifocal pneumonitis, both active and sequela from prior infection.   3.  Small adjacent nodules  LEFT lung apex measuring up to 5 mm, similar to prior.   4.  Slight increase in size of small RIGHT lower lobe nodule now measuring 4 x 6 mm, previously 4 mm.      Low Risk: No routine follow-up      High Risk: Optional CT at 12 months      Comments: Use most suspicious nodule as guide to management. Follow-up intervals may vary according to size and risk.      Low Risk - Minimal or absent history of smoking and of other known risk factors.      High Risk - History of smoking or of other known risk factors.      Note: These recommendations do not apply to lung cancer screening, patients with immunosuppression, or patients with known primary cancer.      Fleischner Society 2017 Guidelines for Management of Incidentally Detected Pulmonary Nodules in Adults      DX-CHEST-PORTABLE (1 VIEW)   Final Result      No evidence of acute cardiopulmonary process.           Assessment/Plan  * Hyponatremia- (present on admission)  Assessment & Plan  Likely from dehydration and poor appetite during Covid-19 infection (plus on thiazide diuretics)  Got gentle hydration provided - 1L in ED and 83 cc/hr for 10hrs here  Encouraged oral intake and will monitor   Started salt tablets    Major depressive disorder- (present on admission)  Assessment & Plan  C/w home cymbalta and abilify (?psychotics component)    COVID-19 virus infection- (present on admission)  Assessment & Plan  Supportive Care  Supplement O2 as needed to maintain O2 above 89% (currently not needing O2)  Incentive spirometer    - as patient is NOT hypoxic, has not started on Decadron (Pt also just completed 1 week prednisone taper dosing)  - limit IVFs and keep patient on the drier-side. Will closely monitor on gentle hydration (to complete 8/20 AM)    - Hold off on antibiotics.  Procal wnl  - D-dimer elevated; CTA done - no PE; c/w enoxaparin ppx dosing     - continue symptom control. Monitor for fevers.  - continue contact and droplet isolation with eye protection.  -  closely monitor clinically. Check/repeat inflammatory markers if with worsening hypoxia (<87% on 4L) and clinically decompensating.     Primary osteoarthritis of first carpometacarpal joint of left hand- (present on admission)  Assessment & Plan  C/w home tramadol 50mg prn at home    Osteoporosis- (present on admission)  Assessment & Plan  On Prolia at home    Chronic obstructive pulmonary disease (HCC)- (present on admission)  Assessment & Plan  C/w home ICS/LABA bid and spiriva daily   Duoneb prn  Not in acute exacerbation    Chronic kidney disease, stage III (moderate) (HCC)- (present on admission)  Assessment & Plan  Noted ZARI on CKD III  Likely pre-renal cause with poor oral intake  Gentle hydration being provided  Monitor oral intake    Avoid nephrotoxins    Neuropathy- (present on admission)  Assessment & Plan  Reported has a nerve stimulator in right elbow   Was supposed to have an OR on 8/17 but need to reschedule due to Covid    GERD (gastroesophageal reflux disease)- (present on admission)  Assessment & Plan  C/w home omeprazole    Acquired hypothyroidism- (present on admission)  Assessment & Plan  C/w home levothyroxine dosing    Hyperlipidemia- (present on admission)  Assessment & Plan  C/w home statin and fenofibrate    HTN (hypertension)- (present on admission)  Assessment & Plan  Holding home lisinopril and HCTZ/triamterene given ZARI and hyponatremia  Goal <140/90        VTE prophylaxis: enoxaparin ppx    I have performed a physical exam and reviewed and updated ROS and Plan today (8/20/2021). In review of yesterday's note (8/19/2021), there are no changes except as documented above.

## 2021-08-20 NOTE — PROGRESS NOTES
Report from NALINI Boo.     Pt A&Ox4, up by self, on 2LPM O2 (none at home).  in room with home CPAC machine.     Assessment done and POC discussed, Pt has no current needs, call light within reach.

## 2021-08-20 NOTE — PROGRESS NOTES
4 Eyes Skin Assessment Completed by NALINI Lay and NALINI Lagos.    Head WDL  Ears WDL  Nose WDL  Mouth WDL  Neck Redness and Blanching-  Breast/Chest WDL  Shoulder Blades WDL  Spine Redness and Blanching- upper spine/back area, pt said that's BL  (R) Arm/Elbow/Hand WDL  (L) Arm/Elbow/Hand WDL  Abdomen WDL  Groin WDL  Scrotum/Coccyx/Buttocks WDL  (R) Leg WDL  (L) Leg WDL  (R) Heel/Foot/Toe WDL  (L) Heel/Foot/Toe WDL          Devices In Places Nasal Cannula, PIV, home CPAP machine      Interventions In Place N/A    Possible Skin Injury No    Pictures Uploaded Into Epic N/A  Wound Consult Placed N/A  RN Wound Prevention Protocol Ordered No

## 2021-08-20 NOTE — CARE PLAN
Problem: Pain - Standard  Goal: Alleviation of pain or a reduction in pain to the patient’s comfort goal  Outcome: Progressing     Problem: Knowledge Deficit - Standard  Goal: Patient and family/care givers will demonstrate understanding of plan of care, disease process/condition, diagnostic tests and medications  Outcome: Progressing   The patient is Stable - Low risk of patient condition declining or worsening    Shift Goals  Clinical Goals: Monitor sodium level, wean oxygen  Patient Goals: Go home    Progress made toward(s) clinical / shift goals:  clinical goals met    Patient is not progressing towards the following goals: doctor wants sodium here

## 2021-08-20 NOTE — PROGRESS NOTES
Report received from mesha Lay resting in bed.    Morning assessment done about 0850 and walking sats completed. Pt states she feels much better today and having a productive cough. Pt not needing oxygen and started education for discharge about monitoring at home.      MD rounded and wants sodium levels to be higher; started on salt tabs with meals.  Pt disappointed that she has to stay but understands why.      Pt has been up ambulating in her room independently and doing her word puzzles.  IS is up to 1000.

## 2021-08-21 VITALS
HEIGHT: 65 IN | SYSTOLIC BLOOD PRESSURE: 127 MMHG | RESPIRATION RATE: 18 BRPM | BODY MASS INDEX: 27.58 KG/M2 | DIASTOLIC BLOOD PRESSURE: 56 MMHG | TEMPERATURE: 98.1 F | OXYGEN SATURATION: 90 % | HEART RATE: 79 BPM | WEIGHT: 165.57 LBS

## 2021-08-21 LAB
ANION GAP SERPL CALC-SCNC: 10 MMOL/L (ref 7–16)
BUN SERPL-MCNC: 28 MG/DL (ref 8–22)
CALCIUM SERPL-MCNC: 9.1 MG/DL (ref 8.4–10.2)
CHLORIDE SERPL-SCNC: 96 MMOL/L (ref 96–112)
CO2 SERPL-SCNC: 22 MMOL/L (ref 20–33)
CREAT SERPL-MCNC: 1.32 MG/DL (ref 0.5–1.4)
GLUCOSE SERPL-MCNC: 97 MG/DL (ref 65–99)
POTASSIUM SERPL-SCNC: 5 MMOL/L (ref 3.6–5.5)
SODIUM SERPL-SCNC: 128 MMOL/L (ref 135–145)
SODIUM SERPL-SCNC: 131 MMOL/L (ref 135–145)

## 2021-08-21 PROCEDURE — 700102 HCHG RX REV CODE 250 W/ 637 OVERRIDE(OP): Performed by: STUDENT IN AN ORGANIZED HEALTH CARE EDUCATION/TRAINING PROGRAM

## 2021-08-21 PROCEDURE — 99239 HOSP IP/OBS DSCHRG MGMT >30: CPT | Performed by: STUDENT IN AN ORGANIZED HEALTH CARE EDUCATION/TRAINING PROGRAM

## 2021-08-21 PROCEDURE — 700111 HCHG RX REV CODE 636 W/ 250 OVERRIDE (IP): Performed by: STUDENT IN AN ORGANIZED HEALTH CARE EDUCATION/TRAINING PROGRAM

## 2021-08-21 PROCEDURE — 84295 ASSAY OF SERUM SODIUM: CPT

## 2021-08-21 PROCEDURE — 94760 N-INVAS EAR/PLS OXIMETRY 1: CPT

## 2021-08-21 PROCEDURE — 94660 CPAP INITIATION&MGMT: CPT

## 2021-08-21 PROCEDURE — A9270 NON-COVERED ITEM OR SERVICE: HCPCS | Performed by: STUDENT IN AN ORGANIZED HEALTH CARE EDUCATION/TRAINING PROGRAM

## 2021-08-21 PROCEDURE — 80048 BASIC METABOLIC PNL TOTAL CA: CPT

## 2021-08-21 RX ORDER — SODIUM CHLORIDE 1 G/1
1 TABLET ORAL
Qty: 90 TABLET | Refills: 3 | Status: SHIPPED | OUTPATIENT
Start: 2021-08-21 | End: 2021-08-28

## 2021-08-21 RX ORDER — GUAIFENESIN 600 MG/1
600 TABLET, EXTENDED RELEASE ORAL 2 TIMES DAILY PRN
Qty: 28 TABLET | Refills: 0 | Status: SHIPPED | OUTPATIENT
Start: 2021-08-21

## 2021-08-21 RX ADMIN — SODIUM CHLORIDE 1 G: 1 TABLET ORAL at 08:25

## 2021-08-21 RX ADMIN — SODIUM CHLORIDE 1 G: 1 TABLET ORAL at 10:59

## 2021-08-21 RX ADMIN — ARIPIPRAZOLE 5 MG: 10 TABLET ORAL at 06:19

## 2021-08-21 RX ADMIN — FENOFIBRATE 134 MG: 134 CAPSULE ORAL at 06:19

## 2021-08-21 RX ADMIN — LEVOTHYROXINE SODIUM 100 MCG: 0.1 TABLET ORAL at 06:19

## 2021-08-21 RX ADMIN — TIOTROPIUM BROMIDE INHALATION SPRAY 5 MCG: 3.12 SPRAY, METERED RESPIRATORY (INHALATION) at 06:18

## 2021-08-21 RX ADMIN — DULOXETINE HYDROCHLORIDE 60 MG: 30 CAPSULE, DELAYED RELEASE ORAL at 06:19

## 2021-08-21 RX ADMIN — BUDESONIDE AND FORMOTEROL FUMARATE DIHYDRATE 2 PUFF: 160; 4.5 AEROSOL RESPIRATORY (INHALATION) at 06:18

## 2021-08-21 RX ADMIN — OMEPRAZOLE 20 MG: 20 CAPSULE, DELAYED RELEASE ORAL at 06:19

## 2021-08-21 RX ADMIN — ENOXAPARIN SODIUM 40 MG: 40 INJECTION SUBCUTANEOUS at 06:18

## 2021-08-21 NOTE — PROGRESS NOTES
Discharge instructions reviewed with patient, IV site removed. Awaiting patient's boyfriend to arrive for transportation home. Prescriptions sent to [facility] on [address]. Patient is in pleasant spirits and remains on room air.

## 2021-08-21 NOTE — PROGRESS NOTES
Report received from NALINI Boo. Plan of care discussed. Patient resting comfortably in bed, declines any further needs at this time. Safety precautions in place.

## 2021-08-21 NOTE — PROGRESS NOTES
Patient off the floor for discharge via wheelchair with transport technician and boyfriend as escorts.

## 2021-08-21 NOTE — DISCHARGE INSTRUCTIONS
Discharge Instructions    Discharged to home by car with relative. Discharged via wheelchair, hospital escort: Yes.  Special equipment needed: Not Applicable    Be sure to schedule a follow-up appointment with your primary care doctor or any specialists as instructed.     Discharge Plan:   Diet Plan: Discussed  Activity Level: Discussed  Confirmed Follow up Appointment: Patient to Call and Schedule Appointment  Confirmed Symptoms Management: Discussed  Medication Reconciliation Updated: Yes    I understand that a diet low in cholesterol, fat, and sodium is recommended for good health. Unless I have been given specific instructions below for another diet, I accept this instruction as my diet prescription.   Other diet: Regular    Special Instructions: None    · Is patient discharged on Warfarin / Coumadin?   No     Depression / Suicide Risk    As you are discharged from this [facility] facility, it is important to learn how to keep safe from harming yourself.    Recognize the warning signs:  · Abrupt changes in personality, positive or negative- including increase in energy   · Giving away possessions  · Change in eating patterns- significant weight changes-  positive or negative  · Change in sleeping patterns- unable to sleep or sleeping all the time   · Unwillingness or inability to communicate  · Depression  · Unusual sadness, discouragement and loneliness  · Talk of wanting to die  · Neglect of personal appearance   · Rebelliousness- reckless behavior  · Withdrawal from people/activities they love  · Confusion- inability to concentrate     If you or a loved one observes any of these behaviors or has concerns about self-harm, here's what you can do:  · Talk about it- your feelings and reasons for harming yourself  · Remove any means that you might use to hurt yourself (examples: pills, rope, extension cords, firearm)  · Get professional help from the community (Mental Health, Substance Abuse, psychological  counseling)  · Do not be alone:Call your Safe Contact- someone whom you trust who will be there for you.  · Call your local CRISIS HOTLINE 420-3059 or 183-833-1379  · Call your local Children's Mobile Crisis Response Team [place] (907) 323-2131 or www.[employer]  · Call the toll free National Suicide Prevention Hotlines   · National Suicide Prevention Lifeline 276-643-FHWA (7997)  · [employer] Line Network 800-SUICIDE (082-3872)    Hyponatremia  Hyponatremia is when the amount of salt (sodium) in your blood is too low. When salt levels are low, your body may take in extra water. This can cause swelling throughout the body. The swelling often affects the brain.  What are the causes?  This condition may be caused by:  · Certain medical problems or conditions.  · Vomiting a lot.  · Having watery poop (diarrhea) often.  · Certain medicines or illegal drugs.  · Not having enough water in the body (dehydration).  · Drinking too much water.  · Eating a diet that is low in salt.  · Large burns on your body.  · Too much sweating.  What increases the risk?  You are more likely to get this condition if you:  · Have long-term (chronic) kidney disease.  · Have heart failure.  · Have a medical condition that causes you to have watery poop often.  · Do very hard exercises.  · Take medicines that affect the amount of salt is in your blood.  What are the signs or symptoms?  Symptoms of this condition include:  · Headache.  · Feeling like you may vomit (nausea).  · Vomiting.  · Being very tired (lethargic).  · Muscle weakness and cramps.  · Not wanting to eat as much as normal (loss of appetite).  · Feeling weak or light-headed.  Severe symptoms of this condition include:  · Confusion.  · Feeling restless (agitation).  · Having a fast heart rate.  · Passing out (fainting).  · Seizures.  · Coma.  How is this treated?  Treatment for this condition depends on the cause. Treatment may include:  · Getting fluids through an  IV tube that is put into one of your veins.  · Taking medicines to fix the salt levels in your blood. If medicines are causing the problem, your medicines will need to be changed.  · Limiting how much water or fluid you take in.  · Monitoring in the hospital to watch your symptoms.  Follow these instructions at home:    · Take over-the-counter and prescription medicines only as told by your doctor. Many medicines can make this condition worse. Talk with your doctor about any medicines that you are taking.  · Eat and drink exactly as you are told by your doctor.  ? Eat only the foods you are told to eat.  ? Limit how much fluid you take.  · Do not drink alcohol.  · Keep all follow-up visits as told by your doctor. This is important.  Contact a doctor if:  · You feel more like you may vomit.  · You feel more tired.  · Your headache gets worse.  · You feel more confused.  · You feel weaker.  · Your symptoms go away and then they come back.  · You have trouble following the diet instructions.  Get help right away if:  · You have a seizure.  · You pass out.  · You keep having watery poop.  · You keep vomiting.  Summary  · Hyponatremia is when the amount of salt in your blood is too low.  · When salt levels are low, you can have swelling throughout the body. The swelling mostly affects the brain.  · Treatment depends on the cause. Treatment may include getting IV fluids, medicines, or not drinking as much fluid.  This information is not intended to replace advice given to you by your health care provider. Make sure you discuss any questions you have with your health care provider.  Document Released: 08/29/2012 Document Revised: 03/05/2020 Document Reviewed: 11/21/2019  [employer] Patient Education © 2020 [employer]    COVID-19  COVID-19 is a respiratory infection that is caused by a virus called severe acute respiratory syndrome coronavirus 2 (SARS-CoV-2). The disease is also known as coronavirus disease or novel  coronavirus. In some people, the virus may not cause any symptoms. In others, it may cause a serious infection. The infection can get worse quickly and can lead to complications, such as:  · Pneumonia, or infection of the lungs.  · Acute respiratory distress syndrome or ARDS. This is fluid build-up in the lungs.  · Acute respiratory failure. This is a condition in which there is not enough oxygen passing from the lungs to the body.  · Sepsis or septic shock. This is a serious bodily reaction to an infection.  · Blood clotting problems.  · Secondary infections due to bacteria or fungus.  The virus that causes COVID-19 is contagious. This means that it can spread from person to person through droplets from coughs and sneezes (respiratory secretions).  What are the causes?  This illness is caused by a virus. You may catch the virus by:  · Breathing in droplets from an infected person's cough or sneeze.  · Touching something, like a table or a doorknob, that was exposed to the virus (contaminated) and then touching your mouth, nose, or eyes.  What increases the risk?  Risk for infection  You are more likely to be infected with this virus if you:  · Live in or travel to an area with a COVID-19 outbreak.  · Come in contact with a sick person who recently traveled to an area with a COVID-19 outbreak.  · Provide care for or live with a person who is infected with COVID-19.  Risk for serious illness  You are more likely to become seriously ill from the virus if you:  · Are 65 years of age or older.  · Have a long-term disease that lowers your body's ability to fight infection (immunocompromised).  · Live in a nursing home or long-term care facility.  · Have a long-term (chronic) disease such as:  ? Chronic lung disease, including chronic obstructive pulmonary disease or asthma  ? Heart disease.  ? Diabetes.  ? Chronic kidney disease.  ? Liver disease.  · Are obese.  What are the signs or symptoms?  Symptoms of this condition  can range from mild to severe. Symptoms may appear any time from 2 to 14 days after being exposed to the virus. They include:  · A fever.  · A cough.  · Difficulty breathing.  · Chills.  · Muscle pains.  · A sore throat.  · Loss of taste or smell.  Some people may also have stomach problems, such as nausea, vomiting, or diarrhea.  Other people may not have any symptoms of COVID-19.  How is this diagnosed?  This condition may be diagnosed based on:  · Your signs and symptoms, especially if:  ? You live in an area with a COVID-19 outbreak.  ? You recently traveled to or from an area where the virus is common.  ? You provide care for or live with a person who was diagnosed with COVID-19.  · A physical exam.  · Lab tests, which may include:  ? A nasal swab to take a sample of fluid from your nose.  ? A throat swab to take a sample of fluid from your throat.  ? A sample of mucus from your lungs (sputum).  ? Blood tests.  · Imaging tests, which may include, X-rays, CT scan, or ultrasound.  How is this treated?  At present, there is no medicine to treat COVID-19. Medicines that treat other diseases are being used on a trial basis to see if they are effective against COVID-19.  Your health care provider will talk with you about ways to treat your symptoms. For most people, the infection is mild and can be managed at home with rest, fluids, and over-the-counter medicines.  Treatment for a serious infection usually takes places in a hospital intensive care unit (ICU). It may include one or more of the following treatments. These treatments are given until your symptoms improve.  · Receiving fluids and medicines through an IV.  · Supplemental oxygen. Extra oxygen is given through a tube in the nose, a face mask, or a asencio.  · Positioning you to lie on your stomach (prone position). This makes it easier for oxygen to get into the lungs.  · Continuous positive airway pressure (CPAP) or bi-level positive airway pressure (BPAP)  machine. This treatment uses mild air pressure to keep the airways open. A tube that is connected to a motor delivers oxygen to the body.  · Ventilator. This treatment moves air into and out of the lungs by using a tube that is placed in your windpipe.  · Tracheostomy. This is a procedure to create a hole in the neck so that a breathing tube can be inserted.  · Extracorporeal membrane oxygenation (ECMO). This procedure gives the lungs a chance to recover by taking over the functions of the heart and lungs. It supplies oxygen to the body and removes carbon dioxide.  Follow these instructions at home:  Lifestyle  · If you are sick, stay home except to get medical care. Your health care provider will tell you how long to stay home. Call your health care provider before you go for medical care.  · Rest at home as told by your health care provider.  · Do not use any products that contain nicotine or tobacco, such as cigarettes, e-cigarettes, and chewing tobacco. If you need help quitting, ask your health care provider.  · Return to your normal activities as told by your health care provider. Ask your health care provider what activities are safe for you.  General instructions  · Take over-the-counter and prescription medicines only as told by your health care provider.  · Drink enough fluid to keep your urine pale yellow.  · Keep all follow-up visits as told by your health care provider. This is important.  How is this prevented?    There is no vaccine to help prevent COVID-19 infection. However, there are steps you can take to protect yourself and others from this virus.  To protect yourself:   · Do not travel to areas where COVID-19 is a risk. The areas where COVID-19 is reported change often. To identify high-risk areas and travel restrictions, check the CDC travel website: wwwnc.cdc.gov/travel/notices  · If you live in, or must travel to, an area where COVID-19 is a risk, take precautions to avoid infection.  ? Stay  away from people who are sick.  ? Wash your hands often with soap and water for 20 seconds. If soap and water are not available, use an alcohol-based hand .  ? Avoid touching your mouth, face, eyes, or nose.  ? Avoid going out in public, follow guidance from your state and local health authorities.  ? If you must go out in public, wear a cloth face covering or face mask.  ? Disinfect objects and surfaces that are frequently touched every day. This may include:  § Counters and tables.  § Doorknobs and light switches.  § Sinks and faucets.  § Electronics, such as phones, remote controls, keyboards, computers, and tablets.  To protect others:  If you have symptoms of COVID-19, take steps to prevent the virus from spreading to others.  · If you think you have a COVID-19 infection, contact your health care provider right away. Tell your health care team that you think you may have a COVID-19 infection.  · Stay home. Leave your house only to seek medical care. Do not use public transport.  · Do not travel while you are sick.  · Wash your hands often with soap and water for 20 seconds. If soap and water are not available, use alcohol-based hand .  · Stay away from other members of your household. Let healthy household members care for children and pets, if possible. If you have to care for children or pets, wash your hands often and wear a mask. If possible, stay in your own room, separate from others. Use a different bathroom.  · Make sure that all people in your household wash their hands well and often.  · Cough or sneeze into a tissue or your sleeve or elbow. Do not cough or sneeze into your hand or into the air.  · Wear a cloth face covering or face mask.  Where to find more information  · Centers for Disease Control and Prevention: www.cdc.gov/coronavirus/2019-ncov/index.html  · World Health Organization: www.who.int/health-topics/coronavirus  Contact a health care provider if:  · You live in or  have traveled to an area where COVID-19 is a risk and you have symptoms of the infection.  · You have had contact with someone who has COVID-19 and you have symptoms of the infection.  Get help right away if:  · You have trouble breathing.  · You have pain or pressure in your chest.  · You have confusion.  · You have bluish lips and fingernails.  · You have difficulty waking from sleep.  · You have symptoms that get worse.  These symptoms may represent a serious problem that is an emergency. Do not wait to see if the symptoms will go away. Get medical help right away. Call your local emergency services (911 in the [place] Do not drive yourself to the hospital. Let the emergency medical personnel know if you think you have COVID-19.  Summary  · COVID-19 is a respiratory infection that is caused by a virus. It is also known as coronavirus disease or novel coronavirus. It can cause serious infections, such as pneumonia, acute respiratory distress syndrome, acute respiratory failure, or sepsis.  · The virus that causes COVID-19 is contagious. This means that it can spread from person to person through droplets from coughs and sneezes.  · You are more likely to develop a serious illness if you are 65 years of age or older, have a weak immunity, live in a nursing home, or have chronic disease.  · There is no medicine to treat COVID-19. Your health care provider will talk with you about ways to treat your symptoms.  · Take steps to protect yourself and others from infection. Wash your hands often and disinfect objects and surfaces that are frequently touched every day. Stay away from people who are sick and wear a mask if you are sick.  This information is not intended to replace advice given to you by your health care provider. Make sure you discuss any questions you have with your health care provider.  Document Released: 01/23/2020 Document Revised: 05/14/2020 Document Reviewed: 01/23/2020  Elsevier Patient Education ©  2020 [employer]

## 2021-08-21 NOTE — PROGRESS NOTES
Per RT, pt requires an order for using nocturnal CPAP  Will obtain an order from on-call hospitalist     0121: Dr. Patricia made aware and placed order for pt

## 2021-08-21 NOTE — CARE PLAN
The patient is Stable - Low risk of patient condition declining or worsening    Shift Goals  Clinical Goals: Monitor sodium level   Patient Goals: Able to rest during the night     Progress made toward(s) clinical / shift goals:   Problem: Pain - Standard  Goal: Alleviation of pain or a reduction in pain to the patient’s comfort goal  Outcome: Progressing  Note: Pt denied any pain upon assessment   Encouraged pt to report to the nurse if experience any pain for appropriate treatment, pt verbalized understanding     Problem: Respiratory  Goal: Patient will achieve/maintain optimum respiratory ventilation and gas exchange  Outcome: Progressing  Note: Pt on room air at this moment; and only requires during the night  Pt denied any chest pain or SOB   Will continue to monitor

## 2021-08-21 NOTE — PROGRESS NOTES
Received report from NALINI Bowling  Assumed pt care  Pt is A&Ox4, resting comfortably in bed.   Pt on r.a and using CPAP at nightime   No signs of SOB/respiratory distress.   Labs noted, VSS.   Pt denied any chest pain upon assessment   Fall precautions in place.   Bed locked & at lowest position.   Call light and personal belongings within reach.   Continue to monitor

## 2021-08-21 NOTE — DISCHARGE SUMMARY
Discharge Summary    CHIEF COMPLAINT ON ADMISSION  Chief Complaint   Patient presents with   • Shortness of Breath     SOB and cough  Pain to chest and back  covid positive and vaccinated       Reason for Admission  SOB, cough     Admission Date  8/19/2021    CODE STATUS  Full Code    HPI & HOSPITAL COURSE  71 y.o. F with COPD (not on home O2),  CKD III, HLD, HTN and depression presented to the ED on 8/19/2021 c/o cough, bilateral chest pain from the cough itself which she describes as rough and sharp.  There is no other chest pain.  No exertional component to the pain.  She did initially have some body aches and fever although this seems better.  No abdominal pain, nausea vomiting or diarrhea, although states she has not been eating very much at all, has been drinking some water.  No leg pain or swelling.    She received her Covid vaccination in March, she did test positive for Covid on 8/14. She went to PCP clinic on 8/13 for a similar reason - DC home with prednisone taper course.      In ED, In afebrile, vitals wnl except mild tachypnea. Pertinent exam findings: poor air entry, diffuse rhonchi. Otherwise, appeared comfortable. Labs notable for hyponatremia, hypochloremia and elevated Cr from baseline. Trop 27, EKG was NSR.  Procal and lactate not elevated. CXR no acute cardiopulm abnormalities. Elevated D-dimer 0.78 - CTA done in ED showed no PE. Pt was provided with gentle IVF and Pt was then referred to Hospitalist services for further management.   8/20: Patient reports cough, productive yellow phlegm. Afebrile, hemodynamically stable. On room air. Na 125->127->126. Ordered sodium tablets. Creat 1.52->1.38. COVID 19 test from 8/13/21 positive. Patient does not qualify for home oxygen.  8/21: Afebrile, hemodynamically stable. CPAP at night. On room air.  Na 128-131 improving. Creat 1.32 improving.    Therefore, she is discharged in fair and stable condition to home with close outpatient follow-up.    The  patient met 2-midnight criteria for an inpatient stay at the time of discharge.    Discharge Date  8/21/21    FOLLOW UP ITEMS POST DISCHARGE  Follow up with PCP    DISCHARGE DIAGNOSES  Principal Problem:    Hyponatremia POA: Yes  Active Problems:    HTN (hypertension) POA: Yes    Hyperlipidemia POA: Yes    Acquired hypothyroidism POA: Yes    GERD (gastroesophageal reflux disease) POA: Yes    Neuropathy POA: Yes    Chronic kidney disease, stage III (moderate) (HCC) POA: Yes    Chronic obstructive pulmonary disease (HCC) POA: Yes    Osteoporosis POA: Yes    Primary osteoarthritis of first carpometacarpal joint of left hand POA: Yes    COVID-19 virus infection POA: Yes    Major depressive disorder POA: Yes  Resolved Problems:    * No resolved hospital problems. *      FOLLOW UP  Future Appointments   Date Time Provider Department Center   10/6/2021 10:10 AM Marie Leyva M.D. [facility] None   12/20/2021  9[facility]     Sandra Caceres M.D.  [address] [facility] [address]  [facility] [address]  [phone]    In 1 week        MEDICATIONS ON DISCHARGE     Medication List      START taking these medications      Instructions   guaiFENesin  MG Tb12  Commonly known as: MUCINEX   Take 1 Tablet by mouth 2 times a day as needed for Cough (productive cough).  Dose: 600 mg     sodium chloride 1 GM Tabs  Commonly known as: SALT   Take 1 Tablet by mouth 3 times a day with meals for 7 days.  Dose: 1 g        CONTINUE taking these medications      Instructions   * albuterol 2.5mg/3ml Nebu solution for nebulization  Commonly known as: PROVENTIL   Doctor's comments: DX COPD J44.9  3 mL by Nebulization route every four hours as needed for Shortness of Breath.  Dose: 2.5 mg     * albuterol 108 (90 Base) MCG/ACT Aers inhalation aerosol   USE 2 INHALATIONS ORALLY   EVERY SIX HOURS AS NEEDED  FOR SHORTNESS OF BREATH     allopurinol 100 MG Tabs  Commonly known as: ZYLOPRIM   Take 1 tablet by mouth every  day.  Dose: 100 mg     ARIPiprazole 5 MG tablet  Commonly known as: Abilify   Take 1 tablet by mouth every day.  Dose: 5 mg     atorvastatin 40 MG Tabs  Commonly known as: LIPITOR   TAKE 1 TABLET DAILY     budesonide-formoterol 160-4.5 MCG/ACT Aero  Commonly known as: SYMBICORT   USE 2 INHALATIONS ORALLY   TWICE DAILY , USE WITH     SPACER RINSE MOUTH AFTER   EACH USE     DULoxetine 60 MG Cpep delayed-release capsule  Commonly known as: CYMBALTA   TAKE 1 CAPSULE EVERY       MORNING     fenofibrate 145 MG Tabs  Commonly known as: TRICOR   TAKE 1 TABLET DAILY.     fluticasone 50 MCG/ACT nasal spray  Commonly known as: FLONASE   SPRAY 1 SPRAY INTO AFFECTED NOSTRIL EVERY DAY     latanoprost 0.005 % Soln  Commonly known as: XALATAN   Place 1 Drop in both eyes every evening.  Dose: 1 Drop     lisinopril 10 MG Tabs  Commonly known as: PRINIVIL   TAKE 1 TABLET DAILY     omeprazole 20 MG delayed-release capsule  Commonly known as: PRILOSEC   TAKE 1 CAPSULE BY MOUTH EVERY DAY     Prolia 60 MG/ML Sosy  Generic drug: denosumab   Inject 1 mL as instructed every 6 months.  Dose: 60 mg     Spiriva HandiHaler 18 MCG Caps  Generic drug: tiotropium   INHALE THE CONTENTS OF ONE CAPSULE DAILY     Synthroid 100 MCG Tabs  Generic drug: levothyroxine   TAKE 1 TABLET DAILY     therapeutic multivitamin-minerals Tabs   Take 1 Tab by mouth every day.  Dose: 1 Tablet     traMADol 50 MG Tabs  Commonly known as: ULTRAM   Take 50 mg by mouth every evening.  Dose: 50 mg     VITAMIN D PO   Take 1 Tablet by mouth every morning.  Dose: 1 Tablet         * This list has 2 medication(s) that are the same as other medications prescribed for you. Read the directions carefully, and ask your doctor or other care provider to review them with you.            STOP taking these medications    azithromycin 250 MG Tabs  Commonly known as: ZITHROMAX     predniSONE 10 MG Tabs  Commonly known as: DELTASONE     triamterene-hctz 37.5-25 MG Tabs  Commonly known as:  "MAXZIDE-25/DYAZIDE            Allergies  Allergies   Allergen Reactions   • Acetaminophen Anaphylaxis and Swelling     Lip & throat swelling     • Latex Hives   • Tape Unspecified     Causes blisters, Paper tape and \"the kind of tape used for wounds\"       DIET  Orders Placed This Encounter   Procedures   • Diet Order Diet: Cardiac     Standing Status:   Standing     Number of Occurrences:   1     Order Specific Question:   Diet:     Answer:   Cardiac [6]       ACTIVITY  As tolerated.  Weight bearing as tolerated    CONSULTATIONS  None    PROCEDURES  None    LABORATORY  Lab Results   Component Value Date    SODIUM 131 (L) 08/21/2021    POTASSIUM 5.0 08/21/2021    CHLORIDE 96 08/21/2021    CO2 22 08/21/2021    GLUCOSE 97 08/21/2021    BUN 28 (H) 08/21/2021    CREATININE 1.32 08/21/2021        Lab Results   Component Value Date    WBC 5.1 08/20/2021    HEMOGLOBIN 11.6 (L) 08/20/2021    HEMATOCRIT 36.2 (L) 08/20/2021    PLATELETCT 214 08/20/2021        Total time of the discharge process exceeds 35 minutes.  "

## 2021-08-21 NOTE — CARE PLAN
The patient is Stable - Low risk of patient condition declining or worsening    Shift Goals  Clinical Goals: Monitor NA level  Patient Goals: Discharge home    Progress made toward(s) clinical / shift goals:  yes    Patient is not progressing towards the following goals: n/a    Problem: Pain - Standard  Goal: Alleviation of pain or a reduction in pain to the patient’s comfort goal  Outcome: Progressing     Problem: Knowledge Deficit - Standard  Goal: Patient and family/care givers will demonstrate understanding of plan of care, disease process/condition, diagnostic tests and medications  Outcome: Progressing     Problem: Respiratory  Goal: Patient will achieve/maintain optimum respiratory ventilation and gas exchange  Outcome: Progressing

## 2021-08-24 LAB
BACTERIA BLD CULT: NORMAL
BACTERIA BLD CULT: NORMAL
SIGNIFICANT IND 70042: NORMAL
SIGNIFICANT IND 70042: NORMAL
SITE SITE: NORMAL
SITE SITE: NORMAL
SOURCE SOURCE: NORMAL
SOURCE SOURCE: NORMAL

## 2021-08-31 ENCOUNTER — TELEPHONE (OUTPATIENT)
Dept: MEDICAL GROUP | Facility: MEDICAL CENTER | Age: 71
End: 2021-08-31

## 2021-08-31 NOTE — TELEPHONE ENCOUNTER
Spoke to patient to schedule a follow up from her hospital visit. Patient declined. Advised patient to call anytime to schedule if patient's mind changes.

## 2021-09-01 RX ORDER — FLUTICASONE PROPIONATE 50 MCG
SPRAY, SUSPENSION (ML) NASAL
Qty: 16 ML | Refills: 0 | Status: SHIPPED | OUTPATIENT
Start: 2021-09-01

## 2021-09-03 ENCOUNTER — PATIENT MESSAGE (OUTPATIENT)
Dept: MEDICAL GROUP | Facility: MEDICAL CENTER | Age: 71
End: 2021-09-03

## 2021-09-03 DIAGNOSIS — E78.00 PURE HYPERCHOLESTEROLEMIA: ICD-10-CM

## 2021-09-03 RX ORDER — ATORVASTATIN CALCIUM 40 MG/1
40 TABLET, FILM COATED ORAL DAILY
Qty: 90 TABLET | Refills: 3 | Status: SHIPPED | OUTPATIENT
Start: 2021-09-03

## 2021-09-03 RX ORDER — LEVOTHYROXINE SODIUM 0.1 MG/1
100 TABLET ORAL DAILY
Qty: 90 TABLET | Refills: 3 | Status: SHIPPED | OUTPATIENT
Start: 2021-09-03

## 2021-09-03 NOTE — TELEPHONE ENCOUNTER
From: Summer Hatch  To: Physician Sandra Caceres  Sent: 9/3/2021 3:21 AM PDT  Subject: Prescription    Please call in prescriptions for my ATvorsatin 40MG TAB and SYnthroid 100MCG TAB. as Dr aPlomino is o longer my  thanks

## 2021-12-20 ENCOUNTER — APPOINTMENT (OUTPATIENT)
Dept: ONCOLOGY | Facility: MEDICAL CENTER | Age: 71
End: 2021-12-20
Attending: FAMILY MEDICINE
Payer: MEDICARE

## 2022-12-09 NOTE — TELEPHONE ENCOUNTER
Was the patient seen in the last year in this department? Yes    Does patient have an active prescription for medications requested? No     Received Request Via: Pharmacy       Ventricular Rate 88 BPM    Atrial Rate 88 BPM    P-R Interval 152 ms    QRS Duration 64 ms    Q-T Interval 386 ms    QTC Calculation(Bazett) 467 ms    P Axis 1 degrees    R Axis -5 degrees    T Axis 44 degrees    Diagnosis Line Normal sinus rhythm  Poor R wave progression V1-V3  Abnormal ECG     Ventricular Rate 88 BPM    Atrial Rate 88 BPM    P-R Interval 152 ms    QRS Duration 64 ms    Q-T Interval 386 ms    QTC Calculation(Bazett) 467 ms    P Axis 1 degrees    R Axis -5 degrees    T Axis 44 degrees    Diagnosis Line Normal sinus rhythm  Poor R wave progression V1-V3  Abnormal ECG     Ventricular Rate 88 BPM    Atrial Rate 88 BPM    P-R Interval 152 ms    QRS Duration 64 ms    Q-T Interval 386 ms    QTC Calculation(Bazett) 467 ms    P Axis 1 degrees    R Axis -5 degrees    T Axis 44 degrees    Diagnosis Line Normal sinus rhythm  Poor R wave progression V1-V3  Abnormal ECG     Ventricular Rate 88 BPM    Atrial Rate 88 BPM    P-R Interval 152 ms    QRS Duration 64 ms    Q-T Interval 386 ms    QTC Calculation(Bazett) 467 ms    P Axis 1 degrees    R Axis -5 degrees    T Axis 44 degrees    Diagnosis Line Normal sinus rhythm  Poor R wave progression V1-V3  Abnormal ECG

## 2023-03-07 NOTE — PROCEDURES
Multi-Ox Readings  Multi Ox #1 Room air   O2 sat % at rest 94   O2 sat % on exertion 90   O2 sat average on exertion 92   Multi Ox #2     O2 sat % at rest     O2 sat % on exertion     O2 sat average on exertion       Oxygen Use     Oxygen Frequency     Duration of need     Is the patient mobile within the home?     CPAP Use?     BIPAP Use?     Servo Titration             
[Initial Evaluation] : an initial evaluation

## 2023-04-04 ENCOUNTER — OFFICE (OUTPATIENT)
Dept: URBAN - METROPOLITAN AREA CLINIC 84 | Facility: CLINIC | Age: 73
End: 2023-04-04
Payer: COMMERCIAL

## 2023-04-04 VITALS
SYSTOLIC BLOOD PRESSURE: 110 MMHG | HEART RATE: 86 BPM | HEIGHT: 65 IN | DIASTOLIC BLOOD PRESSURE: 70 MMHG | WEIGHT: 148 LBS | OXYGEN SATURATION: 95 %

## 2023-04-04 DIAGNOSIS — Z86.19 PERSONAL HISTORY OF OTHER INFECTIOUS AND PARASITIC DISEASES: ICD-10-CM

## 2023-04-04 DIAGNOSIS — Z86.010 PERSONAL HISTORY OF COLONIC POLYPS: ICD-10-CM

## 2023-04-04 DIAGNOSIS — Z87.19 PERSONAL HISTORY OF OTHER DISEASES OF THE DIGESTIVE SYSTEM: ICD-10-CM

## 2023-04-04 PROCEDURE — 99204 OFFICE O/P NEW MOD 45 MIN: CPT | Performed by: NURSE PRACTITIONER

## 2023-09-11 ENCOUNTER — OFFICE (OUTPATIENT)
Dept: URBAN - METROPOLITAN AREA CLINIC 84 | Facility: CLINIC | Age: 73
End: 2023-09-11
Payer: COMMERCIAL

## 2023-09-11 VITALS
HEIGHT: 65 IN | HEART RATE: 67 BPM | SYSTOLIC BLOOD PRESSURE: 118 MMHG | DIASTOLIC BLOOD PRESSURE: 70 MMHG | OXYGEN SATURATION: 97 % | WEIGHT: 154.4 LBS

## 2023-09-11 DIAGNOSIS — R93.5 ABNORMAL FINDINGS ON DIAGNOSTIC IMAGING OF OTHER ABDOMINAL R: ICD-10-CM

## 2023-09-11 DIAGNOSIS — Z86.010 PERSONAL HISTORY OF COLONIC POLYPS: ICD-10-CM

## 2023-09-11 DIAGNOSIS — Z86.19 PERSONAL HISTORY OF OTHER INFECTIOUS AND PARASITIC DISEASES: ICD-10-CM

## 2023-09-11 DIAGNOSIS — R79.89 OTHER SPECIFIED ABNORMAL FINDINGS OF BLOOD CHEMISTRY: ICD-10-CM

## 2023-09-11 PROCEDURE — 99214 OFFICE O/P EST MOD 30 MIN: CPT | Performed by: NURSE PRACTITIONER

## 2025-03-21 NOTE — TELEPHONE ENCOUNTER
Caller: Xochitl with Dr Merino's office.    Phone Number: [phone]    Message: Xochitl called and lm. Re: Sx clearance with Dr Leyva        04/01/21:  Called pt and lm, asking pt to return my call.   Sent [employer] message re: this.    Mild, likely cardiogenic in setting of newly reduced LVEF  Levophed titrated to goal MAPs >65  Trend UOP, lactate, other indices of perfusion   Levophed off as of AM of 3/22

## (undated) DEVICE — RESERVOIR SUCTION 100 CC - SILICONE (20EA/CA)

## (undated) DEVICE — DRESSING ABDOMINAL PAD STERILE 8 X 10" (360EA/CA)"

## (undated) DEVICE — ELECTRODE DUAL RETURN W/ CORD - (50/PK)

## (undated) DEVICE — GLOVE SZ 6.5 BIOGEL PI MICRO - PF LF (50PR/BX)

## (undated) DEVICE — WATER IRRIGATION STERILE 1000ML (12EA/CA)

## (undated) DEVICE — ADHESIVE MASTISOL - (48/BX)

## (undated) DEVICE — HEAD HOLDER JUNIOR/ADULT

## (undated) DEVICE — KIT  I.V. START (100EA/CA)

## (undated) DEVICE — DRAPE C-ARM LARGE 41IN X 74 IN - (10/BX 2BX/CA)

## (undated) DEVICE — TUBING CLEARLINK DUO-VENT - C-FLO (48EA/CA)

## (undated) DEVICE — GLOVE SZ 8 BIOGEL PI MICRO - PF LF (50PR/BX)

## (undated) DEVICE — DRAPE LARGE 3 QUARTER - (20/CA)

## (undated) DEVICE — NEEDLE NON SAFETY HYPO 22 GA X 1 1/2 IN (100/BX)

## (undated) DEVICE — SENSOR SPO2 NEO LNCS ADHESIVE (20/BX) SEE USER NOTES

## (undated) DEVICE — Device

## (undated) DEVICE — SUTURE 2-0 VICRYL PLUS CT-1 36 (36PK/BX)"

## (undated) DEVICE — BLADE SURGICAL #15 - (50/BX 3BX/CA)

## (undated) DEVICE — DERMABOND ADVANCED - (12EA/BX)

## (undated) DEVICE — CLIP SM INTNL HRZN TI ESCP LGT - (24EA/PK 25PK/BX)

## (undated) DEVICE — GLOVE BIOGEL PI INDICATOR SZ 7.5 SURGICAL PF LF -(50/BX 4BX/CA)

## (undated) DEVICE — GLOVE, LITE (PAIR)

## (undated) DEVICE — GLOVE BIOGEL INDICATOR SZ 7.5 SURGICAL PF LTX - (50PR/BX 4BX/CA)

## (undated) DEVICE — NEPTUNE 4 PORT MANIFOLD - (20/PK)

## (undated) DEVICE — SUTURE 4-0 MONOCRYL PLUS PS-2 - 27 INCH (36/BX)

## (undated) DEVICE — BINDER ABDOMINAL 24X30X12 IN - FITS 24-30IN WAIST 12IN HIGH

## (undated) DEVICE — SODIUM CHL IRRIGATION 0.9% 1000ML (12EA/CA)

## (undated) DEVICE — NEEDLE SPINAL NON-SAFETY 25GA X 3 1/2 (25/BX)

## (undated) DEVICE — GLOVE BIOGEL SZ 7.5 SURGICAL PF LTX - (50PR/BX 4BX/CA)

## (undated) DEVICE — SLEEVE, VASO, THIGH, MED

## (undated) DEVICE — SYRINGE SAFETY 5 ML 18 GA X 1-1/2 BLUNT LL (100/BX 4BX/CA)

## (undated) DEVICE — SUTURE GENERAL

## (undated) DEVICE — NEEDLE NON SAFETY 25 GA X 1 1/2 IN HYPO (100EA/BX)

## (undated) DEVICE — GLOVE BIOGEL PI INDICATOR SZ 6.5 SURGICAL PF LF - (50/BX 4BX/CA)

## (undated) DEVICE — KIT ANESTHESIA W/CIRCUIT & 3/LT BAG W/FILTER (20EA/CA)

## (undated) DEVICE — CLIP MED INTNL HRZN TI ESCP - (25/BX)

## (undated) DEVICE — SYRINGE 12 CC LUER TIP - (80/BX) OBSOLETE ITEM

## (undated) DEVICE — CLOSURE SKIN STRIP 1/2 X 4 IN - (STERI STRIP) (50/BX 4BX/CA)

## (undated) DEVICE — SUTURE 3-0 MONOCRYL PLUS PS-2 - (12/BX)

## (undated) DEVICE — DRAPE LOWER EXTREMETY - (6/CA)

## (undated) DEVICE — SUTURE 3-0 ETHIBOND RB-1 (36PK/BX)

## (undated) DEVICE — DRESSING PETROLEUM GAUZE 5 X 9" (50EA/BX 4BX/CA)"

## (undated) DEVICE — SUTURE 0 VICRYL PLUS CT-1 - 36 INCH (36/BX)

## (undated) DEVICE — CATHETER IV 20 GA X 1-1/4 ---SURG.& SDS ONLY--- (50EA/BX)

## (undated) DEVICE — ELECTRODE 850 FOAM ADHESIVE - HYDROGEL RADIOTRNSPRNT (50/PK)

## (undated) DEVICE — NEEDLE SPINAL NON-SAFETY 22 GA X 3 (25EA/BX)"

## (undated) DEVICE — SET LEADWIRE 5 LEAD BEDSIDE DISPOSABLE ECG (1SET OF 5/EA)

## (undated) DEVICE — CLEANER ELECTRO-SURGICAL TIP - (25/BX 4BX/CA)

## (undated) DEVICE — SPONGE GAUZESTER 4 X 4 4PLY - (128PK/CA)

## (undated) DEVICE — PAD LAP STERILE 18 X 18 - (5/PK 40PK/CA)

## (undated) DEVICE — GOWN SURGEONS X-LARGE - DISP. (30/CA)

## (undated) DEVICE — SUCTION INSTRUMENT YANKAUER BULBOUS TIP W/O VENT (50EA/CA)

## (undated) DEVICE — GLOVE BIOGEL PI ULTRATOUCH SZ 7.0 SURGICAL PF LF- POWDER FREE (50/BX 4BX/CA)

## (undated) DEVICE — TUBE CONNECTING SUCTION - CLEAR PLASTIC STERILE 72 IN (50EA/CA)

## (undated) DEVICE — SUTURE 0 ETHIBOND MO6 C/R - (12/BX) 8-18 INCH ETHICON

## (undated) DEVICE — SYRINGE 10 ML CONTROL LL (25EA/BX 4BX/CA)

## (undated) DEVICE — CANISTER SUCTION 3000ML MECHANICAL FILTER AUTO SHUTOFF MEDI-VAC NONSTERILE LF DISP  (40EA/CA)

## (undated) DEVICE — SUTURE 3-0 MONOCRYL PLUS PS-1 - 27 INCH (36/BX)

## (undated) DEVICE — LACTATED RINGERS INJ 1000 ML - (14EA/CA 60CA/PF)

## (undated) DEVICE — DRAIN J-VAC 10MM FLAT - (10/CA)

## (undated) DEVICE — GLOVE BIOGEL SZ 8 SURGICAL PF LTX - (50PR/BX 4BX/CA)

## (undated) DEVICE — GLOVE BIOGEL PI INDICATOR SZ 8.0 SURGICAL PF LF -(50/BX 4BX/CA)

## (undated) DEVICE — MANIFOLD NEPTUNE 1 PORT (20/PK)

## (undated) DEVICE — STERI STRIP COMPOUND BENZOIN - TINCTURE 0.6ML WITH APPLICATOR (40EA/BX)

## (undated) DEVICE — TOWEL STOP TIMEOUT SAFETY FLAG (40EA/CA)

## (undated) DEVICE — COVER PROBE STERILE CONE (12EA/CA)

## (undated) DEVICE — PENCIL ELECTSURG 10FT BTN SWH - (50/CA)

## (undated) DEVICE — CANISTER SUCTION RIGID RED 1500CC (40EA/CA)

## (undated) DEVICE — CHLORAPREP 26 ML APPLICATOR - ORANGE TINT(25/CA)

## (undated) DEVICE — GOWN WARMING STANDARD FLEX - (30/CA)

## (undated) DEVICE — PACK MINOR BASIN - (2EA/CA)